# Patient Record
Sex: MALE | Race: WHITE | NOT HISPANIC OR LATINO | Employment: OTHER | URBAN - METROPOLITAN AREA
[De-identification: names, ages, dates, MRNs, and addresses within clinical notes are randomized per-mention and may not be internally consistent; named-entity substitution may affect disease eponyms.]

---

## 2017-01-25 ENCOUNTER — OFFICE VISIT (OUTPATIENT)
Dept: FAMILY MEDICINE CLINIC | Facility: CLINIC | Age: 72
End: 2017-01-25

## 2017-01-25 VITALS
BODY MASS INDEX: 26.92 KG/M2 | HEIGHT: 70 IN | WEIGHT: 188 LBS | OXYGEN SATURATION: 98 % | SYSTOLIC BLOOD PRESSURE: 122 MMHG | TEMPERATURE: 97.5 F | HEART RATE: 102 BPM | DIASTOLIC BLOOD PRESSURE: 66 MMHG

## 2017-01-25 DIAGNOSIS — M51.36 DDD (DEGENERATIVE DISC DISEASE), LUMBAR: Primary | ICD-10-CM

## 2017-01-25 PROCEDURE — 99213 OFFICE O/P EST LOW 20 MIN: CPT | Performed by: FAMILY MEDICINE

## 2017-01-25 RX ORDER — HYDROCODONE BITARTRATE AND ACETAMINOPHEN 10; 325 MG/1; MG/1
1 TABLET ORAL EVERY 6 HOURS PRN
Qty: 120 TABLET | Refills: 0 | Status: SHIPPED | OUTPATIENT
Start: 2017-01-25 | End: 2018-09-10 | Stop reason: DRUGHIGH

## 2017-01-25 NOTE — PROGRESS NOTES
Subjective   Ben Walsh is a 71 y.o. male.     History of Present Illness   Ben Walsh 71 y.o. male who presents today for recheck of low back pain.  Pain has been well controlled with hydrocodone.  he has a history of   Patient Active Problem List   Diagnosis   • Allergic rhinitis   • ADD (attention deficit disorder)   • DDD (degenerative disc disease), lumbar   • BPH (benign prostatic hypertrophy)   • Chronic pain   • ASCVD (arteriosclerotic cardiovascular disease)   • Depression   • Diverticulosis of colon   • GERD (gastroesophageal reflux disease)   • Hiatal hernia   • Hyperlipidemia   • Essential hypertension   • Primary hypothyroidism   • Insomnia   • Osteoarthritis, multiple sites   • Acute pancreatitis   • Vitamin D deficiency   • Glaucoma   • Abnormal nuclear stress test   • Two-vessel coronary artery disease   • SAEED (dyspnea on exertion)   • Obstructive sleep apnea syndrome   • Patent foramen ovale   • Snoring   • Disorder of rotator cuff   • History of COPD   • Type 2 diabetes mellitus with microalbuminuria, without long-term current use of insulin   • Carpal tunnel syndrome of right wrist   .      The following portions of the patient's history were reviewed and updated as appropriate: allergies, current medications, past family history, past medical history, past social history, past surgical history and problem list.    Review of Systems   Constitutional: Negative for fatigue.   Respiratory: Negative for shortness of breath.    Cardiovascular: Negative for chest pain.   Gastrointestinal: Negative for nausea and vomiting.   Musculoskeletal: Positive for back pain.   Skin: Negative.        Objective   Physical Exam   Constitutional: He appears well-developed and well-nourished.   HENT:   Head: Normocephalic.   Eyes: EOM are normal. Pupils are equal, round, and reactive to light.   Cardiovascular: Normal rate, regular rhythm and normal heart sounds.    Pulmonary/Chest: Effort normal and  breath sounds normal.   Musculoskeletal:        Lumbar back: He exhibits decreased range of motion and tenderness.   Skin: Skin is warm and dry.   Psychiatric: He has a normal mood and affect. His behavior is normal. Judgment and thought content normal.   Vitals reviewed.      Assessment/Plan   Ben was seen today for med refill.    Diagnoses and all orders for this visit:    DDD (degenerative disc disease), lumbar  -     HYDROcodone-acetaminophen (NORCO)  MG per tablet; Take 1 tablet by mouth Every 6 (Six) Hours As Needed for moderate pain (4-6).

## 2017-01-25 NOTE — MR AVS SNAPSHOT
Ben Walsh   1/25/2017 3:30 PM   Office Visit    Provider:  Kasey Castillo MD   Department:  [facility]   Dept Phone:  [phone]                Your Full Care Plan              Where to Get Your Medications      You can get these medications from any pharmacy     Bring a paper prescription for each of these medications     HYDROcodone-acetaminophen  MG per tablet            Your Updated Medication List          This list is accurate as of: 1/25/17  4:12 PM.  Always use your most recent med list.                ACCU-CHEK FASTCLIX LANCETS misc   TESTING BS 3 X DAY       amLODIPine 5 MG tablet   Commonly known as:  NORVASC   TAKE 1 TABLET EVERY DAY       aspirin 81 MG EC tablet       atorvastatin 40 MG tablet   Commonly known as:  LIPITOR       citalopram 40 MG tablet   Commonly known as:  CeleXA   TAKE 1 TABLET EVERY DAY       clopidogrel 75 MG tablet   Commonly known as:  PLAVIX   Take 1 tablet by mouth daily. First day take 4 tablets and then one tablet daily after       coenzyme Q10 100 MG capsule       cyclobenzaprine 10 MG tablet   Commonly known as:  FLEXERIL   Take 1 tablet by mouth 3 (three) times a day as needed for muscle spasms.       esomeprazole 20 MG capsule   Commonly known as:  nexIUM       glimepiride 2 MG tablet   Commonly known as:  AMARYL   1 tablet daily       glucose blood test strip   Commonly known as:  ACCU-CHEK SMARTVIEW   TESTING BS 3 X DAY DX CODE E11.29       HYDROcodone-acetaminophen  MG per tablet   Commonly known as:  NORCO   Take 1 tablet by mouth Every 6 (Six) Hours As Needed for moderate pain (4-6).       levothyroxine 50 MCG tablet   Commonly known as:  SYNTHROID, LEVOTHROID   TAKE 1 TABLET EVERY DAY       lisinopril 20 MG tablet   Commonly known as:  PRINIVIL,ZESTRIL       metFORMIN 1000 MG tablet   Commonly known as:  GLUCOPHAGE   1 tablet twice a day       MULTIVITAMIN ADULTS 50+ PO       NITROSTAT 0.4 MG SL tablet   Generic drug:  nitroglycerin       predniSONE 20 MG tablet   Commonly known as:  DELTASONE       PROBIOTIC DAILY capsule       tamsulosin 0.4 MG capsule 24 hr capsule   Commonly known as:  FLOMAX       vitamin D 15199 UNITS capsule capsule   Commonly known as:  ERGOCALCIFEROL   TAKE 1 CAPSULE EVERY WEEK       zolpidem 10 MG tablet   Commonly known as:  AMBIEN   Take 1 tablet by mouth At Night As Needed for sleep.               You Were Diagnosed With        Codes Comments    DDD (degenerative disc disease), lumbar    -  Primary ICD-10-CM: M51.36  ICD-9-CM: 722.52       Instructions     None    Patient Instructions History      [employer] Signup     [facility] allows you to send messages to your doctor, view your test results, renew your prescriptions, schedule appointments, and more. To sign up, go to [employer] and click on the Sign Up Now link in the New User? box. Enter your [employer] Activation Code exactly as it appears below along with the last four digits of your Social Security Number and your Date of Birth () to complete the sign-up process. If you do not sign up before the expiration date, you must request a new code.    [employer] Activation Code: [email or web address]  Expires: 2017  4:12 PM    If you have questions, you can email [email or web address] or call [phone] to talk to our [employer] staff. Remember, [employer] is NOT to be used for urgent needs. For medical emergencies, dial 911.               Other Info from Your Visit           Your Appointments     2017  1:15 PM EST   Office Visit with Matt Flores MD   [facility] (--)    [address]  [facility] [address] [phone]           Arrive 15 minutes prior to appointment.              Allergies     Onglyza [Saxagliptin]  Other (See Comments)    pancreatitis    Other Allergy Other (See Comments)    PLASTIC TAPE  "CAUSES RASH    Vibramycin [Doxycycline] Intolerance Nausea And Vomiting      Reason for Visit     Med Refill           Vital Signs     Blood Pressure Pulse Temperature Height Weight Oxygen Saturation    122/66 (BP Location: Left arm, Patient Position: Sitting, Cuff Size: Adult) 102 97.5 °F (36.4 °C) (Oral) 70\" (177.8 cm) 188 lb (85.3 kg) 98%    Body Mass Index Smoking Status                26.98 kg/m2 Never Smoker          Problems and Diagnoses Noted     Degeneration of lumbar or lumbosacral intervertebral disc      "

## 2017-02-02 RX ORDER — GUAIFENESIN, PSEUDOEPHEDRINE HYDROCHLORIDE 600; 60 MG/1; MG/1
1 TABLET, EXTENDED RELEASE ORAL EVERY 12 HOURS
Qty: 60 TABLET | Refills: 11 | Status: SHIPPED | OUTPATIENT
Start: 2017-02-02 | End: 2019-02-06

## 2017-02-10 DIAGNOSIS — M51.36 DDD (DEGENERATIVE DISC DISEASE), LUMBAR: Primary | ICD-10-CM

## 2017-02-21 ENCOUNTER — OFFICE VISIT (OUTPATIENT)
Dept: CARDIOLOGY | Facility: CLINIC | Age: 72
End: 2017-02-21

## 2017-02-21 VITALS
HEART RATE: 94 BPM | HEIGHT: 70 IN | BODY MASS INDEX: 27.06 KG/M2 | DIASTOLIC BLOOD PRESSURE: 83 MMHG | WEIGHT: 189 LBS | SYSTOLIC BLOOD PRESSURE: 124 MMHG

## 2017-02-21 DIAGNOSIS — I48.91 ATRIAL FIBRILLATION AND FLUTTER (HCC): Primary | ICD-10-CM

## 2017-02-21 DIAGNOSIS — I48.92 ATRIAL FIBRILLATION AND FLUTTER (HCC): Primary | ICD-10-CM

## 2017-02-21 PROBLEM — R94.31 ABNORMAL ELECTROCARDIOGRAM: Status: ACTIVE | Noted: 2017-02-21

## 2017-02-21 PROBLEM — R07.89 ATYPICAL CHEST PAIN: Status: ACTIVE | Noted: 2017-02-21

## 2017-02-21 PROCEDURE — 93000 ELECTROCARDIOGRAM COMPLETE: CPT | Performed by: INTERNAL MEDICINE

## 2017-02-21 PROCEDURE — 99213 OFFICE O/P EST LOW 20 MIN: CPT | Performed by: INTERNAL MEDICINE

## 2017-02-21 RX ORDER — ERGOCALCIFEROL 1.25 MG/1
50000 CAPSULE ORAL
Qty: 13 CAPSULE | Refills: 3 | Status: SHIPPED | OUTPATIENT
Start: 2017-02-21 | End: 2018-03-09 | Stop reason: SDUPTHER

## 2017-02-21 RX ORDER — TAMSULOSIN HYDROCHLORIDE 0.4 MG/1
1 CAPSULE ORAL DAILY
Qty: 90 CAPSULE | Refills: 3 | Status: SHIPPED | OUTPATIENT
Start: 2017-02-21 | End: 2018-09-24 | Stop reason: SDUPTHER

## 2017-02-21 NOTE — PROGRESS NOTES
Procedure   [facility]  Cardiology Progress Note    Patient Identification:  Name:Ben Walsh  Age:71 y.o.  Sex: male  :  1945  MRN: [id number]           2017    Subjective:    Chief Complaint   Patient presents with   • Abnormal ECG       JUNAID Walsh is a 71 y.o. year-old male here for cardiac follow-up on Coronary Artery Disease. Past medical history includes moderate disease of the RCA; treated medically as it is not significant by FFR and had stent to the LAD  with 2.5/12mm promus drug eluting stent, abnormal liver function test,depression, esophageal reflux, hyperlipidemia, hypertension, hypothyroidism, nonalcoholic fatty liver disease, obstructive sleep apnea, rotator cuff tear, diabetes mellitus and patent foramen ovale..    Repeat Cath in 2016 showed patentstent in the LAD and RCA disease is not that bad     . He denies any unusual dyspnea. He had left shoulder surgery done. He has some numbness in the right arm that was felt to be due to either carpal tunnel or cervical radiculopathy.  .    Since last visit the patient has been doing well. The patient denies chest pain, palpitations, dizziness, lightheadedness or syncopal episodes. He says he can cut 3 acres of lawn with no symptoms.    Review of Systems   Constitution: Negative for chills and fever.   Eyes: Negative for blurred vision and double vision.   Cardiovascular: Negative for chest pain, irregular heartbeat, leg swelling, near-syncope, palpitations and syncope.   Respiratory: Negative for cough, shortness of breath, snoring and wheezing.    Gastrointestinal: Negative for diarrhea, nausea and vomiting.   Neurological: Positive for numbness. Negative for dizziness, light-headedness and seizures.        Right arm   Psychiatric/Behavioral: Negative for depression.       Past Medical History   Diagnosis Date   • Acute pancreatitis      thought to be from the Januvia Rx,    • ADD (attention deficit  disorder)    • Allergic rhinitis    • Back pain    • BPH (benign prostatic hypertrophy)    • CAD (coronary artery disease)    • Chronic pain    • DDD (degenerative disc disease), lumbar    • Depression    • Diverticulitis of colon    • Diverticulitis of colon with hemorrhage    • Diverticulosis of colon    • Elevated PSA    • Essential hypertension    • GERD (gastroesophageal reflux disease)    • Glaucoma      Dr. Art   • Herpes zoster      HX   • Hiatal hernia    • Hyperlipidemia    • Hypothyroidism    • Impacted cerumen    • Incisional hernia    • Insomnia    • Joint pain    • Osteoarthritis, multiple sites    • RCT (rotator cuff tear)    • Type 2 diabetes mellitus    • Vitamin D deficiency        Past Surgical History   Procedure Laterality Date   • Shoulder surgery Left    • Back surgery     • Cardiac catheterization     • Coronary angioplasty with stent placement  07/2014   • Cataract extraction Left 03/2014   • Cervical spine surgery     • Colon resection     • Endoscopy     • Lumbar disc surgery     • Lumbar laminectomy     • Lumbar fusion     • Spine surgery       lumbrosacral   • Prostate biopsy     • Eye surgery Right 12/2011     Dr. River Art; had bilat YAG peripheral iridotomy for glaucoma   • Hernia repair  03/2009     also 4/09; Dr. Carson; incisional hernias   • Cardiac catheterization N/A 3/11/2016     Procedure: Coronary angiography;  Surgeon: Anastacio Flores MD;  [facility] INVAS[facility];  Service:    • Shoulder arthroscopy w/ rotator cuff repair Left 8/31/2016     Procedure: LEFT SHOULDER ARTHROSCOPY WITH ROTATOR CUFF REPAIR,  ACROMIOPLASTY, AND DEBRIDMENT OF LABRUM;  Surgeon: Guerrero Kruse MD;  [place] [facility] [place] [facility] [place]  Service:        Social History     Social History   • Marital status:      Spouse name: N/A   • Number of children: N/A   • Years of education: N/A     Occupational History   • Not on file.     Social History Main Topics   • Smoking status:  Never Smoker   • Smokeless tobacco: Never Used   • Alcohol use No   • Drug use: No   • Sexual activity: Not on file     Other Topics Concern   • Not on file     Social History Narrative       Family History   Problem Relation Age of Onset   • Diabetes Mother    • Heart failure Mother    • Hypertension Mother    • Hyperlipidemia Mother    • Alcohol abuse Father    • Cancer Father      prostate   • Cancer Sister    • Other Brother      demyelinating disease of central nervous system   • Cancer Brother    • Heart disease Sister        Scheduled Meds:    Current Outpatient Prescriptions:   •  ACCU-CHEK FASTCLIX LANCETS misc, TESTING BS 3 X DAY, Disp: 300 each, Rfl: 1  •  amLODIPine (NORVASC) 5 MG tablet, TAKE 1 TABLET EVERY DAY, Disp: 90 tablet, Rfl: 1  •  aspirin 81 MG EC tablet, Take 81 mg by mouth. INSTRUCTED NOT TO STOP PER MD, Disp: , Rfl:   •  atorvastatin (LIPITOR) 40 MG tablet, Take 80 mg by mouth daily., Disp: , Rfl:   •  citalopram (CeleXA) 40 MG tablet, TAKE 1 TABLET EVERY DAY, Disp: 90 tablet, Rfl: 1  •  clopidogrel (PLAVIX) 75 MG tablet, Take 1 tablet by mouth daily. First day take 4 tablets and then one tablet daily after, Disp: 30 tablet, Rfl: 4  •  coenzyme Q10 100 MG capsule, Take 100 mg by mouth Daily., Disp: , Rfl:   •  cyclobenzaprine (FLEXERIL) 10 MG tablet, Take 1 tablet by mouth 3 (three) times a day as needed for muscle spasms., Disp: 90 tablet, Rfl: 1  •  esomeprazole (NexIUM) 20 MG capsule, Take 20 mg by mouth every morning before breakfast., Disp: , Rfl:   •  glimepiride (AMARYL) 2 MG tablet, 1 tablet daily, Disp: 90 tablet, Rfl: 2  •  glucose blood (ACCU-CHEK SMARTVIEW) test strip, TESTING BS 3 X DAY DX CODE E11.29, Disp: 300 each, Rfl: 1  •  HYDROcodone-acetaminophen (NORCO)  MG per tablet, Take 1 tablet by mouth Every 6 (Six) Hours As Needed for moderate pain (4-6)., Disp: 120 tablet, Rfl: 0  •  levothyroxine (SYNTHROID, LEVOTHROID) 50 MCG tablet, TAKE 1 TABLET EVERY DAY, Disp: 90  "tablet, Rfl: 1  •  lisinopril (PRINIVIL,ZESTRIL) 20 MG tablet, , Disp: , Rfl:   •  metFORMIN (GLUCOPHAGE) 1000 MG tablet, 1 tablet twice a day, Disp: 180 tablet, Rfl: 2  •  Multiple Vitamins-Minerals (MULTIVITAMIN ADULTS 50+ PO), Take 1 tablet by mouth daily. PT HOLDING FOR SURGERY, Disp: , Rfl:   •  NITROSTAT 0.4 MG SL tablet, Place 0.4 mg under the tongue every 5 (five) minutes as needed., Disp: , Rfl:   •  predniSONE (DELTASONE) 20 MG tablet, Take 20 mg by mouth. 3 DAYS WEEK FOR ALLERGIES , Disp: , Rfl:   •  Probiotic Product (PROBIOTIC DAILY) capsule, Take 1 capsule by mouth daily., Disp: , Rfl:   •  pseudoephedrine-guaifenesin (MUCINEX D)  MG per 12 hr tablet, Take 1 tablet by mouth Every 12 (Twelve) Hours., Disp: 60 tablet, Rfl: 11  •  tamsulosin (FLOMAX) 0.4 MG capsule 24 hr capsule, 1 capsule daily., Disp: , Rfl:   •  vitamin D (ERGOCALCIFEROL) 32878 UNITS capsule capsule, TAKE 1 CAPSULE EVERY WEEK, Disp: 13 capsule, Rfl: 3  •  zolpidem (AMBIEN) 10 MG tablet, Take 1 tablet by mouth At Night As Needed for sleep., Disp: 30 tablet, Rfl: 5    Objective:  Visit Vitals   • /83   • Pulse 94   • Ht 70\" (177.8 cm)   • Wt 189 lb (85.7 kg)   • BMI 27.12 kg/m2        Physical Exam  Physical Exam:    General: No acute distress.    Skin: Warm and dry, no diaphoresis noted   HEENT: No ptosis; external ear and nose normal; oral mucosa moist   Neck: Supple; no carotid bruits; no JVD, Trachea mid line   Heart: S1S2 regular rate and rhythm; no murmurs; no gallop or rub appreciated, apex not displaced   Chest: Respirations regular, unlabored at rest, bilateral breath sounds have good air entry; no  wheezes auscultated.     Abdomen: Soft, non-tender, non-distended, positive bowel sounds  No hepatosplenomegaly   Extremities: Bilateral lower extremities have no pre-tibial pitting edema; Radials are palpable   Neurological: Alert and oriented x 3; no new motor deficits,           ECG 12 Lead  Date/Time: 2/21/2017 1:25 " PM  Performed by: HANNAH FLORES  Authorized by: HANNAH FLORES   Rhythm: sinus rhythm  Rate: normal  QRS axis: normal  Clinical impression: normal ECG           Comparison to previous ECG:  Similar to previous ecg     Cath 2016:       Assessment:  Problem List Items Addressed This Visit     None      Visit Diagnoses     Atrial fibrillation and flutter    -  Primary    Relevant Orders    ECG 12 Lead          Plan:    Overall clinically he has been stable with no angina. No active chest pain. I will continue the current medical regimen except we can stop the Aspirin. Weight loss is reemphasized.      02/21/2017  Anastacio Flores MD, FACC

## 2017-03-06 ENCOUNTER — HOSPITAL ENCOUNTER (OUTPATIENT)
Facility: HOSPITAL | Age: 72
Setting detail: HOSPITAL OUTPATIENT SURGERY
Discharge: HOME | End: 2017-03-06
Attending: SURGERY | Admitting: SURGERY

## 2017-03-06 ENCOUNTER — ANESTHESIA (OUTPATIENT)
Dept: GASTROENTEROLOGY | Facility: HOSPITAL | Age: 72
End: 2017-03-06

## 2017-03-06 ENCOUNTER — ANESTHESIA EVENT (OUTPATIENT)
Dept: GASTROENTEROLOGY | Facility: HOSPITAL | Age: 72
End: 2017-03-06

## 2017-03-06 VITALS
OXYGEN SATURATION: 98 % | RESPIRATION RATE: 16 BRPM | WEIGHT: 182.5 LBS | SYSTOLIC BLOOD PRESSURE: 113 MMHG | DIASTOLIC BLOOD PRESSURE: 66 MMHG | TEMPERATURE: 98.5 F | BODY MASS INDEX: 26.13 KG/M2 | HEART RATE: 76 BPM | HEIGHT: 70 IN

## 2017-03-06 LAB — GLUCOSE BLDC GLUCOMTR-MCNC: 171 MG/DL (ref 70–130)

## 2017-03-06 PROCEDURE — 82962 GLUCOSE BLOOD TEST: CPT

## 2017-03-06 PROCEDURE — 25010000002 MIDAZOLAM PER 1 MG: Performed by: ANESTHESIOLOGY

## 2017-03-06 PROCEDURE — 25010000002 PROPOFOL 10 MG/ML EMULSION: Performed by: ANESTHESIOLOGY

## 2017-03-06 RX ORDER — PROPOFOL 10 MG/ML
VIAL (ML) INTRAVENOUS AS NEEDED
Status: DISCONTINUED | OUTPATIENT
Start: 2017-03-06 | End: 2017-03-06 | Stop reason: SURG

## 2017-03-06 RX ORDER — SODIUM CHLORIDE 0.9 % (FLUSH) 0.9 %
1-10 SYRINGE (ML) INJECTION AS NEEDED
Status: DISCONTINUED | OUTPATIENT
Start: 2017-03-06 | End: 2017-03-06 | Stop reason: HOSPADM

## 2017-03-06 RX ORDER — SODIUM CHLORIDE, SODIUM LACTATE, POTASSIUM CHLORIDE, CALCIUM CHLORIDE 600; 310; 30; 20 MG/100ML; MG/100ML; MG/100ML; MG/100ML
30 INJECTION, SOLUTION INTRAVENOUS CONTINUOUS PRN
Status: DISCONTINUED | OUTPATIENT
Start: 2017-03-06 | End: 2017-03-06 | Stop reason: HOSPADM

## 2017-03-06 RX ORDER — PROPOFOL 10 MG/ML
VIAL (ML) INTRAVENOUS CONTINUOUS PRN
Status: DISCONTINUED | OUTPATIENT
Start: 2017-03-06 | End: 2017-03-06 | Stop reason: SURG

## 2017-03-06 RX ORDER — MIDAZOLAM HYDROCHLORIDE 1 MG/ML
INJECTION INTRAMUSCULAR; INTRAVENOUS AS NEEDED
Status: DISCONTINUED | OUTPATIENT
Start: 2017-03-06 | End: 2017-03-06 | Stop reason: SURG

## 2017-03-06 RX ORDER — LIDOCAINE HYDROCHLORIDE 20 MG/ML
INJECTION, SOLUTION INFILTRATION; PERINEURAL AS NEEDED
Status: DISCONTINUED | OUTPATIENT
Start: 2017-03-06 | End: 2017-03-06 | Stop reason: SURG

## 2017-03-06 RX ADMIN — PROPOFOL 100 MG: 10 INJECTION, EMULSION INTRAVENOUS at 11:50

## 2017-03-06 RX ADMIN — PROPOFOL 100 MCG/KG/MIN: 10 INJECTION, EMULSION INTRAVENOUS at 11:50

## 2017-03-06 RX ADMIN — MIDAZOLAM HYDROCHLORIDE 1 MG: 1 INJECTION, SOLUTION INTRAMUSCULAR; INTRAVENOUS at 11:49

## 2017-03-06 RX ADMIN — LIDOCAINE HYDROCHLORIDE 60 MG: 20 INJECTION, SOLUTION INFILTRATION; PERINEURAL at 11:50

## 2017-03-06 RX ADMIN — SODIUM CHLORIDE, POTASSIUM CHLORIDE, SODIUM LACTATE AND CALCIUM CHLORIDE 30 ML/HR: 600; 310; 30; 20 INJECTION, SOLUTION INTRAVENOUS at 11:29

## 2017-03-06 NOTE — H&P
Ben Walsh is a 71 y.o. male who presents today for a Colonoscopy.  Patient has a personal history of colon polyps and has not been checked in 5 years.    Past Medical History   Diagnosis Date   • Acute pancreatitis      thought to be from the Januvia Rx, 2014   • ADD (attention deficit disorder)    • Allergic rhinitis    • Back pain    • BPH (benign prostatic hypertrophy)    • CAD (coronary artery disease)    • Chronic pain    • DDD (degenerative disc disease), lumbar    • Depression    • Diverticulitis of colon    • Diverticulitis of colon with hemorrhage    • Diverticulosis of colon    • Elevated PSA    • Essential hypertension    • GERD (gastroesophageal reflux disease)    • Glaucoma      Dr. Art   • Herpes zoster      HX   • Hiatal hernia    • History of transfusion    • Hyperlipidemia    • Hypothyroidism    • Impacted cerumen    • Incisional hernia    • Insomnia    • Joint pain    • Osteoarthritis, multiple sites    • RCT (rotator cuff tear)    • Type 2 diabetes mellitus    • Vitamin D deficiency          Objective     Pt is in no distress.  Heart regular.  Chest clear.  Abdomen soft.  Rectal deferred to endoscopy.      Assessment/Plan      Plan a Colonoscopy today.  Risks and benefits were discussed.  Patient is agreeable.  Final recommendations will follow depending on the results.

## 2017-03-06 NOTE — PLAN OF CARE
Problem: Patient Care Overview (Adult)  Goal: Plan of Care Review  Outcome: Ongoing (interventions implemented as appropriate)    03/06/17 1130   Coping/Psychosocial Response Interventions   Plan Of Care Reviewed With patient   Patient Care Overview   Progress progress toward functional goals as expected       Goal: Adult Individualization and Mutuality  Outcome: Ongoing (interventions implemented as appropriate)    03/06/17 1130   Individualization   Patient Specific Preferences Jairon       Goal: Discharge Needs Assessment  Outcome: Ongoing (interventions implemented as appropriate)    03/06/17 1130   Discharge Needs Assessment   Concerns To Be Addressed denies needs/concerns at this time   Discharge Disposition home or self-care   Living Environment   Transportation Available car         Problem: GI Endoscopy (Adult)  Goal: Signs and Symptoms of Listed Potential Problems Will be Absent or Manageable (GI Endoscopy)  Outcome: Ongoing (interventions implemented as appropriate)    03/06/17 1130   GI Endoscopy   Problems Assessed (GI Endoscopy) all   Problems Present (GI Endoscopy) none

## 2017-03-06 NOTE — ANESTHESIA PREPROCEDURE EVALUATION
Anesthesia Evaluation     Patient summary reviewed and Nursing notes reviewed   no history of anesthetic complications:  NPO Status: > 8 hours   Airway   Mallampati: II  Dental      Pulmonary - normal exam   (+) sleep apnea,   Cardiovascular - normal exam    (+) hypertension well controlled, CAD, cardiac stents more than 12 months ago       Neuro/Psych  (+) psychiatric history Depression,    GI/Hepatic/Renal/Endo    (+)  hiatal hernia, GERD, diabetes mellitus type 2,     Musculoskeletal     Abdominal    Substance History      OB/GYN          Other                                    Anesthesia Plan    ASA 4     MAC     intravenous induction   Anesthetic plan and risks discussed with patient.

## 2017-03-06 NOTE — ANESTHESIA POSTPROCEDURE EVALUATION
Patient: Ben Walsh    Procedure Summary     Date Anesthesia Start Anesthesia Stop Room / Location    03/06/17 1147 1208  [facility] /  [facility]       Procedure Diagnosis Surgeon Provider    COLONOSCOPY into cecum (N/A ) No diagnosis on file. MD Lewis Elias MD          Anesthesia Type: MAC  Last vitals  /72 (03/06/17 1110)    Temp 36.9 °C (98.5 °F) (03/06/17 1110)    Pulse 77 (03/06/17 1110)   Resp 16 (03/06/17 1110)    SpO2 95 % (03/06/17 1110)      Post Anesthesia Care and Evaluation    Patient location during evaluation: bedside  Patient participation: complete - patient participated  Level of consciousness: awake and alert  Pain management: adequate  Airway patency: patent  Anesthetic complications: No anesthetic complications    Cardiovascular status: acceptable  Respiratory status: acceptable  Hydration status: acceptable

## 2017-04-12 ENCOUNTER — OFFICE VISIT (OUTPATIENT)
Dept: FAMILY MEDICINE CLINIC | Facility: CLINIC | Age: 72
End: 2017-04-12

## 2017-04-12 VITALS
SYSTOLIC BLOOD PRESSURE: 148 MMHG | DIASTOLIC BLOOD PRESSURE: 90 MMHG | HEIGHT: 70 IN | TEMPERATURE: 97.9 F | WEIGHT: 185 LBS | RESPIRATION RATE: 18 BRPM | HEART RATE: 90 BPM | BODY MASS INDEX: 26.48 KG/M2

## 2017-04-12 DIAGNOSIS — M62.838 MUSCLE SPASM: ICD-10-CM

## 2017-04-12 DIAGNOSIS — E03.9 PRIMARY HYPOTHYROIDISM: ICD-10-CM

## 2017-04-12 DIAGNOSIS — F33.42 RECURRENT MAJOR DEPRESSIVE DISORDER, IN FULL REMISSION (HCC): ICD-10-CM

## 2017-04-12 DIAGNOSIS — I10 ESSENTIAL HYPERTENSION: Primary | ICD-10-CM

## 2017-04-12 DIAGNOSIS — F51.01 PRIMARY INSOMNIA: ICD-10-CM

## 2017-04-12 PROCEDURE — 99214 OFFICE O/P EST MOD 30 MIN: CPT | Performed by: FAMILY MEDICINE

## 2017-04-12 RX ORDER — CITALOPRAM 40 MG/1
40 TABLET ORAL DAILY
Qty: 90 TABLET | Refills: 1 | Status: SHIPPED | OUTPATIENT
Start: 2017-04-12 | End: 2017-10-04 | Stop reason: SDUPTHER

## 2017-04-12 RX ORDER — ZOLPIDEM TARTRATE 10 MG/1
10 TABLET ORAL NIGHTLY PRN
Qty: 30 TABLET | Refills: 2 | Status: SHIPPED | OUTPATIENT
Start: 2017-04-12 | End: 2017-10-04 | Stop reason: SDUPTHER

## 2017-04-12 RX ORDER — LEVOTHYROXINE SODIUM 0.05 MG/1
50 TABLET ORAL DAILY
Qty: 90 TABLET | Refills: 1 | Status: SHIPPED | OUTPATIENT
Start: 2017-04-12 | End: 2017-10-04 | Stop reason: SDUPTHER

## 2017-04-12 RX ORDER — CYCLOBENZAPRINE HCL 10 MG
10 TABLET ORAL 3 TIMES DAILY PRN
Qty: 90 TABLET | Refills: 1 | Status: SHIPPED | OUTPATIENT
Start: 2017-04-12 | End: 2017-10-04 | Stop reason: SDUPTHER

## 2017-04-12 RX ORDER — AMLODIPINE BESYLATE 5 MG/1
5 TABLET ORAL DAILY
Qty: 90 TABLET | Refills: 1 | Status: SHIPPED | OUTPATIENT
Start: 2017-04-12 | End: 2017-10-04 | Stop reason: SDUPTHER

## 2017-04-12 NOTE — PROGRESS NOTES
Subjective   Ben Walsh is a 71 y.o. male.     History of Present Illness     Chief Complaint:   Chief Complaint   Patient presents with   • Hyperlipidemia     med refill / reza - [facility]   • Hypertension     see's specialist in pain mangement    • Hypothyroidism   • Arthritis   • Insomnia       Ben Walsh 71 y.o. male who presents today To see me for the first time (former Dr. Castillo pt)  for Medical Management of the below listed issues and medication refills.  he has a history of   Patient Active Problem List   Diagnosis   • Allergic rhinitis   • ADD (attention deficit disorder)   • DDD (degenerative disc disease), lumbar   • BPH (benign prostatic hypertrophy)   • Chronic pain   • ASCVD (arteriosclerotic cardiovascular disease)   • Depression   • Diverticulosis of colon   • GERD (gastroesophageal reflux disease)   • Hiatal hernia   • Hyperlipidemia   • Hypertension   • Primary hypothyroidism   • Insomnia   • Osteoarthritis, multiple sites   • Acute pancreatitis   • Vitamin D deficiency   • Glaucoma   • Abnormal nuclear stress test   • Two-vessel coronary artery disease   • SAEED (dyspnea on exertion)   • Obstructive sleep apnea syndrome   • Patent foramen ovale   • Snoring   • Disorder of rotator cuff   • History of COPD   • Type 2 diabetes mellitus with microalbuminuria, without long-term current use of insulin   • Carpal tunnel syndrome of right wrist   • Abnormal electrocardiogram   • Atypical chest pain   .  Since the last visit, he has overall felt well.  he has been compliant with   Current Outpatient Prescriptions:   •  amLODIPine (NORVASC) 5 MG tablet, Take 1 tablet by mouth Daily., Disp: 90 tablet, Rfl: 1  •  citalopram (CeleXA) 40 MG tablet, Take 1 tablet by mouth Daily., Disp: 90 tablet, Rfl: 1  •  cyclobenzaprine (FLEXERIL) 10 MG tablet, Take 1 tablet by mouth 3 (Three) Times a Day As Needed for Muscle Spasms., Disp: 90 tablet, Rfl: 1  •  levothyroxine (SYNTHROID, LEVOTHROID) 50 MCG  tablet, Take 1 tablet by mouth Daily., Disp: 90 tablet, Rfl: 1  •  zolpidem (AMBIEN) 10 MG tablet, Take 1 tablet by mouth At Night As Needed for Sleep., Disp: 30 tablet, Rfl: 2  •  ACCU-CHEK FASTCLIX LANCETS misc, TESTING BS 3 X DAY, Disp: 300 each, Rfl: 1  •  atorvastatin (LIPITOR) 40 MG tablet, Take 80 mg by mouth daily., Disp: , Rfl:   •  clopidogrel (PLAVIX) 75 MG tablet, Take 1 tablet by mouth daily. First day take 4 tablets and then one tablet daily after, Disp: 30 tablet, Rfl: 4  •  coenzyme Q10 100 MG capsule, Take 100 mg by mouth Daily., Disp: , Rfl:   •  esomeprazole (NexIUM) 20 MG capsule, Take 20 mg by mouth every morning before breakfast., Disp: , Rfl:   •  glimepiride (AMARYL) 2 MG tablet, 1 tablet daily, Disp: 90 tablet, Rfl: 2  •  glucose blood (ACCU-CHEK SMARTVIEW) test strip, TESTING BS 3 X DAY DX CODE E11.29, Disp: 300 each, Rfl: 1  •  HYDROcodone-acetaminophen (NORCO)  MG per tablet, Take 1 tablet by mouth Every 6 (Six) Hours As Needed for moderate pain (4-6)., Disp: 120 tablet, Rfl: 0  •  lisinopril (PRINIVIL,ZESTRIL) 20 MG tablet, , Disp: , Rfl:   •  metFORMIN (GLUCOPHAGE) 1000 MG tablet, 1 tablet twice a day, Disp: 180 tablet, Rfl: 2  •  Multiple Vitamins-Minerals (MULTIVITAMIN ADULTS 50+ PO), Take 1 tablet by mouth daily. PT HOLDING FOR SURGERY, Disp: , Rfl:   •  NITROSTAT 0.4 MG SL tablet, Place 0.4 mg under the tongue every 5 (five) minutes as needed., Disp: , Rfl:   •  predniSONE (DELTASONE) 20 MG tablet, Take 20 mg by mouth. 3 DAYS WEEK FOR ALLERGIES , Disp: , Rfl:   •  Probiotic Product (PROBIOTIC DAILY) capsule, Take 1 capsule by mouth daily., Disp: , Rfl:   •  pseudoephedrine-guaifenesin (MUCINEX D)  MG per 12 hr tablet, Take 1 tablet by mouth Every 12 (Twelve) Hours., Disp: 60 tablet, Rfl: 11  •  tamsulosin (FLOMAX) 0.4 MG capsule 24 hr capsule, Take 1 capsule by mouth Daily. For prostate, Disp: 90 capsule, Rfl: 3  •  vitamin D (ERGOCALCIFEROL) 23542 UNITS capsule capsule,  "Take 1 capsule by mouth Every 7 (Seven) Days., Disp: 13 capsule, Rfl: 3.  he denies medication side effects.    All of the chronic condition(s) listed above are stable w/o issues.    /90  Pulse 90  Temp 97.9 °F (36.6 °C) (Oral)   Resp 18  Ht 70\" (177.8 cm)  Wt 185 lb (83.9 kg)  BMI 26.54 kg/m2    Results for orders placed or performed during the hospital encounter of 03/06/17   POC Glucose Fingerstick   Result Value Ref Range    Glucose 171 (H) 70 - 130 mg/dL         The following portions of the patient's history were reviewed and updated as appropriate: allergies, current medications, past family history, past medical history, past social history, past surgical history and problem list.    Review of Systems   Constitutional: Negative for activity change, chills, fatigue and fever.   Respiratory: Negative for cough and chest tightness.    Cardiovascular: Negative for chest pain and palpitations.   Gastrointestinal: Negative for abdominal pain and nausea.   Endocrine: Negative for cold intolerance and polydipsia.   Psychiatric/Behavioral: Negative for behavioral problems and dysphoric mood.   All other systems reviewed and are negative.      Objective   Physical Exam   Constitutional: He appears well-developed and well-nourished.   Neck: Neck supple. No thyromegaly present.   Cardiovascular: Normal rate and regular rhythm.    No murmur heard.  Pulmonary/Chest: Effort normal and breath sounds normal.   Abdominal: Bowel sounds are normal.   Psychiatric: He has a normal mood and affect. His behavior is normal.   Nursing note and vitals reviewed.    The patient has read and signed the [facility] Controlled Substance Contract.  I will continue to see patient for regular follow up appointments.  They are well controlled on their medication.  NISHANT has been reviewed by me and is updated every 3 months. The patient is aware of the potential for addiction and dependence.    Assessment/Plan   Ben was seen " today for hyperlipidemia, hypertension, hypothyroidism, arthritis and insomnia.    Diagnoses and all orders for this visit:    Essential hypertension  -     amLODIPine (NORVASC) 5 MG tablet; Take 1 tablet by mouth Daily.    Primary insomnia  -     zolpidem (AMBIEN) 10 MG tablet; Take 1 tablet by mouth At Night As Needed for Sleep.    Primary hypothyroidism  -     levothyroxine (SYNTHROID, LEVOTHROID) 50 MCG tablet; Take 1 tablet by mouth Daily.    Recurrent major depressive disorder, in full remission  -     citalopram (CeleXA) 40 MG tablet; Take 1 tablet by mouth Daily.    Muscle spasm  -     cyclobenzaprine (FLEXERIL) 10 MG tablet; Take 1 tablet by mouth 3 (Three) Times a Day As Needed for Muscle Spasms.

## 2017-05-01 RX ORDER — CLOBETASOL PROPIONATE 0.46 MG/ML
SOLUTION TOPICAL 2 TIMES DAILY
Qty: 1 EACH | Refills: 5 | Status: SHIPPED | OUTPATIENT
Start: 2017-05-01 | End: 2019-05-29

## 2017-05-18 RX ORDER — ATORVASTATIN CALCIUM 40 MG/1
80 TABLET, FILM COATED ORAL DAILY
Qty: 60 TABLET | Refills: 1 | Status: SHIPPED | OUTPATIENT
Start: 2017-05-18 | End: 2017-10-04 | Stop reason: SDUPTHER

## 2017-05-19 RX ORDER — ATORVASTATIN CALCIUM 40 MG/1
TABLET, FILM COATED ORAL
Qty: 30 TABLET | Refills: 5 | Status: SHIPPED | OUTPATIENT
Start: 2017-05-19 | End: 2018-02-27 | Stop reason: SDUPTHER

## 2017-07-25 RX ORDER — CLOPIDOGREL BISULFATE 75 MG/1
75 TABLET ORAL DAILY
Qty: 30 TABLET | Refills: 0 | Status: SHIPPED | OUTPATIENT
Start: 2017-07-25 | End: 2017-12-06 | Stop reason: SDUPTHER

## 2017-08-22 RX ORDER — GLIMEPIRIDE 2 MG/1
TABLET ORAL
Qty: 90 TABLET | Refills: 0 | Status: SHIPPED | OUTPATIENT
Start: 2017-08-22 | End: 2017-10-20

## 2017-09-25 DIAGNOSIS — E03.9 PRIMARY HYPOTHYROIDISM: ICD-10-CM

## 2017-09-26 RX ORDER — LEVOTHYROXINE SODIUM 0.05 MG/1
TABLET ORAL
Qty: 90 TABLET | Refills: 1 | OUTPATIENT
Start: 2017-09-26

## 2017-10-04 ENCOUNTER — OFFICE VISIT (OUTPATIENT)
Dept: FAMILY MEDICINE CLINIC | Facility: CLINIC | Age: 72
End: 2017-10-04

## 2017-10-04 VITALS
TEMPERATURE: 98.2 F | WEIGHT: 188 LBS | HEART RATE: 94 BPM | SYSTOLIC BLOOD PRESSURE: 133 MMHG | HEIGHT: 70 IN | BODY MASS INDEX: 26.92 KG/M2 | RESPIRATION RATE: 18 BRPM | DIASTOLIC BLOOD PRESSURE: 82 MMHG

## 2017-10-04 DIAGNOSIS — F33.42 RECURRENT MAJOR DEPRESSIVE DISORDER, IN FULL REMISSION (HCC): ICD-10-CM

## 2017-10-04 DIAGNOSIS — Z00.00 MEDICARE ANNUAL WELLNESS VISIT, SUBSEQUENT: Primary | ICD-10-CM

## 2017-10-04 DIAGNOSIS — I10 ESSENTIAL HYPERTENSION: ICD-10-CM

## 2017-10-04 DIAGNOSIS — M62.838 MUSCLE SPASM: ICD-10-CM

## 2017-10-04 DIAGNOSIS — F51.01 PRIMARY INSOMNIA: ICD-10-CM

## 2017-10-04 DIAGNOSIS — E03.9 PRIMARY HYPOTHYROIDISM: ICD-10-CM

## 2017-10-04 PROCEDURE — 99214 OFFICE O/P EST MOD 30 MIN: CPT | Performed by: FAMILY MEDICINE

## 2017-10-04 PROCEDURE — G0439 PPPS, SUBSEQ VISIT: HCPCS | Performed by: FAMILY MEDICINE

## 2017-10-04 RX ORDER — CITALOPRAM 40 MG/1
40 TABLET ORAL DAILY
Qty: 90 TABLET | Refills: 1 | Status: SHIPPED | OUTPATIENT
Start: 2017-10-04 | End: 2018-03-27

## 2017-10-04 RX ORDER — AMLODIPINE BESYLATE 5 MG/1
5 TABLET ORAL DAILY
Qty: 90 TABLET | Refills: 1 | Status: SHIPPED | OUTPATIENT
Start: 2017-10-04 | End: 2018-03-27 | Stop reason: SDUPTHER

## 2017-10-04 RX ORDER — CYCLOBENZAPRINE HCL 10 MG
10 TABLET ORAL 3 TIMES DAILY PRN
Qty: 90 TABLET | Refills: 1 | Status: SHIPPED | OUTPATIENT
Start: 2017-10-04 | End: 2017-12-10 | Stop reason: SDUPTHER

## 2017-10-04 RX ORDER — LEVOTHYROXINE SODIUM 0.05 MG/1
50 TABLET ORAL DAILY
Qty: 90 TABLET | Refills: 1 | Status: SHIPPED | OUTPATIENT
Start: 2017-10-04 | End: 2017-10-20 | Stop reason: SDUPTHER

## 2017-10-04 RX ORDER — ZOLPIDEM TARTRATE 10 MG/1
10 TABLET ORAL NIGHTLY PRN
Qty: 30 TABLET | Refills: 2 | Status: SHIPPED | OUTPATIENT
Start: 2017-10-04 | End: 2018-03-27 | Stop reason: SDUPTHER

## 2017-10-04 NOTE — PROGRESS NOTES
QUICK REFERENCE INFORMATION:  The ABCs of the Annual Wellness Visit    Subsequent Medicare Wellness Visit    HEALTH RISK ASSESSMENT    1945    Recent Hospitalizations:  No hospitalization(s) within the last year..        Current Medical Providers:  Patient Care Team:  Jake Marcus MD as PCP - General (Family Medicine)  Kasey Castillo MD (Inactive) as PCP - Family Medicine  Matt Flores MD (Inactive) as Consulting Physician (Cardiology)  Guerrero Kruse MD as Consulting Physician (Orthopedic Surgery)  Kelton Francois MD as Consulting Physician (Ophthalmology)        Smoking Status:  History   Smoking Status   • Never Smoker   Smokeless Tobacco   • Never Used       Alcohol Consumption:  History   Alcohol Use No       Depression Screen:   PHQ-2/PHQ-9 Depression Screening 10/4/2017   Little interest or pleasure in doing things 0   Feeling down, depressed, or hopeless 0   Trouble falling or staying asleep, or sleeping too much 0   Feeling tired or having little energy 0   Poor appetite or overeating 0   Feeling bad about yourself - or that you are a failure or have let yourself or your family down 0   Trouble concentrating on things, such as reading the newspaper or watching television 0   Moving or speaking so slowly that other people could have noticed. Or the opposite - being so fidgety or restless that you have been moving around a lot more than usual 0   Thoughts that you would be better off dead, or of hurting yourself in some way 0   Total Score 0   If you checked off any problems, how difficult have these problems made it for you to do your work, take care of things at home, or get along with other people? Not difficult at all       Health Habits and Functional and Cognitive Screening:  Functional & Cognitive Status 10/4/2017   Do you have difficulty preparing food and eating? No   Do you have difficulty bathing yourself? No   Do you have difficulty getting dressed? No   Do you have  difficulty using the toilet? No   Do you have difficulty moving around from place to place? No   In the past year have you fallen or experienced a near fall? No   Do you need help using the phone?  No   Are you deaf or do you have serious difficulty hearing?  No   Do you need help with transportation? No   Do you need help shopping? No   Do you need help preparing meals?  No   Do you need help with housework?  No   Do you need help with laundry? No   Do you need help taking your medications? No   Do you need help managing money? No   Do you have difficulty concentrating, remembering or making decisions? No       Health Habits  Current Diet: Well Balanced Diet  Dental Exam: Up to date  Eye Exam: Up to date  Exercise (times per week): 3 times per week  Current Exercise Activities Include: Walking      Does the patient have evidence of cognitive impairment? No    Aspirin use counseling: On clopidrogel as an alternative (due to ASA contraindication)      Recent Lab Results:  CMP:  Lab Results   Component Value Date     (H) 11/02/2016    BUN 8 11/02/2016    CREATININE 0.91 11/02/2016    EGFRIFNONA 82 11/02/2016    EGFRIFAFRI 99 11/02/2016    BCR 8.8 11/02/2016     11/02/2016    K 4.7 11/02/2016    CO2 24.8 11/02/2016    CALCIUM 10.0 11/02/2016    PROTENTOTREF 7.2 11/02/2016    ALBUMIN 4.80 11/02/2016    LABGLOBREF 2.4 11/02/2016    LABIL2 2.0 11/02/2016    BILITOT 0.2 11/02/2016    ALKPHOS 82 11/02/2016    AST 17 11/02/2016    ALT 31 11/02/2016     Lipid Panel:  Lab Results   Component Value Date    TRIG 475 (H) 11/02/2016    HDL 36 (L) 11/02/2016    VLDL CANCELED 11/02/2016     HbA1c:  Lab Results   Component Value Date    HGBA1C 7.80 (H) 11/02/2016       Visual Acuity:  No exam data present    Age-appropriate Screening Schedule:  Refer to the list below for future screening recommendations based on patient's age, sex and/or medical conditions. Orders for these recommended tests are listed in the plan  section. The patient has been provided with a written plan.    Health Maintenance   Topic Date Due   • PNEUMOCOCCAL VACCINES (65+ LOW/MEDIUM RISK) (2 of 2 - PPSV23) 10/16/2016   • HEMOGLOBIN A1C  05/02/2017   • DIABETIC EYE EXAM  11/07/2017 (Originally 4/18/2017)   • INFLUENZA VACCINE  11/14/2017 (Originally 8/1/2017)   • DIABETIC FOOT EXAM  11/20/2017 (Originally 12/16/2015)   • TDAP/TD VACCINES (1 - Tdap) 11/20/2017 (Originally 5/31/1964)   • ZOSTER VACCINE  11/20/2017 (Originally 3/16/2016)   • LIPID PANEL  11/02/2017   • URINE MICROALBUMIN  11/02/2017   • COLONOSCOPY  03/06/2027        Subjective   History of Present Illness    Ben Walsh is a 72 y.o. male who presents for an Subsequent Wellness Visit.    The following portions of the patient's history were reviewed and updated as appropriate: allergies, current medications, past family history, past medical history, past social history, past surgical history and problem list.    Outpatient Medications Prior to Visit   Medication Sig Dispense Refill   • amLODIPine (NORVASC) 5 MG tablet Take 1 tablet by mouth Daily. 90 tablet 1   • citalopram (CeleXA) 40 MG tablet Take 1 tablet by mouth Daily. 90 tablet 1   • cyclobenzaprine (FLEXERIL) 10 MG tablet Take 1 tablet by mouth 3 (Three) Times a Day As Needed for Muscle Spasms. 90 tablet 1   • levothyroxine (SYNTHROID, LEVOTHROID) 50 MCG tablet Take 1 tablet by mouth Daily. 90 tablet 1   • ACCU-CHEK FASTCLIX LANCETS [facility] [place] TESTING BS 3 X  each 1   • atorvastatin (LIPITOR) 40 MG tablet TAKE ONE TABLET BY MOUTH DAILY 30 tablet 5   • clobetasol (TEMOVATE) 0.05 % external solution Apply  topically 2 (Two) Times a Day. Apply and rub into scalp lesions BID PRN 1 each 5   • clopidogrel (PLAVIX) 75 MG tablet Take 1 tablet by mouth Daily. First day take 4 tablets and then one tablet daily after 30 tablet 0   • coenzyme Q10 100 MG capsule Take 100 mg by mouth Daily.     • esomeprazole (NexIUM) 20 MG capsule Take 20  mg by mouth every morning before breakfast.     • glimepiride (AMARYL) 2 MG tablet TAKE 1 TABLET EVERY DAY 90 tablet 0   • glucose blood (ACCU-CHEK SMARTVIEW) test strip TESTING BS 3 X DAY DX CODE E11.29 300 each 1   • HYDROcodone-acetaminophen (NORCO)  MG per tablet Take 1 tablet by mouth Every 6 (Six) Hours As Needed for moderate pain (4-6). 120 tablet 0   • lisinopril (PRINIVIL,ZESTRIL) 20 MG tablet      • metFORMIN (GLUCOPHAGE) 1000 MG tablet 1 tablet twice a day 180 tablet 2   • Multiple Vitamins-Minerals (MULTIVITAMIN ADULTS 50+ PO) Take 1 tablet by mouth daily. PT HOLDING FOR SURGERY     • NITROSTAT 0.4 MG SL tablet Place 0.4 mg under the tongue every 5 (five) minutes as needed.     • predniSONE (DELTASONE) 20 MG tablet Take 20 mg by mouth. 3 DAYS WEEK FOR ALLERGIES      • Probiotic Product (PROBIOTIC DAILY) capsule Take 1 capsule by mouth daily.     • pseudoephedrine-guaifenesin (MUCINEX D)  MG per 12 hr tablet Take 1 tablet by mouth Every 12 (Twelve) Hours. 60 tablet 11   • tamsulosin (FLOMAX) 0.4 MG capsule 24 hr capsule Take 1 capsule by mouth Daily. For prostate 90 capsule 3   • vitamin D (ERGOCALCIFEROL) 69840 UNITS capsule capsule Take 1 capsule by mouth Every 7 (Seven) Days. 13 capsule 3   • atorvastatin (LIPITOR) 40 MG tablet Take 2 tablets by mouth Daily. 60 tablet 1   • zolpidem (AMBIEN) 10 MG tablet Take 1 tablet by mouth At Night As Needed for Sleep. 30 tablet 2     No facility-administered medications prior to visit.        Patient Active Problem List   Diagnosis   • Allergic rhinitis   • ADD (attention deficit disorder)   • DDD (degenerative disc disease), lumbar   • BPH (benign prostatic hypertrophy)   • Chronic pain   • ASCVD (arteriosclerotic cardiovascular disease)   • Depression   • Diverticulosis of colon   • GERD (gastroesophageal reflux disease)   • Hiatal hernia   • Hyperlipidemia   • Hypertension   • Primary hypothyroidism   • Insomnia   • Osteoarthritis, multiple sites  "  • Acute pancreatitis   • Vitamin D deficiency   • Glaucoma   • Abnormal nuclear stress test   • Two-vessel coronary artery disease   • SAEED (dyspnea on exertion)   • Obstructive sleep apnea syndrome   • Patent foramen ovale   • Snoring   • Disorder of rotator cuff   • History of COPD   • Type 2 diabetes mellitus with microalbuminuria, without long-term current use of insulin   • Carpal tunnel syndrome of right wrist   • Abnormal electrocardiogram   • Atypical chest pain       Advance Care Planning:  has an advance directive - a copy HAS NOT been provided    Identification of Risk Factors:  Risk factors include: cardiovascular risk.    Review of Systems    Compared to one year ago, the patient feels his physical health is the same.  Compared to one year ago, the patient feels his mental health is the same.    Objective     Physical Exam    Vitals:    10/04/17 1133   BP: 133/82   Pulse: 94   Resp: 18   Temp: 98.2 °F (36.8 °C)   TempSrc: Oral   Weight: 188 lb (85.3 kg)   Height: 70\" (177.8 cm)   PainSc: 0-No pain       Body mass index is 26.98 kg/(m^2).  Discussed the patient's BMI with him. The BMI is in the acceptable range.    Assessment/Plan   Patient Self-Management and Personalized Health Advice  The patient has been provided with information about: diet, exercise and weight management and preventive services including:   · Exercise counseling provided, Fall Risk assessment done, Nutrition counseling provided.    Visit Diagnoses:    ICD-10-CM ICD-9-CM   1. Medicare annual wellness visit, subsequent Z00.00 V70.0   2. Essential hypertension I10 401.9   3. Recurrent major depressive disorder, in full remission F33.42 296.36   4. Muscle spasm M62.838 728.85   5. Primary hypothyroidism E03.9 244.9   6. Primary insomnia F51.01 307.42       No orders of the defined types were placed in this encounter.      Outpatient Encounter Prescriptions as of 10/4/2017   Medication Sig Dispense Refill   • amLODIPine (NORVASC) 5 MG " tablet Take 1 tablet by mouth Daily. 90 tablet 1   • citalopram (CeleXA) 40 MG tablet Take 1 tablet by mouth Daily. 90 tablet 1   • cyclobenzaprine (FLEXERIL) 10 MG tablet Take 1 tablet by mouth 3 (Three) Times a Day As Needed for Muscle Spasms. 90 tablet 1   • levothyroxine (SYNTHROID, LEVOTHROID) 50 MCG tablet Take 1 tablet by mouth Daily. 90 tablet 1   • [DISCONTINUED] amLODIPine (NORVASC) 5 MG tablet Take 1 tablet by mouth Daily. 90 tablet 1   • [DISCONTINUED] citalopram (CeleXA) 40 MG tablet Take 1 tablet by mouth Daily. 90 tablet 1   • [DISCONTINUED] cyclobenzaprine (FLEXERIL) 10 MG tablet Take 1 tablet by mouth 3 (Three) Times a Day As Needed for Muscle Spasms. 90 tablet 1   • [DISCONTINUED] levothyroxine (SYNTHROID, LEVOTHROID) 50 MCG tablet Take 1 tablet by mouth Daily. 90 tablet 1   • ACCU-CHEK FASTCLIX LANCETS misc TESTING BS 3 X  each 1   • atorvastatin (LIPITOR) 40 MG tablet TAKE ONE TABLET BY MOUTH DAILY 30 tablet 5   • clobetasol (TEMOVATE) 0.05 % external solution Apply  topically 2 (Two) Times a Day. Apply and rub into scalp lesions BID PRN 1 each 5   • clopidogrel (PLAVIX) 75 MG tablet Take 1 tablet by mouth Daily. First day take 4 tablets and then one tablet daily after 30 tablet 0   • coenzyme Q10 100 MG capsule Take 100 mg by mouth Daily.     • esomeprazole (NexIUM) 20 MG capsule Take 20 mg by mouth every morning before breakfast.     • glimepiride (AMARYL) 2 MG tablet TAKE 1 TABLET EVERY DAY 90 tablet 0   • glucose blood (ACCU-CHEK SMARTVIEW) test strip TESTING BS 3 X DAY DX CODE E11.29 300 each 1   • HYDROcodone-acetaminophen (NORCO)  MG per tablet Take 1 tablet by mouth Every 6 (Six) Hours As Needed for moderate pain (4-6). 120 tablet 0   • lisinopril (PRINIVIL,ZESTRIL) 20 MG tablet      • metFORMIN (GLUCOPHAGE) 1000 MG tablet 1 tablet twice a day 180 tablet 2   • Multiple Vitamins-Minerals (MULTIVITAMIN ADULTS 50+ PO) Take 1 tablet by mouth daily. PT HOLDING FOR SURGERY     •  NITROSTAT 0.4 MG SL tablet Place 0.4 mg under the tongue every 5 (five) minutes as needed.     • predniSONE (DELTASONE) 20 MG tablet Take 20 mg by mouth. 3 DAYS WEEK FOR ALLERGIES      • Probiotic Product (PROBIOTIC DAILY) capsule Take 1 capsule by mouth daily.     • pseudoephedrine-guaifenesin (MUCINEX D)  MG per 12 hr tablet Take 1 tablet by mouth Every 12 (Twelve) Hours. 60 tablet 11   • tamsulosin (FLOMAX) 0.4 MG capsule 24 hr capsule Take 1 capsule by mouth Daily. For prostate 90 capsule 3   • vitamin D (ERGOCALCIFEROL) 64606 UNITS capsule capsule Take 1 capsule by mouth Every 7 (Seven) Days. 13 capsule 3   • zolpidem (AMBIEN) 10 MG tablet Take 1 tablet by mouth At Night As Needed for Sleep. 30 tablet 2   • [DISCONTINUED] atorvastatin (LIPITOR) 40 MG tablet Take 2 tablets by mouth Daily. 60 tablet 1   • [DISCONTINUED] zolpidem (AMBIEN) 10 MG tablet Take 1 tablet by mouth At Night As Needed for Sleep. 30 tablet 2     No facility-administered encounter medications on file as of 10/4/2017.        Reviewed use of high risk medication in the elderly: not applicable  Reviewed for potential of harmful drug interactions in the elderly: not applicable    Follow Up:  No Follow-up on file.     An After Visit Summary and PPPS with all of these plans were given to the patient.

## 2017-10-04 NOTE — PROGRESS NOTES
Subjective   Ben Walsh is a 72 y.o. male.     History of Present Illness     Chief Complaint:   Chief Complaint   Patient presents with   • Hypertension     MED REFILL    • Hypothyroidism   • MEDICARE WELLNESS EXAM   • Depression       Ben Walsh 72 y.o. male who presents today for Medical Management of the below listed issues and medication refills.  he has a problem list of   Patient Active Problem List   Diagnosis   • Allergic rhinitis   • ADD (attention deficit disorder)   • DDD (degenerative disc disease), lumbar   • BPH (benign prostatic hypertrophy)   • Chronic pain   • ASCVD (arteriosclerotic cardiovascular disease)   • Depression   • Diverticulosis of colon   • GERD (gastroesophageal reflux disease)   • Hiatal hernia   • Hyperlipidemia   • Hypertension   • Primary hypothyroidism   • Insomnia   • Osteoarthritis, multiple sites   • Acute pancreatitis   • Vitamin D deficiency   • Glaucoma   • Abnormal nuclear stress test   • Two-vessel coronary artery disease   • SAEED (dyspnea on exertion)   • Obstructive sleep apnea syndrome   • Patent foramen ovale   • Snoring   • Disorder of rotator cuff   • History of COPD   • Type 2 diabetes mellitus with microalbuminuria, without long-term current use of insulin   • Carpal tunnel syndrome of right wrist   • Abnormal electrocardiogram   • Atypical chest pain   .  Since the last visit, he has overall felt well.  he has been compliant with   Current Outpatient Prescriptions:   •  amLODIPine (NORVASC) 5 MG tablet, Take 1 tablet by mouth Daily., Disp: 90 tablet, Rfl: 1  •  citalopram (CeleXA) 40 MG tablet, Take 1 tablet by mouth Daily., Disp: 90 tablet, Rfl: 1  •  cyclobenzaprine (FLEXERIL) 10 MG tablet, Take 1 tablet by mouth 3 (Three) Times a Day As Needed for Muscle Spasms., Disp: 90 tablet, Rfl: 1  •  levothyroxine (SYNTHROID, LEVOTHROID) 50 MCG tablet, Take 1 tablet by mouth Daily., Disp: 90 tablet, Rfl: 1  •  ACCU-CHEK FASTCLIX LANCETS [facility] [place] 3  X DAY, Disp: 300 each, Rfl: 1  •  atorvastatin (LIPITOR) 40 MG tablet, TAKE ONE TABLET BY MOUTH DAILY, Disp: 30 tablet, Rfl: 5  •  clobetasol (TEMOVATE) 0.05 % external solution, Apply  topically 2 (Two) Times a Day. Apply and rub into scalp lesions BID PRN, Disp: 1 each, Rfl: 5  •  clopidogrel (PLAVIX) 75 MG tablet, Take 1 tablet by mouth Daily. First day take 4 tablets and then one tablet daily after, Disp: 30 tablet, Rfl: 0  •  coenzyme Q10 100 MG capsule, Take 100 mg by mouth Daily., Disp: , Rfl:   •  esomeprazole (NexIUM) 20 MG capsule, Take 20 mg by mouth every morning before breakfast., Disp: , Rfl:   •  glimepiride (AMARYL) 2 MG tablet, TAKE 1 TABLET EVERY DAY, Disp: 90 tablet, Rfl: 0  •  glucose blood (ACCU-CHEK SMARTVIEW) test strip, TESTING BS 3 X DAY DX CODE E11.29, Disp: 300 each, Rfl: 1  •  HYDROcodone-acetaminophen (NORCO)  MG per tablet, Take 1 tablet by mouth Every 6 (Six) Hours As Needed for moderate pain (4-6)., Disp: 120 tablet, Rfl: 0  •  lisinopril (PRINIVIL,ZESTRIL) 20 MG tablet, , Disp: , Rfl:   •  metFORMIN (GLUCOPHAGE) 1000 MG tablet, 1 tablet twice a day, Disp: 180 tablet, Rfl: 2  •  Multiple Vitamins-Minerals (MULTIVITAMIN ADULTS 50+ PO), Take 1 tablet by mouth daily. PT HOLDING FOR SURGERY, Disp: , Rfl:   •  NITROSTAT 0.4 MG SL tablet, Place 0.4 mg under the tongue every 5 (five) minutes as needed., Disp: , Rfl:   •  predniSONE (DELTASONE) 20 MG tablet, Take 20 mg by mouth. 3 DAYS WEEK FOR ALLERGIES , Disp: , Rfl:   •  Probiotic Product (PROBIOTIC DAILY) capsule, Take 1 capsule by mouth daily., Disp: , Rfl:   •  pseudoephedrine-guaifenesin (MUCINEX D)  MG per 12 hr tablet, Take 1 tablet by mouth Every 12 (Twelve) Hours., Disp: 60 tablet, Rfl: 11  •  tamsulosin (FLOMAX) 0.4 MG capsule 24 hr capsule, Take 1 capsule by mouth Daily. For prostate, Disp: 90 capsule, Rfl: 3  •  vitamin D (ERGOCALCIFEROL) 12480 UNITS capsule capsule, Take 1 capsule by mouth Every 7 (Seven) Days.,  "Disp: 13 capsule, Rfl: 3  •  zolpidem (AMBIEN) 10 MG tablet, Take 1 tablet by mouth At Night As Needed for Sleep., Disp: 30 tablet, Rfl: 2.  he denies medication side effects.    All of the chronic condition(s) listed above are stable w/o issues.    /82  Pulse 94  Temp 98.2 °F (36.8 °C) (Oral)   Resp 18  Ht 70\" (177.8 cm)  Wt 188 lb (85.3 kg)  BMI 26.98 kg/m2    Results for orders placed or performed during the hospital encounter of 03/06/17   POC Glucose Fingerstick   Result Value Ref Range    Glucose 171 (H) 70 - 130 mg/dL         The following portions of the patient's history were reviewed and updated as appropriate: allergies, current medications, past family history, past medical history, past social history, past surgical history and problem list.    Review of Systems   Constitutional: Negative for activity change, chills, fatigue and fever.   Respiratory: Negative for cough and shortness of breath.    Cardiovascular: Negative for chest pain and palpitations.   Gastrointestinal: Negative for abdominal pain.   Endocrine: Negative for cold intolerance.   Psychiatric/Behavioral: Negative for behavioral problems and dysphoric mood. The patient is not nervous/anxious.        Objective   Physical Exam   Constitutional: He appears well-developed and well-nourished.   Neck: Neck supple. No thyromegaly present.   Cardiovascular: Normal rate and regular rhythm.    No murmur heard.  Pulmonary/Chest: Effort normal and breath sounds normal.   Abdominal: Bowel sounds are normal.   Psychiatric: He has a normal mood and affect. His behavior is normal.   Nursing note and vitals reviewed.    The patient has read and signed the [facility] Controlled Substance Contract.  I will continue to see patient for regular follow up appointments.  They are well controlled on their medication.  NISHANT has been reviewed by me and is updated every 3 months. The patient is aware of the potential for addiction and " dependence.    Assessment/Plan   Ben was seen today for hypertension, hypothyroidism, medicare wellness exam and depression.    Diagnoses and all orders for this visit:    Medicare annual wellness visit, subsequent    Essential hypertension  -     amLODIPine (NORVASC) 5 MG tablet; Take 1 tablet by mouth Daily.    Recurrent major depressive disorder, in full remission  -     citalopram (CeleXA) 40 MG tablet; Take 1 tablet by mouth Daily.    Muscle spasm  -     cyclobenzaprine (FLEXERIL) 10 MG tablet; Take 1 tablet by mouth 3 (Three) Times a Day As Needed for Muscle Spasms.    Primary hypothyroidism  -     levothyroxine (SYNTHROID, LEVOTHROID) 50 MCG tablet; Take 1 tablet by mouth Daily.    Primary insomnia  -     zolpidem (AMBIEN) 10 MG tablet; Take 1 tablet by mouth At Night As Needed for Sleep.

## 2017-10-04 NOTE — PATIENT INSTRUCTIONS
Medicare Wellness  Personal Prevention Plan of Service     Date of Office Visit:  10/04/2017  Encounter Provider:  Jake Marcus MD  Place of Service:  [facility]  Patient Name: Ben Walsh  :  1945    As part of the Medicare Wellness portion of your visit today, we are providing you with this personalized preventive plan of services (PPPS). This plan is based upon recommendations of the [place] Preventive Services Task Force (USPSTF) and the Advisory Committee on Immunization Practices (ACIP).    This lists the preventive care services that should be considered, and provides dates of when you are due. Items listed as completed are up-to-date and do not require any further intervention.    Health Maintenance   Topic Date Due   • AAA SCREEN (ONE-TIME)  2015   • PNEUMOCOCCAL VACCINES (65+ LOW/MEDIUM RISK) (2 of 2 - PPSV23) 10/16/2016   • HEMOGLOBIN A1C  2017   • DIABETIC EYE EXAM  2017 (Originally 2017)   • INFLUENZA VACCINE  2017 (Originally 2017)   • DIABETIC FOOT EXAM  2017 (Originally 2015)   • TDAP/TD VACCINES (1 - Tdap) 2017 (Originally 1962)   • ZOSTER VACCINE  2017 (Originally 3/16/2016)   • LIPID PANEL  2017   • URINE MICROALBUMIN  2017   • MEDICARE ANNUAL WELLNESS  10/04/2018   • COLONOSCOPY  2027       No orders of the defined types were placed in this encounter.      Return in about 6 months (around 2018) for Recheck.

## 2017-10-11 ENCOUNTER — LAB (OUTPATIENT)
Dept: ENDOCRINOLOGY | Age: 72
End: 2017-10-11

## 2017-10-11 DIAGNOSIS — E11.29 TYPE 2 DIABETES MELLITUS WITH MICROALBUMINURIA, WITHOUT LONG-TERM CURRENT USE OF INSULIN (HCC): ICD-10-CM

## 2017-10-11 DIAGNOSIS — R80.9 TYPE 2 DIABETES MELLITUS WITH MICROALBUMINURIA, WITHOUT LONG-TERM CURRENT USE OF INSULIN (HCC): ICD-10-CM

## 2017-10-11 DIAGNOSIS — E03.9 PRIMARY HYPOTHYROIDISM: ICD-10-CM

## 2017-10-11 DIAGNOSIS — E78.5 HYPERLIPIDEMIA, UNSPECIFIED HYPERLIPIDEMIA TYPE: Primary | ICD-10-CM

## 2017-10-11 DIAGNOSIS — E78.5 HYPERLIPIDEMIA, UNSPECIFIED HYPERLIPIDEMIA TYPE: ICD-10-CM

## 2017-10-11 DIAGNOSIS — E55.9 VITAMIN D DEFICIENCY: ICD-10-CM

## 2017-10-16 LAB
25(OH)D3+25(OH)D2 SERPL-MCNC: 42.1 NG/ML (ref 30–100)
ALBUMIN SERPL-MCNC: 4.6 G/DL (ref 3.5–5.2)
ALBUMIN/GLOB SERPL: 1.7 G/DL
ALP SERPL-CCNC: 88 U/L (ref 39–117)
ALT SERPL-CCNC: 34 U/L (ref 1–41)
AST SERPL-CCNC: 22 U/L (ref 1–40)
BILIRUB SERPL-MCNC: 0.4 MG/DL (ref 0.1–1.2)
BUN SERPL-MCNC: 11 MG/DL (ref 8–23)
BUN/CREAT SERPL: 11.3 (ref 7–25)
C PEPTIDE SERPL-MCNC: 10.4 NG/ML (ref 1.1–4.4)
CALCIUM SERPL-MCNC: 9.9 MG/DL (ref 8.6–10.5)
CHLORIDE SERPL-SCNC: 100 MMOL/L (ref 98–107)
CHOLEST SERPL-MCNC: 144 MG/DL (ref 0–200)
CO2 SERPL-SCNC: 22.6 MMOL/L (ref 22–29)
CREAT SERPL-MCNC: 0.97 MG/DL (ref 0.76–1.27)
FT4I SERPL CALC-MCNC: 1.4 (ref 1.2–4.9)
GLOBULIN SER CALC-MCNC: 2.7 GM/DL
GLUCOSE SERPL-MCNC: 291 MG/DL (ref 65–99)
HBA1C MFR BLD: 8.54 % (ref 4.8–5.6)
HDLC SERPL-MCNC: 35 MG/DL (ref 40–60)
LDLC SERPL CALC-MCNC: ABNORMAL MG/DL
POTASSIUM SERPL-SCNC: 4.8 MMOL/L (ref 3.5–5.2)
PROT SERPL-MCNC: 7.3 G/DL (ref 6–8.5)
SODIUM SERPL-SCNC: 139 MMOL/L (ref 136–145)
T3FREE SERPL-MCNC: 3.1 PG/ML (ref 2–4.4)
T3RU NFR SERPL: 26 % (ref 24–39)
T4 FREE SERPL-MCNC: 0.93 NG/DL (ref 0.93–1.7)
T4 SERPL-MCNC: 5.5 UG/DL (ref 4.5–12)
THYROGLOB AB SERPL-ACNC: <1 IU/ML (ref 0–0.9)
THYROGLOB SERPL-MCNC: <2 NG/ML
TRIGL SERPL-MCNC: 404 MG/DL (ref 0–150)
TSH SERPL DL<=0.005 MIU/L-ACNC: 2.37 UIU/ML (ref 0.45–4.5)
VLDLC SERPL CALC-MCNC: ABNORMAL MG/DL

## 2017-10-20 ENCOUNTER — OFFICE VISIT (OUTPATIENT)
Dept: ENDOCRINOLOGY | Age: 72
End: 2017-10-20

## 2017-10-20 VITALS
HEIGHT: 70 IN | BODY MASS INDEX: 27.29 KG/M2 | WEIGHT: 190.6 LBS | SYSTOLIC BLOOD PRESSURE: 124 MMHG | DIASTOLIC BLOOD PRESSURE: 78 MMHG

## 2017-10-20 DIAGNOSIS — R80.9 TYPE 2 DIABETES MELLITUS WITH MICROALBUMINURIA, WITHOUT LONG-TERM CURRENT USE OF INSULIN (HCC): Primary | ICD-10-CM

## 2017-10-20 DIAGNOSIS — E03.9 PRIMARY HYPOTHYROIDISM: ICD-10-CM

## 2017-10-20 DIAGNOSIS — E11.29 TYPE 2 DIABETES MELLITUS WITH MICROALBUMINURIA, WITHOUT LONG-TERM CURRENT USE OF INSULIN (HCC): Primary | ICD-10-CM

## 2017-10-20 DIAGNOSIS — E78.5 HYPERLIPIDEMIA, UNSPECIFIED HYPERLIPIDEMIA TYPE: ICD-10-CM

## 2017-10-20 DIAGNOSIS — I10 ESSENTIAL HYPERTENSION: ICD-10-CM

## 2017-10-20 DIAGNOSIS — E55.9 VITAMIN D DEFICIENCY: ICD-10-CM

## 2017-10-20 PROCEDURE — 99214 OFFICE O/P EST MOD 30 MIN: CPT | Performed by: NURSE PRACTITIONER

## 2017-10-20 NOTE — PATIENT INSTRUCTIONS
Stop the Amaryl and metformin    Start Synjardy 12.5/1000 1 in the morning one in the evening-with this medication take vitamin C over-the-counter 500 mg once daily and continue your vitamin D increase water at least 3 bottles to 4 bottles daily especially the first 3-4 weeks while starting this medication    New instructions for basal insulIn  Tresiba U200 15 units in AM   Titration instructions - increase AM dose 2 units every 1 days if fasting blood glucose is over 110 mg/Vahid    Start vascepa 1 g 2 capsules twice daily    Start fenofibrate/TriCor 145 mg 1 before bed or dinnertime    home blood tests -  Blood glucose testing: 3-4 times daily, that are:  fasting- 1st thing in morning before eating or drinking  before each meal and 1 or 2 hours after meal  bedtime  anytime you feel symptoms of hyperglycemia or hypoglycemia (high or low blood sugars)

## 2017-10-20 NOTE — PROGRESS NOTES
Ben Walsh  presents to the office  for the follow-up appointment for Type 2 diabetes mellitus.     The diabete's condition location is throughout the system with the  clinical course has fluctuated, the severity is Mild, and the modifying/allievating factors are oral medications.  Medications and  Dosages were  reviewed with Ben Walsh and suggested that compliance most of the time.    Associated symptoms of hyperglycemia have been none.  Associated symptoms of hypoglycemia have been none. Patient reports  hypoglycemia threshold for symptoms is 80 mg/dl .     The patient is currently on oral medications .     Compliance with blood glucose monitoring: poor.     Meal panning: The patient is using avoidance of concentrated sweets.    The patient is currently taking home blood tests - Blood glucose testin times daily, that are:  fasting- 1st thing in morning before eating or drinking    Last instructions given were:   Continue glimepiride and metformin  Continue lisinopril  Continue Lipitor  Continue levothyroxine  Continue amlodipine and lisinopril    Home blood glucose testing daily: 1- FB to 160 mg/dl    Last reported blood glucose readings are:   He stopped checking his blood sugar in 2016.  He denies any severe hypoglycemic episodes.    Patterns reported per patient are that he has not been doing everything he should, but he wants to get back on track.  .         The following portions of the patient's history were reviewed and updated as appropriate: current medications, past family history, past medical history, past social history, past surgical history and problem list.      Current Outpatient Prescriptions:   •  ACCU-CHEK FASTCLIX LANCETS [facility] [place], TESTING BS 3 X DAY, Disp: 300 each, Rfl: 1  •  amLODIPine (NORVASC) 5 MG tablet, Take 1 tablet by mouth Daily., Disp: 90 tablet, Rfl: 1  •  atorvastatin (LIPITOR) 40 MG tablet, TAKE ONE TABLET BY MOUTH DAILY, Disp: 30 tablet, Rfl: 5  •   citalopram (CeleXA) 40 MG tablet, Take 1 tablet by mouth Daily., Disp: 90 tablet, Rfl: 1  •  clobetasol (TEMOVATE) 0.05 % external solution, Apply  topically 2 (Two) Times a Day. Apply and rub into scalp lesions BID PRN, Disp: 1 each, Rfl: 5  •  clopidogrel (PLAVIX) 75 MG tablet, Take 1 tablet by mouth Daily. First day take 4 tablets and then one tablet daily after, Disp: 30 tablet, Rfl: 0  •  coenzyme Q10 100 MG capsule, Take 100 mg by mouth Daily., Disp: , Rfl:   •  cyclobenzaprine (FLEXERIL) 10 MG tablet, Take 1 tablet by mouth 3 (Three) Times a Day As Needed for Muscle Spasms., Disp: 90 tablet, Rfl: 1  •  esomeprazole (NexIUM) 20 MG capsule, Take 20 mg by mouth every morning before breakfast., Disp: , Rfl:   •  glucose blood (ACCU-CHEK SMARTVIEW) test strip, TESTING BS 3 X DAY DX CODE E11.29, Disp: 300 each, Rfl: 1  •  HYDROcodone-acetaminophen (NORCO)  MG per tablet, Take 1 tablet by mouth Every 6 (Six) Hours As Needed for moderate pain (4-6)., Disp: 120 tablet, Rfl: 0  •  levothyroxine (SYNTHROID, LEVOTHROID) 50 MCG tablet, Take 1 tablet by mouth Daily., Disp: 90 tablet, Rfl: 1  •  lisinopril (PRINIVIL,ZESTRIL) 20 MG tablet, , Disp: , Rfl:   •  Multiple Vitamins-Minerals (MULTIVITAMIN ADULTS 50+ PO), Take 1 tablet by mouth daily. PT HOLDING FOR SURGERY, Disp: , Rfl:   •  NITROSTAT 0.4 MG SL tablet, Place 0.4 mg under the tongue every 5 (five) minutes as needed., Disp: , Rfl:   •  predniSONE (DELTASONE) 20 MG tablet, Take 20 mg by mouth. 3 DAYS WEEK FOR ALLERGIES , Disp: , Rfl:   •  Probiotic Product (PROBIOTIC DAILY) capsule, Take 1 capsule by mouth daily., Disp: , Rfl:   •  pseudoephedrine-guaifenesin (MUCINEX D)  MG per 12 hr tablet, Take 1 tablet by mouth Every 12 (Twelve) Hours., Disp: 60 tablet, Rfl: 11  •  tamsulosin (FLOMAX) 0.4 MG capsule 24 hr capsule, Take 1 capsule by mouth Daily. For prostate, Disp: 90 capsule, Rfl: 3  •  vitamin D (ERGOCALCIFEROL) 27591 UNITS capsule capsule, Take 1  capsule by mouth Every 7 (Seven) Days., Disp: 13 capsule, Rfl: 3  •  zolpidem (AMBIEN) 10 MG tablet, Take 1 tablet by mouth At Night As Needed for Sleep., Disp: 30 tablet, Rfl: 2    Patient Active Problem List    Diagnosis   • Abnormal electrocardiogram [R94.31]   • Atypical chest pain [R07.89]     Overview Note:     Description: Suspect this is not cardiac related given that is not exertional. However he is not very functional and this makes the assessment somewhat difficult     • Type 2 diabetes mellitus with microalbuminuria, without long-term current use of insulin [E11.29, R80.9]   • Carpal tunnel syndrome of right wrist [G56.01]   • History of COPD [Z87.09]   • Abnormal nuclear stress test [R94.39]   • Two-vessel coronary artery disease [I25.10]   • SAEED (dyspnea on exertion) [R06.09]     Overview Note:     Description: Clinically he is euvolemic and is not in CHF     • Obstructive sleep apnea syndrome [G47.33]   • Patent foramen ovale [Q21.1]   • Snoring [R06.83]   • Disorder of rotator cuff [M67.919]   • Allergic rhinitis [J30.9]   • ADD (attention deficit disorder) [F98.8]   • DDD (degenerative disc disease), lumbar [M51.36]   • BPH (benign prostatic hypertrophy) [N40.0]   • Chronic pain [G89.29]   • ASCVD (arteriosclerotic cardiovascular disease) [I25.10]   • Depression [F32.9]   • Diverticulosis of colon [K57.30]   • GERD (gastroesophageal reflux disease) [K21.9]   • Hiatal hernia [K44.9]   • Hyperlipidemia [E78.5]   • Hypertension [I10]   • Primary hypothyroidism [E03.9]   • Insomnia [G47.00]   • Osteoarthritis, multiple sites [M15.9]   • Acute pancreatitis [K85.90]     Overview Note:     thought to be from the Januvia Rx     • Vitamin D deficiency [E55.9]   • Glaucoma [H40.9]     Overview Note:     Dr. Art         Review of Systems   A comprehensive review of the 14 systems was negative except of listed below:  Constitutional: fatigue and weight gain 5* lb  in 5 months**  Genitourinary:positive for  "frequency and nocturia  Musculoskeletal:positive for muscle weakness and muscle change with endurance decreased, muscle spasms, , .  Behavioral/Psych: positive for decreased appetite, fatigue and sleep disturbance  Endocrine: diabetic symptoms including increased fatigue, polydipsia, polyphagia and polyuria  hyperglycemia     Objective:     Wt Readings from Last 3 Encounters:   10/20/17 190 lb 9.6 oz (86.5 kg)   10/04/17 188 lb (85.3 kg)   04/12/17 185 lb (83.9 kg)     Temp Readings from Last 3 Encounters:   10/04/17 98.2 °F (36.8 °C) (Oral)   04/12/17 97.9 °F (36.6 °C) (Oral)   03/06/17 98.5 °F (36.9 °C) (Oral)     BP Readings from Last 3 Encounters:   10/20/17 124/78   10/04/17 133/82   04/12/17 148/90     Pulse Readings from Last 3 Encounters:   10/04/17 94   04/12/17 90   03/06/17 76        /78  Ht 70\" (177.8 cm)  Wt 190 lb 9.6 oz (86.5 kg)  BMI 27.35 kg/m2    General appearance:  alert, cooperative, delirious, fatigued, nourished\" \"appears stated age and cooperative\" \"NAD   Neck: no carotid bruit, supple, symmetrical, trachea midline and thyroid not enlarged, symmetric, no tenderness/mass/nodules   Thyroid:  no palpable nodule, no enlargement, no tenderness   Lung:  \"clear to auscultation bilaterally\" \"no abnormal breath sounds  \" effort was normal, no labored breath, no use of accessory muscles.   Heart: regular rate and rhythm, S1, S2 normal, no murmur, click, rub or gallop      Abdomen:  normal bowel sounds- 4 quads, soft non-tender and contour - slt rounded      Extremities: extremities normal, atraumatic, no cyanosis or edema extremities normal, atraumatic, no cyanosis or edema\" WNL - gait and station, strength and stability\"       Skin:   Pulses:  warm and dry, no hyperpigmentation, normal skin coloring, or suspicious lesions   2+ and symmetric   Neuro: Alert and oriented x3. Gait normal. Reflexes and motor strength normal and symmetric. Cranial nerves 2-12 and sensation grossly intact.    "   Psych: behavior - normal, judgement - normal, mood - normal, affect - normal                 Lab Review  Results for orders placed or performed in visit on 10/11/17   Comprehensive Metabolic Panel   Result Value Ref Range    Glucose 291 (H) 65 - 99 mg/dL    BUN 11 8 - 23 mg/dL    Creatinine 0.97 0.76 - 1.27 mg/dL    eGFR Non African Am 76 >60 mL/min/1.73    eGFR African Am 92 >60 mL/min/1.73    BUN/Creatinine Ratio 11.3 7.0 - 25.0    Sodium 139 136 - 145 mmol/L    Potassium 4.8 3.5 - 5.2 mmol/L    Chloride 100 98 - 107 mmol/L    Total CO2 22.6 22.0 - 29.0 mmol/L    Calcium 9.9 8.6 - 10.5 mg/dL    Total Protein 7.3 6.0 - 8.5 g/dL    Albumin 4.60 3.50 - 5.20 g/dL    Globulin 2.7 gm/dL    A/G Ratio 1.7 g/dL    Total Bilirubin 0.4 0.1 - 1.2 mg/dL    Alkaline Phosphatase 88 39 - 117 U/L    AST (SGOT) 22 1 - 40 U/L    ALT (SGPT) 34 1 - 41 U/L   C-Peptide   Result Value Ref Range    C-Peptide 10.4 (H) 1.1 - 4.4 ng/mL   Hemoglobin A1c   Result Value Ref Range    Hemoglobin A1C 8.54 (H) 4.80 - 5.60 %   Vitamin D 25 Hydroxy   Result Value Ref Range    25 Hydroxy, Vitamin D 42.1 30.0 - 100.0 ng/mL   T4, Free   Result Value Ref Range    Free T4 0.93 0.93 - 1.70 ng/dL   T3, Free   Result Value Ref Range    T3, Free 3.1 2.0 - 4.4 pg/mL   Thyroid Panel With TSH   Result Value Ref Range    TSH 2.370 0.450 - 4.500 uIU/mL    T4, Total 5.5 4.5 - 12.0 ug/dL    T3 Uptake 26 24 - 39 %    Free Thyroxine Index 1.4 1.2 - 4.9   Thyroglobulin   Result Value Ref Range    Thyroglobulin (TG-GERMAN) <2.0 ng/mL   Anti-Thyroglobulin Antibody   Result Value Ref Range    Thyroglobulin Ab <1.0 0.0 - 0.9 IU/mL   Lipid Panel   Result Value Ref Range    Total Cholesterol 144 0 - 200 mg/dL    Triglycerides 404 (H) 0 - 150 mg/dL    HDL Cholesterol 35 (L) 40 - 60 mg/dL    VLDL Cholesterol CANCELED mg/dL    LDL Cholesterol  CANCELED mg/dL           Assessment:   Ben was seen today for follow-up.    Diagnoses and all orders for this visit:    Type 2  diabetes mellitus with microalbuminuria, without long-term current use of insulin    Essential hypertension    Primary hypothyroidism    Hyperlipidemia, unspecified hyperlipidemia type    Vitamin D deficiency          Plan:   In Summary: She was seen today.  Patient is metabolically stable and doing well.  Reviewed all lab work and self-monitoring blood glucose, medication changes today were as follows patient verbally stated he understood all instructions.         Stop the Amaryl and metformin    Start Synjardy 12.5/1000 1 in the morning one in the evening-with this medication take vitamin C over-the-counter 500 mg once daily and continue your vitamin D increase water at least 3 bottles to 4 bottles daily especially the first 3-4 weeks while starting this medication    New instructions for basal insulIn  Tresiba U200 15 units in AM   Titration instructions - increase AM dose 2 units every 1 days if fasting blood glucose is over 110 mg/Vahid    Start vascepa 1 g 2 capsules twice daily    Start fenofibrate/TriCor 145 mg 1 before bed or dinnertime    home blood tests -  Blood glucose testing: 3-4 times daily, that are:  fasting- 1st thing in morning before eating or drinking  before each meal and 1 or 2 hours after meal  bedtime  anytime you feel symptoms of hyperglycemia or hypoglycemia (high or low blood sugars)        Education:  interpretation of lab results, blood sugar goals, complications of diabetes mellitus, hypoglycemia prevention and treatment, exercise, illness management, self-monitoring of blood glucose skills, nutrition, carbohydrate counting, site rotation, use of insulin pen, insulin adjustments, self-injection of insulin, use of sliding scale/correction formula and use of insulin: carb ratio        Office visit 45 minutes with additional education given 18 minutes - SMBG with goals, medication changes with purposes and s/e profiles.       Return in about 3 months (around 1/20/2018), or if symptoms  worsen or fail to improve, for Recheck. 3 months with Kay-1 week prior for labs 6 months with Dr. Miller one week prior for labs      Dragon transcription disclaimer     Much of this encounter note is an electronic transcription/translation of spoken language to printed text. The electronic translation of spoken language may permit erroneous, or at times, nonsensical words or phrases to be inadvertently transcribed. Although I have reviewed the note for such errors, some may still exist.

## 2017-10-23 RX ORDER — LEVOTHYROXINE SODIUM 0.05 MG/1
50 TABLET ORAL DAILY
Qty: 90 TABLET | Refills: 1 | Status: SHIPPED | OUTPATIENT
Start: 2017-10-23 | End: 2018-08-04 | Stop reason: SDUPTHER

## 2017-10-23 RX ORDER — FENOFIBRATE 145 MG/1
145 TABLET, COATED ORAL DAILY
Qty: 90 TABLET | Refills: 1 | Status: SHIPPED | OUTPATIENT
Start: 2017-10-23 | End: 2018-07-16 | Stop reason: SDUPTHER

## 2017-10-23 RX ORDER — ICOSAPENT ETHYL 1000 MG/1
2 CAPSULE ORAL 2 TIMES DAILY WITH MEALS
Qty: 240 CAPSULE | Refills: 3 | Status: SHIPPED | OUTPATIENT
Start: 2017-10-23 | End: 2018-06-08 | Stop reason: SDUPTHER

## 2017-10-27 RX ORDER — GLIMEPIRIDE 2 MG/1
TABLET ORAL
Qty: 90 TABLET | Refills: 0 | OUTPATIENT
Start: 2017-10-27

## 2017-11-14 ENCOUNTER — FLU SHOT (OUTPATIENT)
Dept: FAMILY MEDICINE CLINIC | Facility: CLINIC | Age: 72
End: 2017-11-14

## 2017-11-14 PROCEDURE — G0008 ADMIN INFLUENZA VIRUS VAC: HCPCS | Performed by: FAMILY MEDICINE

## 2017-11-14 PROCEDURE — 90686 IIV4 VACC NO PRSV 0.5 ML IM: CPT | Performed by: FAMILY MEDICINE

## 2017-12-06 ENCOUNTER — TELEPHONE (OUTPATIENT)
Dept: FAMILY MEDICINE CLINIC | Facility: CLINIC | Age: 72
End: 2017-12-06

## 2017-12-06 DIAGNOSIS — I25.10 TWO-VESSEL CORONARY ARTERY DISEASE: Primary | ICD-10-CM

## 2017-12-06 DIAGNOSIS — M62.838 MUSCLE SPASM: ICD-10-CM

## 2017-12-06 RX ORDER — CLOPIDOGREL BISULFATE 75 MG/1
75 TABLET ORAL DAILY
Qty: 30 TABLET | Refills: 0 | Status: SHIPPED | OUTPATIENT
Start: 2017-12-06 | End: 2017-12-18 | Stop reason: SDUPTHER

## 2017-12-06 NOTE — TELEPHONE ENCOUNTER
barbi calls and states that his flexaril will no longer be covered and he should change to baclofen or tizanidine. pls advise?

## 2017-12-10 RX ORDER — CYCLOBENZAPRINE HCL 10 MG
10 TABLET ORAL 3 TIMES DAILY PRN
Qty: 90 TABLET | Refills: 5 | Status: SHIPPED | OUTPATIENT
Start: 2017-12-10 | End: 2018-09-24 | Stop reason: SDUPTHER

## 2017-12-18 DIAGNOSIS — I25.10 TWO-VESSEL CORONARY ARTERY DISEASE: ICD-10-CM

## 2017-12-18 RX ORDER — CLOPIDOGREL BISULFATE 75 MG/1
TABLET ORAL
Qty: 30 TABLET | Refills: 0 | Status: SHIPPED | OUTPATIENT
Start: 2017-12-18 | End: 2017-12-25

## 2017-12-20 ENCOUNTER — TELEPHONE (OUTPATIENT)
Dept: FAMILY MEDICINE CLINIC | Facility: CLINIC | Age: 72
End: 2017-12-20

## 2017-12-20 DIAGNOSIS — I25.10 ASCVD (ARTERIOSCLEROTIC CARDIOVASCULAR DISEASE): ICD-10-CM

## 2017-12-20 DIAGNOSIS — I25.10 TWO-VESSEL CORONARY ARTERY DISEASE: Primary | ICD-10-CM

## 2017-12-20 DIAGNOSIS — R94.31 ABNORMAL ELECTROCARDIOGRAM: ICD-10-CM

## 2017-12-20 NOTE — TELEPHONE ENCOUNTER
----- Message from Angel Borges MD sent at 12/18/2017  5:42 PM EST -----  sure  ----- Message -----     From: Cleo Mcintosh LPN     Sent: 12/18/2017   5:37 PM       To: Angel Borges MD    [facility] ! Will you take him on s a patient. This is Janiya solo dad

## 2017-12-23 DIAGNOSIS — I25.10 TWO-VESSEL CORONARY ARTERY DISEASE: ICD-10-CM

## 2017-12-25 RX ORDER — CLOPIDOGREL BISULFATE 75 MG/1
75 TABLET ORAL DAILY
Qty: 30 TABLET | Refills: 11 | Status: SHIPPED | OUTPATIENT
Start: 2017-12-25 | End: 2018-09-24 | Stop reason: SDUPTHER

## 2018-02-15 ENCOUNTER — TELEPHONE (OUTPATIENT)
Dept: CARDIOLOGY | Facility: CLINIC | Age: 73
End: 2018-02-15

## 2018-02-15 NOTE — TELEPHONE ENCOUNTER
----- Message from Emma Tolbert sent at 2/15/2018 10:56 AM EST -----  Regarding: STAT REFILL PT IS OUT OF MEDS  PT SAYS THIS IS 2ND REQUEST FOR atorvastatin (LIPITOR) 40 MG tablet PLEASE SEND REFILL  TO [address]   THANKS          This not Dr Rosenbaum pt. Last physician pt seen was Dr Flores in 2/21/17.  [place] [facility] [place].    Called s/w pt and explained.    Pt verbalized understanding

## 2018-02-20 ENCOUNTER — TELEPHONE (OUTPATIENT)
Dept: CARDIOLOGY | Facility: CLINIC | Age: 73
End: 2018-02-20

## 2018-02-20 NOTE — TELEPHONE ENCOUNTER
----- Message from Emma Tolbert sent at 2/20/2018  1:46 PM EST -----  Regarding: OUT OF MEDS FOR OVER A WEEK   FORMER [facility] PATIENT LAST SEEN FEB 2017 IS REQUESTING REFILL OFatorvastatin (LIPITOR) 40 MG tablet [facility]EDIA - [phone]  - [phone] FX PATIENT STATES HE HAS BEEN OUT OF MEDS FOR OVER A WEEK.  HE IS GOING TO [facility].  PLEASE CALL PATIENT TO ADVISE [phone]

## 2018-02-20 NOTE — TELEPHONE ENCOUNTER
Has an appt scheduled with Dr Borges on 2/22/18. Instructed that he will need to have PCP fill RX until appt. He verbalized understanding-ap

## 2018-02-22 ENCOUNTER — OFFICE VISIT (OUTPATIENT)
Dept: CARDIOLOGY | Facility: CLINIC | Age: 73
End: 2018-02-22

## 2018-02-22 VITALS
WEIGHT: 187 LBS | DIASTOLIC BLOOD PRESSURE: 70 MMHG | HEIGHT: 70 IN | BODY MASS INDEX: 26.77 KG/M2 | SYSTOLIC BLOOD PRESSURE: 115 MMHG | HEART RATE: 84 BPM

## 2018-02-22 DIAGNOSIS — I10 ESSENTIAL HYPERTENSION: ICD-10-CM

## 2018-02-22 DIAGNOSIS — I25.10 TWO-VESSEL CORONARY ARTERY DISEASE: Primary | ICD-10-CM

## 2018-02-22 PROCEDURE — 93000 ELECTROCARDIOGRAM COMPLETE: CPT | Performed by: INTERNAL MEDICINE

## 2018-02-22 PROCEDURE — 99214 OFFICE O/P EST MOD 30 MIN: CPT | Performed by: INTERNAL MEDICINE

## 2018-02-22 NOTE — PROGRESS NOTES
Subjective:     Encounter Date:02/22/2018      Patient ID: Ben Walsh is a 72 y.o. male.    Chief Complaint:  Coronary Artery Disease   Presents for follow-up visit. Symptoms include chest pain. Pertinent negatives include no chest pressure, chest tightness, dizziness, leg swelling, muscle weakness, palpitations or shortness of breath. The symptoms have been stable.   Hypertension   This is a chronic problem. The current episode started more than 1 year ago. The problem is controlled. Associated symptoms include chest pain. Pertinent negatives include no palpitations or shortness of breath.       72-year-old gentleman who presents today for establishment of care.  Patient has seen [place] [facility] specialist in the past and wishes to change physicians.  Patient's been having episodes of chest discomfort.  She describes a right at the costochondral junction and says it usually occurs when he lays down at night on his right side.  He does not get it with exertion he does not get easily during the daytime.  He does carry a history of sleep apnea but does not wear his BiPAP.  Patient had a stent about 4 years ago.  He is facing an upcoming surgery and he was referred to my office for perioperative risk assessment as well as establishment of care.    Review of Systems   Cardiovascular: Positive for chest pain. Negative for leg swelling and palpitations.   Respiratory: Negative for chest tightness and shortness of breath.    Musculoskeletal: Negative for muscle weakness.   Neurological: Negative for dizziness.   All other systems reviewed and are negative.        ECG 12 Lead  Date/Time: 2/22/2018 3:39 PM  Performed by: KAREEN SHAW  Authorized by: KAREEN SHAW   Comparison: compared with previous ECG from 2/21/2017  Similar to previous ECG  Rhythm: sinus rhythm  Clinical impression: normal ECG               Objective:     Physical Exam   Constitutional: He is oriented to person, place, and time. He  appears well-developed.   HENT:   Head: Normocephalic.   Eyes: Conjunctivae are normal.   Neck: Normal range of motion.   Cardiovascular: Normal rate, regular rhythm and normal heart sounds.    Pulmonary/Chest: Breath sounds normal.   Abdominal: Soft. Bowel sounds are normal.   Musculoskeletal: Normal range of motion. He exhibits no edema.   Neurological: He is alert and oriented to person, place, and time.   Skin: Skin is warm and dry.   Psychiatric: He has a normal mood and affect. His behavior is normal.   Vitals reviewed.      Lab Review:       Assessment:          Diagnosis Plan   1. Two-vessel coronary artery disease     2. Essential hypertension            Plan:       1.  History of coronary artery disease.  EKG is unchanged.  Chest pain is consistent with costochondritis.  At this point I encourage exercise no further workup necessary.  2.  Hypertension blood pressures great  3.  Patient faces a surgery for possible circumcision this is a low risk procedure from a vascular standpoint no further workup necessary at this time.  4.  Follow-up one year sooner if issues    Coronary Artery Disease  Assessment  • The patient has no angina    Plan  • Lifestyle modifications discussed include adhering to a heart healthy diet, avoidance of tobacco products, maintenance of a healthy weight, medication compliance, regular exercise and regular monitoring of cholesterol and blood pressure    Subjective - Objective  • There has been a previous stent procedure using CARLIE  • Current antiplatelet therapy includes clopidogrel 75 mg

## 2018-02-26 ENCOUNTER — TELEPHONE (OUTPATIENT)
Dept: CARDIOLOGY | Facility: CLINIC | Age: 73
End: 2018-02-26

## 2018-02-26 NOTE — TELEPHONE ENCOUNTER
Pt had lipid panel on 10-11-17. Please review and let me know if OK to refill.      Thanks,  Leyla

## 2018-02-26 NOTE — TELEPHONE ENCOUNTER
----- Message from Ben Walsh sent at 2/24/2018  9:43 PM EST -----  Regarding: Prescription Question  Contact: [phone]  Forgot to ask for my meds. I have been on  Atorvastatin 1/day at 40 MG.

## 2018-02-27 DIAGNOSIS — F51.01 PRIMARY INSOMNIA: ICD-10-CM

## 2018-02-27 RX ORDER — ZOLPIDEM TARTRATE 10 MG/1
TABLET ORAL
Qty: 30 TABLET | Refills: 1 | OUTPATIENT
Start: 2018-02-27

## 2018-02-27 RX ORDER — ATORVASTATIN CALCIUM 40 MG/1
40 TABLET, FILM COATED ORAL NIGHTLY
Qty: 30 TABLET | Refills: 1 | Status: SHIPPED | OUTPATIENT
Start: 2018-02-27 | End: 2018-07-16 | Stop reason: SDUPTHER

## 2018-03-09 RX ORDER — ERGOCALCIFEROL 1.25 MG/1
CAPSULE ORAL
Qty: 13 CAPSULE | Refills: 3 | Status: SHIPPED | OUTPATIENT
Start: 2018-03-09 | End: 2019-05-08

## 2018-03-27 ENCOUNTER — OFFICE VISIT (OUTPATIENT)
Dept: FAMILY MEDICINE CLINIC | Facility: CLINIC | Age: 73
End: 2018-03-27

## 2018-03-27 VITALS
TEMPERATURE: 98.6 F | BODY MASS INDEX: 26.48 KG/M2 | WEIGHT: 185 LBS | HEART RATE: 82 BPM | DIASTOLIC BLOOD PRESSURE: 83 MMHG | RESPIRATION RATE: 16 BRPM | HEIGHT: 70 IN | SYSTOLIC BLOOD PRESSURE: 137 MMHG

## 2018-03-27 DIAGNOSIS — F51.01 PRIMARY INSOMNIA: ICD-10-CM

## 2018-03-27 DIAGNOSIS — F33.42 RECURRENT MAJOR DEPRESSIVE DISORDER, IN FULL REMISSION (HCC): ICD-10-CM

## 2018-03-27 DIAGNOSIS — I10 ESSENTIAL HYPERTENSION: Primary | ICD-10-CM

## 2018-03-27 PROCEDURE — 99214 OFFICE O/P EST MOD 30 MIN: CPT | Performed by: FAMILY MEDICINE

## 2018-03-27 RX ORDER — ESCITALOPRAM OXALATE 20 MG/1
20 TABLET ORAL DAILY
Qty: 90 TABLET | Refills: 1 | Status: SHIPPED | OUTPATIENT
Start: 2018-03-27 | End: 2018-09-24 | Stop reason: SDUPTHER

## 2018-03-27 RX ORDER — AMLODIPINE BESYLATE 5 MG/1
5 TABLET ORAL DAILY
Qty: 90 TABLET | Refills: 1 | Status: SHIPPED | OUTPATIENT
Start: 2018-03-27 | End: 2018-09-24 | Stop reason: SDUPTHER

## 2018-03-27 RX ORDER — ZOLPIDEM TARTRATE 10 MG/1
10 TABLET ORAL NIGHTLY PRN
Qty: 90 TABLET | Refills: 0 | Status: SHIPPED | OUTPATIENT
Start: 2018-03-27 | End: 2018-06-11 | Stop reason: SDUPTHER

## 2018-03-27 NOTE — PROGRESS NOTES
Subjective   Ben Walsh is a 72 y.o. male.     History of Present Illness     Chief Complaint:   Chief Complaint   Patient presents with   • Insomnia     MED REFILL K ASPER    • Hypertension   • Heartburn   • Anxiety       Ben Walsh 72 y.o. male who presents today for Medical Management of the below listed issues and medication refills.  he has a problem list of   Patient Active Problem List   Diagnosis   • Allergic rhinitis   • ADD (attention deficit disorder)   • DDD (degenerative disc disease), lumbar   • BPH (benign prostatic hypertrophy)   • Chronic pain   • ASCVD (arteriosclerotic cardiovascular disease)   • Depression   • Diverticulosis of colon   • GERD (gastroesophageal reflux disease)   • Hiatal hernia   • Hyperlipidemia   • Hypertension   • Primary hypothyroidism   • Insomnia   • Osteoarthritis, multiple sites   • Acute pancreatitis   • Vitamin D deficiency   • Glaucoma   • Abnormal nuclear stress test   • Two-vessel coronary artery disease   • SAEED (dyspnea on exertion)   • Obstructive sleep apnea syndrome   • Patent foramen ovale   • Snoring   • Disorder of rotator cuff   • History of COPD   • Type 2 diabetes mellitus with microalbuminuria, without long-term current use of insulin   • Carpal tunnel syndrome of right wrist   • Abnormal electrocardiogram   • Atypical chest pain   .  Since the last visit, he has overall felt well, except for some increasing depression/anxiety issues, even on the current medication. he has been compliant with   Current Outpatient Prescriptions:   •  amLODIPine (NORVASC) 5 MG tablet, Take 1 tablet by mouth Daily., Disp: 90 tablet, Rfl: 1  •  clopidogrel (PLAVIX) 75 MG tablet, Take 1 tablet by mouth Daily. For heart disease, Disp: 30 tablet, Rfl: 11  •  cyclobenzaprine (FLEXERIL) 10 MG tablet, Take 1 tablet by mouth 3 (Three) Times a Day As Needed for Muscle Spasms., Disp: 90 tablet, Rfl: 5  •  tamsulosin (FLOMAX) 0.4 MG capsule 24 hr capsule, Take 1 capsule  by mouth Daily. For prostate, Disp: 90 capsule, Rfl: 3  •  vitamin D (ERGOCALCIFEROL) 26833 units capsule capsule, TAKE 1 CAPSULE EVERY 7 DAYS, Disp: 13 capsule, Rfl: 3  •  zolpidem (AMBIEN) 10 MG tablet, Take 1 tablet by mouth At Night As Needed for Sleep., Disp: 90 tablet, Rfl: 0  •  ACCU-CHEK FASTCLIX LANCETS misc, TESTING BS 3 X DAY, Disp: 300 each, Rfl: 1  •  atorvastatin (LIPITOR) 40 MG tablet, Take 1 tablet by mouth Every Night. PT MUST KEEP April APPT, Disp: 30 tablet, Rfl: 1  •  clobetasol (TEMOVATE) 0.05 % external solution, Apply  topically 2 (Two) Times a Day. Apply and rub into scalp lesions BID PRN, Disp: 1 each, Rfl: 5  •  coenzyme Q10 100 MG capsule, Take 100 mg by mouth Daily., Disp: , Rfl:   •  Empagliflozin-Metformin HCl 12.5-1000 MG tablet, Take 12.5 mg by mouth 2 (Two) Times a Day., Disp: 180 tablet, Rfl: 1  •  escitalopram (LEXAPRO) 20 MG tablet, Take 1 tablet by mouth Daily., Disp: 90 tablet, Rfl: 1  •  esomeprazole (NexIUM) 20 MG capsule, Take 20 mg by mouth every morning before breakfast., Disp: , Rfl:   •  fenofibrate (TRICOR) 145 MG tablet, Take 1 tablet by mouth Daily., Disp: 90 tablet, Rfl: 1  •  glucose blood (ACCU-CHEK SMARTVIEW) test strip, TESTING BS 5x DAY DX CODE E11.29, Disp: 300 each, Rfl: 1  •  HYDROcodone-acetaminophen (NORCO)  MG per tablet, Take 1 tablet by mouth Every 6 (Six) Hours As Needed for moderate pain (4-6)., Disp: 120 tablet, Rfl: 0  •  icosapent ethyl (VASCEPA) 1 g capsule capsule, Take 2 g by mouth 2 (Two) Times a Day With Meals., Disp: 240 capsule, Rfl: 3  •  Insulin Degludec 200 UNIT/ML solution pen-injector, Inject 20 Units under the skin Daily. Titration max of 100 units daily, Disp: 6 pen, Rfl: 4  •  Insulin Pen Needle 31G X 8 MM misc, Inject once daily, Disp: 90 each, Rfl: 1  •  levothyroxine (SYNTHROID, LEVOTHROID) 50 MCG tablet, Take 1 tablet by mouth Daily., Disp: 90 tablet, Rfl: 1  •  lisinopril (PRINIVIL,ZESTRIL) 20 MG tablet, , Disp: , Rfl:   •   "Multiple Vitamins-Minerals (MULTIVITAMIN ADULTS 50+ PO), Take 1 tablet by mouth daily. PT HOLDING FOR SURGERY, Disp: , Rfl:   •  NITROSTAT 0.4 MG SL tablet, Place 0.4 mg under the tongue every 5 (five) minutes as needed., Disp: , Rfl:   •  Probiotic Product (PROBIOTIC DAILY) capsule, Take 1 capsule by mouth daily., Disp: , Rfl:   •  pseudoephedrine-guaifenesin (MUCINEX D)  MG per 12 hr tablet, Take 1 tablet by mouth Every 12 (Twelve) Hours., Disp: 60 tablet, Rfl: 11.  he denies medication side effects.    All of the chronic condition(s) listed above are stable w/o issues.    /83   Pulse 82   Temp 98.6 °F (37 °C) (Oral)   Resp 16   Ht 177.8 cm (70\")   Wt 83.9 kg (185 lb)   BMI 26.54 kg/m²     Results for orders placed or performed in visit on 10/11/17   Comprehensive Metabolic Panel   Result Value Ref Range    Glucose 291 (H) 65 - 99 mg/dL    BUN 11 8 - 23 mg/dL    Creatinine 0.97 0.76 - 1.27 mg/dL    eGFR Non African Am 76 >60 mL/min/1.73    eGFR African Am 92 >60 mL/min/1.73    BUN/Creatinine Ratio 11.3 7.0 - 25.0    Sodium 139 136 - 145 mmol/L    Potassium 4.8 3.5 - 5.2 mmol/L    Chloride 100 98 - 107 mmol/L    Total CO2 22.6 22.0 - 29.0 mmol/L    Calcium 9.9 8.6 - 10.5 mg/dL    Total Protein 7.3 6.0 - 8.5 g/dL    Albumin 4.60 3.50 - 5.20 g/dL    Globulin 2.7 gm/dL    A/G Ratio 1.7 g/dL    Total Bilirubin 0.4 0.1 - 1.2 mg/dL    Alkaline Phosphatase 88 39 - 117 U/L    AST (SGOT) 22 1 - 40 U/L    ALT (SGPT) 34 1 - 41 U/L   C-Peptide   Result Value Ref Range    C-Peptide 10.4 (H) 1.1 - 4.4 ng/mL   Hemoglobin A1c   Result Value Ref Range    Hemoglobin A1C 8.54 (H) 4.80 - 5.60 %   Vitamin D 25 Hydroxy   Result Value Ref Range    25 Hydroxy, Vitamin D 42.1 30.0 - 100.0 ng/mL   T4, Free   Result Value Ref Range    Free T4 0.93 0.93 - 1.70 ng/dL   T3, Free   Result Value Ref Range    T3, Free 3.1 2.0 - 4.4 pg/mL   Thyroid Panel With TSH   Result Value Ref Range    TSH 2.370 0.450 - 4.500 uIU/mL    T4, " Total 5.5 4.5 - 12.0 ug/dL    T3 Uptake 26 24 - 39 %    Free Thyroxine Index 1.4 1.2 - 4.9   Thyroglobulin   Result Value Ref Range    Thyroglobulin (TG-GERMAN) <2.0 ng/mL   Anti-Thyroglobulin Antibody   Result Value Ref Range    Thyroglobulin Ab <1.0 0.0 - 0.9 IU/mL   Lipid Panel   Result Value Ref Range    Total Cholesterol 144 0 - 200 mg/dL    Triglycerides 404 (H) 0 - 150 mg/dL    HDL Cholesterol 35 (L) 40 - 60 mg/dL    VLDL Cholesterol CANCELED mg/dL    LDL Cholesterol  CANCELED mg/dL           The following portions of the patient's history were reviewed and updated as appropriate: allergies, current medications, past family history, past medical history, past social history, past surgical history and problem list.    Review of Systems   Constitutional: Negative for activity change, chills, fatigue and fever.   Respiratory: Negative for cough and shortness of breath.    Cardiovascular: Negative for chest pain and palpitations.   Gastrointestinal: Negative for abdominal pain.   Endocrine: Negative for cold intolerance.   Psychiatric/Behavioral: Positive for dysphoric mood. Negative for behavioral problems. The patient is nervous/anxious.        Objective   Physical Exam   Constitutional: He appears well-developed and well-nourished.   Neck: Neck supple. No thyromegaly present.   Cardiovascular: Normal rate and regular rhythm.    No murmur heard.  Pulmonary/Chest: Effort normal and breath sounds normal.   Abdominal: Bowel sounds are normal.   Psychiatric: He has a normal mood and affect. His behavior is normal.   Nursing note and vitals reviewed.      Assessment/Plan   Ben was seen today for insomnia, hypertension, heartburn and anxiety.    Diagnoses and all orders for this visit:    Essential hypertension  -     amLODIPine (NORVASC) 5 MG tablet; Take 1 tablet by mouth Daily.    Primary insomnia  -     zolpidem (AMBIEN) 10 MG tablet; Take 1 tablet by mouth At Night As Needed for Sleep.    Recurrent major  depressive disorder, in full remission  -     escitalopram (LEXAPRO) 20 MG tablet; Take 1 tablet by mouth Daily.

## 2018-04-23 ENCOUNTER — OFFICE VISIT (OUTPATIENT)
Dept: ENDOCRINOLOGY | Age: 73
End: 2018-04-23

## 2018-04-23 VITALS
OXYGEN SATURATION: 98 % | WEIGHT: 186.8 LBS | BODY MASS INDEX: 26.74 KG/M2 | HEART RATE: 91 BPM | DIASTOLIC BLOOD PRESSURE: 72 MMHG | HEIGHT: 70 IN | SYSTOLIC BLOOD PRESSURE: 116 MMHG

## 2018-04-23 DIAGNOSIS — E03.9 PRIMARY HYPOTHYROIDISM: ICD-10-CM

## 2018-04-23 DIAGNOSIS — I10 ESSENTIAL HYPERTENSION: ICD-10-CM

## 2018-04-23 DIAGNOSIS — E11.42 TYPE 2 DIABETES MELLITUS WITH PERIPHERAL NEUROPATHY (HCC): ICD-10-CM

## 2018-04-23 DIAGNOSIS — E11.29 TYPE 2 DIABETES MELLITUS WITH MICROALBUMINURIA, WITHOUT LONG-TERM CURRENT USE OF INSULIN (HCC): Primary | ICD-10-CM

## 2018-04-23 DIAGNOSIS — E78.5 HYPERLIPIDEMIA, UNSPECIFIED HYPERLIPIDEMIA TYPE: ICD-10-CM

## 2018-04-23 DIAGNOSIS — R80.9 TYPE 2 DIABETES MELLITUS WITH MICROALBUMINURIA, WITHOUT LONG-TERM CURRENT USE OF INSULIN (HCC): Primary | ICD-10-CM

## 2018-04-23 PROCEDURE — 99214 OFFICE O/P EST MOD 30 MIN: CPT | Performed by: INTERNAL MEDICINE

## 2018-04-23 RX ORDER — GLIPIZIDE 10 MG/1
TABLET ORAL
Qty: 90 TABLET | Refills: 2 | Status: SHIPPED | OUTPATIENT
Start: 2018-04-23 | End: 2019-01-12 | Stop reason: SDUPTHER

## 2018-04-23 RX ORDER — GABAPENTIN 100 MG/1
100 CAPSULE ORAL 3 TIMES DAILY
Qty: 90 CAPSULE | Refills: 2 | Status: SHIPPED | OUTPATIENT
Start: 2018-04-23 | End: 2018-09-10 | Stop reason: DRUGHIGH

## 2018-04-23 NOTE — PROGRESS NOTES
Subjective   Ben Walsh is a 72 y.o. male.     F/u for dm  2, hypertension, hyperlipidemia, vitamin d def, BPH, COPD / testing bs 1 x day / last dm eye exam 10/17/17 with dr Francois / last dm foot exam today with dr Weinberg       Diabetes   Hypoglycemia symptoms include nervousness/anxiousness. Associated symptoms include fatigue.   Hyperlipidemia     Hypertension   Associated symptoms include neck pain.   Benign Prostatic Hypertrophy     COPD   Associated symptoms include fatigue, neck pain and numbness (hands and feet ).      Patient is a 72-year-old male who came in for follow-up.  He has known diabetes since 2013.  He has been on Synjardy 12.5/1000 mg twice a day and Tresiba 20 every AM. FBS <116 .  He denies any severe hypoglycemic episodes.  He has lost 4 pounds since 11/17.  He has penile skin infection every few weeks.  His last meal was at 8 AM     He has microalbuminuria on urine sample taken in 2014.  He has been on lisinopril.  His last eye examination was in 10/17.  He has cataract on the right eye but no retinopathy.  He has burning in feet and pain in his calves, not related to activity     He has hyperlipidemia and has been on Lipitor 40 mg once a day, TriCor 145 mg once a day, and Vascepa 2 g twice a day..  He denies any myalgia.  CT scan of the abdomen and ultrasound of the abdomen in 2012 showed fatty infiltration of the liver and a right kidney stone.     He has hypothyroidism and has been on levothyroxine 50 µg per day.  He denies heat or cold intolerance.     He has hypertension and has been on amlodipine and lisinopril.  He had a false positive stress test in February 2016.  He had a cardiac catheterization in March 2016.  The stent to the mid LAD is patent.  There is mild luminal irregularities of the proximal LAD.  The left circumflex complex has mild luminal plaque.  The proximal RCA has a stable 50% stenosis.  He is on Plavix 75 mg once a day.  He follows with Dr. Borges     The  "following portions of the patient's history were reviewed and updated as appropriate: allergies, current medications, past family history, past medical history, past social history, past surgical history and problem list.    Review of Systems   Constitutional: Positive for fatigue.   HENT: Negative.    Eyes: Negative.    Respiratory: Negative.    Cardiovascular: Negative.    Gastrointestinal: Positive for abdominal distention (bloating ).   Endocrine: Negative.    Genitourinary: Negative.    Musculoskeletal: Positive for back pain and neck pain.   Skin: Negative.    Allergic/Immunologic: Negative.    Neurological: Positive for numbness (hands and feet ).   Psychiatric/Behavioral: Positive for sleep disturbance ( sleep apnea not using mask ). The patient is nervous/anxious.        Objective      Vitals:    04/23/18 1352   BP: 116/72   BP Location: Left arm   Patient Position: Sitting   Cuff Size: Large Adult   Pulse: 91   SpO2: 98%   Weight: 84.7 kg (186 lb 12.8 oz)   Height: 177.8 cm (70\")     Physical Exam   Constitutional: He is oriented to person, place, and time. He appears well-developed and well-nourished. No distress.   HENT:   Head: Normocephalic.   Nose: Nose normal.   Mouth/Throat: No oropharyngeal exudate.   Eyes: Conjunctivae and EOM are normal. Right eye exhibits no discharge. Left eye exhibits no discharge. No scleral icterus.   Neck: Normal range of motion. Neck supple. No JVD present. No tracheal deviation present. No thyromegaly present.   Cardiovascular: Normal rate, regular rhythm, normal heart sounds and intact distal pulses.  Exam reveals no friction rub.    No murmur heard.  Pulmonary/Chest: Effort normal and breath sounds normal. No respiratory distress. He has no wheezes. He has no rales.   Abdominal: Soft. Bowel sounds are normal. He exhibits no distension and no mass. There is no tenderness. There is no guarding.   Musculoskeletal: Normal range of motion. He exhibits no edema or deformity. "   Lymphadenopathy:     He has no cervical adenopathy.   Neurological: He is alert and oriented to person, place, and time.   Skin: Skin is warm and dry. No rash noted. No erythema.   Psychiatric: He has a normal mood and affect. His behavior is normal.     Lab on 10/11/2017   Component Date Value Ref Range Status   • Glucose 10/16/2017 291* 65 - 99 mg/dL Final   • BUN 10/16/2017 11  8 - 23 mg/dL Final   • Creatinine 10/16/2017 0.97  0.76 - 1.27 mg/dL Final   • eGFR Non  Am 10/16/2017 76  >60 mL/min/1.73 Final    Comment: The MDRD GFR formula is only valid for adults with stable  renal function between ages 18 and 70.     • eGFR  Am 10/16/2017 92  >60 mL/min/1.73 Final   • BUN/Creatinine Ratio 10/16/2017 11.3  7.0 - 25.0 Final   • Sodium 10/16/2017 139  136 - 145 mmol/L Final   • Potassium 10/16/2017 4.8  3.5 - 5.2 mmol/L Final   • Chloride 10/16/2017 100  98 - 107 mmol/L Final   • Total CO2 10/16/2017 22.6  22.0 - 29.0 mmol/L Final   • Calcium 10/16/2017 9.9  8.6 - 10.5 mg/dL Final   • Total Protein 10/16/2017 7.3  6.0 - 8.5 g/dL Final   • Albumin 10/16/2017 4.60  3.50 - 5.20 g/dL Final   • Globulin 10/16/2017 2.7  gm/dL Final   • A/G Ratio 10/16/2017 1.7  g/dL Final   • Total Bilirubin 10/16/2017 0.4  0.1 - 1.2 mg/dL Final   • Alkaline Phosphatase 10/16/2017 88  39 - 117 U/L Final   • AST (SGOT) 10/16/2017 22  1 - 40 U/L Final   • ALT (SGPT) 10/16/2017 34  1 - 41 U/L Final   • C-Peptide 10/16/2017 10.4* 1.1 - 4.4 ng/mL Final   • Hemoglobin A1C 10/16/2017 8.54* 4.80 - 5.60 % Final    Comment: Hemoglobin A1C Ranges:  Increased Risk for Diabetes  5.7% to 6.4%  Diabetes                     >= 6.5%  Diabetic Goal                < 7.0%     • 25 Hydroxy, Vitamin D 10/16/2017 42.1  30.0 - 100.0 ng/mL Final    Comment: Reference Range for Total Vitamin D 25(OH)  Deficiency    <20.0 ng/mL  Insufficiency 21-29 ng/mL  Sufficiency    ng/mL  Toxicity      >100 ng/ml        • Free T4 10/16/2017 0.93  0.93 -  1.70 ng/dL Final   • T3, Free 10/16/2017 3.1  2.0 - 4.4 pg/mL Final   • TSH 10/16/2017 2.370  0.450 - 4.500 uIU/mL Final   • T4, Total 10/16/2017 5.5  4.5 - 12.0 ug/dL Final   • T3 Uptake 10/16/2017 26  24 - 39 % Final   • Free Thyroxine Index 10/16/2017 1.4  1.2 - 4.9 Final   • Thyroglobulin (TG-GERMAN) 10/16/2017 <2.0  ng/mL Final    Comment: Reference Range:  Pubertal Children  and Adults: <40  According to the National Academy of Clinical Biochemistry,  the reference interval for Thyroglobulin (TG) should be  related to euthyroid patients and not for patients who  underwent thyroidectomy.  TG reference intervals for these  patients depend on the residual mass of the thyroid tissue  left after surgery.  Establishing a post-operative baseline  is recommended.  The assay quantitation limit is 2.0 ng/mL.     • Thyroglobulin Ab 10/16/2017 <1.0  0.0 - 0.9 IU/mL Final   • Total Cholesterol 10/16/2017 144  0 - 200 mg/dL Final   • Triglycerides 10/16/2017 404* 0 - 150 mg/dL Final   • HDL Cholesterol 10/16/2017 35* 40 - 60 mg/dL Final   • VLDL Cholesterol 10/16/2017 CANCELED  mg/dL Final-Edited    Comment: Test not performed  Unable to calculate    Result canceled by the ancillary     • LDL Cholesterol  10/16/2017 CANCELED  mg/dL Final-Edited    Comment: Test not performed  Unable to calculate    Result canceled by the ancillary       Assessment/Plan   Ben was seen today for diabetes, hyperlipidemia, hypertension, vitamin d deficiency, benign prostatic hypertrophy and copd.    Diagnoses and all orders for this visit:    Type 2 diabetes mellitus with microalbuminuria, without long-term current use of insulin  -     Comprehensive Metabolic Panel  -     Hemoglobin A1c  -     TSH  -     T4, Free  -     Vitamin B12  -     glipiZIDE (GLUCOTROL) 10 MG tablet; 1 tablet every morning  -     metFORMIN (GLUCOPHAGE) 1000 MG tablet; 1 tablet twice a day with meals  -     Microalbumin / Creatinine Urine Ratio - Urine, Clean  Catch    Hyperlipidemia, unspecified hyperlipidemia type  -     Comprehensive Metabolic Panel  -     Lipid Panel  -     Aldolase  -     CK    Primary hypothyroidism  -     TSH  -     T4, Free    Essential hypertension  -     Comprehensive Metabolic Panel    Type 2 diabetes mellitus with peripheral neuropathy  -     gabapentin (NEURONTIN) 100 MG capsule; Take 1 capsule by mouth 3 (Three) Times a Day.      Discontinue Synjardy because of recurrent penile skin infection.  Start glipizide 10 mg every morning and metformin 1000 mg twice a day.  Continue Tresiba 20 units every morning.  Start gabapentin 100 mg every evening  Topic TriCor and Lipitor temporarily and see if calf pain goes away.  Continue Vascepa.   Call with results  Continue amlodipine and lisinopril    Send copy of my note to Dr. Jake Marcus and Janiya Black PA    RTC 4 mos

## 2018-04-23 NOTE — PATIENT INSTRUCTIONS
Discontinue Synjardy  Start glipizide 10 mg before breakfast and metformin 1000 mg with meals  Continue Tresiba 20 units every morning.  Hold Lipitor and TriCor temporarily and see if calf pain goes away.  Call with results.  Continue Vascepa.  Start gabapentin 100 mg every evening.

## 2018-04-24 LAB
ALBUMIN SERPL-MCNC: 5 G/DL (ref 3.5–5.2)
ALBUMIN/CREAT UR: 7.4 MG/G CREAT (ref 0–30)
ALBUMIN/GLOB SERPL: 1.9 G/DL
ALDOLASE SERPL-CCNC: 2.3 U/L (ref 3.3–10.3)
ALP SERPL-CCNC: 79 U/L (ref 39–117)
ALT SERPL-CCNC: 32 U/L (ref 1–41)
AST SERPL-CCNC: 20 U/L (ref 1–40)
BILIRUB SERPL-MCNC: 0.4 MG/DL (ref 0.1–1.2)
BUN SERPL-MCNC: 12 MG/DL (ref 8–23)
BUN/CREAT SERPL: 10.7 (ref 7–25)
CALCIUM SERPL-MCNC: 11 MG/DL (ref 8.6–10.5)
CHLORIDE SERPL-SCNC: 101 MMOL/L (ref 98–107)
CHOLEST SERPL-MCNC: 145 MG/DL (ref 0–200)
CK SERPL-CCNC: 87 U/L (ref 20–200)
CO2 SERPL-SCNC: 26.1 MMOL/L (ref 22–29)
CREAT SERPL-MCNC: 1.12 MG/DL (ref 0.76–1.27)
CREAT UR-MCNC: 45.9 MG/DL
GFR SERPLBLD CREATININE-BSD FMLA CKD-EPI: 64 ML/MIN/1.73
GFR SERPLBLD CREATININE-BSD FMLA CKD-EPI: 78 ML/MIN/1.73
GLOBULIN SER CALC-MCNC: 2.7 GM/DL
GLUCOSE SERPL-MCNC: 94 MG/DL (ref 65–99)
HBA1C MFR BLD: 7.4 % (ref 4.8–5.6)
HDLC SERPL-MCNC: 41 MG/DL (ref 40–60)
INTERPRETATION: NORMAL
LDLC SERPL CALC-MCNC: 82 MG/DL (ref 0–100)
Lab: NORMAL
MICROALBUMIN UR-MCNC: 3.4 UG/ML
POTASSIUM SERPL-SCNC: 4.8 MMOL/L (ref 3.5–5.2)
PROT SERPL-MCNC: 7.7 G/DL (ref 6–8.5)
SODIUM SERPL-SCNC: 144 MMOL/L (ref 136–145)
T4 FREE SERPL-MCNC: 1.26 NG/DL (ref 0.93–1.7)
TRIGL SERPL-MCNC: 111 MG/DL (ref 0–150)
TSH SERPL DL<=0.005 MIU/L-ACNC: 4.03 MIU/ML (ref 0.27–4.2)
VIT B12 SERPL-MCNC: 368 PG/ML (ref 211–946)
VLDLC SERPL CALC-MCNC: 22.2 MG/DL (ref 5–40)

## 2018-04-26 ENCOUNTER — TELEPHONE (OUTPATIENT)
Dept: ENDOCRINOLOGY | Age: 73
End: 2018-04-26

## 2018-04-26 DIAGNOSIS — E83.52 SERUM CALCIUM ELEVATED: Primary | ICD-10-CM

## 2018-04-26 NOTE — TELEPHONE ENCOUNTER
----- Message from Kay Hall sent at 4/26/2018 12:29 PM EDT -----  Contact: PATIENT     PATIENT CALLED BACK FOR LAB RESULTS:  DATE LABS WAS TAKEN:  04/23/2018  CALL BACK NUMBER: [phone]  MESSAGE: PATIENT IS RETURNING YOUR PHONE CALL.   AND CAN LEAVE A MESSAGE ON HIS VOICE MAIL ABOUT HIS LAB RESULTS

## 2018-05-02 ENCOUNTER — RESULTS ENCOUNTER (OUTPATIENT)
Dept: ENDOCRINOLOGY | Age: 73
End: 2018-05-02

## 2018-05-02 ENCOUNTER — TELEPHONE (OUTPATIENT)
Dept: ENDOCRINOLOGY | Age: 73
End: 2018-05-02

## 2018-05-02 DIAGNOSIS — E83.52 SERUM CALCIUM ELEVATED: ICD-10-CM

## 2018-05-02 NOTE — TELEPHONE ENCOUNTER
----- Message from Gretchen Robertson sent at 5/2/2018 12:00 PM EDT -----  Contact: PATIENT AT CHECK OUT  PATIENT ASKED IF MERARY BROWER WILL HAVE HIS IRON CHECKED WITH THE LABS HE HAD DRAWN TODAY. HE SAID HE SPOKE TO THE LAB AND THEY SINDY ENOUGH BLOOD.  HE SAID HE HAS BEEN FEELING TIRED FOR A WEEK.  THANK YOU.

## 2018-05-03 LAB
BUN SERPL-MCNC: 11 MG/DL (ref 8–23)
BUN/CREAT SERPL: 11.2 (ref 7–25)
CALCIUM SERPL-MCNC: 9.4 MG/DL (ref 8.6–10.5)
CHLORIDE SERPL-SCNC: 102 MMOL/L (ref 98–107)
CO2 SERPL-SCNC: 23.9 MMOL/L (ref 22–29)
CREAT SERPL-MCNC: 0.98 MG/DL (ref 0.76–1.27)
GFR SERPLBLD CREATININE-BSD FMLA CKD-EPI: 75 ML/MIN/1.73
GFR SERPLBLD CREATININE-BSD FMLA CKD-EPI: 91 ML/MIN/1.73
GLUCOSE SERPL-MCNC: 269 MG/DL (ref 65–99)
Lab: NORMAL
Lab: NORMAL
POTASSIUM SERPL-SCNC: 4.6 MMOL/L (ref 3.5–5.2)
PTH-INTACT SERPL-MCNC: 14 PG/ML (ref 15–65)
SODIUM SERPL-SCNC: 140 MMOL/L (ref 136–145)

## 2018-05-08 LAB
FERRITIN SERPL-MCNC: 10.07 NG/ML (ref 30–400)
IRON SATN MFR SERPL: 5 % (ref 20–50)
IRON SERPL-MCNC: 33 MCG/DL (ref 59–158)
TIBC SERPL-MCNC: 651 MCG/DL
UIBC SERPL-MCNC: 618 MCG/DL
WRITTEN AUTHORIZATION: NORMAL

## 2018-05-18 RX ORDER — PEN NEEDLE, DIABETIC 31 GX5/16"
NEEDLE, DISPOSABLE MISCELLANEOUS
Qty: 100 EACH | Refills: 11 | Status: SHIPPED | OUTPATIENT
Start: 2018-05-18 | End: 2019-07-23 | Stop reason: SDUPTHER

## 2018-06-08 DIAGNOSIS — E78.5 HYPERLIPIDEMIA, UNSPECIFIED HYPERLIPIDEMIA TYPE: ICD-10-CM

## 2018-06-11 ENCOUNTER — OFFICE VISIT (OUTPATIENT)
Dept: FAMILY MEDICINE CLINIC | Facility: CLINIC | Age: 73
End: 2018-06-11

## 2018-06-11 VITALS
RESPIRATION RATE: 16 BRPM | TEMPERATURE: 97.9 F | SYSTOLIC BLOOD PRESSURE: 140 MMHG | BODY MASS INDEX: 26.48 KG/M2 | OXYGEN SATURATION: 98 % | HEART RATE: 82 BPM | HEIGHT: 70 IN | WEIGHT: 185 LBS | DIASTOLIC BLOOD PRESSURE: 78 MMHG

## 2018-06-11 DIAGNOSIS — F51.01 PRIMARY INSOMNIA: ICD-10-CM

## 2018-06-11 DIAGNOSIS — M54.12 CERVICAL RADICULOPATHY: ICD-10-CM

## 2018-06-11 DIAGNOSIS — R07.89 CHEST WALL PAIN: Primary | ICD-10-CM

## 2018-06-11 PROCEDURE — 93000 ELECTROCARDIOGRAM COMPLETE: CPT | Performed by: FAMILY MEDICINE

## 2018-06-11 PROCEDURE — 99214 OFFICE O/P EST MOD 30 MIN: CPT | Performed by: FAMILY MEDICINE

## 2018-06-11 RX ORDER — ZOLPIDEM TARTRATE 10 MG/1
10 TABLET ORAL NIGHTLY PRN
Qty: 90 TABLET | Refills: 0 | Status: SHIPPED | OUTPATIENT
Start: 2018-06-11 | End: 2018-09-24 | Stop reason: SDUPTHER

## 2018-06-11 RX ORDER — METHYLPREDNISOLONE 4 MG/1
TABLET ORAL
Qty: 21 TABLET | Refills: 0 | Status: SHIPPED | OUTPATIENT
Start: 2018-06-11 | End: 2018-09-10

## 2018-06-11 NOTE — PROGRESS NOTES
"Subjective   Ben Walsh is a 73 y.o. male.     CC: Chest Pain/Numbness Issues          Management of Insomnia    History of Present Illness     Pt comes in today c/o 8-9 month h/o chest pain along with some numbness and tingling of the arms.  Reports has seen his cardiologist and endocrine and told his heart ok. No increase pain with exertion but does have increased pain with actual movement (deep breaths, rotation, movement). No trauma. Uses [employer] for his back and reports this doesn't help. Denies neck pain per se but does report the arms resolve with laying supine with the arms both in full flexion at the shoulders. Had neck fusion many years ago.     Pt also doing well with his Ambien, w/o SEs, and desires a refill.    The following portions of the patient's history were reviewed and updated as appropriate: allergies, current medications, past family history, past medical history, past social history, past surgical history and problem list.    Review of Systems   Constitutional: Negative for activity change, chills, fatigue and fever.   Respiratory: Negative for cough and shortness of breath.    Cardiovascular: Positive for chest pain. Negative for palpitations.   Gastrointestinal: Negative for abdominal pain.   Endocrine: Negative for cold intolerance.   Musculoskeletal: Positive for neck pain.   Neurological: Positive for numbness.   Psychiatric/Behavioral: Negative for behavioral problems and dysphoric mood. The patient is not nervous/anxious.      /78   Pulse 82   Temp 97.9 °F (36.6 °C) (Oral)   Resp 16   Ht 177.8 cm (70\")   Wt 83.9 kg (185 lb)   SpO2 98%   BMI 26.54 kg/m²     Objective   Physical Exam   Constitutional: He appears well-developed and well-nourished.   Neck: Neck supple. No thyromegaly present.   Cardiovascular: Normal rate and regular rhythm.    No murmur heard.  Pulmonary/Chest: Effort normal and breath sounds normal.   Abdominal: Bowel sounds are normal. There is no " tenderness.   Musculoskeletal: He exhibits tenderness (left costchondral attachments at the Sternal interface, along with lateral CW, also lower neck spinous processes).   Psychiatric: He has a normal mood and affect. His behavior is normal.   Nursing note and vitals reviewed.      Assessment/Plan   Ben was seen today for chest pain and numbness and tingling bilateral arms.    Diagnoses and all orders for this visit:    Chest wall pain  -     MethylPREDNISolone (MEDROL) 4 MG tablet; follow package directions  -     CT chest wo contrast; Future  -     ECG 12 Lead    Cervical radiculopathy  -     MethylPREDNISolone (MEDROL) 4 MG tablet; follow package directions  -     MRI cervical spine wo contrast; Future    Primary insomnia  -     zolpidem (AMBIEN) 10 MG tablet; Take 1 tablet by mouth At Night As Needed for Sleep.

## 2018-06-11 NOTE — PROGRESS NOTES
Procedure     ECG 12 Lead  Date/Time: 6/11/2018 6:02 PM  Performed by: CHRISTIANE BARBA  Authorized by: CHRISTIANE BARBA   Interpreted by ED physician  Comparison: compared with previous ECG from 2/22/2018  Similar to previous ECG  Rhythm: sinus rhythm  Rate: normal  Conduction: conduction normal  ST Segments: ST segments normal  T Waves: T waves normal  QRS axis: normal  Other: no other findings  Clinical impression: normal ECG

## 2018-06-12 RX ORDER — ICOSAPENT ETHYL 1000 MG/1
CAPSULE ORAL
Qty: 360 CAPSULE | Refills: 3 | Status: SHIPPED | OUTPATIENT
Start: 2018-06-12 | End: 2018-11-07 | Stop reason: ALTCHOICE

## 2018-06-14 DIAGNOSIS — F41.8 ANXIETY ABOUT HEALTH: Primary | ICD-10-CM

## 2018-06-14 RX ORDER — ALPRAZOLAM 0.5 MG/1
TABLET ORAL
Qty: 2 TABLET | Refills: 0 | Status: SHIPPED | OUTPATIENT
Start: 2018-06-14 | End: 2018-09-10

## 2018-06-18 ENCOUNTER — TELEPHONE (OUTPATIENT)
Dept: FAMILY MEDICINE CLINIC | Facility: CLINIC | Age: 73
End: 2018-06-18

## 2018-06-18 NOTE — TELEPHONE ENCOUNTER
Confusion on message - Pt has not had CT scan done yet. This is in Medical review. Pt was scheduled for two different tests. Mary in Referrals to check on this germán

## 2018-06-18 NOTE — TELEPHONE ENCOUNTER
Pt advised that Ct scan is still in Medical review per Mary Lynn . Pt told if pain gets worse go to the ER. germán

## 2018-06-18 NOTE — TELEPHONE ENCOUNTER
Patient has 2 different orders in for CT SCAN- Mary SAWANT Reschedule appointment - pt missed appointment . germán

## 2018-06-21 DIAGNOSIS — R06.00 DYSPNEA, UNSPECIFIED TYPE: Primary | ICD-10-CM

## 2018-06-21 DIAGNOSIS — R07.89 CHEST WALL PAIN: ICD-10-CM

## 2018-06-21 PROCEDURE — 71046 X-RAY EXAM CHEST 2 VIEWS: CPT | Performed by: PHYSICIAN ASSISTANT

## 2018-06-25 DIAGNOSIS — M54.2 ACUTE NECK PAIN: Primary | ICD-10-CM

## 2018-07-16 ENCOUNTER — TELEPHONE (OUTPATIENT)
Dept: ENDOCRINOLOGY | Age: 73
End: 2018-07-16

## 2018-07-16 DIAGNOSIS — E78.5 HYPERLIPIDEMIA, UNSPECIFIED HYPERLIPIDEMIA TYPE: ICD-10-CM

## 2018-07-16 RX ORDER — FENOFIBRATE 145 MG/1
145 TABLET, COATED ORAL DAILY
Qty: 90 TABLET | Refills: 1 | Status: SHIPPED | OUTPATIENT
Start: 2018-07-16 | End: 2019-02-09 | Stop reason: SDUPTHER

## 2018-07-16 RX ORDER — ATORVASTATIN CALCIUM 40 MG/1
40 TABLET, FILM COATED ORAL NIGHTLY
Qty: 90 TABLET | Refills: 1 | Status: SHIPPED | OUTPATIENT
Start: 2018-07-16 | End: 2018-10-11 | Stop reason: SDUPTHER

## 2018-07-16 NOTE — TELEPHONE ENCOUNTER
----- Message from Cami Escalona sent at 7/16/2018  4:14 PM EDT -----  Contact: PATIENT   PATIENT REQUEST A 90 DAY SUPPLY OF THE FOLLOWING.     fenofibrate (TRICOR) 145 MG    atorvastatin (LIPITOR) 40 MG tablet    [facility] - [place], [place] - [address] - [phone]  - [phone] (Phone)  [phone] (Fax)

## 2018-07-31 ENCOUNTER — OFFICE VISIT (OUTPATIENT)
Dept: FAMILY MEDICINE CLINIC | Facility: CLINIC | Age: 73
End: 2018-07-31

## 2018-07-31 VITALS
BODY MASS INDEX: 27.35 KG/M2 | SYSTOLIC BLOOD PRESSURE: 142 MMHG | WEIGHT: 191 LBS | HEIGHT: 70 IN | TEMPERATURE: 97.9 F | HEART RATE: 84 BPM | DIASTOLIC BLOOD PRESSURE: 82 MMHG | RESPIRATION RATE: 16 BRPM | OXYGEN SATURATION: 95 %

## 2018-07-31 DIAGNOSIS — R09.81 SINUS CONGESTION: ICD-10-CM

## 2018-07-31 DIAGNOSIS — D50.9 IRON DEFICIENCY ANEMIA, UNSPECIFIED IRON DEFICIENCY ANEMIA TYPE: Primary | ICD-10-CM

## 2018-07-31 PROCEDURE — 99214 OFFICE O/P EST MOD 30 MIN: CPT | Performed by: NURSE PRACTITIONER

## 2018-07-31 RX ORDER — PREDNISONE 20 MG/1
TABLET ORAL
Qty: 20 TABLET | Refills: 0 | Status: SHIPPED | OUTPATIENT
Start: 2018-07-31 | End: 2018-09-10

## 2018-07-31 NOTE — PROGRESS NOTES
Subjective   Ben Walsh is a 73 y.o. male.     History of Present Illness   Ben Walsh 73 y.o. male who presents for evaluation of sinus pressure and congestion. Symptoms include ear pressure, congestion, nasal blockage and sinus pain.  Onset of symptoms was 3 weeks ago, unchanged since that time. Patient denies fever.   Evaluation to date: none Treatment to date:  Mucinex.       Has had fatigue for months. Had low iron and ferritin with last lab set in May.  He has not been treated for this. Colonoscopy was last year with Dr. Wynne and was normal.  Repeat in 4 more years.  Patient states he has some intermittent diarrhea at times which is new since colonoscopy.   The following portions of the patient's history were reviewed and updated as appropriate: allergies, current medications, past family history, past medical history, past social history, past surgical history and problem list.    Review of Systems   Constitutional: Positive for fatigue. Negative for chills and fever.   HENT: Positive for congestion, postnasal drip, rhinorrhea and sinus pressure.    Respiratory: Negative for cough.    Neurological: Positive for weakness.       Objective   Physical Exam   Constitutional: He is oriented to person, place, and time. He appears well-developed and well-nourished.   HENT:   Right Ear: Tympanic membrane, external ear and ear canal normal.   Left Ear: Tympanic membrane, external ear and ear canal normal.   Nose: Right sinus exhibits no maxillary sinus tenderness and no frontal sinus tenderness. Left sinus exhibits no maxillary sinus tenderness and no frontal sinus tenderness.   Mouth/Throat: Uvula is midline and oropharynx is clear and moist.   Cardiovascular: Normal rate and regular rhythm.    Pulmonary/Chest: Effort normal and breath sounds normal.   Neurological: He is oriented to person, place, and time.   Skin: Skin is warm and dry.   Psychiatric: He has a normal mood and affect. His behavior is  normal. Judgment and thought content normal.   Nursing note and vitals reviewed.      Assessment/Plan   Problems Addressed this Visit     None      Visit Diagnoses     Iron deficiency anemia, unspecified iron deficiency anemia type    -  Primary    Relevant Orders    CBC No Differential    Iron level    Ferritin    Sinus congestion        Relevant Medications    predniSONE (DELTASONE) 20 MG tablet

## 2018-08-01 LAB
ERYTHROCYTE [DISTWIDTH] IN BLOOD BY AUTOMATED COUNT: 15.1 % (ref 11.5–14.5)
FERRITIN SERPL-MCNC: 13.06 NG/ML (ref 30–400)
HCT VFR BLD AUTO: 39.5 % (ref 40.4–52.2)
HGB BLD-MCNC: 12.4 G/DL (ref 13.7–17.6)
IRON SERPL-MCNC: 48 MCG/DL (ref 59–158)
MCH RBC QN AUTO: 27.9 PG (ref 27–32.7)
MCHC RBC AUTO-ENTMCNC: 31.4 G/DL (ref 32.6–36.4)
MCV RBC AUTO: 89 FL (ref 79.8–96.2)
PLATELET # BLD AUTO: 312 10*3/MM3 (ref 140–500)
RBC # BLD AUTO: 4.44 10*6/MM3 (ref 4.6–6)
WBC # BLD AUTO: 5.45 10*3/MM3 (ref 4.5–10.7)

## 2018-08-04 DIAGNOSIS — E03.9 PRIMARY HYPOTHYROIDISM: ICD-10-CM

## 2018-08-06 RX ORDER — LEVOTHYROXINE SODIUM 0.05 MG/1
TABLET ORAL
Qty: 90 TABLET | Refills: 1 | Status: SHIPPED | OUTPATIENT
Start: 2018-08-06 | End: 2018-09-02 | Stop reason: SDUPTHER

## 2018-09-02 DIAGNOSIS — E03.9 PRIMARY HYPOTHYROIDISM: ICD-10-CM

## 2018-09-02 RX ORDER — LEVOTHYROXINE SODIUM 0.05 MG/1
50 TABLET ORAL DAILY
Qty: 90 TABLET | Refills: 1 | Status: SHIPPED | OUTPATIENT
Start: 2018-09-02 | End: 2018-09-24 | Stop reason: SDUPTHER

## 2018-09-10 ENCOUNTER — OFFICE VISIT (OUTPATIENT)
Dept: ENDOCRINOLOGY | Age: 73
End: 2018-09-10

## 2018-09-10 VITALS
SYSTOLIC BLOOD PRESSURE: 132 MMHG | BODY MASS INDEX: 27.46 KG/M2 | WEIGHT: 191.8 LBS | OXYGEN SATURATION: 96 % | DIASTOLIC BLOOD PRESSURE: 76 MMHG | HEART RATE: 84 BPM | HEIGHT: 70 IN

## 2018-09-10 DIAGNOSIS — R80.9 TYPE 2 DIABETES MELLITUS WITH MICROALBUMINURIA, WITHOUT LONG-TERM CURRENT USE OF INSULIN (HCC): Primary | ICD-10-CM

## 2018-09-10 DIAGNOSIS — E78.5 HYPERLIPIDEMIA, UNSPECIFIED HYPERLIPIDEMIA TYPE: ICD-10-CM

## 2018-09-10 DIAGNOSIS — I10 ESSENTIAL HYPERTENSION: ICD-10-CM

## 2018-09-10 DIAGNOSIS — E11.42 TYPE 2 DIABETES MELLITUS WITH PERIPHERAL NEUROPATHY (HCC): ICD-10-CM

## 2018-09-10 DIAGNOSIS — D64.9 CHRONIC ANEMIA: ICD-10-CM

## 2018-09-10 DIAGNOSIS — M51.36 DDD (DEGENERATIVE DISC DISEASE), LUMBAR: ICD-10-CM

## 2018-09-10 DIAGNOSIS — I25.10 ASCVD (ARTERIOSCLEROTIC CARDIOVASCULAR DISEASE): ICD-10-CM

## 2018-09-10 DIAGNOSIS — E55.9 VITAMIN D DEFICIENCY: ICD-10-CM

## 2018-09-10 DIAGNOSIS — E11.29 TYPE 2 DIABETES MELLITUS WITH MICROALBUMINURIA, WITHOUT LONG-TERM CURRENT USE OF INSULIN (HCC): Primary | ICD-10-CM

## 2018-09-10 DIAGNOSIS — E03.9 PRIMARY HYPOTHYROIDISM: ICD-10-CM

## 2018-09-10 PROCEDURE — 99214 OFFICE O/P EST MOD 30 MIN: CPT | Performed by: INTERNAL MEDICINE

## 2018-09-10 RX ORDER — HYDROCODONE BITARTRATE AND ACETAMINOPHEN 7.5; 325 MG/1; MG/1
1 TABLET ORAL 2 TIMES DAILY
Start: 2018-09-10

## 2018-09-10 RX ORDER — GABAPENTIN 100 MG/1
CAPSULE ORAL
Start: 2018-09-10 | End: 2020-03-03 | Stop reason: DRUGHIGH

## 2018-09-10 NOTE — PROGRESS NOTES
Subjective   Ben Walsh is a 73 y.o. male.     F/u for dm 2,hypertension, hyperlipidemia, vitamin d def,BPH, COPD/ testing bs 1 x day / last dm eye exam 10/17/17 with dr Francois / last dm foot exam 4/23/18 with dr Miller       Diabetes   Hypoglycemia symptoms include nervousness/anxiousness. Associated symptoms include fatigue.   Hyperlipidemia     Hypertension   Associated symptoms include neck pain.   Benign Prostatic Hypertrophy     COPD   Associated symptoms include fatigue and neck pain.      Patient is a 73-year-old male who came in for follow-up.  He has known diabetes since 2013.  He has been on metformin 1000 mg twice a day, glipizide 10 mg every AM  and Tresiba 20 every AM.  Fasting glucose 100-196.  Random glucose 123-273.   .  He denies any severe hypoglycemic episodes.  He has gained 5 lbs since 4/18.  He admits to dietary noncompliance.  He is no longer hair having penile skin infection since he stopped Synjardy.  His last meal was at 10 AM     He has microalbuminuria on urine sample taken in 2014.  Repeat urine microalbumin done in April 2018 was normal.  He has been on lisinopril.  His last eye examination was in 10/17.  He has cataract on the right eye but no retinopathy.  He has burning in feet and pain in his calves, not related to activity.  He is on gabapentin 200 mg every morning, 200 mg every afternoon and 300 mg every evening prescribed by Dr. Jacobo.     He has hyperlipidemia and has been on Lipitor 40 mg once a day, TriCor 145 mg once a day, and Vascepa 2 g twice a day..  He denies any myalgia.  CT scan of the abdomen and ultrasound of the abdomen in 2012 showed fatty infiltration of the liver and a right kidney stone.     He has hypothyroidism and has been on levothyroxine 50 µg per day.  He denies heat or cold intolerance.     He has hypertension and has been on amlodipine and lisinopril.  He ran out of lisinopril for unknown period of time.  He had a false positive stress test in  "February 2016.  He had a cardiac catheterization in March 2016.  The stent to the mid LAD is patent.  There is mild luminal irregularities of the proximal LAD.  The left circumflex complex has mild luminal plaque.  The proximal RCA has a stable 50% stenosis.  He is on Plavix 75 mg once a day.  He follows with Dr. Borges.    He has chronic mild iron deficiency anemia since at least June 2014.  He is not on iron supplements.  He had a colonoscopy in March 2014 and a mixed adenomatous/hyper lasting polyp with low-grade dysplasia was removed by Dr. Wynne. He had a normal colonoscopy in March 2017 which was done by Dr. Wynne.  He denies melena, hematochezia, or hematuria.    The following portions of the patient's history were reviewed and updated as appropriate: allergies, current medications, past family history, past medical history, past social history, past surgical history and problem list.    Review of Systems   Constitutional: Positive for fatigue.   HENT: Negative.    Eyes: Negative.    Respiratory: Negative.    Cardiovascular: Negative.    Gastrointestinal: Positive for abdominal distention (bloating ).   Endocrine: Negative.    Genitourinary: Negative.    Musculoskeletal: Positive for back pain and neck pain.   Skin: Negative.    Allergic/Immunologic: Negative.    Neurological: Negative.    Hematological: Bruises/bleeds easily (on plavix ).   Psychiatric/Behavioral: The patient is nervous/anxious.        Objective      Vitals:    09/10/18 1359   BP: 132/76   BP Location: Right arm   Patient Position: Sitting   Cuff Size: Large Adult   Pulse: 84   SpO2: 96%   Weight: 87 kg (191 lb 12.8 oz)   Height: 177.8 cm (70\")     Physical Exam   Constitutional: He is oriented to person, place, and time. He appears well-developed and well-nourished. No distress.   HENT:   Head: Normocephalic.   Nose: Nose normal.   Mouth/Throat: No oropharyngeal exudate.   Eyes: Conjunctivae and EOM are normal. Right eye exhibits no " discharge. Left eye exhibits no discharge. No scleral icterus.   Neck: Neck supple. No JVD present. No tracheal deviation present. No thyromegaly present.   Cardiovascular: Normal rate, regular rhythm, normal heart sounds and intact distal pulses.  Exam reveals no gallop and no friction rub.    No murmur heard.  Pulmonary/Chest: Effort normal and breath sounds normal. No respiratory distress. He has no wheezes. He has no rales. He exhibits no tenderness.   Abdominal: Soft. Bowel sounds are normal. He exhibits no distension and no mass. There is no tenderness. There is no rebound and no guarding. No hernia.   Musculoskeletal: Normal range of motion. He exhibits no edema, tenderness or deformity.   Lymphadenopathy:     He has no cervical adenopathy.   Neurological: He is alert and oriented to person, place, and time. He displays normal reflexes. Coordination normal.   Intact light touch in distal lower ext   Skin: Skin is warm and dry. No rash noted. No erythema. No pallor.   Psychiatric: He has a normal mood and affect. His behavior is normal.     Office Visit on 07/31/2018   Component Date Value Ref Range Status   • WBC 07/31/2018 5.45  4.50 - 10.70 10*3/mm3 Final   • RBC 07/31/2018 4.44* 4.60 - 6.00 10*6/mm3 Final   • Hemoglobin 07/31/2018 12.4* 13.7 - 17.6 g/dL Final   • Hematocrit 07/31/2018 39.5* 40.4 - 52.2 % Final   • MCV 07/31/2018 89.0  79.8 - 96.2 fL Final   • MCH 07/31/2018 27.9  27.0 - 32.7 pg Final   • MCHC 07/31/2018 31.4* 32.6 - 36.4 g/dL Final   • RDW 07/31/2018 15.1* 11.5 - 14.5 % Final   • Platelets 07/31/2018 312  140 - 500 10*3/mm3 Final   • Iron 07/31/2018 48* 59 - 158 mcg/dL Final   • Ferritin 07/31/2018 13.06* 30.00 - 400.00 ng/mL Final     Assessment/Plan   Ben was seen today for diabetes, hyperlipidemia, hypertension, vitamin d deficiency, benign prostatic hypertrophy and copd.    Diagnoses and all orders for this visit:    Type 2 diabetes mellitus with microalbuminuria, without long-term  current use of insulin (CMS/[facility])  -     Comprehensive Metabolic Panel  -     Lipid Panel  -     Hemoglobin A1c    Hyperlipidemia, unspecified hyperlipidemia type  -     Comprehensive Metabolic Panel  -     Lipid Panel    Type 2 diabetes mellitus with peripheral neuropathy (CMS/[facility])  -     gabapentin (NEURONTIN) 100 MG capsule; 2 capsules every morning, 2 capsules every afternoon, and 3 capsules every evening.  Prescribed by Dr. Jacobo  -     Comprehensive Metabolic Panel  -     Lipid Panel  -     Hemoglobin A1c    DDD (degenerative disc disease), lumbar  -     HYDROcodone-acetaminophen (NORCO) 7.5-325 MG per tablet; Take 1 tablet by mouth 2 (Two) Times a Day. Prescribed by Dr. Jacobo    Primary hypothyroidism  -     Comprehensive Metabolic Panel  -     TSH  -     T4, Free    ASCVD (arteriosclerotic cardiovascular disease)    Essential hypertension  -     Comprehensive Metabolic Panel    Vitamin D deficiency  -     Comprehensive Metabolic Panel    Chronic anemia  -     CBC & Differential  -     DEJAH + PE  -     Reticulocytes  -     Haptoglobin  -     Hgb. Frac. Profile  -     Iron Profile  -     Ferritin  -     Folate  -     Vitamin B12      Continue glipizide, metformin, and Tresiba  Continue Lipitor, TriCor, Vascepa, and low-fat diet.  Continue levothyroxine 50 µg per day.  Continue amlodipine.  Restart lisinopril 10 mg per day.  Continue Plavix per Dr. Borges  Will workup anemia.  May need a referral to hematologist.  Flu vaccine next month    Send copy of my notes to Dr. Jake Marcus, and Dr. Borges    RTC 4 mos.

## 2018-09-11 LAB
25(OH)D3+25(OH)D2 SERPL-MCNC: 73.8 NG/ML (ref 30–100)
ALBUMIN SERPL ELPH-MCNC: 4 G/DL (ref 2.9–4.4)
ALBUMIN SERPL-MCNC: 4.8 G/DL (ref 3.5–5.2)
ALBUMIN/GLOB SERPL: 1.3 {RATIO} (ref 0.7–1.7)
ALBUMIN/GLOB SERPL: 2 G/DL
ALP SERPL-CCNC: 75 U/L (ref 39–117)
ALPHA1 GLOB SERPL ELPH-MCNC: 0.2 G/DL (ref 0–0.4)
ALPHA2 GLOB SERPL ELPH-MCNC: 0.9 G/DL (ref 0.4–1)
ALT SERPL-CCNC: 41 U/L (ref 1–41)
AST SERPL-CCNC: 27 U/L (ref 1–40)
B-GLOBULIN SERPL ELPH-MCNC: 1.3 G/DL (ref 0.7–1.3)
BASOPHILS # BLD AUTO: 0.03 10*3/MM3 (ref 0–0.2)
BASOPHILS NFR BLD AUTO: 0.5 % (ref 0–1.5)
BILIRUB SERPL-MCNC: 0.5 MG/DL (ref 0.1–1.2)
BUN SERPL-MCNC: 9 MG/DL (ref 8–23)
BUN/CREAT SERPL: 10 (ref 7–25)
CALCIUM SERPL-MCNC: 10.5 MG/DL (ref 8.6–10.5)
CHLORIDE SERPL-SCNC: 102 MMOL/L (ref 98–107)
CHOLEST SERPL-MCNC: 122 MG/DL (ref 0–200)
CO2 SERPL-SCNC: 26.8 MMOL/L (ref 22–29)
CREAT SERPL-MCNC: 0.9 MG/DL (ref 0.76–1.27)
EOSINOPHIL # BLD AUTO: 0.1 10*3/MM3 (ref 0–0.7)
EOSINOPHIL NFR BLD AUTO: 1.8 % (ref 0.3–6.2)
ERYTHROCYTE [DISTWIDTH] IN BLOOD BY AUTOMATED COUNT: 14.6 % (ref 11.5–14.5)
FERRITIN SERPL-MCNC: 25.2 NG/ML (ref 30–400)
FOLATE SERPL-MCNC: >20 NG/ML (ref 4.78–24.2)
GAMMA GLOB SERPL ELPH-MCNC: 0.7 G/DL (ref 0.4–1.8)
GLOBULIN SER CALC-MCNC: 2.4 GM/DL
GLOBULIN SER-MCNC: 3.2 G/DL (ref 2.2–3.9)
GLUCOSE SERPL-MCNC: 94 MG/DL (ref 65–99)
HAPTOGLOB SERPL-MCNC: 144 MG/DL (ref 34–200)
HBA1C MFR BLD: 7.7 % (ref 4.8–5.6)
HCT VFR BLD AUTO: 41.6 % (ref 40.4–52.2)
HDLC SERPL-MCNC: 32 MG/DL (ref 40–60)
HGB A MFR BLD: 97.8 % (ref 96.4–98.8)
HGB A2 MFR BLD COLUMN CHROM: 2.2 % (ref 1.8–3.2)
HGB BLD-MCNC: 12.8 G/DL (ref 13.7–17.6)
HGB C MFR BLD: 0 %
HGB F MFR BLD: 0 % (ref 0–2)
HGB FRACT BLD-IMP: NORMAL
HGB S BLD QL SOLY: NEGATIVE
HGB S MFR BLD: 0 %
IGA SERPL-MCNC: 205 MG/DL (ref 61–437)
IGG SERPL-MCNC: 739 MG/DL (ref 700–1600)
IGM SERPL-MCNC: 27 MG/DL (ref 15–143)
IMM GRANULOCYTES # BLD: 0 10*3/MM3 (ref 0–0.03)
IMM GRANULOCYTES NFR BLD: 0 % (ref 0–0.5)
INTERPRETATION SERPL IEP-IMP: NORMAL
INTERPRETATION: NORMAL
IRON SATN MFR SERPL: 28 % (ref 20–50)
IRON SERPL-MCNC: 171 MCG/DL (ref 59–158)
LABORATORY COMMENT REPORT: NORMAL
LDLC SERPL CALC-MCNC: 54 MG/DL (ref 0–100)
LYMPHOCYTES # BLD AUTO: 1.82 10*3/MM3 (ref 0.9–4.8)
LYMPHOCYTES NFR BLD AUTO: 32.8 % (ref 19.6–45.3)
Lab: NORMAL
M PROTEIN SERPL ELPH-MCNC: NORMAL G/DL
MCH RBC QN AUTO: 27.9 PG (ref 27–32.7)
MCHC RBC AUTO-ENTMCNC: 30.8 G/DL (ref 32.6–36.4)
MCV RBC AUTO: 90.8 FL (ref 79.8–96.2)
MONOCYTES # BLD AUTO: 0.56 10*3/MM3 (ref 0.2–1.2)
MONOCYTES NFR BLD AUTO: 10.1 % (ref 5–12)
NEUTROPHILS # BLD AUTO: 3.04 10*3/MM3 (ref 1.9–8.1)
NEUTROPHILS NFR BLD AUTO: 54.8 % (ref 42.7–76)
PLATELET # BLD AUTO: 330 10*3/MM3 (ref 140–500)
POTASSIUM SERPL-SCNC: 4.7 MMOL/L (ref 3.5–5.2)
PROT SERPL-MCNC: 7.2 G/DL (ref 6–8.5)
RBC # BLD AUTO: 4.58 10*6/MM3 (ref 4.6–6)
RETICS/RBC NFR AUTO: 1.05 % (ref 0.5–1.5)
SODIUM SERPL-SCNC: 140 MMOL/L (ref 136–145)
T4 FREE SERPL-MCNC: 1.3 NG/DL (ref 0.93–1.7)
TIBC SERPL-MCNC: 606 MCG/DL
TRIGL SERPL-MCNC: 181 MG/DL (ref 0–150)
TSH SERPL DL<=0.005 MIU/L-ACNC: 2.12 MIU/ML (ref 0.27–4.2)
UIBC SERPL-MCNC: 435 MCG/DL
VIT B12 SERPL-MCNC: 321 PG/ML (ref 211–946)
VLDLC SERPL CALC-MCNC: 36.2 MG/DL (ref 5–40)
WBC # BLD AUTO: 5.55 10*3/MM3 (ref 4.5–10.7)

## 2018-09-14 DIAGNOSIS — R80.9 TYPE 2 DIABETES MELLITUS WITH MICROALBUMINURIA, WITHOUT LONG-TERM CURRENT USE OF INSULIN (HCC): ICD-10-CM

## 2018-09-14 DIAGNOSIS — E78.49 OTHER HYPERLIPIDEMIA: Primary | ICD-10-CM

## 2018-09-14 DIAGNOSIS — R79.9 ABNORMAL BLOOD CHEMISTRY: ICD-10-CM

## 2018-09-14 DIAGNOSIS — I10 ESSENTIAL HYPERTENSION: ICD-10-CM

## 2018-09-14 DIAGNOSIS — E11.29 TYPE 2 DIABETES MELLITUS WITH MICROALBUMINURIA, WITHOUT LONG-TERM CURRENT USE OF INSULIN (HCC): ICD-10-CM

## 2018-09-24 ENCOUNTER — OFFICE VISIT (OUTPATIENT)
Dept: FAMILY MEDICINE CLINIC | Facility: CLINIC | Age: 73
End: 2018-09-24

## 2018-09-24 VITALS
HEIGHT: 70 IN | DIASTOLIC BLOOD PRESSURE: 80 MMHG | HEART RATE: 86 BPM | WEIGHT: 192 LBS | SYSTOLIC BLOOD PRESSURE: 140 MMHG | BODY MASS INDEX: 27.49 KG/M2 | RESPIRATION RATE: 16 BRPM

## 2018-09-24 DIAGNOSIS — F51.01 PRIMARY INSOMNIA: ICD-10-CM

## 2018-09-24 DIAGNOSIS — N40.0 BENIGN PROSTATIC HYPERPLASIA WITHOUT LOWER URINARY TRACT SYMPTOMS: ICD-10-CM

## 2018-09-24 DIAGNOSIS — E03.9 PRIMARY HYPOTHYROIDISM: ICD-10-CM

## 2018-09-24 DIAGNOSIS — I25.10 TWO-VESSEL CORONARY ARTERY DISEASE: ICD-10-CM

## 2018-09-24 DIAGNOSIS — I10 ESSENTIAL HYPERTENSION: Primary | ICD-10-CM

## 2018-09-24 DIAGNOSIS — M62.838 MUSCLE SPASM: ICD-10-CM

## 2018-09-24 DIAGNOSIS — F33.42 RECURRENT MAJOR DEPRESSIVE DISORDER, IN FULL REMISSION (HCC): ICD-10-CM

## 2018-09-24 PROCEDURE — 99214 OFFICE O/P EST MOD 30 MIN: CPT | Performed by: FAMILY MEDICINE

## 2018-09-24 RX ORDER — ZOLPIDEM TARTRATE 10 MG/1
10 TABLET ORAL NIGHTLY PRN
Qty: 90 TABLET | Refills: 0 | Status: SHIPPED | OUTPATIENT
Start: 2018-09-24 | End: 2019-01-31 | Stop reason: SDUPTHER

## 2018-09-24 RX ORDER — CYCLOBENZAPRINE HCL 10 MG
10 TABLET ORAL 3 TIMES DAILY PRN
Qty: 90 TABLET | Refills: 5 | Status: SHIPPED | OUTPATIENT
Start: 2018-09-24 | End: 2019-01-31 | Stop reason: SDUPTHER

## 2018-09-24 RX ORDER — ESCITALOPRAM OXALATE 20 MG/1
20 TABLET ORAL DAILY
Qty: 90 TABLET | Refills: 1 | Status: SHIPPED | OUTPATIENT
Start: 2018-09-24 | End: 2019-01-31 | Stop reason: SDUPTHER

## 2018-09-24 RX ORDER — TAMSULOSIN HYDROCHLORIDE 0.4 MG/1
1 CAPSULE ORAL DAILY
Qty: 90 CAPSULE | Refills: 1 | Status: SHIPPED | OUTPATIENT
Start: 2018-09-24 | End: 2019-01-31 | Stop reason: SDUPTHER

## 2018-09-24 RX ORDER — LEVOTHYROXINE SODIUM 0.05 MG/1
50 TABLET ORAL DAILY
Qty: 90 TABLET | Refills: 1 | Status: SHIPPED | OUTPATIENT
Start: 2018-09-24 | End: 2019-01-31 | Stop reason: SDUPTHER

## 2018-09-24 RX ORDER — LISINOPRIL 20 MG/1
20 TABLET ORAL DAILY
Qty: 90 TABLET | Refills: 1 | Status: SHIPPED | OUTPATIENT
Start: 2018-09-24 | End: 2019-01-31 | Stop reason: SDUPTHER

## 2018-09-24 RX ORDER — AMLODIPINE BESYLATE 5 MG/1
5 TABLET ORAL DAILY
Qty: 90 TABLET | Refills: 1 | Status: SHIPPED | OUTPATIENT
Start: 2018-09-24 | End: 2019-01-31 | Stop reason: SDUPTHER

## 2018-09-24 RX ORDER — CLOPIDOGREL BISULFATE 75 MG/1
75 TABLET ORAL DAILY
Qty: 90 TABLET | Refills: 1 | Status: SHIPPED | OUTPATIENT
Start: 2018-09-24 | End: 2019-01-31 | Stop reason: SDUPTHER

## 2018-09-24 NOTE — PROGRESS NOTES
Subjective   Ben Walsh is a 73 y.o. male.     History of Present Illness     Chief Complaint:   Chief Complaint   Patient presents with   • Insomnia     med refill - reza    • Hypertension     room 14   • Hyperlipidemia   • Hypothyroidism   • Arthritis   • Depression       Ben Walsh 73 y.o. male who presents today for Medical Management of the below listed issues and medication refills.  he has a problem list of   Patient Active Problem List   Diagnosis   • Allergic rhinitis   • ADD (attention deficit disorder)   • DDD (degenerative disc disease), lumbar   • BPH (benign prostatic hypertrophy)   • Chronic pain   • ASCVD (arteriosclerotic cardiovascular disease)   • Depression   • Diverticulosis of colon   • GERD (gastroesophageal reflux disease)   • Hiatal hernia   • Hyperlipidemia   • Hypertension   • Primary hypothyroidism   • Insomnia   • Osteoarthritis, multiple sites   • Acute pancreatitis   • Vitamin D deficiency   • Glaucoma   • Abnormal nuclear stress test   • Two-vessel coronary artery disease   • SAEED (dyspnea on exertion)   • Obstructive sleep apnea syndrome   • Patent foramen ovale   • Snoring   • Disorder of rotator cuff   • History of COPD   • Type 2 diabetes mellitus with microalbuminuria, without long-term current use of insulin (CMS/[facility])   • Carpal tunnel syndrome of right wrist   • Abnormal electrocardiogram   • Atypical chest pain   • Type 2 diabetes mellitus with peripheral neuropathy (CMS/HCC)   .  Since the last visit, he has overall felt well.  he has been compliant with   Current Outpatient Prescriptions:   •  amLODIPine (NORVASC) 5 MG tablet, Take 1 tablet by mouth Daily., Disp: 90 tablet, Rfl: 1  •  clopidogrel (PLAVIX) 75 MG tablet, Take 1 tablet by mouth Daily. For heart disease, Disp: 90 tablet, Rfl: 1  •  cyclobenzaprine (FLEXERIL) 10 MG tablet, Take 1 tablet by mouth 3 (Three) Times a Day As Needed for Muscle Spasms., Disp: 90 tablet, Rfl: 5  •  escitalopram (LEXAPRO)  "20 MG tablet, Take 1 tablet by mouth Daily., Disp: 90 tablet, Rfl: 1  •  levothyroxine (SYNTHROID, LEVOTHROID) 50 MCG tablet, Take 1 tablet by mouth Daily. For thyroid, Disp: 90 tablet, Rfl: 1  •  lisinopril (PRINIVIL,ZESTRIL) 20 MG tablet, Take 1 tablet by mouth Daily., Disp: 90 tablet, Rfl: 1  •  tamsulosin (FLOMAX) 0.4 MG capsule 24 hr capsule, Take 1 capsule by mouth Daily. For prostate, Disp: 90 capsule, Rfl: 1  •  zolpidem (AMBIEN) 10 MG tablet, Take 1 tablet by mouth At Night As Needed for Sleep., Disp: 90 tablet, Rfl: 0  •  ACCU-CHEK FASTCLIX LANCETS misc, TESTING BS 3 X DAY, Disp: 300 each, Rfl: 1  •  atorvastatin (LIPITOR) 40 MG tablet, Take 1 tablet by mouth Every Night. PT MUST KEEP April APPT, Disp: 90 tablet, Rfl: 1  •  clobetasol (TEMOVATE) 0.05 % external solution, Apply  topically 2 (Two) Times a Day. Apply and rub into scalp lesions BID PRN, Disp: 1 each, Rfl: 5  •  coenzyme Q10 100 MG capsule, Take 100 mg by mouth Daily., Disp: , Rfl:   •  esomeprazole (NexIUM) 20 MG capsule, Take 20 mg by mouth every morning before breakfast., Disp: , Rfl:   •  fenofibrate (TRICOR) 145 MG tablet, Take 1 tablet by mouth Daily., Disp: 90 tablet, Rfl: 1  •  gabapentin (NEURONTIN) 100 MG capsule, 2 capsules every morning, 2 capsules every afternoon, and 3 capsules every evening.  Prescribed by Dr. Jacobo, Disp: , Rfl:   •  glipiZIDE (GLUCOTROL) 10 MG tablet, 1 tablet every morning, Disp: 90 tablet, Rfl: 2  •  glucose blood (ACCU-CHEK SMARTVIEW) test strip, TESTING BS 5x DAY DX CODE E11.29, Disp: 300 each, Rfl: 1  •  HYDROcodone-acetaminophen (NORCO) 7.5-325 MG per tablet, Take 1 tablet by mouth 2 (Two) Times a Day. Prescribed by Dr. Jacobo, Disp: , Rfl:   •  Insulin Degludec 200 UNIT/ML solution pen-injector, Inject 20 Units under the skin Daily. Titration max of 100 units daily, Disp: 6 pen, Rfl: 4  •  Insulin Pen Needle (PEN NEEDLES 5/16\") 31G X 8 MM misc, USE FOR INJECTIONS DAILY, Disp: 100 each, Rfl: 11  •  " "metFORMIN (GLUCOPHAGE) 1000 MG tablet, 1 tablet twice a day with meals, Disp: 180 tablet, Rfl: 2  •  Multiple Vitamins-Minerals (MULTIVITAMIN ADULTS 50+ PO), Take 1 tablet by mouth daily. PT HOLDING FOR SURGERY, Disp: , Rfl:   •  NITROSTAT 0.4 MG SL tablet, Place 0.4 mg under the tongue every 5 (five) minutes as needed., Disp: , Rfl:   •  Probiotic Product (PROBIOTIC DAILY) capsule, Take 1 capsule by mouth daily., Disp: , Rfl:   •  pseudoephedrine-guaifenesin (MUCINEX D)  MG per 12 hr tablet, Take 1 tablet by mouth Every 12 (Twelve) Hours., Disp: 60 tablet, Rfl: 11  •  VASCEPA 1 g capsule capsule, TAKE 2 CAPSULES TWICE DAILY WITH MEALS, Disp: 360 capsule, Rfl: 3  •  vitamin D (ERGOCALCIFEROL) 37409 units capsule capsule, TAKE 1 CAPSULE EVERY 7 DAYS, Disp: 13 capsule, Rfl: 3.  he denies medication side effects.    All of the chronic condition(s) listed above are stable w/o issues.    /80   Pulse 86   Resp 16   Ht 177.8 cm (70\")   Wt 87.1 kg (192 lb)   BMI 27.55 kg/m²     Results for orders placed or performed in visit on 09/10/18   Comprehensive Metabolic Panel   Result Value Ref Range    Glucose 94 65 - 99 mg/dL    BUN 9 8 - 23 mg/dL    Creatinine 0.90 0.76 - 1.27 mg/dL    eGFR Non African Am 82 >60 mL/min/1.73    eGFR African Am 100 >60 mL/min/1.73    BUN/Creatinine Ratio 10.0 7.0 - 25.0    Sodium 140 136 - 145 mmol/L    Potassium 4.7 3.5 - 5.2 mmol/L    Chloride 102 98 - 107 mmol/L    Total CO2 26.8 22.0 - 29.0 mmol/L    Calcium 10.5 8.6 - 10.5 mg/dL    Total Protein 7.2 6.0 - 8.5 g/dL    Albumin 4.80 3.50 - 5.20 g/dL    Globulin 2.4 gm/dL    A/G Ratio 2.0 g/dL    Total Bilirubin 0.5 0.1 - 1.2 mg/dL    Alkaline Phosphatase 75 39 - 117 U/L    AST (SGOT) 27 1 - 40 U/L    ALT (SGPT) 41 1 - 41 U/L   Lipid Panel   Result Value Ref Range    Total Cholesterol 122 0 - 200 mg/dL    Triglycerides 181 (H) 0 - 150 mg/dL    HDL Cholesterol 32 (L) 40 - 60 mg/dL    VLDL Cholesterol 36.2 5 - 40 mg/dL    LDL " Cholesterol  54 0 - 100 mg/dL   Hemoglobin A1c   Result Value Ref Range    Hemoglobin A1C 7.70 (H) 4.80 - 5.60 %   TSH   Result Value Ref Range    TSH 2.120 0.270 - 4.200 mIU/mL   DEJAH + PE   Result Value Ref Range    IgG 739 700 - 1,600 mg/dL    IgA 205 61 - 437 mg/dL    IgM 27 15 - 143 mg/dL    Albumin 4.0 2.9 - 4.4 g/dL    Alpha-1-Globulin 0.2 0.0 - 0.4 g/dL    Alpha-2-Globulin 0.9 0.4 - 1.0 g/dL    Beta Globulin 1.3 0.7 - 1.3 g/dL    Gamma Globulin 0.7 0.4 - 1.8 g/dL    M-Benedict Not Observed Not Observed g/dL    Globulin 3.2 2.2 - 3.9 g/dL    A/G Ratio 1.3 0.7 - 1.7    Immunofixation Reflex, Serum Comment     Please note Comment    T4, Free   Result Value Ref Range    Free T4 1.30 0.93 - 1.70 ng/dL   Reticulocytes   Result Value Ref Range    Reticulocyte Absolute 1.05 0.50 - 1.50 %   Haptoglobin   Result Value Ref Range    Haptoglobin 144 34 - 200 mg/dL   Hgb. Frac. Profile   Result Value Ref Range    Hgb Solubility Negative Negative    Hgb F Quant 0.0 0.0 - 2.0 %    Hgb A 97.8 96.4 - 98.8 %    Hgb S 0.0 0.0 %    Hgb C 0.0 0.0 %    Hgb A2 Quant 2.2 1.8 - 3.2 %    Hgb Interp. Comment    Iron Profile   Result Value Ref Range    TIBC 606 mcg/dL    UIBC 435 mcg/dL    Iron 171 (H) 59 - 158 mcg/dL    Iron Saturation 28 20 - 50 %   Ferritin   Result Value Ref Range    Ferritin 25.20 (L) 30.00 - 400.00 ng/mL   Folate   Result Value Ref Range    Folate >20.00 4.78 - 24.20 ng/mL   Vitamin B12   Result Value Ref Range    Vitamin B-12 321 211 - 946 pg/mL   Vitamin D 25 Hydroxy   Result Value Ref Range    25 Hydroxy, Vitamin D 73.8 30.0 - 100.0 ng/ml   Cardiovascular Risk Assessment   Result Value Ref Range    Interpretation Note    Diabetes Patient Education   Result Value Ref Range    PDF Image Not applicable    CBC & Differential   Result Value Ref Range    WBC 5.55 4.50 - 10.70 10*3/mm3    RBC 4.58 (L) 4.60 - 6.00 10*6/mm3    Hemoglobin 12.8 (L) 13.7 - 17.6 g/dL    Hematocrit 41.6 40.4 - 52.2 %    MCV 90.8 79.8 - 96.2 fL     MCH 27.9 27.0 - 32.7 pg    MCHC 30.8 (L) 32.6 - 36.4 g/dL    RDW 14.6 (H) 11.5 - 14.5 %    Platelets 330 140 - 500 10*3/mm3    Neutrophil Rel % 54.8 42.7 - 76.0 %    Lymphocyte Rel % 32.8 19.6 - 45.3 %    Monocyte Rel % 10.1 5.0 - 12.0 %    Eosinophil Rel % 1.8 0.3 - 6.2 %    Basophil Rel % 0.5 0.0 - 1.5 %    Neutrophils Absolute 3.04 1.90 - 8.10 10*3/mm3    Lymphocytes Absolute 1.82 0.90 - 4.80 10*3/mm3    Monocytes Absolute 0.56 0.20 - 1.20 10*3/mm3    Eosinophils Absolute 0.10 0.00 - 0.70 10*3/mm3    Basophils Absolute 0.03 0.00 - 0.20 10*3/mm3    Immature Granulocyte Rel % 0.0 0.0 - 0.5 %    Immature Grans Absolute 0.00 0.00 - 0.03 10*3/mm3           The following portions of the patient's history were reviewed and updated as appropriate: allergies, current medications, past family history, past medical history, past social history, past surgical history and problem list.    Review of Systems   Constitutional: Negative for activity change, chills, fatigue and fever.   Respiratory: Negative for cough and shortness of breath.    Cardiovascular: Negative for chest pain and palpitations.   Gastrointestinal: Negative for abdominal pain.   Endocrine: Negative for cold intolerance.   Psychiatric/Behavioral: Negative for behavioral problems and dysphoric mood. The patient is not nervous/anxious.        Objective   Physical Exam   Constitutional: He appears well-developed and well-nourished.   Neck: Neck supple. No thyromegaly present.   Cardiovascular: Normal rate and regular rhythm.    No murmur heard.  Pulmonary/Chest: Effort normal and breath sounds normal.   Abdominal: Bowel sounds are normal. There is no tenderness.   Psychiatric: He has a normal mood and affect. His behavior is normal.   Nursing note and vitals reviewed.    The patient has read and signed the [facility] Controlled Substance Contract.  I will continue to see patient for regular follow up appointments.  They are well controlled on their  medication.  NISHANT has been reviewed by me and is updated every 3 months. The patient is aware of the potential for addiction and dependence.    Assessment/Plan   Ben was seen today for insomnia, hypertension, hyperlipidemia, hypothyroidism, arthritis and depression.    Diagnoses and all orders for this visit:    Essential hypertension  -     amLODIPine (NORVASC) 5 MG tablet; Take 1 tablet by mouth Daily.  -     lisinopril (PRINIVIL,ZESTRIL) 20 MG tablet; Take 1 tablet by mouth Daily.    Two-vessel coronary artery disease  -     clopidogrel (PLAVIX) 75 MG tablet; Take 1 tablet by mouth Daily. For heart disease    Muscle spasm  -     cyclobenzaprine (FLEXERIL) 10 MG tablet; Take 1 tablet by mouth 3 (Three) Times a Day As Needed for Muscle Spasms.    Recurrent major depressive disorder, in full remission (CMS/HCC)  -     escitalopram (LEXAPRO) 20 MG tablet; Take 1 tablet by mouth Daily.    Primary hypothyroidism  -     levothyroxine (SYNTHROID, LEVOTHROID) 50 MCG tablet; Take 1 tablet by mouth Daily. For thyroid    Primary insomnia  -     zolpidem (AMBIEN) 10 MG tablet; Take 1 tablet by mouth At Night As Needed for Sleep.    Benign prostatic hyperplasia without lower urinary tract symptoms  -     tamsulosin (FLOMAX) 0.4 MG capsule 24 hr capsule; Take 1 capsule by mouth Daily. For prostate

## 2018-10-11 RX ORDER — ATORVASTATIN CALCIUM 40 MG/1
TABLET, FILM COATED ORAL
Qty: 30 TABLET | Refills: 5 | Status: SHIPPED | OUTPATIENT
Start: 2018-10-11 | End: 2019-06-13 | Stop reason: SDUPTHER

## 2018-10-16 ENCOUNTER — CLINICAL SUPPORT (OUTPATIENT)
Dept: FAMILY MEDICINE CLINIC | Facility: CLINIC | Age: 73
End: 2018-10-16

## 2018-10-16 DIAGNOSIS — Z23 IMMUNIZATION DUE: Primary | ICD-10-CM

## 2018-10-16 PROCEDURE — G0008 ADMIN INFLUENZA VIRUS VAC: HCPCS | Performed by: FAMILY MEDICINE

## 2018-10-16 PROCEDURE — 90732 PPSV23 VACC 2 YRS+ SUBQ/IM: CPT | Performed by: FAMILY MEDICINE

## 2018-10-16 PROCEDURE — 90662 IIV NO PRSV INCREASED AG IM: CPT | Performed by: FAMILY MEDICINE

## 2018-10-16 PROCEDURE — G0009 ADMIN PNEUMOCOCCAL VACCINE: HCPCS | Performed by: FAMILY MEDICINE

## 2018-11-07 RX ORDER — OMEGA-3-ACID ETHYL ESTERS 1 G/1
2 CAPSULE, LIQUID FILLED ORAL 2 TIMES DAILY
Qty: 120 CAPSULE | Refills: 5 | Status: SHIPPED | OUTPATIENT
Start: 2018-11-07 | End: 2022-02-25

## 2019-01-02 ENCOUNTER — RESULTS ENCOUNTER (OUTPATIENT)
Dept: ENDOCRINOLOGY | Age: 74
End: 2019-01-02

## 2019-01-02 DIAGNOSIS — R80.9 TYPE 2 DIABETES MELLITUS WITH MICROALBUMINURIA, WITHOUT LONG-TERM CURRENT USE OF INSULIN (HCC): ICD-10-CM

## 2019-01-02 DIAGNOSIS — I10 ESSENTIAL HYPERTENSION: ICD-10-CM

## 2019-01-02 DIAGNOSIS — E11.29 TYPE 2 DIABETES MELLITUS WITH MICROALBUMINURIA, WITHOUT LONG-TERM CURRENT USE OF INSULIN (HCC): ICD-10-CM

## 2019-01-02 DIAGNOSIS — E78.49 OTHER HYPERLIPIDEMIA: ICD-10-CM

## 2019-01-02 DIAGNOSIS — R79.9 ABNORMAL BLOOD CHEMISTRY: ICD-10-CM

## 2019-01-12 DIAGNOSIS — E11.29 TYPE 2 DIABETES MELLITUS WITH MICROALBUMINURIA, WITHOUT LONG-TERM CURRENT USE OF INSULIN (HCC): ICD-10-CM

## 2019-01-12 DIAGNOSIS — R80.9 TYPE 2 DIABETES MELLITUS WITH MICROALBUMINURIA, WITHOUT LONG-TERM CURRENT USE OF INSULIN (HCC): ICD-10-CM

## 2019-01-14 RX ORDER — GLIPIZIDE 10 MG/1
TABLET ORAL
Qty: 90 TABLET | Refills: 0 | Status: SHIPPED | OUTPATIENT
Start: 2019-01-14 | End: 2019-05-08 | Stop reason: SDUPTHER

## 2019-01-15 RX ORDER — BLOOD-GLUCOSE METER
1 EACH MISCELLANEOUS ONCE
Qty: 1 KIT | Refills: 0 | Status: SHIPPED | OUTPATIENT
Start: 2019-01-15 | End: 2019-01-15

## 2019-01-15 RX ORDER — BLOOD SUGAR DIAGNOSTIC
STRIP MISCELLANEOUS
Qty: 300 EACH | Refills: 1 | Status: SHIPPED | OUTPATIENT
Start: 2019-01-15

## 2019-01-21 DIAGNOSIS — I10 ESSENTIAL HYPERTENSION: ICD-10-CM

## 2019-01-21 DIAGNOSIS — E61.1 IRON DEFICIENCY: ICD-10-CM

## 2019-01-21 DIAGNOSIS — E11.29 TYPE 2 DIABETES MELLITUS WITH MICROALBUMINURIA, WITHOUT LONG-TERM CURRENT USE OF INSULIN (HCC): ICD-10-CM

## 2019-01-21 DIAGNOSIS — E78.49 OTHER HYPERLIPIDEMIA: Primary | ICD-10-CM

## 2019-01-21 DIAGNOSIS — R80.9 TYPE 2 DIABETES MELLITUS WITH MICROALBUMINURIA, WITHOUT LONG-TERM CURRENT USE OF INSULIN (HCC): ICD-10-CM

## 2019-01-28 ENCOUNTER — RESULTS ENCOUNTER (OUTPATIENT)
Dept: ENDOCRINOLOGY | Age: 74
End: 2019-01-28

## 2019-01-28 DIAGNOSIS — E11.29 TYPE 2 DIABETES MELLITUS WITH MICROALBUMINURIA, WITHOUT LONG-TERM CURRENT USE OF INSULIN (HCC): ICD-10-CM

## 2019-01-28 DIAGNOSIS — E61.1 IRON DEFICIENCY: ICD-10-CM

## 2019-01-28 DIAGNOSIS — I10 ESSENTIAL HYPERTENSION: ICD-10-CM

## 2019-01-28 DIAGNOSIS — E78.49 OTHER HYPERLIPIDEMIA: ICD-10-CM

## 2019-01-28 DIAGNOSIS — R80.9 TYPE 2 DIABETES MELLITUS WITH MICROALBUMINURIA, WITHOUT LONG-TERM CURRENT USE OF INSULIN (HCC): ICD-10-CM

## 2019-01-28 LAB
ALBUMIN SERPL-MCNC: 4.7 G/DL (ref 3.5–5.2)
ALBUMIN/GLOB SERPL: 2 G/DL
ALP SERPL-CCNC: 88 U/L (ref 39–117)
ALT SERPL-CCNC: 63 U/L (ref 1–41)
AST SERPL-CCNC: 40 U/L (ref 1–40)
BASOPHILS # BLD AUTO: 0.03 10*3/MM3 (ref 0–0.2)
BASOPHILS NFR BLD AUTO: 0.7 % (ref 0–1.5)
BILIRUB SERPL-MCNC: 0.4 MG/DL (ref 0.1–1.2)
BUN SERPL-MCNC: 14 MG/DL (ref 8–23)
BUN/CREAT SERPL: 14.6 (ref 7–25)
CALCIUM SERPL-MCNC: 10.5 MG/DL (ref 8.6–10.5)
CHLORIDE SERPL-SCNC: 104 MMOL/L (ref 98–107)
CHOLEST SERPL-MCNC: 131 MG/DL (ref 0–200)
CO2 SERPL-SCNC: 24.4 MMOL/L (ref 22–29)
CREAT SERPL-MCNC: 0.96 MG/DL (ref 0.76–1.27)
EOSINOPHIL # BLD AUTO: 0.12 10*3/MM3 (ref 0–0.7)
EOSINOPHIL NFR BLD AUTO: 2.7 % (ref 0.3–6.2)
ERYTHROCYTE [DISTWIDTH] IN BLOOD BY AUTOMATED COUNT: 13 % (ref 11.5–14.5)
GLOBULIN SER CALC-MCNC: 2.4 GM/DL
GLUCOSE SERPL-MCNC: 149 MG/DL (ref 65–99)
HBA1C MFR BLD: 7.2 % (ref 4.8–5.6)
HCT VFR BLD AUTO: 40.8 % (ref 40.4–52.2)
HDLC SERPL-MCNC: 31 MG/DL (ref 40–60)
HGB BLD-MCNC: 13.2 G/DL (ref 13.7–17.6)
IMM GRANULOCYTES # BLD AUTO: 0 10*3/MM3 (ref 0–0.03)
IMM GRANULOCYTES NFR BLD AUTO: 0 % (ref 0–0.5)
INTERPRETATION: NORMAL
IRON SATN MFR SERPL: 18 % (ref 20–50)
IRON SERPL-MCNC: 96 MCG/DL (ref 59–158)
LDLC SERPL CALC-MCNC: 65 MG/DL (ref 0–100)
LYMPHOCYTES # BLD AUTO: 1.65 10*3/MM3 (ref 0.9–4.8)
LYMPHOCYTES NFR BLD AUTO: 37.6 % (ref 19.6–45.3)
Lab: NORMAL
MCH RBC QN AUTO: 30.6 PG (ref 27–32.7)
MCHC RBC AUTO-ENTMCNC: 32.4 G/DL (ref 32.6–36.4)
MCV RBC AUTO: 94.7 FL (ref 79.8–96.2)
MONOCYTES # BLD AUTO: 0.45 10*3/MM3 (ref 0.2–1.2)
MONOCYTES NFR BLD AUTO: 10.3 % (ref 5–12)
NEUTROPHILS # BLD AUTO: 2.14 10*3/MM3 (ref 1.9–8.1)
NEUTROPHILS NFR BLD AUTO: 48.7 % (ref 42.7–76)
PLATELET # BLD AUTO: 287 10*3/MM3 (ref 140–500)
POTASSIUM SERPL-SCNC: 4.7 MMOL/L (ref 3.5–5.2)
PROT SERPL-MCNC: 7.1 G/DL (ref 6–8.5)
RBC # BLD AUTO: 4.31 10*6/MM3 (ref 4.6–6)
SODIUM SERPL-SCNC: 142 MMOL/L (ref 136–145)
TIBC SERPL-MCNC: 526 MCG/DL
TRIGL SERPL-MCNC: 174 MG/DL (ref 0–150)
UIBC SERPL-MCNC: 430 MCG/DL
VLDLC SERPL CALC-MCNC: 34.8 MG/DL (ref 5–40)
WBC # BLD AUTO: 4.39 10*3/MM3 (ref 4.5–10.7)

## 2019-01-31 ENCOUNTER — OFFICE VISIT (OUTPATIENT)
Dept: FAMILY MEDICINE CLINIC | Facility: CLINIC | Age: 74
End: 2019-01-31

## 2019-01-31 VITALS
TEMPERATURE: 98.8 F | HEART RATE: 92 BPM | RESPIRATION RATE: 18 BRPM | SYSTOLIC BLOOD PRESSURE: 137 MMHG | DIASTOLIC BLOOD PRESSURE: 72 MMHG | HEIGHT: 70 IN | WEIGHT: 198 LBS | BODY MASS INDEX: 28.35 KG/M2

## 2019-01-31 DIAGNOSIS — I25.10 TWO-VESSEL CORONARY ARTERY DISEASE: ICD-10-CM

## 2019-01-31 DIAGNOSIS — E11.42 TYPE 2 DIABETES MELLITUS WITH PERIPHERAL NEUROPATHY (HCC): ICD-10-CM

## 2019-01-31 DIAGNOSIS — M62.838 MUSCLE SPASM: ICD-10-CM

## 2019-01-31 DIAGNOSIS — I48.92 ATRIAL FIBRILLATION AND FLUTTER (HCC): ICD-10-CM

## 2019-01-31 DIAGNOSIS — N40.0 BENIGN PROSTATIC HYPERPLASIA WITHOUT LOWER URINARY TRACT SYMPTOMS: ICD-10-CM

## 2019-01-31 DIAGNOSIS — Z00.00 MEDICARE ANNUAL WELLNESS VISIT, SUBSEQUENT: Primary | ICD-10-CM

## 2019-01-31 DIAGNOSIS — F51.01 PRIMARY INSOMNIA: ICD-10-CM

## 2019-01-31 DIAGNOSIS — I48.91 ATRIAL FIBRILLATION AND FLUTTER (HCC): ICD-10-CM

## 2019-01-31 DIAGNOSIS — E03.9 PRIMARY HYPOTHYROIDISM: ICD-10-CM

## 2019-01-31 DIAGNOSIS — F33.42 RECURRENT MAJOR DEPRESSIVE DISORDER, IN FULL REMISSION (HCC): ICD-10-CM

## 2019-01-31 DIAGNOSIS — I10 ESSENTIAL HYPERTENSION: ICD-10-CM

## 2019-01-31 PROCEDURE — 99214 OFFICE O/P EST MOD 30 MIN: CPT | Performed by: FAMILY MEDICINE

## 2019-01-31 PROCEDURE — 96160 PT-FOCUSED HLTH RISK ASSMT: CPT | Performed by: FAMILY MEDICINE

## 2019-01-31 PROCEDURE — G0439 PPPS, SUBSEQ VISIT: HCPCS | Performed by: FAMILY MEDICINE

## 2019-01-31 RX ORDER — LISINOPRIL 20 MG/1
20 TABLET ORAL DAILY
Qty: 90 TABLET | Refills: 1 | Status: SHIPPED | OUTPATIENT
Start: 2019-01-31 | End: 2019-09-21 | Stop reason: SDUPTHER

## 2019-01-31 RX ORDER — CLOPIDOGREL BISULFATE 75 MG/1
75 TABLET ORAL DAILY
Qty: 90 TABLET | Refills: 1 | Status: SHIPPED | OUTPATIENT
Start: 2019-01-31 | End: 2019-08-29 | Stop reason: SDUPTHER

## 2019-01-31 RX ORDER — ZOLPIDEM TARTRATE 10 MG/1
10 TABLET ORAL NIGHTLY PRN
Qty: 90 TABLET | Refills: 0 | Status: SHIPPED | OUTPATIENT
Start: 2019-01-31 | End: 2019-05-29 | Stop reason: SDUPTHER

## 2019-01-31 RX ORDER — TAMSULOSIN HYDROCHLORIDE 0.4 MG/1
1 CAPSULE ORAL DAILY
Qty: 90 CAPSULE | Refills: 1 | Status: SHIPPED | OUTPATIENT
Start: 2019-01-31 | End: 2019-08-29 | Stop reason: SDUPTHER

## 2019-01-31 RX ORDER — LEVOTHYROXINE SODIUM 0.05 MG/1
50 TABLET ORAL DAILY
Qty: 90 TABLET | Refills: 1 | Status: SHIPPED | OUTPATIENT
Start: 2019-01-31 | End: 2019-08-29 | Stop reason: SDUPTHER

## 2019-01-31 RX ORDER — AMLODIPINE BESYLATE 5 MG/1
5 TABLET ORAL DAILY
Qty: 90 TABLET | Refills: 1 | Status: SHIPPED | OUTPATIENT
Start: 2019-01-31 | End: 2019-08-29 | Stop reason: SDUPTHER

## 2019-01-31 RX ORDER — ESCITALOPRAM OXALATE 20 MG/1
20 TABLET ORAL DAILY
Qty: 90 TABLET | Refills: 1 | Status: SHIPPED | OUTPATIENT
Start: 2019-01-31 | End: 2019-08-29 | Stop reason: SDUPTHER

## 2019-01-31 RX ORDER — CYCLOBENZAPRINE HCL 10 MG
10 TABLET ORAL 3 TIMES DAILY PRN
Qty: 90 TABLET | Refills: 5 | Status: SHIPPED | OUTPATIENT
Start: 2019-01-31 | End: 2021-01-02 | Stop reason: SDUPTHER

## 2019-01-31 NOTE — PROGRESS NOTES
Subjective   Ben Walsh is a 73 y.o. male.     History of Present Illness     Chief Complaint:   Chief Complaint   Patient presents with   • medicare wellness   • Hypertension     med refill - reza    • Hyperlipidemia   • Hypothyroidism   • Coronary Artery Disease   • Insomnia       Ben Walsh 73 y.o. male who presents today for Medical Management of the below listed issues and medication refills.  he has a problem list of   Patient Active Problem List   Diagnosis   • Allergic rhinitis   • ADD (attention deficit disorder)   • DDD (degenerative disc disease), lumbar   • BPH (benign prostatic hypertrophy)   • Chronic pain   • ASCVD (arteriosclerotic cardiovascular disease)   • Depression   • Diverticulosis of colon   • GERD (gastroesophageal reflux disease)   • Hiatal hernia   • Hyperlipidemia   • Hypertension   • Primary hypothyroidism   • Insomnia   • Osteoarthritis, multiple sites   • Acute pancreatitis   • Vitamin D deficiency   • Glaucoma   • Abnormal nuclear stress test   • Two-vessel coronary artery disease   • SAEED (dyspnea on exertion)   • Obstructive sleep apnea syndrome   • Patent foramen ovale   • Snoring   • Disorder of rotator cuff   • History of COPD   • Type 2 diabetes mellitus with microalbuminuria, without long-term current use of insulin (CMS/[facility])   • Carpal tunnel syndrome of right wrist   • Abnormal electrocardiogram   • Atypical chest pain   • Type 2 diabetes mellitus with peripheral neuropathy (CMS/HCC)   • Atrial fibrillation and flutter (CMS/HCC)   .  Since the last visit, he has overall felt well.  he has been compliant with   Current Outpatient Medications:   •  amLODIPine (NORVASC) 5 MG tablet, Take 1 tablet by mouth Daily., Disp: 90 tablet, Rfl: 1  •  clopidogrel (PLAVIX) 75 MG tablet, Take 1 tablet by mouth Daily. For heart disease, Disp: 90 tablet, Rfl: 1  •  cyclobenzaprine (FLEXERIL) 10 MG tablet, Take 1 tablet by mouth 3 (Three) Times a Day As Needed for Muscle  "Spasms., Disp: 90 tablet, Rfl: 5  •  levothyroxine (SYNTHROID, LEVOTHROID) 50 MCG tablet, Take 1 tablet by mouth Daily. For thyroid, Disp: 90 tablet, Rfl: 1  •  lisinopril (PRINIVIL,ZESTRIL) 20 MG tablet, Take 1 tablet by mouth Daily., Disp: 90 tablet, Rfl: 1  •  tamsulosin (FLOMAX) 0.4 MG capsule 24 hr capsule, Take 1 capsule by mouth Daily. For prostate, Disp: 90 capsule, Rfl: 1  •  zolpidem (AMBIEN) 10 MG tablet, Take 1 tablet by mouth At Night As Needed for Sleep., Disp: 90 tablet, Rfl: 0  •  ACCU-CHEK GUIDE test strip, Dx code E11.29 testing bs 3 x day, Disp: 300 each, Rfl: 1  •  atorvastatin (LIPITOR) 40 MG tablet, TAKE ONE TABLET BY MOUTH EVERY NIGHT AT BEDTIME, Disp: 30 tablet, Rfl: 5  •  clobetasol (TEMOVATE) 0.05 % external solution, Apply  topically 2 (Two) Times a Day. Apply and rub into scalp lesions BID PRN, Disp: 1 each, Rfl: 5  •  coenzyme Q10 100 MG capsule, Take 100 mg by mouth Daily., Disp: , Rfl:   •  escitalopram (LEXAPRO) 20 MG tablet, Take 1 tablet by mouth Daily., Disp: 90 tablet, Rfl: 1  •  esomeprazole (NexIUM) 20 MG capsule, Take 20 mg by mouth every morning before breakfast., Disp: , Rfl:   •  fenofibrate (TRICOR) 145 MG tablet, Take 1 tablet by mouth Daily., Disp: 90 tablet, Rfl: 1  •  gabapentin (NEURONTIN) 100 MG capsule, 2 capsules every morning, 2 capsules every afternoon, and 3 capsules every evening.  Prescribed by Dr. Jacobo, Disp: , Rfl:   •  glipiZIDE (GLUCOTROL) 10 MG tablet, TAKE 1 TABLET EVERY MORNING, Disp: 90 tablet, Rfl: 0  •  HYDROcodone-acetaminophen (NORCO) 7.5-325 MG per tablet, Take 1 tablet by mouth 2 (Two) Times a Day. Prescribed by Dr. Jacobo, Disp: , Rfl:   •  Insulin Degludec 200 UNIT/ML solution pen-injector, Inject 20 Units under the skin Daily. Titration max of 100 units daily, Disp: 6 pen, Rfl: 4  •  Insulin Pen Needle (PEN NEEDLES 5/16\") 31G X 8 MM misc, USE FOR INJECTIONS DAILY, Disp: 100 each, Rfl: 11  •  Lancets (ACCU-CHEK SOFT TOUCH) lancets, Dx code " "E11.29 testing bs 3 x day, Disp: 300 each, Rfl: 1  •  metFORMIN (GLUCOPHAGE) 1000 MG tablet, 1 tablet twice a day with meals, Disp: 180 tablet, Rfl: 2  •  Multiple Vitamins-Minerals (MULTIVITAMIN ADULTS 50+ PO), Take 1 tablet by mouth daily. PT HOLDING FOR SURGERY, Disp: , Rfl:   •  NITROSTAT 0.4 MG SL tablet, Place 0.4 mg under the tongue every 5 (five) minutes as needed., Disp: , Rfl:   •  omega-3 acid ethyl esters (LOVAZA) 1 g capsule, Take 2 capsules by mouth 2 (Two) Times a Day., Disp: 120 capsule, Rfl: 5  •  Probiotic Product (PROBIOTIC DAILY) capsule, Take 1 capsule by mouth daily., Disp: , Rfl:   •  pseudoephedrine-guaifenesin (MUCINEX D)  MG per 12 hr tablet, Take 1 tablet by mouth Every 12 (Twelve) Hours., Disp: 60 tablet, Rfl: 11  •  vitamin D (ERGOCALCIFEROL) 78045 units capsule capsule, TAKE 1 CAPSULE EVERY 7 DAYS, Disp: 13 capsule, Rfl: 3.  he denies medication side effects.    All of the chronic condition(s) listed above are stable w/o issues.    /72   Pulse 92   Temp 98.8 °F (37.1 °C) (Oral)   Resp 18   Ht 177.8 cm (70\")   Wt 89.8 kg (198 lb)   BMI 28.41 kg/m²     Results for orders placed or performed in visit on 01/28/19   Comprehensive Metabolic Panel   Result Value Ref Range    Glucose 149 (H) 65 - 99 mg/dL    BUN 14 8 - 23 mg/dL    Creatinine 0.96 0.76 - 1.27 mg/dL    eGFR Non African Am 76 >60 mL/min/1.73    eGFR African Am 93 >60 mL/min/1.73    BUN/Creatinine Ratio 14.6 7.0 - 25.0    Sodium 142 136 - 145 mmol/L    Potassium 4.7 3.5 - 5.2 mmol/L    Chloride 104 98 - 107 mmol/L    Total CO2 24.4 22.0 - 29.0 mmol/L    Calcium 10.5 8.6 - 10.5 mg/dL    Total Protein 7.1 6.0 - 8.5 g/dL    Albumin 4.70 3.50 - 5.20 g/dL    Globulin 2.4 gm/dL    A/G Ratio 2.0 g/dL    Total Bilirubin 0.4 0.1 - 1.2 mg/dL    Alkaline Phosphatase 88 39 - 117 U/L    AST (SGOT) 40 1 - 40 U/L    ALT (SGPT) 63 (H) 1 - 41 U/L   Lipid Panel   Result Value Ref Range    Total Cholesterol 131 0 - 200 mg/dL    " Triglycerides 174 (H) 0 - 150 mg/dL    HDL Cholesterol 31 (L) 40 - 60 mg/dL    VLDL Cholesterol 34.8 5 - 40 mg/dL    LDL Cholesterol  65 0 - 100 mg/dL   Hemoglobin A1c   Result Value Ref Range    Hemoglobin A1C 7.20 (H) 4.80 - 5.60 %   Iron Profile   Result Value Ref Range    TIBC 526 mcg/dL    UIBC 430 mcg/dL    Iron 96 59 - 158 mcg/dL    Iron Saturation 18 (L) 20 - 50 %   Cardiovascular Risk Assessment   Result Value Ref Range    Interpretation Note    Diabetes Patient Education   Result Value Ref Range    PDF Image Not applicable    CBC & Differential   Result Value Ref Range    WBC 4.39 (L) 4.50 - 10.70 10*3/mm3    RBC 4.31 (L) 4.60 - 6.00 10*6/mm3    Hemoglobin 13.2 (L) 13.7 - 17.6 g/dL    Hematocrit 40.8 40.4 - 52.2 %    MCV 94.7 79.8 - 96.2 fL    MCH 30.6 27.0 - 32.7 pg    MCHC 32.4 (L) 32.6 - 36.4 g/dL    RDW 13.0 11.5 - 14.5 %    Platelets 287 140 - 500 10*3/mm3    Neutrophil Rel % 48.7 42.7 - 76.0 %    Lymphocyte Rel % 37.6 19.6 - 45.3 %    Monocyte Rel % 10.3 5.0 - 12.0 %    Eosinophil Rel % 2.7 0.3 - 6.2 %    Basophil Rel % 0.7 0.0 - 1.5 %    Neutrophils Absolute 2.14 1.90 - 8.10 10*3/mm3    Lymphocytes Absolute 1.65 0.90 - 4.80 10*3/mm3    Monocytes Absolute 0.45 0.20 - 1.20 10*3/mm3    Eosinophils Absolute 0.12 0.00 - 0.70 10*3/mm3    Basophils Absolute 0.03 0.00 - 0.20 10*3/mm3    Immature Granulocyte Rel % 0.0 0.0 - 0.5 %    Immature Grans Absolute 0.00 0.00 - 0.03 10*3/mm3           The following portions of the patient's history were reviewed and updated as appropriate: allergies, current medications, past family history, past medical history, past social history, past surgical history and problem list.    Review of Systems   Constitutional: Negative for activity change, chills, fatigue and fever.   Respiratory: Negative for cough and shortness of breath.    Cardiovascular: Negative for chest pain and palpitations.   Gastrointestinal: Negative for abdominal pain.   Endocrine: Negative for cold  intolerance.   Psychiatric/Behavioral: Negative for behavioral problems and dysphoric mood. The patient is not nervous/anxious.        Objective   Physical Exam   Constitutional: He appears well-developed and well-nourished.   Neck: Neck supple. No thyromegaly present.   Cardiovascular: Normal rate and regular rhythm.   No murmur heard.  Pulmonary/Chest: Effort normal and breath sounds normal.   Abdominal: Bowel sounds are normal. There is no tenderness.   Psychiatric: He has a normal mood and affect. His behavior is normal.   Nursing note and vitals reviewed.  Labs reviewed with pt today during visit. All questions answered.    The patient has read and signed the [facility] Controlled Substance Contract.  I will continue to see patient for regular follow up appointments.  They are well controlled on their medication.  NISHANT has been reviewed by me and is updated every 3 months. The patient is aware of the potential for addiction and dependence.    Assessment/Plan   Ben was seen today for medicare wellness, hypertension, hyperlipidemia, hypothyroidism, coronary artery disease and insomnia.    Diagnoses and all orders for this visit:    Medicare annual wellness visit, subsequent    Essential hypertension  -     amLODIPine (NORVASC) 5 MG tablet; Take 1 tablet by mouth Daily.  -     lisinopril (PRINIVIL,ZESTRIL) 20 MG tablet; Take 1 tablet by mouth Daily.    Two-vessel coronary artery disease  -     clopidogrel (PLAVIX) 75 MG tablet; Take 1 tablet by mouth Daily. For heart disease    Muscle spasm  -     cyclobenzaprine (FLEXERIL) 10 MG tablet; Take 1 tablet by mouth 3 (Three) Times a Day As Needed for Muscle Spasms.    Recurrent major depressive disorder, in full remission (CMS/HCC)  -     escitalopram (LEXAPRO) 20 MG tablet; Take 1 tablet by mouth Daily.    Primary hypothyroidism  -     levothyroxine (SYNTHROID, LEVOTHROID) 50 MCG tablet; Take 1 tablet by mouth Daily. For thyroid    Benign prostatic  hyperplasia without lower urinary tract symptoms  -     tamsulosin (FLOMAX) 0.4 MG capsule 24 hr capsule; Take 1 capsule by mouth Daily. For prostate    Primary insomnia  -     zolpidem (AMBIEN) 10 MG tablet; Take 1 tablet by mouth At Night As Needed for Sleep.    Atrial fibrillation and flutter (CMS/HCC)    Type 2 diabetes mellitus with peripheral neuropathy (CMS/HCC)

## 2019-01-31 NOTE — PATIENT INSTRUCTIONS
Medicare Wellness  Personal Prevention Plan of Service     Date of Office Visit:  2019  Encounter Provider:  Jake Marcus MD  Place of Service:  [facility]  Patient Name: Ben Walsh  :  1945    As part of the Medicare Wellness portion of your visit today, we are providing you with this personalized preventive plan of services (PPPS). This plan is based upon recommendations of the [place] Preventive Services Task Force (USPSTF) and the Advisory Committee on Immunization Practices (ACIP).    This lists the preventive care services that should be considered, and provides dates of when you are due. Items listed as completed are up-to-date and do not require any further intervention.    Health Maintenance   Topic Date Due   • TDAP/TD VACCINES (1 - Tdap) 2019 (Originally 1964)   • DIABETIC EYE EXAM  2019 (Originally 10/17/2018)   • AAA SCREEN (ONE-TIME)  2019 (Originally 2015)   • DIABETIC FOOT EXAM  2019   • URINE MICROALBUMIN  2019   • HEMOGLOBIN A1C  2019   • LIPID PANEL  2020   • MEDICARE ANNUAL WELLNESS  2020   • COLONOSCOPY  2027   • INFLUENZA VACCINE  Completed   • PNEUMOCOCCAL VACCINES (65+ LOW/MEDIUM RISK)  Completed   • ZOSTER VACCINE  Discontinued       No orders of the defined types were placed in this encounter.      Return in about 3 months (around 2019) for Recheck.

## 2019-01-31 NOTE — PROGRESS NOTES
QUICK REFERENCE INFORMATION:  The ABCs of the Annual Wellness Visit    Subsequent Medicare Wellness Visit    HEALTH RISK ASSESSMENT    1945    Recent Hospitalizations:  No hospitalization(s) within the last year..        Current Medical Providers:  Patient Care Team:  Jake Marcus MD as PCP - General (Family Medicine)  Kasey Castillo MD (Inactive) as PCP - Family Medicine  Guerrero Kruse MD as Consulting Physician (Orthopedic Surgery)  Kelton Francois MD as Consulting Physician (Ophthalmology)  Justin Miller MD as Consulting Physician (Endocrinology)  Angel Borges MD as Consulting Physician (Cardiology)        Smoking Status:  Social History     Tobacco Use   Smoking Status Never Smoker   Smokeless Tobacco Never Used   Tobacco Comment    no caffeine       Alcohol Consumption:  Social History     Substance and Sexual Activity   Alcohol Use No       Depression Screen:   PHQ-2/PHQ-9 Depression Screening 1/31/2019   Little interest or pleasure in doing things 0   Feeling down, depressed, or hopeless 0   Trouble falling or staying asleep, or sleeping too much -   Feeling tired or having little energy -   Poor appetite or overeating -   Feeling bad about yourself - or that you are a failure or have let yourself or your family down -   Trouble concentrating on things, such as reading the newspaper or watching television -   Moving or speaking so slowly that other people could have noticed. Or the opposite - being so fidgety or restless that you have been moving around a lot more than usual -   Thoughts that you would be better off dead, or of hurting yourself in some way -   Total Score 0   If you checked off any problems, how difficult have these problems made it for you to do your work, take care of things at home, or get along with other people? -       Health Habits and Functional and Cognitive Screening:  Functional & Cognitive Status 1/31/2019   Do you have difficulty preparing food and  eating? No   Do you have difficulty bathing yourself, getting dressed or grooming yourself? No   Do you have difficulty using the toilet? No   Do you have difficulty moving around from place to place? No   Do you have trouble with steps or getting out of a bed or a chair? No   In the past year have you fallen or experienced a near fall? Yes   Current Diet Well Balanced Diet   Dental Exam Up to date   Eye Exam Up to date   Exercise (times per week) 0 times per week   Current Exercise Activities Include None   Do you need help using the phone?  No   Are you deaf or do you have serious difficulty hearing?  No   Do you need help with transportation? No   Do you need help shopping? No   Do you need help preparing meals?  No   Do you need help with housework?  No   Do you need help with laundry? No   Do you need help taking your medications? No   Do you need help managing money? No   Do you ever drive or ride in a car without wearing a seat belt? No   Have you felt unusual stress, anger or loneliness in the last month? No   Who do you live with? Spouse   If you need help, do you have trouble finding someone available to you? Yes   Have you been bothered in the last four weeks by sexual problems? No   Do you have difficulty concentrating, remembering or making decisions? No           Does the patient have evidence of cognitive impairment? No    Aspirin use counseling: On clopidrogel as an alternative (due to ASA contraindication)      Recent Lab Results:  CMP:  Lab Results   Component Value Date     (H) 01/28/2019    BUN 14 01/28/2019    CREATININE 0.96 01/28/2019    EGFRIFNONA 76 01/28/2019    EGFRIFAFRI 93 01/28/2019    BCR 14.6 01/28/2019     01/28/2019    K 4.7 01/28/2019    CO2 24.4 01/28/2019    CALCIUM 10.5 01/28/2019    PROTENTOTREF 7.1 01/28/2019    ALBUMIN 4.70 01/28/2019    LABGLOBREF 2.4 01/28/2019    LABIL2 2.0 01/28/2019    BILITOT 0.4 01/28/2019    ALKPHOS 88 01/28/2019    AST 40 01/28/2019     ALT 63 (H) 01/28/2019     Lipid Panel:  Lab Results   Component Value Date    TRIG 174 (H) 01/28/2019    HDL 31 (L) 01/28/2019    VLDL 34.8 01/28/2019     HbA1c:  Lab Results   Component Value Date    HGBA1C 7.20 (H) 01/28/2019       Visual Acuity:  No exam data present    Age-appropriate Screening Schedule:  Refer to the list below for future screening recommendations based on patient's age, sex and/or medical conditions. Orders for these recommended tests are listed in the plan section. The patient has been provided with a written plan.    Health Maintenance   Topic Date Due   • TDAP/TD VACCINES (1 - Tdap) 04/24/2019 (Originally 5/31/1964)   • DIABETIC EYE EXAM  07/17/2019 (Originally 10/17/2018)   • DIABETIC FOOT EXAM  04/23/2019   • URINE MICROALBUMIN  04/23/2019   • HEMOGLOBIN A1C  07/28/2019   • LIPID PANEL  01/28/2020   • COLONOSCOPY  03/06/2027   • INFLUENZA VACCINE  Completed   • PNEUMOCOCCAL VACCINES (65+ LOW/MEDIUM RISK)  Completed   • ZOSTER VACCINE  Discontinued        Subjective   History of Present Illness    Ben Walsh is a 73 y.o. male who presents for an Subsequent Wellness Visit.    The following portions of the patient's history were reviewed and updated as appropriate: allergies, current medications, past family history, past medical history, past social history, past surgical history and problem list.    Outpatient Medications Prior to Visit   Medication Sig Dispense Refill   • amLODIPine (NORVASC) 5 MG tablet Take 1 tablet by mouth Daily. 90 tablet 1   • clopidogrel (PLAVIX) 75 MG tablet Take 1 tablet by mouth Daily. For heart disease 90 tablet 1   • cyclobenzaprine (FLEXERIL) 10 MG tablet Take 1 tablet by mouth 3 (Three) Times a Day As Needed for Muscle Spasms. 90 tablet 5   • levothyroxine (SYNTHROID, LEVOTHROID) 50 MCG tablet Take 1 tablet by mouth Daily. For thyroid 90 tablet 1   • lisinopril (PRINIVIL,ZESTRIL) 20 MG tablet Take 1 tablet by mouth Daily. 90 tablet 1   • tamsulosin  "(FLOMAX) 0.4 MG capsule 24 hr capsule Take 1 capsule by mouth Daily. For prostate 90 capsule 1   • zolpidem (AMBIEN) 10 MG tablet Take 1 tablet by mouth At Night As Needed for Sleep. 90 tablet 0   • ACCU-CHEK GUIDE test strip Dx code E11.29 testing bs 3 x day 300 each 1   • atorvastatin (LIPITOR) 40 MG tablet TAKE ONE TABLET BY MOUTH EVERY NIGHT AT BEDTIME 30 tablet 5   • clobetasol (TEMOVATE) 0.05 % external solution Apply  topically 2 (Two) Times a Day. Apply and rub into scalp lesions BID PRN 1 each 5   • coenzyme Q10 100 MG capsule Take 100 mg by mouth Daily.     • esomeprazole (NexIUM) 20 MG capsule Take 20 mg by mouth every morning before breakfast.     • fenofibrate (TRICOR) 145 MG tablet Take 1 tablet by mouth Daily. 90 tablet 1   • gabapentin (NEURONTIN) 100 MG capsule 2 capsules every morning, 2 capsules every afternoon, and 3 capsules every evening.  Prescribed by Dr. Jacobo     • glipiZIDE (GLUCOTROL) 10 MG tablet TAKE 1 TABLET EVERY MORNING 90 tablet 0   • HYDROcodone-acetaminophen (NORCO) 7.5-325 MG per tablet Take 1 tablet by mouth 2 (Two) Times a Day. Prescribed by Dr. Jacobo     • Insulin Degludec 200 UNIT/ML solution pen-injector Inject 20 Units under the skin Daily. Titration max of 100 units daily 6 pen 4   • Insulin Pen Needle (PEN NEEDLES 5/16\") 31G X 8 MM misc USE FOR INJECTIONS DAILY 100 each 11   • Lancets (ACCU-CHEK SOFT TOUCH) lancets Dx code E11.29 testing bs 3 x day 300 each 1   • metFORMIN (GLUCOPHAGE) 1000 MG tablet 1 tablet twice a day with meals 180 tablet 2   • Multiple Vitamins-Minerals (MULTIVITAMIN ADULTS 50+ PO) Take 1 tablet by mouth daily. PT HOLDING FOR SURGERY     • NITROSTAT 0.4 MG SL tablet Place 0.4 mg under the tongue every 5 (five) minutes as needed.     • omega-3 acid ethyl esters (LOVAZA) 1 g capsule Take 2 capsules by mouth 2 (Two) Times a Day. 120 capsule 5   • Probiotic Product (PROBIOTIC DAILY) capsule Take 1 capsule by mouth daily.     • " pseudoephedrine-guaifenesin (MUCINEX D)  MG per 12 hr tablet Take 1 tablet by mouth Every 12 (Twelve) Hours. 60 tablet 11   • vitamin D (ERGOCALCIFEROL) 23375 units capsule capsule TAKE 1 CAPSULE EVERY 7 DAYS 13 capsule 3   • escitalopram (LEXAPRO) 20 MG tablet Take 1 tablet by mouth Daily. 90 tablet 1     No facility-administered medications prior to visit.        Patient Active Problem List   Diagnosis   • Allergic rhinitis   • ADD (attention deficit disorder)   • DDD (degenerative disc disease), lumbar   • BPH (benign prostatic hypertrophy)   • Chronic pain   • ASCVD (arteriosclerotic cardiovascular disease)   • Depression   • Diverticulosis of colon   • GERD (gastroesophageal reflux disease)   • Hiatal hernia   • Hyperlipidemia   • Hypertension   • Primary hypothyroidism   • Insomnia   • Osteoarthritis, multiple sites   • Acute pancreatitis   • Vitamin D deficiency   • Glaucoma   • Abnormal nuclear stress test   • Two-vessel coronary artery disease   • SAEED (dyspnea on exertion)   • Obstructive sleep apnea syndrome   • Patent foramen ovale   • Snoring   • Disorder of rotator cuff   • History of COPD   • Type 2 diabetes mellitus with microalbuminuria, without long-term current use of insulin (CMS/HCC)   • Carpal tunnel syndrome of right wrist   • Abnormal electrocardiogram   • Atypical chest pain   • Type 2 diabetes mellitus with peripheral neuropathy (CMS/HCC)   • Atrial fibrillation and flutter (CMS/HCC)       Advance Care Planning:  has an advance directive - a copy has been provided and is in file    Identification of Risk Factors:  Risk factors include: weight  and cardiovascular risk.    Review of Systems    Compared to one year ago, the patient feels his physical health is the same.  Compared to one year ago, the patient feels his mental health is the same.    Objective     Physical Exam    Vitals:    01/31/19 1505   BP: 137/72   Pulse: 92   Resp: 18   Temp: 98.8 °F (37.1 °C)   TempSrc: Oral   Weight:  "89.8 kg (198 lb)   Height: 177.8 cm (70\")   PainSc:   6   PainLoc: Hip  Comment: rt leg / left hip pain       Patient's Body mass index is 28.41 kg/m². BMI is above normal parameters. Recommendations include: exercise counseling and nutrition counseling.      Assessment/Plan   Patient Self-Management and Personalized Health Advice  The patient has been provided with information about: diet, exercise and weight management and preventive services including:   · Exercise counseling provided, Fall Risk assessment done, Nutrition counseling provided.    Visit Diagnoses:    ICD-10-CM ICD-9-CM   1. Medicare annual wellness visit, subsequent Z00.00 V70.0   2. Essential hypertension I10 401.9   3. Two-vessel coronary artery disease I25.10 414.00   4. Muscle spasm M62.838 728.85   5. Recurrent major depressive disorder, in full remission (CMS/[facility]) F33.42 296.36   6. Primary hypothyroidism E03.9 244.9   7. Benign prostatic hyperplasia without lower urinary tract symptoms N40.0 600.00   8. Primary insomnia F51.01 307.42   9. Atrial fibrillation and flutter (CMS/[facility]) I48.91 427.31    I48.92 427.32   10. Type 2 diabetes mellitus with peripheral neuropathy (CMS/[facility]) E11.42 250.60     357.2       No orders of the defined types were placed in this encounter.      Outpatient Encounter Medications as of 1/31/2019   Medication Sig Dispense Refill   • amLODIPine (NORVASC) 5 MG tablet Take 1 tablet by mouth Daily. 90 tablet 1   • clopidogrel (PLAVIX) 75 MG tablet Take 1 tablet by mouth Daily. For heart disease 90 tablet 1   • cyclobenzaprine (FLEXERIL) 10 MG tablet Take 1 tablet by mouth 3 (Three) Times a Day As Needed for Muscle Spasms. 90 tablet 5   • levothyroxine (SYNTHROID, LEVOTHROID) 50 MCG tablet Take 1 tablet by mouth Daily. For thyroid 90 tablet 1   • lisinopril (PRINIVIL,ZESTRIL) 20 MG tablet Take 1 tablet by mouth Daily. 90 tablet 1   • tamsulosin (FLOMAX) 0.4 MG capsule 24 hr capsule Take 1 capsule by mouth Daily. For " prostate 90 capsule 1   • zolpidem (AMBIEN) 10 MG tablet Take 1 tablet by mouth At Night As Needed for Sleep. 90 tablet 0   • [DISCONTINUED] amLODIPine (NORVASC) 5 MG tablet Take 1 tablet by mouth Daily. 90 tablet 1   • [DISCONTINUED] clopidogrel (PLAVIX) 75 MG tablet Take 1 tablet by mouth Daily. For heart disease 90 tablet 1   • [DISCONTINUED] cyclobenzaprine (FLEXERIL) 10 MG tablet Take 1 tablet by mouth 3 (Three) Times a Day As Needed for Muscle Spasms. 90 tablet 5   • [DISCONTINUED] levothyroxine (SYNTHROID, LEVOTHROID) 50 MCG tablet Take 1 tablet by mouth Daily. For thyroid 90 tablet 1   • [DISCONTINUED] lisinopril (PRINIVIL,ZESTRIL) 20 MG tablet Take 1 tablet by mouth Daily. 90 tablet 1   • [DISCONTINUED] tamsulosin (FLOMAX) 0.4 MG capsule 24 hr capsule Take 1 capsule by mouth Daily. For prostate 90 capsule 1   • [DISCONTINUED] zolpidem (AMBIEN) 10 MG tablet Take 1 tablet by mouth At Night As Needed for Sleep. 90 tablet 0   • ACCU-CHEK GUIDE test strip Dx code E11.29 testing bs 3 x day 300 each 1   • atorvastatin (LIPITOR) 40 MG tablet TAKE ONE TABLET BY MOUTH EVERY NIGHT AT BEDTIME 30 tablet 5   • clobetasol (TEMOVATE) 0.05 % external solution Apply  topically 2 (Two) Times a Day. Apply and rub into scalp lesions BID PRN 1 each 5   • coenzyme Q10 100 MG capsule Take 100 mg by mouth Daily.     • escitalopram (LEXAPRO) 20 MG tablet Take 1 tablet by mouth Daily. 90 tablet 1   • esomeprazole (NexIUM) 20 MG capsule Take 20 mg by mouth every morning before breakfast.     • fenofibrate (TRICOR) 145 MG tablet Take 1 tablet by mouth Daily. 90 tablet 1   • gabapentin (NEURONTIN) 100 MG capsule 2 capsules every morning, 2 capsules every afternoon, and 3 capsules every evening.  Prescribed by Dr. Jacobo     • glipiZIDE (GLUCOTROL) 10 MG tablet TAKE 1 TABLET EVERY MORNING 90 tablet 0   • HYDROcodone-acetaminophen (NORCO) 7.5-325 MG per tablet Take 1 tablet by mouth 2 (Two) Times a Day. Prescribed by Dr. Jacobo     •  "Insulin Degludec 200 UNIT/ML solution pen-injector Inject 20 Units under the skin Daily. Titration max of 100 units daily 6 pen 4   • Insulin Pen Needle (PEN NEEDLES 5/16\") 31G X 8 MM misc USE FOR INJECTIONS DAILY 100 each 11   • Lancets (ACCU-CHEK SOFT TOUCH) lancets Dx code E11.29 testing bs 3 x day 300 each 1   • metFORMIN (GLUCOPHAGE) 1000 MG tablet 1 tablet twice a day with meals 180 tablet 2   • Multiple Vitamins-Minerals (MULTIVITAMIN ADULTS 50+ PO) Take 1 tablet by mouth daily. PT HOLDING FOR SURGERY     • NITROSTAT 0.4 MG SL tablet Place 0.4 mg under the tongue every 5 (five) minutes as needed.     • omega-3 acid ethyl esters (LOVAZA) 1 g capsule Take 2 capsules by mouth 2 (Two) Times a Day. 120 capsule 5   • Probiotic Product (PROBIOTIC DAILY) capsule Take 1 capsule by mouth daily.     • pseudoephedrine-guaifenesin (MUCINEX D)  MG per 12 hr tablet Take 1 tablet by mouth Every 12 (Twelve) Hours. 60 tablet 11   • vitamin D (ERGOCALCIFEROL) 27919 units capsule capsule TAKE 1 CAPSULE EVERY 7 DAYS 13 capsule 3   • [DISCONTINUED] escitalopram (LEXAPRO) 20 MG tablet Take 1 tablet by mouth Daily. 90 tablet 1     No facility-administered encounter medications on file as of 1/31/2019.        Reviewed use of high risk medication in the elderly: not applicable  Reviewed for potential of harmful drug interactions in the elderly: not applicable    Follow Up:  No Follow-up on file.     An After Visit Summary and PPPS with all of these plans were given to the patient.         "

## 2019-02-06 ENCOUNTER — OFFICE VISIT (OUTPATIENT)
Dept: ENDOCRINOLOGY | Age: 74
End: 2019-02-06

## 2019-02-06 VITALS
OXYGEN SATURATION: 92 % | HEART RATE: 72 BPM | BODY MASS INDEX: 27.77 KG/M2 | SYSTOLIC BLOOD PRESSURE: 138 MMHG | DIASTOLIC BLOOD PRESSURE: 74 MMHG | HEIGHT: 70 IN | WEIGHT: 194 LBS

## 2019-02-06 DIAGNOSIS — E03.9 PRIMARY HYPOTHYROIDISM: ICD-10-CM

## 2019-02-06 DIAGNOSIS — E78.5 HYPERLIPIDEMIA, UNSPECIFIED HYPERLIPIDEMIA TYPE: ICD-10-CM

## 2019-02-06 DIAGNOSIS — I10 ESSENTIAL HYPERTENSION: ICD-10-CM

## 2019-02-06 DIAGNOSIS — E11.42 TYPE 2 DIABETES MELLITUS WITH PERIPHERAL NEUROPATHY (HCC): ICD-10-CM

## 2019-02-06 DIAGNOSIS — R80.9 TYPE 2 DIABETES MELLITUS WITH MICROALBUMINURIA, WITHOUT LONG-TERM CURRENT USE OF INSULIN (HCC): Primary | ICD-10-CM

## 2019-02-06 DIAGNOSIS — E11.29 TYPE 2 DIABETES MELLITUS WITH MICROALBUMINURIA, WITHOUT LONG-TERM CURRENT USE OF INSULIN (HCC): Primary | ICD-10-CM

## 2019-02-06 PROCEDURE — 99214 OFFICE O/P EST MOD 30 MIN: CPT | Performed by: INTERNAL MEDICINE

## 2019-02-06 NOTE — PROGRESS NOTES
Subjective   Ben Walsh is a 73 y.o. male.     F/u for dm 2, hypertension, hyperlipidemia,vitamin d def, BPH, COPD/ testing bs 1 x day / last dm eye exam 10/17/17 appt today @ 2:30  with dr Francois/ last dm foot exam today with dr Weinberg / flu vaccine @[facility]       Diabetes   He presents for his follow-up diabetic visit. He has type 2 diabetes mellitus.   Hyperlipidemia     Hypertension     Benign Prostatic Hypertrophy     COPD   Associated symptoms include numbness (right hand , legs from knee down ).      Patient is a 73-year-old male who came in for follow-up.  He has known diabetes since 2013.  He has been on metformin 1000 mg twice a day, glipizide 10 mg every AM  and Tresiba 20 every AM.  Fasting glucose .  Random glucose .   .  He denies any severe hypoglycemic episodes.  He has gained 3 lbs since 9/18.  He admits to dietary noncompliance.      He has microalbuminuria on urine sample taken in 2014.  Repeat urine microalbumin done in April 2018 was normal.  He has been on lisinopril.  His last eye examination was in 10/17.  He has cataract on the right eye but no retinopathy.  He has a scheduled eye examination today.      He has chronic neck and lower back pain due to degenerative disc disease.  He has burning in feet and pain in his calves, not related to activity.   he recently completed physical therapy.   He is on gabapentin 200 mg every morning, 200 mg every afternoon and 300 mg every evening prescribed by Dr. Jacobo.     He has hyperlipidemia and has been on Lipitor 40 mg once a day, TriCor 145 mg once a day, and Vascepa 2 g twice a day..  He denies any myalgia.  CT scan of the abdomen and ultrasound of the abdomen in 2012 showed fatty infiltration of the liver and a right kidney stone.     He has hypothyroidism and has been on levothyroxine 50 µg per day.  He denies heat or cold intolerance.  TSH done in September 2018 was normal at 2.12.     He has hypertension and has been on amlodipine  "and lisinopril.  He had a false positive stress test in February 2016.  He had a cardiac catheterization in March 2016.  The stent to the mid LAD is patent.  There is mild luminal irregularities of the proximal LAD.  The left circumflex complex has mild luminal plaque.  The proximal RCA has a stable 50% stenosis.  He is on Plavix 75 mg once a day.  He follows with Dr. Borges.     He has chronic mild iron deficiency anemia since at least June 2014.  He is not on iron supplements.  He had a colonoscopy in March 2014 and a mixed adenomatous/hyperplastic polyp with low-grade dysplasia was removed by Dr. Wynne. He had a normal colonoscopy in March 2017 which was done by Dr. Wynne.  He denies melena, hematochezia, or hematuria.    The following portions of the patient's history were reviewed and updated as appropriate: allergies, current medications, past family history, past medical history, past social history, past surgical history and problem list.    Review of Systems   Constitutional: Negative.    HENT: Negative.    Eyes: Negative.    Respiratory: Negative.    Cardiovascular: Negative.    Gastrointestinal: Positive for abdominal distention (tenderness ).   Endocrine: Negative.    Genitourinary: Positive for decreased urine volume and difficulty urinating (leaking / hard to get started ).   Musculoskeletal: Negative.    Skin: Negative.    Allergic/Immunologic: Negative.    Neurological: Positive for numbness (right hand , legs from knee down ).   Hematological: Negative.    Psychiatric/Behavioral: Negative.        Objective      Vitals:    02/06/19 1139   BP: 138/74   BP Location: Right arm   Patient Position: Sitting   Cuff Size: Large Adult   Pulse: 72   SpO2: 92%   Weight: 88 kg (194 lb)   Height: 177.8 cm (70\")     Physical Exam   Constitutional: He is oriented to person, place, and time. He appears well-developed and well-nourished. No distress.   HENT:   Head: Normocephalic.   Nose: Nose normal. "   Mouth/Throat: No oropharyngeal exudate.   Eyes: Conjunctivae and EOM are normal. Right eye exhibits no discharge. Left eye exhibits no discharge. No scleral icterus.   Neck: Neck supple. No JVD present. No tracheal deviation present. No thyromegaly present.   Cardiovascular: Normal rate, regular rhythm, normal heart sounds and intact distal pulses. Exam reveals no gallop and no friction rub.   No murmur heard.  Pulmonary/Chest: Effort normal and breath sounds normal. No stridor. No respiratory distress. He has no wheezes. He has no rales. He exhibits no tenderness.   Abdominal: Soft. Bowel sounds are normal. He exhibits no distension and no mass. There is no tenderness. There is no rebound and no guarding.   Musculoskeletal: Normal range of motion. He exhibits no edema, tenderness or deformity.   Lymphadenopathy:     He has no cervical adenopathy.   Neurological: He is alert and oriented to person, place, and time. He displays normal reflexes. Coordination normal.   Intact light touch   Skin: Skin is warm and dry. No rash noted. No erythema.   Psychiatric: He has a normal mood and affect. His behavior is normal.     Results Encounter on 01/28/2019   Component Date Value Ref Range Status   • WBC 01/28/2019 4.39* 4.50 - 10.70 10*3/mm3 Final   • RBC 01/28/2019 4.31* 4.60 - 6.00 10*6/mm3 Final   • Hemoglobin 01/28/2019 13.2* 13.7 - 17.6 g/dL Final   • Hematocrit 01/28/2019 40.8  40.4 - 52.2 % Final   • MCV 01/28/2019 94.7  79.8 - 96.2 fL Final   • MCH 01/28/2019 30.6  27.0 - 32.7 pg Final   • MCHC 01/28/2019 32.4* 32.6 - 36.4 g/dL Final   • RDW 01/28/2019 13.0  11.5 - 14.5 % Final   • Platelets 01/28/2019 287  140 - 500 10*3/mm3 Final   • Neutrophil Rel % 01/28/2019 48.7  42.7 - 76.0 % Final   • Lymphocyte Rel % 01/28/2019 37.6  19.6 - 45.3 % Final   • Monocyte Rel % 01/28/2019 10.3  5.0 - 12.0 % Final   • Eosinophil Rel % 01/28/2019 2.7  0.3 - 6.2 % Final   • Basophil Rel % 01/28/2019 0.7  0.0 - 1.5 % Final   •  Neutrophils Absolute 01/28/2019 2.14  1.90 - 8.10 10*3/mm3 Final   • Lymphocytes Absolute 01/28/2019 1.65  0.90 - 4.80 10*3/mm3 Final   • Monocytes Absolute 01/28/2019 0.45  0.20 - 1.20 10*3/mm3 Final   • Eosinophils Absolute 01/28/2019 0.12  0.00 - 0.70 10*3/mm3 Final   • Basophils Absolute 01/28/2019 0.03  0.00 - 0.20 10*3/mm3 Final   • Immature Granulocyte Rel % 01/28/2019 0.0  0.0 - 0.5 % Final   • Immature Grans Absolute 01/28/2019 0.00  0.00 - 0.03 10*3/mm3 Final   • Glucose 01/28/2019 149* 65 - 99 mg/dL Final   • BUN 01/28/2019 14  8 - 23 mg/dL Final   • Creatinine 01/28/2019 0.96  0.76 - 1.27 mg/dL Final   • eGFR Non African Am 01/28/2019 76  >60 mL/min/1.73 Final    Comment: The MDRD GFR formula is only valid for adults with stable  renal function between ages 18 and 70.     • eGFR  Am 01/28/2019 93  >60 mL/min/1.73 Final   • BUN/Creatinine Ratio 01/28/2019 14.6  7.0 - 25.0 Final   • Sodium 01/28/2019 142  136 - 145 mmol/L Final   • Potassium 01/28/2019 4.7  3.5 - 5.2 mmol/L Final   • Chloride 01/28/2019 104  98 - 107 mmol/L Final   • Total CO2 01/28/2019 24.4  22.0 - 29.0 mmol/L Final   • Calcium 01/28/2019 10.5  8.6 - 10.5 mg/dL Final   • Total Protein 01/28/2019 7.1  6.0 - 8.5 g/dL Final   • Albumin 01/28/2019 4.70  3.50 - 5.20 g/dL Final   • Globulin 01/28/2019 2.4  gm/dL Final   • A/G Ratio 01/28/2019 2.0  g/dL Final   • Total Bilirubin 01/28/2019 0.4  0.1 - 1.2 mg/dL Final   • Alkaline Phosphatase 01/28/2019 88  39 - 117 U/L Final   • AST (SGOT) 01/28/2019 40  1 - 40 U/L Final   • ALT (SGPT) 01/28/2019 63* 1 - 41 U/L Final   • Total Cholesterol 01/28/2019 131  0 - 200 mg/dL Final   • Triglycerides 01/28/2019 174* 0 - 150 mg/dL Final   • HDL Cholesterol 01/28/2019 31* 40 - 60 mg/dL Final   • VLDL Cholesterol 01/28/2019 34.8  5 - 40 mg/dL Final   • LDL Cholesterol  01/28/2019 65  0 - 100 mg/dL Final   • Hemoglobin A1C 01/28/2019 7.20* 4.80 - 5.60 % Final    Comment: Hemoglobin A1C  Ranges:  Increased Risk for Diabetes  5.7% to 6.4%  Diabetes                     >= 6.5%  Diabetic Goal                < 7.0%     • TIBC 01/28/2019 526  mcg/dL Final   • UIBC 01/28/2019 430  mcg/dL Final   • Iron 01/28/2019 96  59 - 158 mcg/dL Final   • Iron Saturation 01/28/2019 18* 20 - 50 % Final   • Interpretation 01/28/2019 Note   Final    Supplemental report is available.   • PDF Image 01/28/2019 Not applicable   Final     Assessment/Plan   Ben was seen today for diabetes, hyperlipidemia, hypertension, vitamin d deficiency, benign prostatic hypertrophy and copd.    Diagnoses and all orders for this visit:    Type 2 diabetes mellitus with microalbuminuria, without long-term current use of insulin (CMS/[facility])  -     Microalbumin / Creatinine Urine Ratio - Urine, Clean Catch    Type 2 diabetes mellitus with peripheral neuropathy (CMS/[facility])    Hyperlipidemia, unspecified hyperlipidemia type    Essential hypertension    Primary hypothyroidism      Continue Tresiba 20 units every morning, metformin 1000 mg twice a day, and glipizide 10 mg every morning.  Continue Lipitor 40 mg per day, TriCor 145 mg per day, and Vascepa 2 g twice a day.  Continue levothyroxine 50 µg per day.    Continue amlodipine and lisinopril.    Send copy of my note to Dr. Marcus and Dr. Jacobo    RTC 4 mos

## 2019-02-07 LAB
ALBUMIN/CREAT UR: 3.5 MG/G CREAT (ref 0–30)
CREAT UR-MCNC: 126.1 MG/DL
MICROALBUMIN UR-MCNC: 4.4 UG/ML

## 2019-02-09 DIAGNOSIS — E78.5 HYPERLIPIDEMIA, UNSPECIFIED HYPERLIPIDEMIA TYPE: ICD-10-CM

## 2019-02-11 RX ORDER — FENOFIBRATE 145 MG/1
TABLET, COATED ORAL
Qty: 90 TABLET | Refills: 1 | Status: SHIPPED | OUTPATIENT
Start: 2019-02-11 | End: 2019-08-17 | Stop reason: SDUPTHER

## 2019-03-06 ENCOUNTER — OFFICE VISIT (OUTPATIENT)
Dept: FAMILY MEDICINE CLINIC | Facility: CLINIC | Age: 74
End: 2019-03-06

## 2019-03-06 VITALS
RESPIRATION RATE: 18 BRPM | HEART RATE: 94 BPM | DIASTOLIC BLOOD PRESSURE: 72 MMHG | WEIGHT: 193 LBS | HEIGHT: 70 IN | OXYGEN SATURATION: 98 % | BODY MASS INDEX: 27.63 KG/M2 | TEMPERATURE: 97.8 F | SYSTOLIC BLOOD PRESSURE: 126 MMHG

## 2019-03-06 DIAGNOSIS — M54.50 LUMBAR BACK PAIN: ICD-10-CM

## 2019-03-06 DIAGNOSIS — M25.551 RIGHT HIP PAIN: Primary | ICD-10-CM

## 2019-03-06 PROCEDURE — 99214 OFFICE O/P EST MOD 30 MIN: CPT | Performed by: FAMILY MEDICINE

## 2019-03-06 PROCEDURE — 72100 X-RAY EXAM L-S SPINE 2/3 VWS: CPT | Performed by: FAMILY MEDICINE

## 2019-03-06 PROCEDURE — 73502 X-RAY EXAM HIP UNI 2-3 VIEWS: CPT | Performed by: FAMILY MEDICINE

## 2019-03-06 RX ORDER — METHYLPREDNISOLONE 4 MG/1
TABLET ORAL
Qty: 21 TABLET | Refills: 0 | Status: SHIPPED | OUTPATIENT
Start: 2019-03-06 | End: 2019-05-29

## 2019-03-06 NOTE — PROGRESS NOTES
"Subjective   Ben Walsh is a 73 y.o. male.     CC: Numbness/Pain    History of Present Illness     Pt comes in today c/o a roughly 8 week h/o right hip pain that radiates to the foot as well as bilateral feet tingling (R>L). Has a h/o cervical DDD and sees pain management for that. Had been going the PT for this but reports the PT made it feel worse. Also reports some ongoing LBP for many years. Had lumbar surgery 30-35 years ago and was successful per pt.      The following portions of the patient's history were reviewed and updated as appropriate: allergies, current medications, past family history, past medical history, past social history, past surgical history and problem list.    Review of Systems   Constitutional: Negative for activity change, chills, fatigue and fever.   Respiratory: Negative for cough and shortness of breath.    Cardiovascular: Negative for chest pain and palpitations.   Gastrointestinal: Negative for abdominal pain.   Endocrine: Negative for cold intolerance.   Musculoskeletal:        Hip pain   Neurological: Positive for numbness.   Psychiatric/Behavioral: Negative for behavioral problems and dysphoric mood. The patient is not nervous/anxious.      /72   Pulse 94   Temp 97.8 °F (36.6 °C) (Oral)   Resp 18   Ht 177.8 cm (70\")   Wt 87.5 kg (193 lb)   SpO2 98%   BMI 27.69 kg/m²     Objective   Physical Exam   Constitutional: He appears well-developed and well-nourished.   Neck: Neck supple. No thyromegaly present.   Cardiovascular: Normal rate and regular rhythm.   No murmur heard.  Pulmonary/Chest: Effort normal and breath sounds normal.   Abdominal: Bowel sounds are normal. There is no tenderness.   Musculoskeletal: He exhibits tenderness (right SI joint).   Positive SLR right   Psychiatric: He has a normal mood and affect. His behavior is normal.   Nursing note and vitals reviewed.  XR Hip and Lumbar: ordered due to pain, no comparisons, read by me: OA but otherwise " SOREN.    Assessment/Plan   Ben was seen today for right hip pain and bilateral feet tingle.    Diagnoses and all orders for this visit:    Right hip pain  -     XR Hip With or Without Pelvis 2 - 3 View Right  -     methylPREDNISolone (MEDROL) 4 MG tablet; follow package directions    Lumbar back pain  -     XR Spine Lumbar 2 or 3 View  -     methylPREDNISolone (MEDROL) 4 MG tablet; follow package directions    Pt to take these XRs to his pain MD and discuss further treatments.

## 2019-03-25 DIAGNOSIS — R80.9 TYPE 2 DIABETES MELLITUS WITH MICROALBUMINURIA, WITHOUT LONG-TERM CURRENT USE OF INSULIN (HCC): ICD-10-CM

## 2019-03-25 DIAGNOSIS — E11.29 TYPE 2 DIABETES MELLITUS WITH MICROALBUMINURIA, WITHOUT LONG-TERM CURRENT USE OF INSULIN (HCC): ICD-10-CM

## 2019-05-08 DIAGNOSIS — R80.9 TYPE 2 DIABETES MELLITUS WITH MICROALBUMINURIA, WITHOUT LONG-TERM CURRENT USE OF INSULIN (HCC): ICD-10-CM

## 2019-05-08 DIAGNOSIS — E11.29 TYPE 2 DIABETES MELLITUS WITH MICROALBUMINURIA, WITHOUT LONG-TERM CURRENT USE OF INSULIN (HCC): ICD-10-CM

## 2019-05-08 RX ORDER — CHOLECALCIFEROL (VITAMIN D3) 1250 MCG
CAPSULE ORAL
Qty: 13 CAPSULE | Refills: 3 | Status: SHIPPED | OUTPATIENT
Start: 2019-05-08 | End: 2020-03-19 | Stop reason: SDUPTHER

## 2019-05-08 RX ORDER — GLIPIZIDE 10 MG/1
TABLET ORAL
Qty: 90 TABLET | Refills: 1 | Status: SHIPPED | OUTPATIENT
Start: 2019-05-08 | End: 2019-11-08

## 2019-05-29 ENCOUNTER — OFFICE VISIT (OUTPATIENT)
Dept: FAMILY MEDICINE CLINIC | Facility: CLINIC | Age: 74
End: 2019-05-29

## 2019-05-29 VITALS
SYSTOLIC BLOOD PRESSURE: 121 MMHG | RESPIRATION RATE: 16 BRPM | DIASTOLIC BLOOD PRESSURE: 75 MMHG | BODY MASS INDEX: 27.06 KG/M2 | TEMPERATURE: 97.8 F | HEART RATE: 82 BPM | HEIGHT: 70 IN | WEIGHT: 189 LBS

## 2019-05-29 DIAGNOSIS — F51.01 PRIMARY INSOMNIA: Primary | ICD-10-CM

## 2019-05-29 DIAGNOSIS — I10 ESSENTIAL HYPERTENSION: ICD-10-CM

## 2019-05-29 PROCEDURE — 99213 OFFICE O/P EST LOW 20 MIN: CPT | Performed by: FAMILY MEDICINE

## 2019-05-29 RX ORDER — ZOLPIDEM TARTRATE 10 MG/1
10 TABLET ORAL NIGHTLY PRN
Qty: 90 TABLET | Refills: 0 | Status: SHIPPED | OUTPATIENT
Start: 2019-05-29 | End: 2019-08-29 | Stop reason: SDUPTHER

## 2019-05-29 NOTE — PROGRESS NOTES
Subjective   Ben Walsh is a 73 y.o. male.     History of Present Illness     Chief Complaint:   Chief Complaint   Patient presents with   • Insomnia     MED REFILL - NISHANT    • EPIDURAL BLOCK     PAIN MANAGEMENT DR LEE - ALISHA DISCUSS    • Hypertension       Ben Walsh 73 y.o. male who presents today for Medical Management of the below listed issues and medication refills.  he has a problem list of   Patient Active Problem List   Diagnosis   • Allergic rhinitis   • ADD (attention deficit disorder)   • DDD (degenerative disc disease), lumbar   • BPH (benign prostatic hypertrophy)   • Chronic pain   • ASCVD (arteriosclerotic cardiovascular disease)   • Depression   • Diverticulosis of colon   • GERD (gastroesophageal reflux disease)   • Hiatal hernia   • Hyperlipidemia   • Essential hypertension   • Primary hypothyroidism   • Insomnia   • Osteoarthritis, multiple sites   • Acute pancreatitis   • Vitamin D deficiency   • Glaucoma   • Abnormal nuclear stress test   • Two-vessel coronary artery disease   • SAEED (dyspnea on exertion)   • Obstructive sleep apnea syndrome   • Patent foramen ovale   • Snoring   • Disorder of rotator cuff   • History of COPD   • Type 2 diabetes mellitus with microalbuminuria, without long-term current use of insulin (CMS/HCC)   • Carpal tunnel syndrome of right wrist   • Abnormal electrocardiogram   • Atypical chest pain   • Type 2 diabetes mellitus with peripheral neuropathy (CMS/HCC)   • Atrial fibrillation and flutter (CMS/HCC)   .  Since the last visit, he has overall felt well.  he has been compliant with   Current Outpatient Medications:   •  zolpidem (AMBIEN) 10 MG tablet, Take 1 tablet by mouth At Night As Needed for Sleep., Disp: 90 tablet, Rfl: 0  •  ACCU-CHEK GUIDE test strip, Dx code E11.29 testing bs 3 x day, Disp: 300 each, Rfl: 1  •  amLODIPine (NORVASC) 5 MG tablet, Take 1 tablet by mouth Daily., Disp: 90 tablet, Rfl: 1  •  atorvastatin (LIPITOR) 40 MG tablet,  "TAKE ONE TABLET BY MOUTH EVERY NIGHT AT BEDTIME, Disp: 30 tablet, Rfl: 5  •  Cholecalciferol (VITAMIN D3) 42019 units capsule, TAKE 1 CAPSULE EVERY 7 DAYS, Disp: 13 capsule, Rfl: 3  •  clopidogrel (PLAVIX) 75 MG tablet, Take 1 tablet by mouth Daily. For heart disease, Disp: 90 tablet, Rfl: 1  •  coenzyme Q10 100 MG capsule, Take 100 mg by mouth Daily., Disp: , Rfl:   •  cyclobenzaprine (FLEXERIL) 10 MG tablet, Take 1 tablet by mouth 3 (Three) Times a Day As Needed for Muscle Spasms., Disp: 90 tablet, Rfl: 5  •  escitalopram (LEXAPRO) 20 MG tablet, Take 1 tablet by mouth Daily., Disp: 90 tablet, Rfl: 1  •  esomeprazole (NexIUM) 20 MG capsule, Take 20 mg by mouth every morning before breakfast., Disp: , Rfl:   •  fenofibrate (TRICOR) 145 MG tablet, TAKE 1 TABLET EVERY DAY, Disp: 90 tablet, Rfl: 1  •  gabapentin (NEURONTIN) 100 MG capsule, 2 capsules every morning, 2 capsules every afternoon, and 3 capsules every evening.  Prescribed by Dr. Jacobo, Disp: , Rfl:   •  glipiZIDE (GLUCOTROL) 10 MG tablet, TAKE 1 TABLET EVERY MORNING, Disp: 90 tablet, Rfl: 1  •  HYDROcodone-acetaminophen (NORCO) 7.5-325 MG per tablet, Take 1 tablet by mouth 2 (Two) Times a Day. Prescribed by Dr. Jacobo, Disp: , Rfl:   •  Insulin Degludec 200 UNIT/ML solution pen-injector, Inject 20 Units under the skin Daily. Titration max of 100 units daily, Disp: 6 pen, Rfl: 4  •  Insulin Pen Needle (PEN NEEDLES 5/16\") 31G X 8 MM misc, USE FOR INJECTIONS DAILY, Disp: 100 each, Rfl: 11  •  Lancets (ACCU-CHEK SOFT TOUCH) lancets, Dx code E11.29 testing bs 3 x day, Disp: 300 each, Rfl: 1  •  levothyroxine (SYNTHROID, LEVOTHROID) 50 MCG tablet, Take 1 tablet by mouth Daily. For thyroid, Disp: 90 tablet, Rfl: 1  •  lisinopril (PRINIVIL,ZESTRIL) 20 MG tablet, Take 1 tablet by mouth Daily., Disp: 90 tablet, Rfl: 1  •  metFORMIN (GLUCOPHAGE) 1000 MG tablet, TAKE 1 TABLET TWICE DAILY WITH MEALS, Disp: 180 tablet, Rfl: 1  •  Multiple Vitamins-Minerals (MULTIVITAMIN " "ADULTS 50+ PO), Take 1 tablet by mouth daily. PT HOLDING FOR SURGERY, Disp: , Rfl:   •  NITROSTAT 0.4 MG SL tablet, Place 0.4 mg under the tongue every 5 (five) minutes as needed., Disp: , Rfl:   •  omega-3 acid ethyl esters (LOVAZA) 1 g capsule, Take 2 capsules by mouth 2 (Two) Times a Day., Disp: 120 capsule, Rfl: 5  •  Probiotic Product (PROBIOTIC DAILY) capsule, Take 1 capsule by mouth daily., Disp: , Rfl:   •  tamsulosin (FLOMAX) 0.4 MG capsule 24 hr capsule, Take 1 capsule by mouth Daily. For prostate, Disp: 90 capsule, Rfl: 1.  he denies medication side effects.    All of the chronic condition(s) listed above are stable w/o issues.    /75   Pulse 82   Temp 97.8 °F (36.6 °C) (Oral)   Resp 16   Ht 177.8 cm (70\")   Wt 85.7 kg (189 lb)   BMI 27.12 kg/m²     Results for orders placed or performed in visit on 02/06/19   Microalbumin / Creatinine Urine Ratio - Urine, Clean Catch   Result Value Ref Range    Creatinine, Urine 126.1 Not Estab. mg/dL    Microalbumin, Urine 4.4 Not Estab. ug/mL    Microalbumin/Creatinine Ratio 3.5 0.0 - 30.0 mg/g creat           The following portions of the patient's history were reviewed and updated as appropriate: allergies, current medications, past family history, past medical history, past social history, past surgical history and problem list.    Review of Systems   Constitutional: Negative for activity change, chills, fatigue and fever.   Respiratory: Negative for cough and shortness of breath.    Cardiovascular: Negative for chest pain and palpitations.   Gastrointestinal: Negative for abdominal pain.   Endocrine: Negative for cold intolerance.   Psychiatric/Behavioral: Negative for behavioral problems and dysphoric mood. The patient is not nervous/anxious.        Objective   Physical Exam   Constitutional: He appears well-developed and well-nourished.   Neck: Neck supple. No thyromegaly present.   Cardiovascular: Normal rate and regular rhythm.   No murmur " heard.  Pulmonary/Chest: Effort normal and breath sounds normal.   Abdominal: Bowel sounds are normal. There is no tenderness.   Psychiatric: He has a normal mood and affect. His behavior is normal.   Nursing note and vitals reviewed.    The patient has read and signed the [facility] Controlled Substance Contract.  I will continue to see patient for regular follow up appointments.  They are well controlled on their medication.  NISHANT has been reviewed by me and is updated every 3 months. The patient is aware of the potential for addiction and dependence.    Assessment/Plan   Ben was seen today for insomnia, epidural block and hypertension.    Diagnoses and all orders for this visit:    Primary insomnia  -     zolpidem (AMBIEN) 10 MG tablet; Take 1 tablet by mouth At Night As Needed for Sleep.    Essential hypertension    Continue with current BP meds/diet/exercise.

## 2019-06-13 RX ORDER — ATORVASTATIN CALCIUM 40 MG/1
TABLET, FILM COATED ORAL
Qty: 90 TABLET | Refills: 1 | Status: SHIPPED | OUTPATIENT
Start: 2019-06-13 | End: 2020-02-04

## 2019-07-01 ENCOUNTER — OFFICE VISIT (OUTPATIENT)
Dept: ENDOCRINOLOGY | Age: 74
End: 2019-07-01

## 2019-07-01 VITALS
DIASTOLIC BLOOD PRESSURE: 70 MMHG | HEART RATE: 79 BPM | WEIGHT: 189.6 LBS | BODY MASS INDEX: 27.14 KG/M2 | OXYGEN SATURATION: 94 % | HEIGHT: 70 IN | SYSTOLIC BLOOD PRESSURE: 116 MMHG

## 2019-07-01 DIAGNOSIS — I10 ESSENTIAL HYPERTENSION: ICD-10-CM

## 2019-07-01 DIAGNOSIS — E03.9 PRIMARY HYPOTHYROIDISM: ICD-10-CM

## 2019-07-01 DIAGNOSIS — I25.10 ASCVD (ARTERIOSCLEROTIC CARDIOVASCULAR DISEASE): ICD-10-CM

## 2019-07-01 DIAGNOSIS — G47.33 OBSTRUCTIVE SLEEP APNEA SYNDROME: ICD-10-CM

## 2019-07-01 DIAGNOSIS — E78.5 HYPERLIPIDEMIA, UNSPECIFIED HYPERLIPIDEMIA TYPE: ICD-10-CM

## 2019-07-01 DIAGNOSIS — R80.9 TYPE 2 DIABETES MELLITUS WITH MICROALBUMINURIA, WITHOUT LONG-TERM CURRENT USE OF INSULIN (HCC): Primary | ICD-10-CM

## 2019-07-01 DIAGNOSIS — E11.29 TYPE 2 DIABETES MELLITUS WITH MICROALBUMINURIA, WITHOUT LONG-TERM CURRENT USE OF INSULIN (HCC): Primary | ICD-10-CM

## 2019-07-01 LAB
ALBUMIN SERPL-MCNC: 4.8 G/DL (ref 3.5–5.2)
ALBUMIN/GLOB SERPL: 2.8 G/DL
ALP SERPL-CCNC: 53 U/L (ref 39–117)
ALT SERPL-CCNC: 62 U/L (ref 1–41)
AST SERPL-CCNC: 35 U/L (ref 1–40)
BILIRUB SERPL-MCNC: 0.5 MG/DL (ref 0.2–1.2)
BUN SERPL-MCNC: 13 MG/DL (ref 8–23)
BUN/CREAT SERPL: 13.4 (ref 7–25)
CALCIUM SERPL-MCNC: 9.5 MG/DL (ref 8.6–10.5)
CHLORIDE SERPL-SCNC: 104 MMOL/L (ref 98–107)
CHOLEST SERPL-MCNC: 127 MG/DL (ref 0–200)
CO2 SERPL-SCNC: 25.4 MMOL/L (ref 22–29)
CREAT SERPL-MCNC: 0.97 MG/DL (ref 0.76–1.27)
GLOBULIN SER CALC-MCNC: 1.7 GM/DL
GLUCOSE SERPL-MCNC: 163 MG/DL (ref 65–99)
HBA1C MFR BLD: 7 % (ref 4.8–5.6)
HDLC SERPL-MCNC: 35 MG/DL (ref 40–60)
INTERPRETATION: NORMAL
LDLC SERPL CALC-MCNC: 71 MG/DL (ref 0–100)
Lab: NORMAL
POTASSIUM SERPL-SCNC: 4.9 MMOL/L (ref 3.5–5.2)
PROT SERPL-MCNC: 6.5 G/DL (ref 6–8.5)
SODIUM SERPL-SCNC: 141 MMOL/L (ref 136–145)
TRIGL SERPL-MCNC: 107 MG/DL (ref 0–150)
TSH SERPL DL<=0.005 MIU/L-ACNC: 2.93 MIU/ML (ref 0.27–4.2)
VLDLC SERPL CALC-MCNC: 21.4 MG/DL

## 2019-07-01 PROCEDURE — 99214 OFFICE O/P EST MOD 30 MIN: CPT | Performed by: INTERNAL MEDICINE

## 2019-07-25 RX ORDER — PEN NEEDLE, DIABETIC 31 GX5/16"
NEEDLE, DISPOSABLE MISCELLANEOUS
Qty: 100 EACH | Refills: 10 | Status: SHIPPED | OUTPATIENT
Start: 2019-07-25 | End: 2020-09-23

## 2019-08-17 DIAGNOSIS — E78.5 HYPERLIPIDEMIA, UNSPECIFIED HYPERLIPIDEMIA TYPE: ICD-10-CM

## 2019-08-19 RX ORDER — FENOFIBRATE 145 MG/1
TABLET, COATED ORAL
Qty: 90 TABLET | Refills: 1 | Status: SHIPPED | OUTPATIENT
Start: 2019-08-19 | End: 2020-02-24 | Stop reason: SDUPTHER

## 2019-08-29 ENCOUNTER — OFFICE VISIT (OUTPATIENT)
Dept: FAMILY MEDICINE CLINIC | Facility: CLINIC | Age: 74
End: 2019-08-29

## 2019-08-29 VITALS
DIASTOLIC BLOOD PRESSURE: 85 MMHG | RESPIRATION RATE: 16 BRPM | HEART RATE: 76 BPM | HEIGHT: 70 IN | TEMPERATURE: 98 F | BODY MASS INDEX: 27.35 KG/M2 | WEIGHT: 191 LBS | SYSTOLIC BLOOD PRESSURE: 136 MMHG

## 2019-08-29 DIAGNOSIS — E03.9 PRIMARY HYPOTHYROIDISM: ICD-10-CM

## 2019-08-29 DIAGNOSIS — I25.10 TWO-VESSEL CORONARY ARTERY DISEASE: ICD-10-CM

## 2019-08-29 DIAGNOSIS — N40.0 BENIGN PROSTATIC HYPERPLASIA WITHOUT LOWER URINARY TRACT SYMPTOMS: ICD-10-CM

## 2019-08-29 DIAGNOSIS — F51.01 PRIMARY INSOMNIA: ICD-10-CM

## 2019-08-29 DIAGNOSIS — F33.42 RECURRENT MAJOR DEPRESSIVE DISORDER, IN FULL REMISSION (HCC): ICD-10-CM

## 2019-08-29 DIAGNOSIS — I10 ESSENTIAL HYPERTENSION: ICD-10-CM

## 2019-08-29 PROCEDURE — 99214 OFFICE O/P EST MOD 30 MIN: CPT | Performed by: FAMILY MEDICINE

## 2019-08-29 RX ORDER — AMLODIPINE BESYLATE 5 MG/1
5 TABLET ORAL DAILY
Qty: 90 TABLET | Refills: 1 | Status: SHIPPED | OUTPATIENT
Start: 2019-08-29 | End: 2020-03-03 | Stop reason: SDUPTHER

## 2019-08-29 RX ORDER — ZOLPIDEM TARTRATE 10 MG/1
10 TABLET ORAL NIGHTLY PRN
Qty: 90 TABLET | Refills: 0 | Status: SHIPPED | OUTPATIENT
Start: 2019-08-29 | End: 2019-12-02 | Stop reason: SDUPTHER

## 2019-08-29 RX ORDER — TAMSULOSIN HYDROCHLORIDE 0.4 MG/1
1 CAPSULE ORAL DAILY
Qty: 90 CAPSULE | Refills: 1 | Status: SHIPPED | OUTPATIENT
Start: 2019-08-29 | End: 2020-03-03 | Stop reason: SDUPTHER

## 2019-08-29 RX ORDER — CLOPIDOGREL BISULFATE 75 MG/1
75 TABLET ORAL DAILY
Qty: 90 TABLET | Refills: 1 | Status: SHIPPED | OUTPATIENT
Start: 2019-08-29 | End: 2020-03-03 | Stop reason: SDUPTHER

## 2019-08-29 RX ORDER — CYCLOBENZAPRINE HCL 10 MG
10 TABLET ORAL 3 TIMES DAILY PRN
Qty: 90 TABLET | Refills: 5 | Status: CANCELLED | OUTPATIENT
Start: 2019-08-29

## 2019-08-29 RX ORDER — ESCITALOPRAM OXALATE 20 MG/1
20 TABLET ORAL DAILY
Qty: 90 TABLET | Refills: 1 | Status: SHIPPED | OUTPATIENT
Start: 2019-08-29 | End: 2020-03-03 | Stop reason: SDUPTHER

## 2019-08-29 RX ORDER — LEVOTHYROXINE SODIUM 0.05 MG/1
50 TABLET ORAL DAILY
Qty: 90 TABLET | Refills: 1 | Status: SHIPPED | OUTPATIENT
Start: 2019-08-29 | End: 2020-03-03 | Stop reason: SDUPTHER

## 2019-08-29 NOTE — PROGRESS NOTES
Subjective   Ben Walsh is a 74 y.o. male.     History of Present Illness     Chief Complaint:   Chief Complaint   Patient presents with   • Insomnia     med refill - reza    • Hypertension   • Hyperlipidemia   • Hypothyroidism   • Coronary Artery Disease   • Arthritis       Ben Walsh 74 y.o. male who presents today for Medical Management of the below listed issues and medication refills.  he has a problem list of   Patient Active Problem List   Diagnosis   • Allergic rhinitis   • ADD (attention deficit disorder)   • DDD (degenerative disc disease), lumbar   • BPH (benign prostatic hypertrophy)   • Chronic pain   • ASCVD (arteriosclerotic cardiovascular disease)   • Depression   • Diverticulosis of colon   • GERD (gastroesophageal reflux disease)   • Hiatal hernia   • Hyperlipidemia   • Essential hypertension   • Primary hypothyroidism   • Insomnia   • Osteoarthritis, multiple sites   • Acute pancreatitis   • Vitamin D deficiency   • Glaucoma   • Abnormal nuclear stress test   • Two-vessel coronary artery disease   • SAEED (dyspnea on exertion)   • Obstructive sleep apnea syndrome   • Patent foramen ovale   • Snoring   • Disorder of rotator cuff   • History of COPD   • Type 2 diabetes mellitus with microalbuminuria, without long-term current use of insulin (CMS/HCC)   • Carpal tunnel syndrome of right wrist   • Abnormal electrocardiogram   • Atypical chest pain   • Type 2 diabetes mellitus with peripheral neuropathy (CMS/HCC)   • Atrial fibrillation and flutter (CMS/HCC)   .  Since the last visit, he has overall felt well.  he has been compliant with   Current Outpatient Medications:   •  amLODIPine (NORVASC) 5 MG tablet, Take 1 tablet by mouth Daily., Disp: 90 tablet, Rfl: 1  •  clopidogrel (PLAVIX) 75 MG tablet, Take 1 tablet by mouth Daily. For heart disease, Disp: 90 tablet, Rfl: 1  •  cyclobenzaprine (FLEXERIL) 10 MG tablet, Take 1 tablet by mouth 3 (Three) Times a Day As Needed for Muscle  Spasms., Disp: 90 tablet, Rfl: 5  •  escitalopram (LEXAPRO) 20 MG tablet, Take 1 tablet by mouth Daily., Disp: 90 tablet, Rfl: 1  •  levothyroxine (SYNTHROID, LEVOTHROID) 50 MCG tablet, Take 1 tablet by mouth Daily. For thyroid, Disp: 90 tablet, Rfl: 1  •  lisinopril (PRINIVIL,ZESTRIL) 20 MG tablet, Take 1 tablet by mouth Daily., Disp: 90 tablet, Rfl: 1  •  tamsulosin (FLOMAX) 0.4 MG capsule 24 hr capsule, Take 1 capsule by mouth Daily. For prostate, Disp: 90 capsule, Rfl: 1  •  zolpidem (AMBIEN) 10 MG tablet, Take 1 tablet by mouth At Night As Needed for Sleep., Disp: 90 tablet, Rfl: 0  •  ACCU-CHEK GUIDE test strip, Dx code E11.29 testing bs 3 x day, Disp: 300 each, Rfl: 1  •  atorvastatin (LIPITOR) 40 MG tablet, TAKE ONE TABLET BY MOUTH ONCE NIGHTLY, Disp: 90 tablet, Rfl: 1  •  B-D ULTRAFINE III SHORT PEN 31G X 8 MM misc, USE FOR INJECTIONS DAILY, Disp: 100 each, Rfl: 10  •  Cholecalciferol (VITAMIN D3) 08881 units capsule, TAKE 1 CAPSULE EVERY 7 DAYS, Disp: 13 capsule, Rfl: 3  •  coenzyme Q10 100 MG capsule, Take 100 mg by mouth Daily., Disp: , Rfl:   •  esomeprazole (NexIUM) 20 MG capsule, Take 20 mg by mouth every morning before breakfast., Disp: , Rfl:   •  fenofibrate (TRICOR) 145 MG tablet, TAKE 1 TABLET EVERY DAY, Disp: 90 tablet, Rfl: 1  •  gabapentin (NEURONTIN) 100 MG capsule, 2 capsules every morning, 2 capsules every afternoon, and 3 capsules every evening.  Prescribed by Dr. Jacobo, Disp: , Rfl:   •  glipiZIDE (GLUCOTROL) 10 MG tablet, TAKE 1 TABLET EVERY MORNING, Disp: 90 tablet, Rfl: 1  •  HYDROcodone-acetaminophen (NORCO) 7.5-325 MG per tablet, Take 1 tablet by mouth 2 (Two) Times a Day. Prescribed by Dr. Jacobo, Disp: , Rfl:   •  Insulin Degludec 200 UNIT/ML solution pen-injector, Inject 20 Units under the skin Daily. Titration max of 100 units daily, Disp: 6 pen, Rfl: 4  •  Lancets (ACCU-CHEK SOFT TOUCH) lancets, Dx code E11.29 testing bs 3 x day, Disp: 300 each, Rfl: 1  •  metFORMIN  "(GLUCOPHAGE) 1000 MG tablet, TAKE 1 TABLET TWICE DAILY WITH MEALS, Disp: 180 tablet, Rfl: 1  •  Multiple Vitamins-Minerals (MULTIVITAMIN ADULTS 50+ PO), Take 1 tablet by mouth daily. PT HOLDING FOR SURGERY, Disp: , Rfl:   •  NITROSTAT 0.4 MG SL tablet, Place 0.4 mg under the tongue every 5 (five) minutes as needed., Disp: , Rfl:   •  omega-3 acid ethyl esters (LOVAZA) 1 g capsule, Take 2 capsules by mouth 2 (Two) Times a Day., Disp: 120 capsule, Rfl: 5  •  Probiotic Product (PROBIOTIC DAILY) capsule, Take 1 capsule by mouth daily., Disp: , Rfl: .  he denies medication side effects.    All of the chronic condition(s) listed above are stable w/o issues.    /85   Pulse 76   Temp 98 °F (36.7 °C) (Oral)   Resp 16   Ht 177.8 cm (70\")   Wt 86.6 kg (191 lb)   BMI 27.41 kg/m²     Results for orders placed or performed in visit on 07/01/19   Comprehensive Metabolic Panel   Result Value Ref Range    Glucose 163 (H) 65 - 99 mg/dL    BUN 13 8 - 23 mg/dL    Creatinine 0.97 0.76 - 1.27 mg/dL    eGFR Non African Am 75 >60 mL/min/1.73    eGFR African Am 91 >60 mL/min/1.73    BUN/Creatinine Ratio 13.4 7.0 - 25.0    Sodium 141 136 - 145 mmol/L    Potassium 4.9 3.5 - 5.2 mmol/L    Chloride 104 98 - 107 mmol/L    Total CO2 25.4 22.0 - 29.0 mmol/L    Calcium 9.5 8.6 - 10.5 mg/dL    Total Protein 6.5 6.0 - 8.5 g/dL    Albumin 4.80 3.50 - 5.20 g/dL    Globulin 1.7 gm/dL    A/G Ratio 2.8 g/dL    Total Bilirubin 0.5 0.2 - 1.2 mg/dL    Alkaline Phosphatase 53 39 - 117 U/L    AST (SGOT) 35 1 - 40 U/L    ALT (SGPT) 62 (H) 1 - 41 U/L   Lipid Panel   Result Value Ref Range    Total Cholesterol 127 0 - 200 mg/dL    Triglycerides 107 0 - 150 mg/dL    HDL Cholesterol 35 (L) 40 - 60 mg/dL    VLDL Cholesterol 21.4 mg/dL    LDL Cholesterol  71 0 - 100 mg/dL   Hemoglobin A1c   Result Value Ref Range    Hemoglobin A1C 7.00 (H) 4.80 - 5.60 %   TSH   Result Value Ref Range    TSH 2.930 0.270 - 4.200 mIU/mL   Cardiovascular Risk Assessment "   Result Value Ref Range    Interpretation Note    Diabetes Patient Education   Result Value Ref Range    PDF Image Not applicable            The following portions of the patient's history were reviewed and updated as appropriate: allergies, current medications, past family history, past medical history, past social history, past surgical history and problem list.    Review of Systems   Constitutional: Negative for activity change, chills, fatigue and fever.   Respiratory: Negative for cough and shortness of breath.    Cardiovascular: Negative for chest pain and palpitations.   Gastrointestinal: Negative for abdominal pain.   Endocrine: Negative for cold intolerance.   Psychiatric/Behavioral: Negative for behavioral problems and dysphoric mood. The patient is not nervous/anxious.        Objective   Physical Exam   Constitutional: He appears well-developed and well-nourished.   Neck: Neck supple. No thyromegaly present.   Cardiovascular: Normal rate and regular rhythm.   No murmur heard.  Pulmonary/Chest: Effort normal and breath sounds normal.   Abdominal: Bowel sounds are normal. There is no tenderness.   Psychiatric: He has a normal mood and affect. His behavior is normal.   Nursing note and vitals reviewed.  Labs reviewed with pt today during visit. All questions answered.    The patient has read and signed the [facility] Controlled Substance Contract.  I will continue to see patient for regular follow up appointments.  They are well controlled on their medication.  NISHANT has been reviewed by me and is updated every 3 months. The patient is aware of the potential for addiction and dependence.    Assessment/Plan   Ben was seen today for insomnia, hypertension, hyperlipidemia, hypothyroidism, coronary artery disease and arthritis.    Diagnoses and all orders for this visit:    Essential hypertension  -     amLODIPine (NORVASC) 5 MG tablet; Take 1 tablet by mouth Daily.    Primary insomnia  -     zolpidem  (AMBIEN) 10 MG tablet; Take 1 tablet by mouth At Night As Needed for Sleep.    Two-vessel coronary artery disease  -     clopidogrel (PLAVIX) 75 MG tablet; Take 1 tablet by mouth Daily. For heart disease    Benign prostatic hyperplasia without lower urinary tract symptoms  -     tamsulosin (FLOMAX) 0.4 MG capsule 24 hr capsule; Take 1 capsule by mouth Daily. For prostate    Recurrent major depressive disorder, in full remission (CMS/HCC)  -     escitalopram (LEXAPRO) 20 MG tablet; Take 1 tablet by mouth Daily.    Primary hypothyroidism  -     levothyroxine (SYNTHROID, LEVOTHROID) 50 MCG tablet; Take 1 tablet by mouth Daily. For thyroid    Other orders  -     Cancel: cyclobenzaprine (FLEXERIL) 10 MG tablet; Take 1 tablet by mouth 3 (Three) Times a Day As Needed for Muscle Spasms.

## 2019-09-21 DIAGNOSIS — I10 ESSENTIAL HYPERTENSION: ICD-10-CM

## 2019-09-23 RX ORDER — LISINOPRIL 20 MG/1
TABLET ORAL
Qty: 90 TABLET | Refills: 0 | Status: SHIPPED | OUTPATIENT
Start: 2019-09-23 | End: 2019-12-02 | Stop reason: SDUPTHER

## 2019-10-14 ENCOUNTER — TELEPHONE (OUTPATIENT)
Dept: ENDOCRINOLOGY | Age: 74
End: 2019-10-14

## 2019-10-14 DIAGNOSIS — R80.9 TYPE 2 DIABETES MELLITUS WITH MICROALBUMINURIA, WITHOUT LONG-TERM CURRENT USE OF INSULIN (HCC): ICD-10-CM

## 2019-10-14 DIAGNOSIS — E11.29 TYPE 2 DIABETES MELLITUS WITH MICROALBUMINURIA, WITHOUT LONG-TERM CURRENT USE OF INSULIN (HCC): ICD-10-CM

## 2019-10-14 NOTE — TELEPHONE ENCOUNTER
Metformin   Needs a prescription for metformin  [facility]end [facility] on [address]    90 day supply

## 2019-10-18 ENCOUNTER — FLU SHOT (OUTPATIENT)
Dept: FAMILY MEDICINE CLINIC | Facility: CLINIC | Age: 74
End: 2019-10-18

## 2019-10-18 DIAGNOSIS — Z23 IMMUNIZATION, PNEUMOCOCCUS AND INFLUENZA: ICD-10-CM

## 2019-10-18 PROCEDURE — 90653 IIV ADJUVANT VACCINE IM: CPT | Performed by: FAMILY MEDICINE

## 2019-10-18 PROCEDURE — G0008 ADMIN INFLUENZA VIRUS VAC: HCPCS | Performed by: FAMILY MEDICINE

## 2019-11-06 NOTE — PROGRESS NOTES
Subjective   Ben Walsh is a 74 y.o. male.     F/u for dm 2, hypertension, hyperlipidemia, vitamin d def, BPH,COPD / testing bs 1 x day / last dm eye exam 2/6/19 with dr Francois / last dm foot exam 2/6/19 with dr Miller / flu vaccine @ PCP        Patient is a 74-year-old male who came in for follow-up.  He has known diabetes since 2013.  He has been on metformin 1000 mg twice a day, glipizide 10 mg every AM  and Tresiba 20 every AM.  Fasting glucose .  Random glucose .  He has hypoglycemic symptoms after lunch.   He has gained 2 pounds since July 2019. His last meal was at 12:30 PM     He has microalbuminuria on urine sample taken in 2014.  Repeat urine microalbumin done in 2/19 was normal.  He has been on lisinopril.  His last eye examination was in October 2019.  He has no retinopathy.     He has chronic neck and lower back pain due to degenerative disc disease.  He has burning in feet and pain in his calves, not related to activity.    He denies muscle weakness or incontinence.  He is on gabapentin 200 mg every morning, 200 mg every afternoon and 300 mg every evening prescribed by Dr. Jacobo.  Epidural injections did not help.       He has hyperlipidemia and has been on Lipitor 40 mg once a day, TriCor 145 mg once a day, and Vascepa 2 g twice a day..  He denies any myalgia.  CT scan of the abdomen and ultrasound of the abdomen in 2012 showed fatty infiltration of the liver and a right kidney stone.     He has hypothyroidism and has been on levothyroxine 50 µg per day.  He denies heat or cold intolerance.       He has hypertension and has been on amlodipine and lisinopril.  He had a false positive stress test in February 2016.  He had a cardiac catheterization in March 2016.  The stent to the mid LAD is patent.  There is mild luminal irregularities of the proximal LAD.  The left circumflex complex has mild luminal plaque.  The proximal RCA has a stable 50% stenosis.  He is on Plavix 75 mg once a  "day.  He denies chest pain or shortness of breath.  He follows with Dr. Borges.      He has chronic mild iron deficiency anemia since at least June 2014.  He is not on iron supplements.  He had a colonoscopy in March 2014 and a mixed adenomatous/hyperplastic polyp with low-grade dysplasia was removed by Dr. Wynne. He had a normal colonoscopy in March 2017 which was done by Dr. Wynne.  He denies melena, hematochezia, or hematuria.  Anemia is being monitored by Dr. Marcus.     He was diagnosed to have sleep apnea many years ago but has not use a CPAP.  He wakes up tired.  He denies waking himself up snoring.    The following portions of the patient's history were reviewed and updated as appropriate: allergies, current medications, past family history, past medical history, past social history, past surgical history and problem list.    Review of Systems   Constitutional: Negative.    HENT: Negative.    Eyes: Negative.    Respiratory: Negative.    Cardiovascular: Negative.    Gastrointestinal: Negative.    Endocrine: Negative.    Genitourinary: Negative.    Musculoskeletal: Positive for back pain. Negative for myalgias.   Neurological: Negative for dizziness and syncope.     Objective      Vitals:    11/07/19 1554   BP: 118/68   BP Location: Right arm   Patient Position: Sitting   Cuff Size: Large Adult   Pulse: 82   SpO2: 99%   Weight: 86.6 kg (191 lb)   Height: 177.8 cm (70\")     Physical Exam   Constitutional: He appears well-developed and well-nourished. No distress.   HENT:   Head: Normocephalic.   Right Ear: External ear normal.   Left Ear: External ear normal.   Nose: Nose normal.   Mouth/Throat: Oropharynx is clear and moist. No oropharyngeal exudate.   Eyes: Conjunctivae and EOM are normal. Right eye exhibits no discharge. Left eye exhibits no discharge. No scleral icterus.   Neck: Neck supple. No JVD present. No tracheal deviation present. No thyromegaly present.   Cardiovascular: Normal rate, regular " rhythm, normal heart sounds and intact distal pulses. Exam reveals no gallop and no friction rub.   No murmur heard.  Pulmonary/Chest: Effort normal and breath sounds normal. No stridor. No respiratory distress. He has no wheezes. He has no rales. He exhibits no tenderness.   Abdominal: Soft. Bowel sounds are normal. He exhibits no distension and no mass. There is no tenderness. There is no guarding.   Musculoskeletal: Normal range of motion. He exhibits no edema, tenderness or deformity.   Lymphadenopathy:     He has no cervical adenopathy.   Neurological: He is alert. He displays normal reflexes.   Decreased light touch in distal left leg   Skin: Skin is warm and dry. No rash noted. No erythema.     Office Visit on 07/01/2019   Component Date Value Ref Range Status   • Glucose 07/01/2019 163* 65 - 99 mg/dL Final   • BUN 07/01/2019 13  8 - 23 mg/dL Final   • Creatinine 07/01/2019 0.97  0.76 - 1.27 mg/dL Final   • eGFR Non African Am 07/01/2019 75  >60 mL/min/1.73 Final    Comment: The MDRD GFR formula is only valid for adults with stable  renal function between ages 18 and 70.     • eGFR  Am 07/01/2019 91  >60 mL/min/1.73 Final   • BUN/Creatinine Ratio 07/01/2019 13.4  7.0 - 25.0 Final   • Sodium 07/01/2019 141  136 - 145 mmol/L Final   • Potassium 07/01/2019 4.9  3.5 - 5.2 mmol/L Final   • Chloride 07/01/2019 104  98 - 107 mmol/L Final   • Total CO2 07/01/2019 25.4  22.0 - 29.0 mmol/L Final   • Calcium 07/01/2019 9.5  8.6 - 10.5 mg/dL Final   • Total Protein 07/01/2019 6.5  6.0 - 8.5 g/dL Final   • Albumin 07/01/2019 4.80  3.50 - 5.20 g/dL Final   • Globulin 07/01/2019 1.7  gm/dL Final   • A/G Ratio 07/01/2019 2.8  g/dL Final   • Total Bilirubin 07/01/2019 0.5  0.2 - 1.2 mg/dL Final   • Alkaline Phosphatase 07/01/2019 53  39 - 117 U/L Final   • AST (SGOT) 07/01/2019 35  1 - 40 U/L Final   • ALT (SGPT) 07/01/2019 62* 1 - 41 U/L Final   • Total Cholesterol 07/01/2019 127  0 - 200 mg/dL Final   • Triglycerides  07/01/2019 107  0 - 150 mg/dL Final   • HDL Cholesterol 07/01/2019 35* 40 - 60 mg/dL Final   • VLDL Cholesterol 07/01/2019 21.4  mg/dL Final   • LDL Cholesterol  07/01/2019 71  0 - 100 mg/dL Final   • Hemoglobin A1C 07/01/2019 7.00* 4.80 - 5.60 % Final    Comment: Hemoglobin A1C Ranges:  Increased Risk for Diabetes  5.7% to 6.4%  Diabetes                     >= 6.5%  Diabetic Goal                < 7.0%     • TSH 07/01/2019 2.930  0.270 - 4.200 mIU/mL Final   • Interpretation 07/01/2019 Note   Final    Supplemental report is available.   • PDF Image 07/01/2019 Not applicable   Final     Assessment/Plan   Ben was seen today for diabetes, hyperlipidemia, hypertension, vitamin d deficiency, benign prostatic hypertrophy and copd.    Diagnoses and all orders for this visit:    Type 2 diabetes mellitus with microalbuminuria, without long-term current use of insulin (CMS/[facility])  -     Comprehensive Metabolic Panel  -     Lipid Panel  -     Hemoglobin A1c  -     TSH    Type 2 diabetes mellitus with peripheral neuropathy (CMS/[facility])  -     Comprehensive Metabolic Panel  -     Lipid Panel  -     Hemoglobin A1c    Primary hypothyroidism  -     Comprehensive Metabolic Panel  -     TSH    Hyperlipidemia, unspecified hyperlipidemia type  -     Comprehensive Metabolic Panel  -     Lipid Panel  -     TSH    Essential hypertension  -     Comprehensive Metabolic Panel      Increase glipizide 10 mg 1/2 tablet every morning.  Continue metformin and Tresiba.    Continue levothyroxine 50 mcg/day.  Continue amlodipine and lisinopril.  Continue Lipitor, TriCor, and Vascepa.    Copy of my note sent to Dr. Jake Marcus, and Dr. Jacobo    RTC 4 mos

## 2019-11-07 ENCOUNTER — OFFICE VISIT (OUTPATIENT)
Dept: ENDOCRINOLOGY | Age: 74
End: 2019-11-07

## 2019-11-07 VITALS
HEART RATE: 82 BPM | WEIGHT: 191 LBS | BODY MASS INDEX: 27.35 KG/M2 | DIASTOLIC BLOOD PRESSURE: 68 MMHG | OXYGEN SATURATION: 99 % | SYSTOLIC BLOOD PRESSURE: 118 MMHG | HEIGHT: 70 IN

## 2019-11-07 DIAGNOSIS — E11.29 TYPE 2 DIABETES MELLITUS WITH MICROALBUMINURIA, WITHOUT LONG-TERM CURRENT USE OF INSULIN (HCC): Primary | ICD-10-CM

## 2019-11-07 DIAGNOSIS — R80.9 TYPE 2 DIABETES MELLITUS WITH MICROALBUMINURIA, WITHOUT LONG-TERM CURRENT USE OF INSULIN (HCC): Primary | ICD-10-CM

## 2019-11-07 DIAGNOSIS — E03.9 PRIMARY HYPOTHYROIDISM: ICD-10-CM

## 2019-11-07 DIAGNOSIS — E78.5 HYPERLIPIDEMIA, UNSPECIFIED HYPERLIPIDEMIA TYPE: ICD-10-CM

## 2019-11-07 DIAGNOSIS — I10 ESSENTIAL HYPERTENSION: ICD-10-CM

## 2019-11-07 DIAGNOSIS — E11.42 TYPE 2 DIABETES MELLITUS WITH PERIPHERAL NEUROPATHY (HCC): ICD-10-CM

## 2019-11-07 PROCEDURE — 99214 OFFICE O/P EST MOD 30 MIN: CPT | Performed by: INTERNAL MEDICINE

## 2019-11-08 DIAGNOSIS — R80.9 TYPE 2 DIABETES MELLITUS WITH MICROALBUMINURIA, WITHOUT LONG-TERM CURRENT USE OF INSULIN (HCC): ICD-10-CM

## 2019-11-08 DIAGNOSIS — E11.29 TYPE 2 DIABETES MELLITUS WITH MICROALBUMINURIA, WITHOUT LONG-TERM CURRENT USE OF INSULIN (HCC): ICD-10-CM

## 2019-11-08 RX ORDER — GLIPIZIDE 10 MG/1
TABLET ORAL
Qty: 90 TABLET | Refills: 1
Start: 2019-11-08 | End: 2019-12-19 | Stop reason: SDUPTHER

## 2019-12-02 ENCOUNTER — OFFICE VISIT (OUTPATIENT)
Dept: FAMILY MEDICINE CLINIC | Facility: CLINIC | Age: 74
End: 2019-12-02

## 2019-12-02 VITALS
SYSTOLIC BLOOD PRESSURE: 134 MMHG | TEMPERATURE: 97.6 F | BODY MASS INDEX: 27.35 KG/M2 | RESPIRATION RATE: 16 BRPM | DIASTOLIC BLOOD PRESSURE: 88 MMHG | HEIGHT: 70 IN | HEART RATE: 76 BPM | WEIGHT: 191 LBS

## 2019-12-02 DIAGNOSIS — F51.01 PRIMARY INSOMNIA: Primary | ICD-10-CM

## 2019-12-02 DIAGNOSIS — I10 ESSENTIAL HYPERTENSION: ICD-10-CM

## 2019-12-02 PROCEDURE — 99213 OFFICE O/P EST LOW 20 MIN: CPT | Performed by: FAMILY MEDICINE

## 2019-12-02 RX ORDER — LISINOPRIL 20 MG/1
20 TABLET ORAL DAILY
Qty: 90 TABLET | Refills: 1 | Status: SHIPPED | OUTPATIENT
Start: 2019-12-02 | End: 2020-03-03 | Stop reason: SDUPTHER

## 2019-12-02 RX ORDER — ZOLPIDEM TARTRATE 10 MG/1
10 TABLET ORAL NIGHTLY PRN
Qty: 90 TABLET | Refills: 0 | Status: SHIPPED | OUTPATIENT
Start: 2019-12-02 | End: 2020-03-03 | Stop reason: SDUPTHER

## 2019-12-02 RX ORDER — KETOCONAZOLE 20 MG/ML
SHAMPOO TOPICAL
COMMUNITY
Start: 2019-08-26 | End: 2021-07-02

## 2019-12-02 RX ORDER — CLOBETASOL PROPIONATE 0.46 MG/ML
SOLUTION TOPICAL
COMMUNITY
Start: 2019-08-26 | End: 2020-03-03

## 2019-12-02 NOTE — PROGRESS NOTES
Subjective   Ben Walsh is a 74 y.o. male.     History of Present Illness     Chief Complaint:   Chief Complaint   Patient presents with   • Insomnia     MED REFILL - NISHANT -    • Hypertension       Ben Walsh 74 y.o. male who presents today for Medical Management of the below listed issues and medication refills.  he has a problem list of   Patient Active Problem List   Diagnosis   • Allergic rhinitis   • ADD (attention deficit disorder)   • DDD (degenerative disc disease), lumbar   • BPH (benign prostatic hypertrophy)   • Chronic pain   • ASCVD (arteriosclerotic cardiovascular disease)   • Depression   • Diverticulosis of colon   • GERD (gastroesophageal reflux disease)   • Hiatal hernia   • Hyperlipidemia   • Essential hypertension   • Primary hypothyroidism   • Insomnia   • Osteoarthritis, multiple sites   • Acute pancreatitis   • Vitamin D deficiency   • Glaucoma   • Abnormal nuclear stress test   • Two-vessel coronary artery disease   • SAEED (dyspnea on exertion)   • Obstructive sleep apnea syndrome   • Patent foramen ovale   • Snoring   • Disorder of rotator cuff   • History of COPD   • Type 2 diabetes mellitus with microalbuminuria, without long-term current use of insulin (CMS/HCC)   • Carpal tunnel syndrome of right wrist   • Abnormal electrocardiogram   • Atypical chest pain   • Type 2 diabetes mellitus with peripheral neuropathy (CMS/HCC)   • Atrial fibrillation and flutter (CMS/HCC)   .  Since the last visit, he has overall felt well.  he has been compliant with   Current Outpatient Medications:   •  lisinopril (PRINIVIL,ZESTRIL) 20 MG tablet, Take 1 tablet by mouth Daily., Disp: 90 tablet, Rfl: 1  •  zolpidem (AMBIEN) 10 MG tablet, Take 1 tablet by mouth At Night As Needed for Sleep., Disp: 90 tablet, Rfl: 0  •  ACCU-CHEK GUIDE test strip, Dx code E11.29 testing bs 3 x day, Disp: 300 each, Rfl: 1  •  amLODIPine (NORVASC) 5 MG tablet, Take 1 tablet by mouth Daily., Disp: 90 tablet, Rfl:  1  •  atorvastatin (LIPITOR) 40 MG tablet, TAKE ONE TABLET BY MOUTH ONCE NIGHTLY, Disp: 90 tablet, Rfl: 1  •  azelastine (ASTELIN) 0.1 % nasal spray, , Disp: , Rfl:   •  B-D ULTRAFINE III SHORT PEN 31G X 8 MM misc, USE FOR INJECTIONS DAILY, Disp: 100 each, Rfl: 10  •  Cholecalciferol (VITAMIN D3) 19110 units capsule, TAKE 1 CAPSULE EVERY 7 DAYS, Disp: 13 capsule, Rfl: 3  •  clobetasol (TEMOVATE) 0.05 % external solution, , Disp: , Rfl:   •  clopidogrel (PLAVIX) 75 MG tablet, Take 1 tablet by mouth Daily. For heart disease, Disp: 90 tablet, Rfl: 1  •  coenzyme Q10 100 MG capsule, Take 100 mg by mouth Daily., Disp: , Rfl:   •  cyclobenzaprine (FLEXERIL) 10 MG tablet, Take 1 tablet by mouth 3 (Three) Times a Day As Needed for Muscle Spasms., Disp: 90 tablet, Rfl: 5  •  escitalopram (LEXAPRO) 20 MG tablet, Take 1 tablet by mouth Daily., Disp: 90 tablet, Rfl: 1  •  esomeprazole (NexIUM) 20 MG capsule, Take 20 mg by mouth every morning before breakfast., Disp: , Rfl:   •  fenofibrate (TRICOR) 145 MG tablet, TAKE 1 TABLET EVERY DAY, Disp: 90 tablet, Rfl: 1  •  fluticasone (FLONASE) 50 MCG/ACT nasal spray, , Disp: , Rfl:   •  gabapentin (NEURONTIN) 100 MG capsule, 2 capsules every morning, 2 capsules every afternoon, and 3 capsules every evening.  Prescribed by Dr. Jacobo, Disp: , Rfl:   •  glipizide (GLUCOTROL) 10 MG tablet, Take 1/2 tablet in the morning, Disp: 90 tablet, Rfl: 1  •  HYDROcodone-acetaminophen (NORCO) 7.5-325 MG per tablet, Take 1 tablet by mouth 2 (Two) Times a Day. Prescribed by Dr. Jacobo (Patient taking differently: Take 1 tablet by mouth Every 8 (Eight) Hours As Needed. Prescribed by Dr. Jacobo), Disp: , Rfl:   •  Insulin Degludec 200 UNIT/ML solution pen-injector, Inject 20 Units under the skin Daily. Titration max of 100 units daily, Disp: 6 pen, Rfl: 4  •  ketoconazole (NIZORAL) 2 % shampoo, , Disp: , Rfl:   •  Lancets (ACCU-CHEK SOFT TOUCH) lancets, Dx code E11.29 testing bs 3 x day, Disp: 300  "each, Rfl: 1  •  levothyroxine (SYNTHROID, LEVOTHROID) 50 MCG tablet, Take 1 tablet by mouth Daily. For thyroid, Disp: 90 tablet, Rfl: 1  •  metFORMIN (GLUCOPHAGE) 1000 MG tablet, TAKE 1 TABLET TWICE DAILY WITH MEALS, Disp: 180 tablet, Rfl: 1  •  montelukast (SINGULAIR) 10 MG tablet, , Disp: , Rfl:   •  Multiple Vitamins-Minerals (MULTIVITAMIN ADULTS 50+ PO), Take 1 tablet by mouth daily. PT HOLDING FOR SURGERY, Disp: , Rfl:   •  NITROSTAT 0.4 MG SL tablet, Place 0.4 mg under the tongue every 5 (five) minutes as needed., Disp: , Rfl:   •  omega-3 acid ethyl esters (LOVAZA) 1 g capsule, Take 2 capsules by mouth 2 (Two) Times a Day., Disp: 120 capsule, Rfl: 5  •  Probiotic Product (PROBIOTIC DAILY) capsule, Take 1 capsule by mouth daily., Disp: , Rfl:   •  tamsulosin (FLOMAX) 0.4 MG capsule 24 hr capsule, Take 1 capsule by mouth Daily. For prostate, Disp: 90 capsule, Rfl: 1.  he denies medication side effects.    All of the other chronic condition(s) listed above are stable w/o issues.    /88   Pulse 76   Temp 97.6 °F (36.4 °C) (Oral)   Resp 16   Ht 177.8 cm (70\")   Wt 86.6 kg (191 lb)   BMI 27.41 kg/m²     Results for orders placed or performed in visit on 07/01/19   Comprehensive Metabolic Panel   Result Value Ref Range    Glucose 163 (H) 65 - 99 mg/dL    BUN 13 8 - 23 mg/dL    Creatinine 0.97 0.76 - 1.27 mg/dL    eGFR Non African Am 75 >60 mL/min/1.73    eGFR African Am 91 >60 mL/min/1.73    BUN/Creatinine Ratio 13.4 7.0 - 25.0    Sodium 141 136 - 145 mmol/L    Potassium 4.9 3.5 - 5.2 mmol/L    Chloride 104 98 - 107 mmol/L    Total CO2 25.4 22.0 - 29.0 mmol/L    Calcium 9.5 8.6 - 10.5 mg/dL    Total Protein 6.5 6.0 - 8.5 g/dL    Albumin 4.80 3.50 - 5.20 g/dL    Globulin 1.7 gm/dL    A/G Ratio 2.8 g/dL    Total Bilirubin 0.5 0.2 - 1.2 mg/dL    Alkaline Phosphatase 53 39 - 117 U/L    AST (SGOT) 35 1 - 40 U/L    ALT (SGPT) 62 (H) 1 - 41 U/L   Lipid Panel   Result Value Ref Range    Total Cholesterol 127 0 " - 200 mg/dL    Triglycerides 107 0 - 150 mg/dL    HDL Cholesterol 35 (L) 40 - 60 mg/dL    VLDL Cholesterol 21.4 mg/dL    LDL Cholesterol  71 0 - 100 mg/dL   Hemoglobin A1c   Result Value Ref Range    Hemoglobin A1C 7.00 (H) 4.80 - 5.60 %   TSH   Result Value Ref Range    TSH 2.930 0.270 - 4.200 mIU/mL   Cardiovascular Risk Assessment   Result Value Ref Range    Interpretation Note    Diabetes Patient Education   Result Value Ref Range    PDF Image Not applicable            The following portions of the patient's history were reviewed and updated as appropriate: allergies, current medications, past family history, past medical history, past social history, past surgical history and problem list.    Review of Systems   Constitutional: Negative for activity change, chills, fatigue and fever.   Respiratory: Negative for cough and shortness of breath.    Cardiovascular: Negative for chest pain and palpitations.   Gastrointestinal: Negative for abdominal pain.   Endocrine: Negative for cold intolerance.   Psychiatric/Behavioral: Negative for behavioral problems and dysphoric mood. The patient is not nervous/anxious.        Objective   Physical Exam   Constitutional: He appears well-developed and well-nourished.   Neck: Neck supple. No thyromegaly present.   Cardiovascular: Normal rate and regular rhythm.   No murmur heard.  Pulmonary/Chest: Effort normal and breath sounds normal.   Abdominal: Bowel sounds are normal. There is no tenderness.   Psychiatric: He has a normal mood and affect. His behavior is normal.   Nursing note and vitals reviewed.    The patient has read and signed the [facility] Controlled Substance Contract.  I will continue to see patient for regular follow up appointments.  They are well controlled on their medication.  NISHANT has been reviewed by me and is updated every 3 months. The patient is aware of the potential for addiction and dependence.    Assessment/Plan   Ben was seen today for  insomnia and hypertension.    Diagnoses and all orders for this visit:    Primary insomnia  -     zolpidem (AMBIEN) 10 MG tablet; Take 1 tablet by mouth At Night As Needed for Sleep.    Essential hypertension  -     lisinopril (PRINIVIL,ZESTRIL) 20 MG tablet; Take 1 tablet by mouth Daily.

## 2019-12-19 ENCOUNTER — TELEPHONE (OUTPATIENT)
Dept: ENDOCRINOLOGY | Age: 74
End: 2019-12-19

## 2019-12-19 DIAGNOSIS — R80.9 TYPE 2 DIABETES MELLITUS WITH MICROALBUMINURIA, WITHOUT LONG-TERM CURRENT USE OF INSULIN (HCC): ICD-10-CM

## 2019-12-19 DIAGNOSIS — E11.29 TYPE 2 DIABETES MELLITUS WITH MICROALBUMINURIA, WITHOUT LONG-TERM CURRENT USE OF INSULIN (HCC): ICD-10-CM

## 2019-12-19 RX ORDER — GLIPIZIDE 10 MG/1
TABLET ORAL
Qty: 45 TABLET | Refills: 1 | Status: SHIPPED | OUTPATIENT
Start: 2019-12-19 | End: 2020-07-06

## 2020-02-04 RX ORDER — ATORVASTATIN CALCIUM 40 MG/1
TABLET, FILM COATED ORAL
Qty: 90 TABLET | Refills: 0 | Status: SHIPPED | OUTPATIENT
Start: 2020-02-04 | End: 2020-02-10

## 2020-02-10 RX ORDER — ATORVASTATIN CALCIUM 40 MG/1
TABLET, FILM COATED ORAL
Qty: 90 TABLET | Refills: 1 | Status: SHIPPED | OUTPATIENT
Start: 2020-02-10 | End: 2020-11-11

## 2020-02-24 DIAGNOSIS — E78.5 HYPERLIPIDEMIA, UNSPECIFIED HYPERLIPIDEMIA TYPE: ICD-10-CM

## 2020-02-24 RX ORDER — FENOFIBRATE 145 MG/1
TABLET, COATED ORAL
Qty: 90 TABLET | Refills: 1 | Status: SHIPPED | OUTPATIENT
Start: 2020-02-24 | End: 2020-09-08 | Stop reason: SDUPTHER

## 2020-02-24 RX ORDER — FENOFIBRATE 145 MG/1
145 TABLET, COATED ORAL DAILY
Qty: 90 TABLET | Refills: 0 | Status: SHIPPED | OUTPATIENT
Start: 2020-02-24 | End: 2020-02-24

## 2020-03-03 ENCOUNTER — OFFICE VISIT (OUTPATIENT)
Dept: FAMILY MEDICINE CLINIC | Facility: CLINIC | Age: 75
End: 2020-03-03

## 2020-03-03 VITALS
BODY MASS INDEX: 26.92 KG/M2 | WEIGHT: 188 LBS | DIASTOLIC BLOOD PRESSURE: 78 MMHG | TEMPERATURE: 98.4 F | RESPIRATION RATE: 16 BRPM | SYSTOLIC BLOOD PRESSURE: 137 MMHG | HEIGHT: 70 IN | HEART RATE: 84 BPM

## 2020-03-03 DIAGNOSIS — F33.42 RECURRENT MAJOR DEPRESSIVE DISORDER, IN FULL REMISSION (HCC): ICD-10-CM

## 2020-03-03 DIAGNOSIS — F51.01 PRIMARY INSOMNIA: ICD-10-CM

## 2020-03-03 DIAGNOSIS — E03.9 PRIMARY HYPOTHYROIDISM: ICD-10-CM

## 2020-03-03 DIAGNOSIS — I10 ESSENTIAL HYPERTENSION: ICD-10-CM

## 2020-03-03 DIAGNOSIS — I25.10 TWO-VESSEL CORONARY ARTERY DISEASE: ICD-10-CM

## 2020-03-03 DIAGNOSIS — N40.0 BENIGN PROSTATIC HYPERPLASIA WITHOUT LOWER URINARY TRACT SYMPTOMS: ICD-10-CM

## 2020-03-03 DIAGNOSIS — Z00.00 MEDICARE ANNUAL WELLNESS VISIT, SUBSEQUENT: Primary | ICD-10-CM

## 2020-03-03 PROCEDURE — 99214 OFFICE O/P EST MOD 30 MIN: CPT | Performed by: FAMILY MEDICINE

## 2020-03-03 PROCEDURE — G0439 PPPS, SUBSEQ VISIT: HCPCS | Performed by: FAMILY MEDICINE

## 2020-03-03 PROCEDURE — 96160 PT-FOCUSED HLTH RISK ASSMT: CPT | Performed by: FAMILY MEDICINE

## 2020-03-03 RX ORDER — LISINOPRIL 20 MG/1
20 TABLET ORAL DAILY
Qty: 90 TABLET | Refills: 1 | Status: SHIPPED | OUTPATIENT
Start: 2020-03-03 | End: 2020-09-03 | Stop reason: SDUPTHER

## 2020-03-03 RX ORDER — GABAPENTIN 600 MG/1
600 TABLET ORAL 3 TIMES DAILY
COMMUNITY
Start: 2020-02-15 | End: 2022-10-04 | Stop reason: DRUGHIGH

## 2020-03-03 RX ORDER — AMOXICILLIN 875 MG/1
TABLET, COATED ORAL
COMMUNITY
Start: 2020-02-28 | End: 2020-03-03

## 2020-03-03 RX ORDER — AMLODIPINE BESYLATE 5 MG/1
5 TABLET ORAL DAILY
Qty: 90 TABLET | Refills: 1 | Status: SHIPPED | OUTPATIENT
Start: 2020-03-03 | End: 2020-09-03 | Stop reason: SDUPTHER

## 2020-03-03 RX ORDER — ZOLPIDEM TARTRATE 10 MG/1
10 TABLET ORAL NIGHTLY PRN
Qty: 90 TABLET | Refills: 0 | Status: SHIPPED | OUTPATIENT
Start: 2020-03-03 | End: 2020-08-10 | Stop reason: SDUPTHER

## 2020-03-03 RX ORDER — LEVOTHYROXINE SODIUM 0.05 MG/1
50 TABLET ORAL DAILY
Qty: 90 TABLET | Refills: 1 | Status: SHIPPED | OUTPATIENT
Start: 2020-03-03 | End: 2020-09-03 | Stop reason: SDUPTHER

## 2020-03-03 RX ORDER — ESCITALOPRAM OXALATE 20 MG/1
20 TABLET ORAL DAILY
Qty: 90 TABLET | Refills: 1 | Status: SHIPPED | OUTPATIENT
Start: 2020-03-03 | End: 2020-09-03 | Stop reason: SDUPTHER

## 2020-03-03 RX ORDER — TAMSULOSIN HYDROCHLORIDE 0.4 MG/1
1 CAPSULE ORAL DAILY
Qty: 90 CAPSULE | Refills: 1 | Status: SHIPPED | OUTPATIENT
Start: 2020-03-03 | End: 2020-09-03 | Stop reason: SDUPTHER

## 2020-03-03 RX ORDER — CLOPIDOGREL BISULFATE 75 MG/1
75 TABLET ORAL DAILY
Qty: 90 TABLET | Refills: 1 | Status: SHIPPED | OUTPATIENT
Start: 2020-03-03 | End: 2020-09-03 | Stop reason: SDUPTHER

## 2020-03-03 NOTE — PROGRESS NOTES
The ABCs of the Annual Wellness Visit  Subsequent Medicare Wellness Visit    Chief Complaint   Patient presents with   • medicare wellness   • Hypertension     med refill reza    • Hypothyroidism   • Hyperlipidemia   • Insomnia       Subjective   History of Present Illness:  Ben Walsh is a 74 y.o. male who presents for a Subsequent Medicare Wellness Visit.    HEALTH RISK ASSESSMENT    Recent Hospitalizations:  No hospitalization(s) within the last year.    Current Medical Providers:  Patient Care Team:  Jake Marcus MD as PCP - General (Family Medicine)  Guerrero Kruse MD as Consulting Physician (Orthopedic Surgery)  Kelton Francois MD as Consulting Physician (Ophthalmology)  Justin Miller MD as Consulting Physician (Endocrinology)  Angel Borges MD as Consulting Physician (Cardiology)  Grupo Jacobo MD (Pain Medicine)    Smoking Status:  Social History     Tobacco Use   Smoking Status Never Smoker   Smokeless Tobacco Never Used   Tobacco Comment    no caffeine       Alcohol Consumption:  Social History     Substance and Sexual Activity   Alcohol Use No       Depression Screen:   PHQ-2/PHQ-9 Depression Screening 3/3/2020   Little interest or pleasure in doing things 0   Feeling down, depressed, or hopeless 0   Trouble falling or staying asleep, or sleeping too much -   Feeling tired or having little energy -   Poor appetite or overeating -   Feeling bad about yourself - or that you are a failure or have let yourself or your family down -   Trouble concentrating on things, such as reading the newspaper or watching television -   Moving or speaking so slowly that other people could have noticed. Or the opposite - being so fidgety or restless that you have been moving around a lot more than usual -   Thoughts that you would be better off dead, or of hurting yourself in some way -   Total Score 0   If you checked off any problems, how difficult have these problems made it for you to do your  work, take care of things at home, or get along with other people? -       Fall Risk Screen:  KIANNA Fall Risk Assessment was completed, and patient is at LOW risk for falls.Assessment completed on:3/3/2020    Health Habits and Functional and Cognitive Screening:  Functional & Cognitive Status 3/3/2020   Do you have difficulty preparing food and eating? No   Do you have difficulty bathing yourself, getting dressed or grooming yourself? No   Do you have difficulty using the toilet? No   Do you have difficulty moving around from place to place? No   Do you have trouble with steps or getting out of a bed or a chair? No   Current Diet Well Balanced Diet   Dental Exam Up to date   Eye Exam Up to date   Exercise (times per week) 3 times per week   Current Exercise Activities Include Walking   Do you need help using the phone?  No   Are you deaf or do you have serious difficulty hearing?  No   Do you need help with transportation? No   Do you need help shopping? No   Do you need help preparing meals?  No   Do you need help with housework?  No   Do you need help with laundry? No   Do you need help taking your medications? No   Do you need help managing money? No   Do you ever drive or ride in a car without wearing a seat belt? No   Have you felt unusual stress, anger or loneliness in the last month? No   Who do you live with? Spouse   If you need help, do you have trouble finding someone available to you? Yes   Have you been bothered in the last four weeks by sexual problems? No   Do you have difficulty concentrating, remembering or making decisions? No         Does the patient have evidence of cognitive impairment? No    Asprin use counseling:Does not need ASA (and currently is not on it)    Age-appropriate Screening Schedule:  Refer to the list below for future screening recommendations based on patient's age, sex and/or medical conditions. Orders for these recommended tests are listed in the plan section. The patient has  been provided with a written plan.    Health Maintenance   Topic Date Due   • TDAP/TD VACCINES (1 - Tdap) 05/31/1956   • DIABETIC EYE EXAM  10/17/2018   • HEMOGLOBIN A1C  01/01/2020   • URINE MICROALBUMIN  02/06/2020   • LIPID PANEL  07/01/2020   • COLONOSCOPY  03/06/2027   • PNEUMOCOCCAL VACCINE (65+ HIGH RISK)  Completed   • INFLUENZA VACCINE  Completed   • ZOSTER VACCINE  Discontinued          The following portions of the patient's history were reviewed and updated as appropriate: allergies, current medications, past family history, past medical history, past social history, past surgical history and problem list.    Outpatient Medications Prior to Visit   Medication Sig Dispense Refill   • amLODIPine (NORVASC) 5 MG tablet Take 1 tablet by mouth Daily. 90 tablet 1   • clopidogrel (PLAVIX) 75 MG tablet Take 1 tablet by mouth Daily. For heart disease 90 tablet 1   • levothyroxine (SYNTHROID, LEVOTHROID) 50 MCG tablet Take 1 tablet by mouth Daily. For thyroid 90 tablet 1   • lisinopril (PRINIVIL,ZESTRIL) 20 MG tablet Take 1 tablet by mouth Daily. 90 tablet 1   • tamsulosin (FLOMAX) 0.4 MG capsule 24 hr capsule Take 1 capsule by mouth Daily. For prostate 90 capsule 1   • zolpidem (AMBIEN) 10 MG tablet Take 1 tablet by mouth At Night As Needed for Sleep. 90 tablet 0   • ACCU-CHEK GUIDE test strip Dx code E11.29 testing bs 3 x day 300 each 1   • atorvastatin (LIPITOR) 40 MG tablet TAKE ONE TABLET BY MOUTH ONCE NIGHTLY 90 tablet 1   • azelastine (ASTELIN) 0.1 % nasal spray      • B-D ULTRAFINE III SHORT PEN 31G X 8 MM misc USE FOR INJECTIONS DAILY 100 each 10   • Cholecalciferol (VITAMIN D3) 58431 units capsule TAKE 1 CAPSULE EVERY 7 DAYS 13 capsule 3   • coenzyme Q10 100 MG capsule Take 100 mg by mouth Daily.     • cyclobenzaprine (FLEXERIL) 10 MG tablet Take 1 tablet by mouth 3 (Three) Times a Day As Needed for Muscle Spasms. 90 tablet 5   • esomeprazole (NexIUM) 20 MG capsule Take 20 mg by mouth every morning before  breakfast.     • fenofibrate (TRICOR) 145 MG tablet TAKE 1 TABLET EVERY DAY 90 tablet 1   • fluticasone (FLONASE) 50 MCG/ACT nasal spray      • gabapentin (NEURONTIN) 300 MG capsule      • glipizide (GLUCOTROL) 10 MG tablet Take 1/2 tablet in the morning 45 tablet 1   • HYDROcodone-acetaminophen (NORCO) 7.5-325 MG per tablet Take 1 tablet by mouth 2 (Two) Times a Day. Prescribed by Dr. Jacobo (Patient taking differently: Take 1 tablet by mouth Every 8 (Eight) Hours As Needed. Prescribed by Dr. Jacobo)     • Insulin Degludec 200 UNIT/ML solution pen-injector Inject 20 Units under the skin Daily. Titration max of 100 units daily 6 pen 4   • ketoconazole (NIZORAL) 2 % shampoo      • Lancets (ACCU-CHEK SOFT TOUCH) lancets Dx code E11.29 testing bs 3 x day 300 each 1   • metFORMIN (GLUCOPHAGE) 1000 MG tablet TAKE 1 TABLET TWICE DAILY WITH MEALS 180 tablet 1   • montelukast (SINGULAIR) 10 MG tablet      • Multiple Vitamins-Minerals (MULTIVITAMIN ADULTS 50+ PO) Take 1 tablet by mouth daily. PT HOLDING FOR SURGERY     • NITROSTAT 0.4 MG SL tablet Place 0.4 mg under the tongue every 5 (five) minutes as needed.     • omega-3 acid ethyl esters (LOVAZA) 1 g capsule Take 2 capsules by mouth 2 (Two) Times a Day. 120 capsule 5   • Probiotic Product (PROBIOTIC DAILY) capsule Take 1 capsule by mouth daily.     • amoxicillin (AMOXIL) 875 MG tablet      • clobetasol (TEMOVATE) 0.05 % external solution      • escitalopram (LEXAPRO) 20 MG tablet Take 1 tablet by mouth Daily. 90 tablet 1     No facility-administered medications prior to visit.        Patient Active Problem List   Diagnosis   • Allergic rhinitis   • ADD (attention deficit disorder)   • DDD (degenerative disc disease), lumbar   • BPH (benign prostatic hypertrophy)   • Chronic pain   • ASCVD (arteriosclerotic cardiovascular disease)   • Depression   • Diverticulosis of colon   • GERD (gastroesophageal reflux disease)   • Hiatal hernia   • Hyperlipidemia   • Essential  "hypertension   • Primary hypothyroidism   • Insomnia   • Osteoarthritis, multiple sites   • Acute pancreatitis   • Vitamin D deficiency   • Glaucoma   • Abnormal nuclear stress test   • Two-vessel coronary artery disease   • SAEED (dyspnea on exertion)   • Obstructive sleep apnea syndrome   • Patent foramen ovale   • Snoring   • Disorder of rotator cuff   • History of COPD   • Type 2 diabetes mellitus with microalbuminuria, without long-term current use of insulin (CMS/[facility])   • Carpal tunnel syndrome of right wrist   • Abnormal electrocardiogram   • Atypical chest pain   • Type 2 diabetes mellitus with peripheral neuropathy (CMS/[facility])   • Atrial fibrillation and flutter (CMS/[facility])       Advanced Care Planning:  ACP discussion was held with the patient during this visit. Patient has an advance directive in EMR which is still valid.     Review of Systems    Compared to one year ago, the patient feels his physical health is the same.  Compared to one year ago, the patient feels his mental health is the same.    Reviewed chart for potential of high risk medication in the elderly: yes  Reviewed chart for potential of harmful drug interactions in the elderly:yes    Objective         Vitals:    03/03/20 1258   BP: 137/78   Pulse: 84   Resp: 16   Temp: 98.4 °F (36.9 °C)   TempSrc: Oral   Weight: 85.3 kg (188 lb)   Height: 177.8 cm (70\")   PainSc:   5       Body mass index is 26.98 kg/m².  Discussed the patient's BMI with him. The BMI is in the acceptable range.    Physical Exam          Assessment/Plan   Medicare Risks and Personalized Health Plan  CMS Preventative Services Quick Reference  Cardiovascular risk    The above risks/problems have been discussed with the patient.  Pertinent information has been shared with the patient in the After Visit Summary.  Follow up plans and orders are seen below in the Assessment/Plan Section.    Diagnoses and all orders for this visit:    1. Medicare annual wellness visit, subsequent " (Primary)    2. Essential hypertension  -     amLODIPine (NORVASC) 5 MG tablet; Take 1 tablet by mouth Daily.  Dispense: 90 tablet; Refill: 1  -     lisinopril (PRINIVIL,ZESTRIL) 20 MG tablet; Take 1 tablet by mouth Daily.  Dispense: 90 tablet; Refill: 1    3. Two-vessel coronary artery disease  -     clopidogrel (PLAVIX) 75 MG tablet; Take 1 tablet by mouth Daily. For heart disease  Dispense: 90 tablet; Refill: 1    4. Recurrent major depressive disorder, in full remission (CMS/HCC)  -     escitalopram (LEXAPRO) 20 MG tablet; Take 1 tablet by mouth Daily.  Dispense: 90 tablet; Refill: 1    5. Primary hypothyroidism  -     levothyroxine (SYNTHROID, LEVOTHROID) 50 MCG tablet; Take 1 tablet by mouth Daily. For thyroid  Dispense: 90 tablet; Refill: 1    6. Benign prostatic hyperplasia without lower urinary tract symptoms  -     tamsulosin (FLOMAX) 0.4 MG capsule 24 hr capsule; Take 1 capsule by mouth Daily. For prostate  Dispense: 90 capsule; Refill: 1    7. Primary insomnia  -     zolpidem (AMBIEN) 10 MG tablet; Take 1 tablet by mouth At Night As Needed for Sleep.  Dispense: 90 tablet; Refill: 0      Follow Up:  Return in about 6 months (around 9/3/2020) for Recheck.     An After Visit Summary and PPPS were given to the patient.

## 2020-03-03 NOTE — PROGRESS NOTES
Subjective   Ben Walsh is a 74 y.o. male.     History of Present Illness     Chief Complaint:   Chief Complaint   Patient presents with   • medicare wellness   • Hypertension     med refill reza    • Hypothyroidism   • Hyperlipidemia   • Insomnia       Ben Walsh 74 y.o. male who presents today for Medical Management of the below listed issues and medication refills.  he has a problem list of   Patient Active Problem List   Diagnosis   • Allergic rhinitis   • ADD (attention deficit disorder)   • DDD (degenerative disc disease), lumbar   • BPH (benign prostatic hypertrophy)   • Chronic pain   • ASCVD (arteriosclerotic cardiovascular disease)   • Depression   • Diverticulosis of colon   • GERD (gastroesophageal reflux disease)   • Hiatal hernia   • Hyperlipidemia   • Essential hypertension   • Primary hypothyroidism   • Insomnia   • Osteoarthritis, multiple sites   • Acute pancreatitis   • Vitamin D deficiency   • Glaucoma   • Abnormal nuclear stress test   • Two-vessel coronary artery disease   • SAEED (dyspnea on exertion)   • Obstructive sleep apnea syndrome   • Patent foramen ovale   • Snoring   • Disorder of rotator cuff   • History of COPD   • Type 2 diabetes mellitus with microalbuminuria, without long-term current use of insulin (CMS/HCC)   • Carpal tunnel syndrome of right wrist   • Abnormal electrocardiogram   • Atypical chest pain   • Type 2 diabetes mellitus with peripheral neuropathy (CMS/HCC)   • Atrial fibrillation and flutter (CMS/HCC)   .  Since the last visit, he has overall felt well.  he has been compliant with   Current Outpatient Medications:   •  ACCU-CHEK GUIDE test strip, Dx code E11.29 testing bs 3 x day, Disp: 300 each, Rfl: 1  •  amLODIPine (NORVASC) 5 MG tablet, Take 1 tablet by mouth Daily., Disp: 90 tablet, Rfl: 1  •  atorvastatin (LIPITOR) 40 MG tablet, TAKE ONE TABLET BY MOUTH ONCE NIGHTLY, Disp: 90 tablet, Rfl: 1  •  azelastine (ASTELIN) 0.1 % nasal spray, , Disp: ,  Rfl:   •  B-D ULTRAFINE III SHORT PEN 31G X 8 MM misc, USE FOR INJECTIONS DAILY, Disp: 100 each, Rfl: 10  •  Cholecalciferol (VITAMIN D3) 81288 units capsule, TAKE 1 CAPSULE EVERY 7 DAYS, Disp: 13 capsule, Rfl: 3  •  clopidogrel (PLAVIX) 75 MG tablet, Take 1 tablet by mouth Daily. For heart disease, Disp: 90 tablet, Rfl: 1  •  coenzyme Q10 100 MG capsule, Take 100 mg by mouth Daily., Disp: , Rfl:   •  cyclobenzaprine (FLEXERIL) 10 MG tablet, Take 1 tablet by mouth 3 (Three) Times a Day As Needed for Muscle Spasms., Disp: 90 tablet, Rfl: 5  •  escitalopram (LEXAPRO) 20 MG tablet, Take 1 tablet by mouth Daily., Disp: 90 tablet, Rfl: 1  •  esomeprazole (NexIUM) 20 MG capsule, Take 20 mg by mouth every morning before breakfast., Disp: , Rfl:   •  fenofibrate (TRICOR) 145 MG tablet, TAKE 1 TABLET EVERY DAY, Disp: 90 tablet, Rfl: 1  •  fluticasone (FLONASE) 50 MCG/ACT nasal spray, , Disp: , Rfl:   •  gabapentin (NEURONTIN) 300 MG capsule, , Disp: , Rfl:   •  glipizide (GLUCOTROL) 10 MG tablet, Take 1/2 tablet in the morning, Disp: 45 tablet, Rfl: 1  •  HYDROcodone-acetaminophen (NORCO) 7.5-325 MG per tablet, Take 1 tablet by mouth 2 (Two) Times a Day. Prescribed by Dr. Jacobo (Patient taking differently: Take 1 tablet by mouth Every 8 (Eight) Hours As Needed. Prescribed by Dr. Jacobo), Disp: , Rfl:   •  Insulin Degludec 200 UNIT/ML solution pen-injector, Inject 20 Units under the skin Daily. Titration max of 100 units daily, Disp: 6 pen, Rfl: 4  •  ketoconazole (NIZORAL) 2 % shampoo, , Disp: , Rfl:   •  Lancets (ACCU-CHEK SOFT TOUCH) lancets, Dx code E11.29 testing bs 3 x day, Disp: 300 each, Rfl: 1  •  levothyroxine (SYNTHROID, LEVOTHROID) 50 MCG tablet, Take 1 tablet by mouth Daily. For thyroid, Disp: 90 tablet, Rfl: 1  •  lisinopril (PRINIVIL,ZESTRIL) 20 MG tablet, Take 1 tablet by mouth Daily., Disp: 90 tablet, Rfl: 1  •  metFORMIN (GLUCOPHAGE) 1000 MG tablet, TAKE 1 TABLET TWICE DAILY WITH MEALS, Disp: 180 tablet,  "Rfl: 1  •  montelukast (SINGULAIR) 10 MG tablet, , Disp: , Rfl:   •  Multiple Vitamins-Minerals (MULTIVITAMIN ADULTS 50+ PO), Take 1 tablet by mouth daily. PT HOLDING FOR SURGERY, Disp: , Rfl:   •  NITROSTAT 0.4 MG SL tablet, Place 0.4 mg under the tongue every 5 (five) minutes as needed., Disp: , Rfl:   •  omega-3 acid ethyl esters (LOVAZA) 1 g capsule, Take 2 capsules by mouth 2 (Two) Times a Day., Disp: 120 capsule, Rfl: 5  •  Probiotic Product (PROBIOTIC DAILY) capsule, Take 1 capsule by mouth daily., Disp: , Rfl:   •  tamsulosin (FLOMAX) 0.4 MG capsule 24 hr capsule, Take 1 capsule by mouth Daily. For prostate, Disp: 90 capsule, Rfl: 1  •  zolpidem (AMBIEN) 10 MG tablet, Take 1 tablet by mouth At Night As Needed for Sleep., Disp: 90 tablet, Rfl: 0.  he denies medication side effects.    All of the other chronic condition(s) listed above are stable w/o issues.    /78   Pulse 84   Temp 98.4 °F (36.9 °C) (Oral)   Resp 16   Ht 177.8 cm (70\")   Wt 85.3 kg (188 lb)   BMI 26.98 kg/m²     Results for orders placed or performed in visit on 07/01/19   Comprehensive Metabolic Panel   Result Value Ref Range    Glucose 163 (H) 65 - 99 mg/dL    BUN 13 8 - 23 mg/dL    Creatinine 0.97 0.76 - 1.27 mg/dL    eGFR Non African Am 75 >60 mL/min/1.73    eGFR African Am 91 >60 mL/min/1.73    BUN/Creatinine Ratio 13.4 7.0 - 25.0    Sodium 141 136 - 145 mmol/L    Potassium 4.9 3.5 - 5.2 mmol/L    Chloride 104 98 - 107 mmol/L    Total CO2 25.4 22.0 - 29.0 mmol/L    Calcium 9.5 8.6 - 10.5 mg/dL    Total Protein 6.5 6.0 - 8.5 g/dL    Albumin 4.80 3.50 - 5.20 g/dL    Globulin 1.7 gm/dL    A/G Ratio 2.8 g/dL    Total Bilirubin 0.5 0.2 - 1.2 mg/dL    Alkaline Phosphatase 53 39 - 117 U/L    AST (SGOT) 35 1 - 40 U/L    ALT (SGPT) 62 (H) 1 - 41 U/L   Lipid Panel   Result Value Ref Range    Total Cholesterol 127 0 - 200 mg/dL    Triglycerides 107 0 - 150 mg/dL    HDL Cholesterol 35 (L) 40 - 60 mg/dL    VLDL Cholesterol 21.4 mg/dL    " LDL Cholesterol  71 0 - 100 mg/dL   Hemoglobin A1c   Result Value Ref Range    Hemoglobin A1C 7.00 (H) 4.80 - 5.60 %   TSH   Result Value Ref Range    TSH 2.930 0.270 - 4.200 mIU/mL   Cardiovascular Risk Assessment   Result Value Ref Range    Interpretation Note    Diabetes Patient Education   Result Value Ref Range    PDF Image Not applicable      Pt is UTD with his endocrinologist and pain management MDs.      The following portions of the patient's history were reviewed and updated as appropriate: allergies, current medications, past family history, past medical history, past social history, past surgical history and problem list.    Review of Systems   Constitutional: Negative for activity change, chills, fatigue and fever.   Respiratory: Negative for cough and shortness of breath.    Cardiovascular: Negative for chest pain and palpitations.   Gastrointestinal: Negative for abdominal pain.   Endocrine: Negative for cold intolerance.   Psychiatric/Behavioral: Negative for behavioral problems and dysphoric mood. The patient is not nervous/anxious.        Objective   Physical Exam   Constitutional: He appears well-developed and well-nourished.   Neck: Neck supple. No thyromegaly present.   Cardiovascular: Normal rate and regular rhythm.   No murmur heard.  Pulmonary/Chest: Effort normal and breath sounds normal.   Abdominal: Bowel sounds are normal. There is no tenderness.   Psychiatric: He has a normal mood and affect. His behavior is normal.   Nursing note and vitals reviewed.    The patient has read and signed the [facility] Controlled Substance Contract.  I will continue to see patient for regular follow up appointments.  They are well controlled on their medication.  NISHANT has been reviewed by me and is updated every 3 months. The patient is aware of the potential for addiction and dependence.    Assessment/Plan   Ben was seen today for medicare wellness, hypertension, hypothyroidism, hyperlipidemia  and insomnia.    Diagnoses and all orders for this visit:    Medicare annual wellness visit, subsequent    Essential hypertension  -     amLODIPine (NORVASC) 5 MG tablet; Take 1 tablet by mouth Daily.  -     lisinopril (PRINIVIL,ZESTRIL) 20 MG tablet; Take 1 tablet by mouth Daily.    Two-vessel coronary artery disease  -     clopidogrel (PLAVIX) 75 MG tablet; Take 1 tablet by mouth Daily. For heart disease    Recurrent major depressive disorder, in full remission (CMS/[facility])  -     escitalopram (LEXAPRO) 20 MG tablet; Take 1 tablet by mouth Daily.    Primary hypothyroidism  -     levothyroxine (SYNTHROID, LEVOTHROID) 50 MCG tablet; Take 1 tablet by mouth Daily. For thyroid    Benign prostatic hyperplasia without lower urinary tract symptoms  -     tamsulosin (FLOMAX) 0.4 MG capsule 24 hr capsule; Take 1 capsule by mouth Daily. For prostate    Primary insomnia  -     zolpidem (AMBIEN) 10 MG tablet; Take 1 tablet by mouth At Night As Needed for Sleep.

## 2020-03-03 NOTE — PROGRESS NOTES
Subjective   Ben Walsh is a 74 y.o. male.     F/u for dm 2, hypertension, hyperlipidemia, vitamin d def, BPH, COPD/ testing bs 1 x day / last dm eye exam 2/6/19 with dr Francois / last dm foot exam today with dr Miller / flu vaccine @[facility]     Patient is a 74-year-old male who came in for follow-up.  He has known diabetes since 2013.  He has been on metformin 1000 mg twice a day, glipizide 10 mg 1/2 tab every AM  and Tresiba 20 every AM.  He stopped checking his blood sugars.    He has lost 2 pounds since 11/19. His last meal was at 12:30 PM     He has microalbuminuria on urine sample taken in 2014.  Repeat urine microalbumin done in 2/19 was normal.  He has been on lisinopril.  His last eye examination was in October 2019.  He has no retinopathy.     He has chronic neck and lower back pain due to degenerative disc disease.  He has burning in feet.    He denies muscle weakness or incontinence.  He has pain in his calves after walking one fourth of a mile.  He is on gabapentin 600 mg every morning, 600 mg every afternoon and 600 mg every evening prescribed by Dr. Jacobo.  Epidural injections did not help.       He has hyperlipidemia and has been on Lipitor 40 mg once a day, TriCor 145 mg once a day, and Vascepa 2 g twice a day..  He denies any myalgia.  CT scan of the abdomen and ultrasound of the abdomen in 2012 showed fatty infiltration of the liver and a right kidney stone.  Lipid profile done in March 2020 are as follows: Cholesterol 108.  HDL 34.  LDL 55.  Triglycerides 95.     He has hypothyroidism and has been on levothyroxine 50 µg per day.  He denies heat or cold intolerance.  TSH done in March 2020 is normal at 3.95.     He has hypertension and has been on amlodipine and lisinopril.  He had a false positive stress test in February 2016.  He had a cardiac catheterization in March 2016.  The stent to the mid LAD is patent.  There is mild luminal irregularities of the proximal LAD.  The left circumflex  "complex has mild luminal plaque.  The proximal RCA has a stable 50% stenosis.  He is on Plavix 75 mg once a day.  He denies chest pain or shortness of breath.  He follows with Dr. Borges.     He was never a smoker    The following portions of the patient's history were reviewed and updated as appropriate: allergies, current medications, past family history, past medical history, past social history, past surgical history and problem list.    Review of Systems   Constitutional: Negative.    HENT: Negative.    Eyes: Negative.    Respiratory: Negative for shortness of breath and wheezing.    Cardiovascular: Negative for chest pain and palpitations.   Gastrointestinal: Negative.    Genitourinary: Negative.    Musculoskeletal: Positive for back pain and neck pain.   Neurological: Negative for dizziness and weakness.     Objective      Vitals:    03/11/20 1352   BP: 114/56   BP Location: Right arm   Patient Position: Sitting   Cuff Size: Large Adult   Pulse: 83   SpO2: 98%   Weight: 85.7 kg (189 lb)   Height: 177.8 cm (70\")     Physical Exam   Constitutional: He is oriented to person, place, and time. He appears well-developed and well-nourished. No distress.   HENT:   Head: Normocephalic.   Right Ear: External ear normal.   Left Ear: External ear normal.   Nose: Nose normal.   Mouth/Throat: Oropharynx is clear and moist. No oropharyngeal exudate.   Eyes: Conjunctivae and EOM are normal. Right eye exhibits no discharge. Left eye exhibits no discharge. No scleral icterus.   Neck: Neck supple. No JVD present. No tracheal deviation present. No thyromegaly present.   Cardiovascular: Normal rate, regular rhythm, normal heart sounds and intact distal pulses. Exam reveals no gallop and no friction rub.   No murmur heard.  Pulmonary/Chest: Effort normal and breath sounds normal. No stridor. No respiratory distress. He has no wheezes. He has no rales.   Abdominal: Soft. Bowel sounds are normal. He exhibits no distension and no " mass. There is no tenderness. There is no rebound and no guarding.   Musculoskeletal: Normal range of motion. He exhibits no edema, tenderness or deformity.   No plantar ulcers.  Faint dorsalis pedis pulses.   Lymphadenopathy:     He has no cervical adenopathy.   Neurological: He is alert and oriented to person, place, and time. He displays normal reflexes.   Intact light touch on lower ext   Skin: Skin is warm and dry. No rash noted. He is not diaphoretic. No erythema.     Office Visit on 07/01/2019   Component Date Value Ref Range Status   • Glucose 07/01/2019 163* 65 - 99 mg/dL Final   • BUN 07/01/2019 13  8 - 23 mg/dL Final   • Creatinine 07/01/2019 0.97  0.76 - 1.27 mg/dL Final   • eGFR Non African Am 07/01/2019 75  >60 mL/min/1.73 Final    Comment: The MDRD GFR formula is only valid for adults with stable  renal function between ages 18 and 70.     • eGFR  Am 07/01/2019 91  >60 mL/min/1.73 Final   • BUN/Creatinine Ratio 07/01/2019 13.4  7.0 - 25.0 Final   • Sodium 07/01/2019 141  136 - 145 mmol/L Final   • Potassium 07/01/2019 4.9  3.5 - 5.2 mmol/L Final   • Chloride 07/01/2019 104  98 - 107 mmol/L Final   • Total CO2 07/01/2019 25.4  22.0 - 29.0 mmol/L Final   • Calcium 07/01/2019 9.5  8.6 - 10.5 mg/dL Final   • Total Protein 07/01/2019 6.5  6.0 - 8.5 g/dL Final   • Albumin 07/01/2019 4.80  3.50 - 5.20 g/dL Final   • Globulin 07/01/2019 1.7  gm/dL Final   • A/G Ratio 07/01/2019 2.8  g/dL Final   • Total Bilirubin 07/01/2019 0.5  0.2 - 1.2 mg/dL Final   • Alkaline Phosphatase 07/01/2019 53  39 - 117 U/L Final   • AST (SGOT) 07/01/2019 35  1 - 40 U/L Final   • ALT (SGPT) 07/01/2019 62* 1 - 41 U/L Final   • Total Cholesterol 07/01/2019 127  0 - 200 mg/dL Final   • Triglycerides 07/01/2019 107  0 - 150 mg/dL Final   • HDL Cholesterol 07/01/2019 35* 40 - 60 mg/dL Final   • VLDL Cholesterol 07/01/2019 21.4  mg/dL Final   • LDL Cholesterol  07/01/2019 71  0 - 100 mg/dL Final   • Hemoglobin A1C 07/01/2019  7.00* 4.80 - 5.60 % Final    Comment: Hemoglobin A1C Ranges:  Increased Risk for Diabetes  5.7% to 6.4%  Diabetes                     >= 6.5%  Diabetic Goal                < 7.0%     • TSH 07/01/2019 2.930  0.270 - 4.200 mIU/mL Final   • Interpretation 07/01/2019 Note   Final    Supplemental report is available.   • PDF Image 07/01/2019 Not applicable   Final     Assessment/Plan   Ben was seen today for diabetes, hyperlipidemia, vitamin d deficiency, benign prostatic hypertrophy, copd and hypertension.    Diagnoses and all orders for this visit:    Type 2 diabetes mellitus with microalbuminuria, without long-term current use of insulin (CMS/HCC)  -     Duplex Lower Extremity Art / Grafts - Bilateral CAR    Type 2 diabetes mellitus with peripheral neuropathy (CMS/HCC)    Hyperlipidemia, unspecified hyperlipidemia type    Essential hypertension    Primary hypothyroidism    Claudication (CMS/HCC)  -     Duplex Lower Extremity Art / Grafts - Bilateral CAR      Continue Tresiba 20 units every morning, glipizide, and metformin.  Schedule arterial ultrasound of the lower extremities.  Continue Lipitor 40 mg/day, TriCor 145 mg/day, and Vascepa 2 g twice a day.  Continue levothyroxine 50 mcg/day.  Advised to give the Tdap vaccine    Copy of my note sent to Dr. Marcus, Dr. Kelton Francois, Dr. Jacobo and Dr. Vasquez    RT 4 mos.

## 2020-03-11 ENCOUNTER — OFFICE VISIT (OUTPATIENT)
Dept: ENDOCRINOLOGY | Age: 75
End: 2020-03-11

## 2020-03-11 VITALS
OXYGEN SATURATION: 98 % | HEIGHT: 70 IN | DIASTOLIC BLOOD PRESSURE: 56 MMHG | HEART RATE: 83 BPM | BODY MASS INDEX: 27.06 KG/M2 | WEIGHT: 189 LBS | SYSTOLIC BLOOD PRESSURE: 114 MMHG

## 2020-03-11 DIAGNOSIS — I73.9 CLAUDICATION (HCC): ICD-10-CM

## 2020-03-11 DIAGNOSIS — E78.5 HYPERLIPIDEMIA, UNSPECIFIED HYPERLIPIDEMIA TYPE: ICD-10-CM

## 2020-03-11 DIAGNOSIS — E03.9 PRIMARY HYPOTHYROIDISM: ICD-10-CM

## 2020-03-11 DIAGNOSIS — E11.29 TYPE 2 DIABETES MELLITUS WITH MICROALBUMINURIA, WITHOUT LONG-TERM CURRENT USE OF INSULIN (HCC): Primary | ICD-10-CM

## 2020-03-11 DIAGNOSIS — E11.42 TYPE 2 DIABETES MELLITUS WITH PERIPHERAL NEUROPATHY (HCC): ICD-10-CM

## 2020-03-11 DIAGNOSIS — I10 ESSENTIAL HYPERTENSION: ICD-10-CM

## 2020-03-11 DIAGNOSIS — R80.9 TYPE 2 DIABETES MELLITUS WITH MICROALBUMINURIA, WITHOUT LONG-TERM CURRENT USE OF INSULIN (HCC): Primary | ICD-10-CM

## 2020-03-11 PROCEDURE — 99214 OFFICE O/P EST MOD 30 MIN: CPT | Performed by: INTERNAL MEDICINE

## 2020-03-12 LAB
ALBUMIN SERPL-MCNC: 4.5 G/DL (ref 3.5–5.2)
ALBUMIN/CREAT UR: 4 MG/G CREAT (ref 0–29)
ALBUMIN/GLOB SERPL: 2.1 G/DL
ALP SERPL-CCNC: 59 U/L (ref 39–117)
ALT SERPL-CCNC: 41 U/L (ref 1–41)
AST SERPL-CCNC: 31 U/L (ref 1–40)
BILIRUB SERPL-MCNC: 0.3 MG/DL (ref 0.2–1.2)
BUN SERPL-MCNC: 7 MG/DL (ref 8–23)
BUN/CREAT SERPL: 8 (ref 7–25)
CALCIUM SERPL-MCNC: 9.9 MG/DL (ref 8.6–10.5)
CHLORIDE SERPL-SCNC: 104 MMOL/L (ref 98–107)
CHOLEST SERPL-MCNC: 108 MG/DL (ref 0–200)
CO2 SERPL-SCNC: 26.1 MMOL/L (ref 22–29)
CREAT SERPL-MCNC: 0.88 MG/DL (ref 0.76–1.27)
CREAT UR-MCNC: 103.6 MG/DL
GLOBULIN SER CALC-MCNC: 2.1 GM/DL
GLUCOSE SERPL-MCNC: 95 MG/DL (ref 65–99)
HBA1C MFR BLD: 7.1 % (ref 4.8–5.6)
HDLC SERPL-MCNC: 34 MG/DL (ref 40–60)
INTERPRETATION: NORMAL
LDLC SERPL CALC-MCNC: 55 MG/DL (ref 0–100)
Lab: NORMAL
Lab: NORMAL
MICROALBUMIN UR-MCNC: 4 UG/ML
POTASSIUM SERPL-SCNC: 5 MMOL/L (ref 3.5–5.2)
PROT SERPL-MCNC: 6.6 G/DL (ref 6–8.5)
SODIUM SERPL-SCNC: 141 MMOL/L (ref 136–145)
TRIGL SERPL-MCNC: 95 MG/DL (ref 0–150)
TSH SERPL DL<=0.005 MIU/L-ACNC: 3.95 UIU/ML (ref 0.27–4.2)
VLDLC SERPL CALC-MCNC: 19 MG/DL

## 2020-03-17 DIAGNOSIS — I73.9 CLAUDICATION OF BOTH LOWER EXTREMITIES (HCC): Primary | ICD-10-CM

## 2020-03-18 ENCOUNTER — HOSPITAL ENCOUNTER (OUTPATIENT)
Dept: CARDIOLOGY | Facility: HOSPITAL | Age: 75
Discharge: HOME | End: 2020-03-18
Admitting: INTERNAL MEDICINE

## 2020-03-18 LAB
BH CV LOWER ARTERIAL LEFT ABI RATIO: 1.32
BH CV LOWER ARTERIAL LEFT DORSALIS PEDIS SYS MAX: 157 MMHG
BH CV LOWER ARTERIAL LEFT GREAT TOE SYS MAX: 133 MMHG
BH CV LOWER ARTERIAL LEFT POST EX ABI RATIO: 1.32
BH CV LOWER ARTERIAL LEFT POST TIBIAL SYS MAX: 160 MMHG
BH CV LOWER ARTERIAL LEFT TBI RATIO: 1.1
BH CV LOWER ARTERIAL RIGHT ABI RATIO: 1.36
BH CV LOWER ARTERIAL RIGHT DORSALIS PEDIS SYS MAX: 153 MMHG
BH CV LOWER ARTERIAL RIGHT GREAT TOE SYS MAX: 128 MMHG
BH CV LOWER ARTERIAL RIGHT POST EX ABI RATIO: 1.36
BH CV LOWER ARTERIAL RIGHT POST TIBIAL SYS MAX: 164 MMHG
BH CV LOWER ARTERIAL RIGHT TBI RATIO: 1.06
UPPER ARTERIAL LEFT ARM BRACHIAL SYS MAX: 118 MMHG
UPPER ARTERIAL RIGHT ARM BRACHIAL SYS MAX: 121 MMHG

## 2020-03-18 PROCEDURE — 93924 LWR XTR VASC STDY BILAT: CPT

## 2020-03-19 RX ORDER — CHOLECALCIFEROL (VITAMIN D3) 1250 MCG
50000 CAPSULE ORAL
Qty: 13 CAPSULE | Refills: 3 | Status: SHIPPED | OUTPATIENT
Start: 2020-03-19 | End: 2020-09-03 | Stop reason: SDUPTHER

## 2020-05-03 DIAGNOSIS — E11.29 TYPE 2 DIABETES MELLITUS WITH MICROALBUMINURIA, WITHOUT LONG-TERM CURRENT USE OF INSULIN (HCC): ICD-10-CM

## 2020-05-03 DIAGNOSIS — R80.9 TYPE 2 DIABETES MELLITUS WITH MICROALBUMINURIA, WITHOUT LONG-TERM CURRENT USE OF INSULIN (HCC): ICD-10-CM

## 2020-06-09 DIAGNOSIS — R80.9 TYPE 2 DIABETES MELLITUS WITH MICROALBUMINURIA, WITHOUT LONG-TERM CURRENT USE OF INSULIN (HCC): ICD-10-CM

## 2020-06-09 DIAGNOSIS — E11.29 TYPE 2 DIABETES MELLITUS WITH MICROALBUMINURIA, WITHOUT LONG-TERM CURRENT USE OF INSULIN (HCC): ICD-10-CM

## 2020-06-11 ENCOUNTER — TELEPHONE (OUTPATIENT)
Dept: FAMILY MEDICINE CLINIC | Facility: CLINIC | Age: 75
End: 2020-06-11

## 2020-06-11 DIAGNOSIS — M54.50 LUMBAR BACK PAIN: Primary | ICD-10-CM

## 2020-06-11 DIAGNOSIS — M54.16 LUMBAR RADICULOPATHY: ICD-10-CM

## 2020-06-11 NOTE — TELEPHONE ENCOUNTER
He called me today and let me know his lumbar disc disease with the radiculopathy is getting worse.  He went to a Ortho for his hips and they concluded it was from his back and he needs to see a spine surgeon.  I will place referral to Dr. Guzman

## 2020-06-16 DIAGNOSIS — R80.9 TYPE 2 DIABETES MELLITUS WITH MICROALBUMINURIA, WITHOUT LONG-TERM CURRENT USE OF INSULIN (HCC): ICD-10-CM

## 2020-06-16 DIAGNOSIS — E11.29 TYPE 2 DIABETES MELLITUS WITH MICROALBUMINURIA, WITHOUT LONG-TERM CURRENT USE OF INSULIN (HCC): ICD-10-CM

## 2020-07-04 DIAGNOSIS — E11.29 TYPE 2 DIABETES MELLITUS WITH MICROALBUMINURIA, WITHOUT LONG-TERM CURRENT USE OF INSULIN (HCC): ICD-10-CM

## 2020-07-04 DIAGNOSIS — R80.9 TYPE 2 DIABETES MELLITUS WITH MICROALBUMINURIA, WITHOUT LONG-TERM CURRENT USE OF INSULIN (HCC): ICD-10-CM

## 2020-07-06 RX ORDER — GLIPIZIDE 10 MG/1
TABLET ORAL
Qty: 45 TABLET | Refills: 0 | Status: SHIPPED | OUTPATIENT
Start: 2020-07-06 | End: 2020-08-05 | Stop reason: ALTCHOICE

## 2020-07-10 ENCOUNTER — OFFICE VISIT (OUTPATIENT)
Dept: ORTHOPEDIC SURGERY | Facility: CLINIC | Age: 75
End: 2020-07-10

## 2020-07-10 VITALS — WEIGHT: 187 LBS | TEMPERATURE: 97.8 F | HEIGHT: 70 IN | BODY MASS INDEX: 26.77 KG/M2

## 2020-07-10 DIAGNOSIS — M54.50 LUMBAR SPINE PAIN: Primary | ICD-10-CM

## 2020-07-10 DIAGNOSIS — M48.062 SPINAL STENOSIS OF LUMBAR REGION WITH NEUROGENIC CLAUDICATION: ICD-10-CM

## 2020-07-10 PROCEDURE — 72100 X-RAY EXAM L-S SPINE 2/3 VWS: CPT | Performed by: ORTHOPAEDIC SURGERY

## 2020-07-10 PROCEDURE — 99204 OFFICE O/P NEW MOD 45 MIN: CPT | Performed by: ORTHOPAEDIC SURGERY

## 2020-07-10 NOTE — PROGRESS NOTES
New patient or new problem visit    Chief Complaint   Patient presents with   • Lumbar Spine - Establish Care       HPI: He complains of a long history of increasing back pain and left hip pain.  The pain is moderate stabbing aching worse with activity.  Saw Dr. Dia vinson who determined it was not intrinsic to the hip.  The hip pain exceeds the back pain occasionally is on the right.  He is undergone surgery in the past by Dr. Martinez who performed fusion from L4-S1 in several stages.  Presently the patient takes hydrocodone and has undergone pain management and sounds like he had a intrathecal injection as a trial for morphine.  He states it did not help much.    PFSH: See chart- reviewed    Review of Systems   Constitutional: Positive for fatigue.   HENT: Positive for dental problem, tinnitus and trouble swallowing.    Eyes: Positive for visual disturbance.   Musculoskeletal: Positive for arthralgias, back pain and neck pain.   Allergic/Immunologic: Positive for environmental allergies.   Psychiatric/Behavioral: Positive for sleep disturbance.       PE: Constitutional: Vital signs above-noted.  Awake, alert and oriented    Psychiatric: Affect and insight do not appear grossly disturbed.    Pulmonary: Breathing is unlabored, color is good.    Skin: Warm, dry and normal turgor    Cardiac: Pedal pulses intact.  No edema.    Eyesight and hearing appear adequate for examination purposes      Musculoskeletal:  There is some tenderness to percussion and palpation of the spine. Motion appears undisturbed.  Posture is unremarkable to coronal and sagittal inspection.    The skin about the area is notable for well-healed midline incision..  There is no palpable or visible deformity.  There is no local spasm.       Neurologic:   Reflexes are 2+ and symmetrical in the patellae and absent in the Achilles.   Motor function is undisturbed in quadriceps, EHL, and gastrocnemius   sensation appears symmetrically intact to light  touch.  In the bilateral lower extremities there is no evidence of atrophy.   Clonus is absent..  Gait appears undisturbed.  SLR test negative      MEDICAL DECISION MAKING    XRAY: Plain film x-rays of the lumbar spine demonstrate a solid fusion L4-S1, L4-5 is instrumented.  Posture is fairly unremarkable lordosis is satisfactory some upper lumbar degenerative changes.  Slight retrolisthesis at L2-3.  No comparison views are available.  MRI scan from [facility] demonstrates some stenosis at 3 4 above I would call it mild to moderate the radiologist called it moderate but in any event is not overwhelming.    Other: n/a    Impression: L3-4 adjacent level stenosis may be contributing to his hip pain.    Plan: Lets try an epidural as a empiric test as well as treatment.  This may be adequate and it can be repeated as needed and if nothing else would help confirm that this is indeed the source of his hip pain.  Otherwise I think a myelogram and CT scan before any surgery might be useful for further diagnostic work-up.

## 2020-08-02 DIAGNOSIS — F51.01 PRIMARY INSOMNIA: ICD-10-CM

## 2020-08-03 RX ORDER — ZOLPIDEM TARTRATE 10 MG/1
TABLET ORAL
Qty: 90 TABLET | Refills: 0 | OUTPATIENT
Start: 2020-08-03

## 2020-08-05 ENCOUNTER — TELEPHONE (OUTPATIENT)
Dept: CARDIOLOGY | Facility: CLINIC | Age: 75
End: 2020-08-05

## 2020-08-05 ENCOUNTER — OFFICE VISIT (OUTPATIENT)
Dept: ENDOCRINOLOGY | Age: 75
End: 2020-08-05

## 2020-08-05 VITALS
HEIGHT: 70 IN | WEIGHT: 185.6 LBS | DIASTOLIC BLOOD PRESSURE: 58 MMHG | BODY MASS INDEX: 26.57 KG/M2 | HEART RATE: 94 BPM | OXYGEN SATURATION: 98 % | SYSTOLIC BLOOD PRESSURE: 116 MMHG

## 2020-08-05 DIAGNOSIS — R80.9 TYPE 2 DIABETES MELLITUS WITH MICROALBUMINURIA, WITHOUT LONG-TERM CURRENT USE OF INSULIN (HCC): Primary | ICD-10-CM

## 2020-08-05 DIAGNOSIS — E11.42 TYPE 2 DIABETES MELLITUS WITH PERIPHERAL NEUROPATHY (HCC): ICD-10-CM

## 2020-08-05 DIAGNOSIS — E11.29 TYPE 2 DIABETES MELLITUS WITH MICROALBUMINURIA, WITHOUT LONG-TERM CURRENT USE OF INSULIN (HCC): Primary | ICD-10-CM

## 2020-08-05 DIAGNOSIS — E03.9 PRIMARY HYPOTHYROIDISM: ICD-10-CM

## 2020-08-05 DIAGNOSIS — I10 ESSENTIAL HYPERTENSION: ICD-10-CM

## 2020-08-05 DIAGNOSIS — E78.5 HYPERLIPIDEMIA, UNSPECIFIED HYPERLIPIDEMIA TYPE: ICD-10-CM

## 2020-08-05 DIAGNOSIS — I25.10 TWO-VESSEL CORONARY ARTERY DISEASE: ICD-10-CM

## 2020-08-05 PROCEDURE — 99214 OFFICE O/P EST MOD 30 MIN: CPT | Performed by: INTERNAL MEDICINE

## 2020-08-06 LAB
ALBUMIN SERPL-MCNC: 4.7 G/DL (ref 3.5–5.2)
ALBUMIN/GLOB SERPL: 2.6 G/DL
ALP SERPL-CCNC: 56 U/L (ref 39–117)
ALT SERPL-CCNC: 50 U/L (ref 1–41)
AST SERPL-CCNC: 40 U/L (ref 1–40)
BILIRUB SERPL-MCNC: 0.4 MG/DL (ref 0–1.2)
BUN SERPL-MCNC: 15 MG/DL (ref 8–23)
BUN/CREAT SERPL: 16.5 (ref 7–25)
CALCIUM SERPL-MCNC: 10.1 MG/DL (ref 8.6–10.5)
CHLORIDE SERPL-SCNC: 104 MMOL/L (ref 98–107)
CHOLEST SERPL-MCNC: 116 MG/DL (ref 0–200)
CO2 SERPL-SCNC: 24.4 MMOL/L (ref 22–29)
CREAT SERPL-MCNC: 0.91 MG/DL (ref 0.76–1.27)
GLOBULIN SER CALC-MCNC: 1.8 GM/DL
GLUCOSE SERPL-MCNC: 151 MG/DL (ref 65–99)
HBA1C MFR BLD: 6.7 % (ref 4.8–5.6)
HDLC SERPL-MCNC: 32 MG/DL (ref 40–60)
INTERPRETATION: NORMAL
LDLC SERPL CALC-MCNC: 44 MG/DL (ref 0–100)
Lab: NORMAL
POTASSIUM SERPL-SCNC: 4.7 MMOL/L (ref 3.5–5.2)
PROT SERPL-MCNC: 6.5 G/DL (ref 6–8.5)
SODIUM SERPL-SCNC: 140 MMOL/L (ref 136–145)
TRIGL SERPL-MCNC: 202 MG/DL (ref 0–150)
TSH SERPL DL<=0.005 MIU/L-ACNC: 0.88 UIU/ML (ref 0.27–4.2)
VLDLC SERPL CALC-MCNC: 40.4 MG/DL

## 2020-08-11 ENCOUNTER — TELEMEDICINE (OUTPATIENT)
Dept: FAMILY MEDICINE CLINIC | Facility: CLINIC | Age: 75
End: 2020-08-11

## 2020-08-11 DIAGNOSIS — F51.01 PRIMARY INSOMNIA: ICD-10-CM

## 2020-08-11 PROCEDURE — 99212 OFFICE O/P EST SF 10 MIN: CPT | Performed by: FAMILY MEDICINE

## 2020-08-11 RX ORDER — ZOLPIDEM TARTRATE 10 MG/1
10 TABLET ORAL NIGHTLY PRN
Qty: 90 TABLET | Refills: 0 | Status: SHIPPED | OUTPATIENT
Start: 2020-08-11 | End: 2020-09-03 | Stop reason: SDUPTHER

## 2020-08-11 NOTE — PROGRESS NOTES
Subjective   Ben Walsh is a 75 y.o. male.     CC: Video Visit for Medical Management    History of Present Illness     Chief Complaint:   Chief Complaint   Patient presents with   • Insomnia     med refill reza  - dox visit        Ben Walsh 75 y.o. male who presents today for Medical Management of the below listed issues and medication refills.  he has a problem list of   Patient Active Problem List   Diagnosis   • Allergic rhinitis   • ADD (attention deficit disorder)   • DDD (degenerative disc disease), lumbar   • BPH (benign prostatic hypertrophy)   • Chronic pain   • ASCVD (arteriosclerotic cardiovascular disease)   • Depression   • Diverticulosis of colon   • GERD (gastroesophageal reflux disease)   • Hiatal hernia   • Hyperlipidemia   • Essential hypertension   • Primary hypothyroidism   • Insomnia   • Osteoarthritis, multiple sites   • Acute pancreatitis   • Vitamin D deficiency   • Glaucoma   • Abnormal nuclear stress test   • Two-vessel coronary artery disease   • SAEED (dyspnea on exertion)   • Obstructive sleep apnea syndrome   • Patent foramen ovale   • Snoring   • Disorder of rotator cuff   • History of COPD   • Type 2 diabetes mellitus with microalbuminuria, without long-term current use of insulin (CMS/HCC)   • Carpal tunnel syndrome of right wrist   • Abnormal electrocardiogram   • Atypical chest pain   • Type 2 diabetes mellitus with peripheral neuropathy (CMS/HCC)   • Atrial fibrillation and flutter (CMS/HCC)   .  Since the last visit, he has overall felt well.  he has been compliant with   Current Outpatient Medications:   •  zolpidem (AMBIEN) 10 MG tablet, Take 1 tablet by mouth At Night As Needed for Sleep., Disp: 90 tablet, Rfl: 0  •  ACCU-CHEK GUIDE test strip, Dx code E11.29 testing bs 3 x day, Disp: 300 each, Rfl: 1  •  amLODIPine (NORVASC) 5 MG tablet, Take 1 tablet by mouth Daily., Disp: 90 tablet, Rfl: 1  •  atorvastatin (LIPITOR) 40 MG tablet, TAKE ONE TABLET BY MOUTH  ONCE NIGHTLY, Disp: 90 tablet, Rfl: 1  •  azelastine (ASTELIN) 0.1 % nasal spray, , Disp: , Rfl:   •  B-D ULTRAFINE III SHORT PEN 31G X 8 MM misc, USE FOR INJECTIONS DAILY, Disp: 100 each, Rfl: 10  •  Cholecalciferol (VITAMIN D3) 1.25 MG (30755 UT) capsule, Take 1 capsule by mouth Every 7 (Seven) Days., Disp: 13 capsule, Rfl: 3  •  clopidogrel (PLAVIX) 75 MG tablet, Take 1 tablet by mouth Daily. For heart disease, Disp: 90 tablet, Rfl: 1  •  cyclobenzaprine (FLEXERIL) 10 MG tablet, Take 1 tablet by mouth 3 (Three) Times a Day As Needed for Muscle Spasms., Disp: 90 tablet, Rfl: 5  •  escitalopram (LEXAPRO) 20 MG tablet, Take 1 tablet by mouth Daily., Disp: 90 tablet, Rfl: 1  •  esomeprazole (NexIUM) 20 MG capsule, Take 20 mg by mouth every morning before breakfast., Disp: , Rfl:   •  fenofibrate (TRICOR) 145 MG tablet, TAKE 1 TABLET EVERY DAY, Disp: 90 tablet, Rfl: 1  •  fluticasone (FLONASE) 50 MCG/ACT nasal spray, , Disp: , Rfl:   •  gabapentin (NEURONTIN) 600 MG tablet, Take 600 mg by mouth 3 (Three) Times a Day., Disp: , Rfl:   •  HYDROcodone-acetaminophen (NORCO) 7.5-325 MG per tablet, Take 1 tablet by mouth 2 (Two) Times a Day. Prescribed by Dr. Jacobo (Patient taking differently: Take 1 tablet by mouth Every 8 (Eight) Hours As Needed. Prescribed by Dr. Jacobo), Disp: , Rfl:   •  Insulin Degludec (TRESIBA FLEXTOUCH) 200 UNIT/ML solution pen-injector pen injection, Inject 20 Units under the skin into the appropriate area as directed Every Morning. Titration max of 100 units daily, Disp: 20 mL, Rfl: 1  •  ketoconazole (NIZORAL) 2 % shampoo, , Disp: , Rfl:   •  Lancets (ACCU-CHEK SOFT TOUCH) lancets, Dx code E11.29 testing bs 3 x day, Disp: 300 each, Rfl: 1  •  levothyroxine (SYNTHROID, LEVOTHROID) 50 MCG tablet, Take 1 tablet by mouth Daily. For thyroid, Disp: 90 tablet, Rfl: 1  •  lisinopril (PRINIVIL,ZESTRIL) 20 MG tablet, Take 1 tablet by mouth Daily., Disp: 90 tablet, Rfl: 1  •  metFORMIN (GLUCOPHAGE) 1000 MG  tablet, TAKE ONE TABLET BY MOUTH TWICE A DAY WITH MEALS, Disp: 180 tablet, Rfl: 1  •  montelukast (SINGULAIR) 10 MG tablet, , Disp: , Rfl:   •  NITROSTAT 0.4 MG SL tablet, Place 0.4 mg under the tongue every 5 (five) minutes as needed., Disp: , Rfl:   •  omega-3 acid ethyl esters (LOVAZA) 1 g capsule, Take 2 capsules by mouth 2 (Two) Times a Day., Disp: 120 capsule, Rfl: 5  •  Probiotic Product (PROBIOTIC DAILY) capsule, Take 1 capsule by mouth daily., Disp: , Rfl:   •  tamsulosin (FLOMAX) 0.4 MG capsule 24 hr capsule, Take 1 capsule by mouth Daily. For prostate, Disp: 90 capsule, Rfl: 1.  he denies medication side effects.    All of the other chronic condition(s) listed above are stable w/o issues.    There were no vitals taken for this visit.    Results for orders placed or performed in visit on 08/05/20   Comprehensive Metabolic Panel   Result Value Ref Range    Glucose 151 (H) 65 - 99 mg/dL    BUN 15 8 - 23 mg/dL    Creatinine 0.91 0.76 - 1.27 mg/dL    eGFR Non African Am 81 >60 mL/min/1.73    eGFR African Am 98 >60 mL/min/1.73    BUN/Creatinine Ratio 16.5 7.0 - 25.0    Sodium 140 136 - 145 mmol/L    Potassium 4.7 3.5 - 5.2 mmol/L    Chloride 104 98 - 107 mmol/L    Total CO2 24.4 22.0 - 29.0 mmol/L    Calcium 10.1 8.6 - 10.5 mg/dL    Total Protein 6.5 6.0 - 8.5 g/dL    Albumin 4.70 3.50 - 5.20 g/dL    Globulin 1.8 gm/dL    A/G Ratio 2.6 g/dL    Total Bilirubin 0.4 0.0 - 1.2 mg/dL    Alkaline Phosphatase 56 39 - 117 U/L    AST (SGOT) 40 1 - 40 U/L    ALT (SGPT) 50 (H) 1 - 41 U/L   Lipid Panel   Result Value Ref Range    Total Cholesterol 116 0 - 200 mg/dL    Triglycerides 202 (H) 0 - 150 mg/dL    HDL Cholesterol 32 (L) 40 - 60 mg/dL    VLDL Cholesterol 40.4 mg/dL    LDL Cholesterol  44 0 - 100 mg/dL   Hemoglobin A1c   Result Value Ref Range    Hemoglobin A1C 6.70 (H) 4.80 - 5.60 %   TSH   Result Value Ref Range    TSH 0.875 0.270 - 4.200 uIU/mL   Cardiovascular Risk Assessment   Result Value Ref Range     Interpretation Note    Diabetes Patient Education   Result Value Ref Range    PDF Image Not applicable            The following portions of the patient's history were reviewed and updated as appropriate: allergies, current medications, past family history, past medical history, past social history, past surgical history and problem list.    Review of Systems   Constitutional: Negative for activity change, chills and fever.   Respiratory: Negative for cough.    Cardiovascular: Negative for chest pain.   Psychiatric/Behavioral: Negative for dysphoric mood.       Objective   Physical Exam   Constitutional: He is oriented to person, place, and time. He appears well-developed and well-nourished. No distress.   Pulmonary/Chest: Effort normal.   Neurological: He is alert and oriented to person, place, and time.   Psychiatric: He has a normal mood and affect. His behavior is normal. Thought content normal.       Assessment/Plan   Ben was seen today for insomnia.    Diagnoses and all orders for this visit:    Primary insomnia  -     zolpidem (AMBIEN) 10 MG tablet; Take 1 tablet by mouth At Night As Needed for Sleep.    Spent  13   minutes with chart and interview and consent for this encounter given by the patient.  You have chosen to receive care through a telehealth visit.  Do you consent to use a video/audio connection for your medical care today? Yes

## 2020-09-03 ENCOUNTER — OFFICE VISIT (OUTPATIENT)
Dept: FAMILY MEDICINE CLINIC | Facility: CLINIC | Age: 75
End: 2020-09-03

## 2020-09-03 VITALS
SYSTOLIC BLOOD PRESSURE: 134 MMHG | DIASTOLIC BLOOD PRESSURE: 71 MMHG | WEIGHT: 182 LBS | HEART RATE: 80 BPM | TEMPERATURE: 98.1 F | HEIGHT: 70 IN | BODY MASS INDEX: 26.05 KG/M2 | RESPIRATION RATE: 16 BRPM

## 2020-09-03 DIAGNOSIS — I25.10 TWO-VESSEL CORONARY ARTERY DISEASE: ICD-10-CM

## 2020-09-03 DIAGNOSIS — E03.9 PRIMARY HYPOTHYROIDISM: ICD-10-CM

## 2020-09-03 DIAGNOSIS — F33.42 RECURRENT MAJOR DEPRESSIVE DISORDER, IN FULL REMISSION (HCC): ICD-10-CM

## 2020-09-03 DIAGNOSIS — I10 ESSENTIAL HYPERTENSION: Primary | ICD-10-CM

## 2020-09-03 DIAGNOSIS — N40.0 BENIGN PROSTATIC HYPERPLASIA WITHOUT LOWER URINARY TRACT SYMPTOMS: ICD-10-CM

## 2020-09-03 DIAGNOSIS — F51.01 PRIMARY INSOMNIA: ICD-10-CM

## 2020-09-03 DIAGNOSIS — E55.9 VITAMIN D DEFICIENCY: ICD-10-CM

## 2020-09-03 PROCEDURE — 99214 OFFICE O/P EST MOD 30 MIN: CPT | Performed by: FAMILY MEDICINE

## 2020-09-03 RX ORDER — ZOLPIDEM TARTRATE 10 MG/1
10 TABLET ORAL NIGHTLY PRN
Qty: 90 TABLET | Refills: 0 | Status: SHIPPED | OUTPATIENT
Start: 2020-09-03 | End: 2021-03-25 | Stop reason: SDUPTHER

## 2020-09-03 RX ORDER — LISINOPRIL 20 MG/1
20 TABLET ORAL DAILY
Qty: 90 TABLET | Refills: 1 | Status: SHIPPED | OUTPATIENT
Start: 2020-09-03 | End: 2021-03-15

## 2020-09-03 RX ORDER — ESCITALOPRAM OXALATE 20 MG/1
20 TABLET ORAL DAILY
Qty: 90 TABLET | Refills: 1 | Status: SHIPPED | OUTPATIENT
Start: 2020-09-03 | End: 2021-03-15

## 2020-09-03 RX ORDER — AMLODIPINE BESYLATE 5 MG/1
5 TABLET ORAL DAILY
Qty: 90 TABLET | Refills: 1 | Status: SHIPPED | OUTPATIENT
Start: 2020-09-03 | End: 2021-03-15

## 2020-09-03 RX ORDER — TAMSULOSIN HYDROCHLORIDE 0.4 MG/1
1 CAPSULE ORAL DAILY
Qty: 90 CAPSULE | Refills: 1 | Status: SHIPPED | OUTPATIENT
Start: 2020-09-03 | End: 2021-03-15

## 2020-09-03 RX ORDER — CLOPIDOGREL BISULFATE 75 MG/1
75 TABLET ORAL DAILY
Qty: 90 TABLET | Refills: 1 | Status: SHIPPED | OUTPATIENT
Start: 2020-09-03 | End: 2021-03-25 | Stop reason: SDUPTHER

## 2020-09-03 RX ORDER — CHOLECALCIFEROL (VITAMIN D3) 1250 MCG
50000 CAPSULE ORAL
Qty: 15 CAPSULE | Refills: 3 | Status: SHIPPED | OUTPATIENT
Start: 2020-09-03 | End: 2021-03-25 | Stop reason: SDUPTHER

## 2020-09-03 RX ORDER — LEVOTHYROXINE SODIUM 0.05 MG/1
50 TABLET ORAL DAILY
Qty: 90 TABLET | Refills: 1 | Status: SHIPPED | OUTPATIENT
Start: 2020-09-03 | End: 2021-02-11

## 2020-09-03 NOTE — PROGRESS NOTES
Subjective   Ben Walsh is a 75 y.o. male.     History of Present Illness     Chief Complaint:   Chief Complaint   Patient presents with   • Hypertension     med refill  - reza - no labs-     • Hyperlipidemia     pt unsure of meds per pt    • Benign Prostatic Hypertrophy   • Hypothyroidism   • Coronary Artery Disease   • Depression   • Vitamin D Deficiency   • Insomnia       Ben Walsh 75 y.o. male who presents today for Medical Management of the below listed issues and medication refills.  he has a problem list of   Patient Active Problem List   Diagnosis   • Allergic rhinitis   • ADD (attention deficit disorder)   • DDD (degenerative disc disease), lumbar   • BPH (benign prostatic hypertrophy)   • Chronic pain   • ASCVD (arteriosclerotic cardiovascular disease)   • Depression   • Diverticulosis of colon   • GERD (gastroesophageal reflux disease)   • Hiatal hernia   • Hyperlipidemia   • Essential hypertension   • Primary hypothyroidism   • Insomnia   • Osteoarthritis, multiple sites   • Acute pancreatitis   • Vitamin D deficiency   • Glaucoma   • Abnormal nuclear stress test   • Two-vessel coronary artery disease   • SAEED (dyspnea on exertion)   • Obstructive sleep apnea syndrome   • Patent foramen ovale   • Snoring   • Disorder of rotator cuff   • History of COPD   • Type 2 diabetes mellitus with microalbuminuria, without long-term current use of insulin (CMS/HCC)   • Carpal tunnel syndrome of right wrist   • Abnormal electrocardiogram   • Atypical chest pain   • Type 2 diabetes mellitus with peripheral neuropathy (CMS/HCC)   • Atrial fibrillation and flutter (CMS/HCC)   .  Since the last visit, he has overall felt well.  he has been compliant with   Current Outpatient Medications:   •  ACCU-CHEK GUIDE test strip, Dx code E11.29 testing bs 3 x day, Disp: 300 each, Rfl: 1  •  amLODIPine (NORVASC) 5 MG tablet, Take 1 tablet by mouth Daily., Disp: 90 tablet, Rfl: 1  •  atorvastatin (LIPITOR) 40 MG  tablet, TAKE ONE TABLET BY MOUTH ONCE NIGHTLY, Disp: 90 tablet, Rfl: 1  •  azelastine (ASTELIN) 0.1 % nasal spray, , Disp: , Rfl:   •  B-D ULTRAFINE III SHORT PEN 31G X 8 MM misc, USE FOR INJECTIONS DAILY, Disp: 100 each, Rfl: 10  •  Cholecalciferol (VITAMIN D3) 1.25 MG (09513 UT) capsule, Take 1 capsule by mouth Every 7 (Seven) Days., Disp: 15 capsule, Rfl: 3  •  clopidogrel (PLAVIX) 75 MG tablet, Take 1 tablet by mouth Daily. For heart disease, Disp: 90 tablet, Rfl: 1  •  cyclobenzaprine (FLEXERIL) 10 MG tablet, Take 1 tablet by mouth 3 (Three) Times a Day As Needed for Muscle Spasms., Disp: 90 tablet, Rfl: 5  •  escitalopram (Lexapro) 20 MG tablet, Take 1 tablet by mouth Daily., Disp: 90 tablet, Rfl: 1  •  esomeprazole (NexIUM) 20 MG capsule, Take 20 mg by mouth every morning before breakfast., Disp: , Rfl:   •  fenofibrate (TRICOR) 145 MG tablet, TAKE 1 TABLET EVERY DAY, Disp: 90 tablet, Rfl: 1  •  fluticasone (FLONASE) 50 MCG/ACT nasal spray, , Disp: , Rfl:   •  gabapentin (NEURONTIN) 600 MG tablet, Take 600 mg by mouth 3 (Three) Times a Day., Disp: , Rfl:   •  HYDROcodone-acetaminophen (NORCO) 7.5-325 MG per tablet, Take 1 tablet by mouth 2 (Two) Times a Day. Prescribed by Dr. Jacobo (Patient taking differently: Take 1 tablet by mouth Every 8 (Eight) Hours As Needed. Prescribed by Dr. Jacobo), Disp: , Rfl:   •  Insulin Degludec (TRESIBA FLEXTOUCH) 200 UNIT/ML solution pen-injector pen injection, Inject 20 Units under the skin into the appropriate area as directed Every Morning. Titration max of 100 units daily, Disp: 20 mL, Rfl: 1  •  ketoconazole (NIZORAL) 2 % shampoo, , Disp: , Rfl:   •  Lancets (ACCU-CHEK SOFT TOUCH) lancets, Dx code E11.29 testing bs 3 x day, Disp: 300 each, Rfl: 1  •  levothyroxine (SYNTHROID, LEVOTHROID) 50 MCG tablet, Take 1 tablet by mouth Daily. For thyroid, Disp: 90 tablet, Rfl: 1  •  lisinopril (PRINIVIL,ZESTRIL) 20 MG tablet, Take 1 tablet by mouth Daily., Disp: 90 tablet, Rfl:  "1  •  metFORMIN (GLUCOPHAGE) 1000 MG tablet, TAKE ONE TABLET BY MOUTH TWICE A DAY WITH MEALS, Disp: 180 tablet, Rfl: 1  •  montelukast (SINGULAIR) 10 MG tablet, , Disp: , Rfl:   •  NITROSTAT 0.4 MG SL tablet, Place 0.4 mg under the tongue every 5 (five) minutes as needed., Disp: , Rfl:   •  omega-3 acid ethyl esters (LOVAZA) 1 g capsule, Take 2 capsules by mouth 2 (Two) Times a Day., Disp: 120 capsule, Rfl: 5  •  Probiotic Product (PROBIOTIC DAILY) capsule, Take 1 capsule by mouth daily., Disp: , Rfl:   •  tamsulosin (FLOMAX) 0.4 MG capsule 24 hr capsule, Take 1 capsule by mouth Daily. For prostate, Disp: 90 capsule, Rfl: 1  •  zolpidem (AMBIEN) 10 MG tablet, Take 1 tablet by mouth At Night As Needed for Sleep., Disp: 90 tablet, Rfl: 0.  he denies medication side effects.    All of the other chronic condition(s) listed above are stable w/o issues.  Pt sees endocrine and pain management.    /71   Pulse 80   Temp 98.1 °F (36.7 °C) (Oral)   Resp 16   Ht 177.8 cm (70\")   Wt 82.6 kg (182 lb)   BMI 26.11 kg/m²     Results for orders placed or performed in visit on 08/05/20   Comprehensive Metabolic Panel   Result Value Ref Range    Glucose 151 (H) 65 - 99 mg/dL    BUN 15 8 - 23 mg/dL    Creatinine 0.91 0.76 - 1.27 mg/dL    eGFR Non African Am 81 >60 mL/min/1.73    eGFR African Am 98 >60 mL/min/1.73    BUN/Creatinine Ratio 16.5 7.0 - 25.0    Sodium 140 136 - 145 mmol/L    Potassium 4.7 3.5 - 5.2 mmol/L    Chloride 104 98 - 107 mmol/L    Total CO2 24.4 22.0 - 29.0 mmol/L    Calcium 10.1 8.6 - 10.5 mg/dL    Total Protein 6.5 6.0 - 8.5 g/dL    Albumin 4.70 3.50 - 5.20 g/dL    Globulin 1.8 gm/dL    A/G Ratio 2.6 g/dL    Total Bilirubin 0.4 0.0 - 1.2 mg/dL    Alkaline Phosphatase 56 39 - 117 U/L    AST (SGOT) 40 1 - 40 U/L    ALT (SGPT) 50 (H) 1 - 41 U/L   Lipid Panel   Result Value Ref Range    Total Cholesterol 116 0 - 200 mg/dL    Triglycerides 202 (H) 0 - 150 mg/dL    HDL Cholesterol 32 (L) 40 - 60 mg/dL    VLDL " Cholesterol 40.4 mg/dL    LDL Cholesterol  44 0 - 100 mg/dL   Hemoglobin A1c   Result Value Ref Range    Hemoglobin A1C 6.70 (H) 4.80 - 5.60 %   TSH   Result Value Ref Range    TSH 0.875 0.270 - 4.200 uIU/mL   Cardiovascular Risk Assessment   Result Value Ref Range    Interpretation Note    Diabetes Patient Education   Result Value Ref Range    PDF Image Not applicable            The following portions of the patient's history were reviewed and updated as appropriate: allergies, current medications, past family history, past medical history, past social history, past surgical history and problem list.    Review of Systems   Constitutional: Negative for activity change, chills, fatigue and fever.   Respiratory: Negative for cough and shortness of breath.    Cardiovascular: Negative for chest pain and palpitations.   Gastrointestinal: Negative for abdominal pain.   Endocrine: Negative for cold intolerance.   Psychiatric/Behavioral: Negative for behavioral problems and dysphoric mood. The patient is not nervous/anxious.        Objective   Physical Exam   Constitutional: He appears well-developed and well-nourished.   Neck: Neck supple. No thyromegaly present.   Cardiovascular: Normal rate and regular rhythm.   No murmur heard.  Pulmonary/Chest: Effort normal and breath sounds normal.   Abdominal: Bowel sounds are normal. There is no tenderness.   Psychiatric: He has a normal mood and affect. His behavior is normal.   Nursing note and vitals reviewed.  Labs from endocrine reviewed with pt today.    Assessment/Plan   Ben was seen today for hypertension, hyperlipidemia, benign prostatic hypertrophy, hypothyroidism, coronary artery disease, depression, vitamin d deficiency and insomnia.    Diagnoses and all orders for this visit:    Essential hypertension  -     amLODIPine (NORVASC) 5 MG tablet; Take 1 tablet by mouth Daily.  -     lisinopril (PRINIVIL,ZESTRIL) 20 MG tablet; Take 1 tablet by mouth Daily.    Two-vessel  coronary artery disease  -     clopidogrel (PLAVIX) 75 MG tablet; Take 1 tablet by mouth Daily. For heart disease    Recurrent major depressive disorder, in full remission (CMS/HCC)  -     escitalopram (Lexapro) 20 MG tablet; Take 1 tablet by mouth Daily.    Primary hypothyroidism  -     levothyroxine (SYNTHROID, LEVOTHROID) 50 MCG tablet; Take 1 tablet by mouth Daily. For thyroid    Benign prostatic hyperplasia without lower urinary tract symptoms  -     tamsulosin (FLOMAX) 0.4 MG capsule 24 hr capsule; Take 1 capsule by mouth Daily. For prostate    Primary insomnia  -     zolpidem (AMBIEN) 10 MG tablet; Take 1 tablet by mouth At Night As Needed for Sleep.    Vitamin D deficiency  -     Cholecalciferol (VITAMIN D3) 1.25 MG (60451 UT) capsule; Take 1 capsule by mouth Every 7 (Seven) Days.

## 2020-09-04 ENCOUNTER — TELEPHONE (OUTPATIENT)
Dept: ENDOCRINOLOGY | Age: 75
End: 2020-09-04

## 2020-09-08 DIAGNOSIS — E78.5 HYPERLIPIDEMIA, UNSPECIFIED HYPERLIPIDEMIA TYPE: ICD-10-CM

## 2020-09-08 RX ORDER — FENOFIBRATE 145 MG/1
145 TABLET, COATED ORAL DAILY
Qty: 90 TABLET | Refills: 1 | Status: SHIPPED | OUTPATIENT
Start: 2020-09-08 | End: 2021-02-08

## 2020-09-23 RX ORDER — PEN NEEDLE, DIABETIC 31 GX5/16"
NEEDLE, DISPOSABLE MISCELLANEOUS
Qty: 100 EACH | Refills: 1 | Status: SHIPPED | OUTPATIENT
Start: 2020-09-23 | End: 2021-04-14

## 2020-11-10 DIAGNOSIS — E11.29 TYPE 2 DIABETES MELLITUS WITH MICROALBUMINURIA, WITHOUT LONG-TERM CURRENT USE OF INSULIN (HCC): ICD-10-CM

## 2020-11-10 DIAGNOSIS — R80.9 TYPE 2 DIABETES MELLITUS WITH MICROALBUMINURIA, WITHOUT LONG-TERM CURRENT USE OF INSULIN (HCC): ICD-10-CM

## 2020-11-11 RX ORDER — ATORVASTATIN CALCIUM 40 MG/1
TABLET, FILM COATED ORAL
Qty: 90 TABLET | Refills: 2 | Status: SHIPPED | OUTPATIENT
Start: 2020-11-11 | End: 2021-11-01

## 2021-01-02 DIAGNOSIS — M62.838 MUSCLE SPASM: ICD-10-CM

## 2021-01-02 RX ORDER — CYCLOBENZAPRINE HCL 10 MG
10 TABLET ORAL 3 TIMES DAILY PRN
Qty: 270 TABLET | Refills: 3 | Status: SHIPPED | OUTPATIENT
Start: 2021-01-02 | End: 2022-03-12

## 2021-01-14 LAB
ALBUMIN SERPL-MCNC: 4.3 G/DL (ref 3.7–4.7)
ALBUMIN/GLOB SERPL: 1.8 {RATIO} (ref 1.2–2.2)
ALP SERPL-CCNC: 64 IU/L (ref 39–117)
ALT SERPL-CCNC: 44 IU/L (ref 0–44)
AST SERPL-CCNC: 29 IU/L (ref 0–40)
BILIRUB SERPL-MCNC: 0.4 MG/DL (ref 0–1.2)
BUN SERPL-MCNC: 10 MG/DL (ref 8–27)
BUN/CREAT SERPL: 10 (ref 10–24)
CALCIUM SERPL-MCNC: 9.7 MG/DL (ref 8.6–10.2)
CHLORIDE SERPL-SCNC: 104 MMOL/L (ref 96–106)
CHOLEST SERPL-MCNC: 122 MG/DL (ref 100–199)
CO2 SERPL-SCNC: 24 MMOL/L (ref 20–29)
CREAT SERPL-MCNC: 0.96 MG/DL (ref 0.76–1.27)
GLOBULIN SER CALC-MCNC: 2.4 G/DL (ref 1.5–4.5)
GLUCOSE SERPL-MCNC: 168 MG/DL (ref 65–99)
HBA1C MFR BLD: 7.4 % (ref 4.8–5.6)
HDLC SERPL-MCNC: 33 MG/DL
INTERPRETATION: NORMAL
LDLC SERPL CALC-MCNC: 69 MG/DL (ref 0–99)
Lab: NORMAL
POTASSIUM SERPL-SCNC: 4 MMOL/L (ref 3.5–5.2)
PROT SERPL-MCNC: 6.7 G/DL (ref 6–8.5)
SODIUM SERPL-SCNC: 143 MMOL/L (ref 134–144)
TRIGL SERPL-MCNC: 110 MG/DL (ref 0–149)
TSH SERPL DL<=0.005 MIU/L-ACNC: 1.38 UIU/ML (ref 0.45–4.5)
VLDLC SERPL CALC-MCNC: 20 MG/DL (ref 5–40)

## 2021-01-18 DIAGNOSIS — R80.9 TYPE 2 DIABETES MELLITUS WITH MICROALBUMINURIA, WITHOUT LONG-TERM CURRENT USE OF INSULIN (HCC): Primary | ICD-10-CM

## 2021-01-18 DIAGNOSIS — E11.29 TYPE 2 DIABETES MELLITUS WITH MICROALBUMINURIA, WITHOUT LONG-TERM CURRENT USE OF INSULIN (HCC): Primary | ICD-10-CM

## 2021-01-18 DIAGNOSIS — I10 ESSENTIAL HYPERTENSION: ICD-10-CM

## 2021-01-18 RX ORDER — EMPAGLIFLOZIN 10 MG/1
10 TABLET, FILM COATED ORAL DAILY
Qty: 30 TABLET | Refills: 5 | Status: SHIPPED | OUTPATIENT
Start: 2021-01-18 | End: 2021-07-10

## 2021-02-07 DIAGNOSIS — E78.5 HYPERLIPIDEMIA, UNSPECIFIED HYPERLIPIDEMIA TYPE: ICD-10-CM

## 2021-02-08 RX ORDER — FENOFIBRATE 145 MG/1
TABLET, COATED ORAL
Qty: 60 TABLET | Refills: 2 | Status: SHIPPED | OUTPATIENT
Start: 2021-02-08 | End: 2021-08-23

## 2021-02-11 DIAGNOSIS — E03.9 PRIMARY HYPOTHYROIDISM: ICD-10-CM

## 2021-02-11 RX ORDER — LEVOTHYROXINE SODIUM 0.05 MG/1
TABLET ORAL
Qty: 30 TABLET | Refills: 0 | Status: SHIPPED | OUTPATIENT
Start: 2021-02-11 | End: 2021-03-25 | Stop reason: SDUPTHER

## 2021-02-26 ENCOUNTER — OFFICE VISIT (OUTPATIENT)
Dept: ORTHOPEDIC SURGERY | Facility: CLINIC | Age: 76
End: 2021-02-26

## 2021-02-26 VITALS — TEMPERATURE: 97 F | WEIGHT: 182.1 LBS | HEIGHT: 70 IN | BODY MASS INDEX: 26.07 KG/M2

## 2021-02-26 DIAGNOSIS — M48.062 SPINAL STENOSIS OF LUMBAR REGION WITH NEUROGENIC CLAUDICATION: ICD-10-CM

## 2021-02-26 DIAGNOSIS — R52 PAIN: Primary | ICD-10-CM

## 2021-02-26 PROCEDURE — 72100 X-RAY EXAM L-S SPINE 2/3 VWS: CPT | Performed by: ORTHOPAEDIC SURGERY

## 2021-02-26 PROCEDURE — 99213 OFFICE O/P EST LOW 20 MIN: CPT | Performed by: ORTHOPAEDIC SURGERY

## 2021-03-13 DIAGNOSIS — I10 ESSENTIAL HYPERTENSION: ICD-10-CM

## 2021-03-13 DIAGNOSIS — N40.0 BENIGN PROSTATIC HYPERPLASIA WITHOUT LOWER URINARY TRACT SYMPTOMS: ICD-10-CM

## 2021-03-13 DIAGNOSIS — F33.42 RECURRENT MAJOR DEPRESSIVE DISORDER, IN FULL REMISSION (HCC): ICD-10-CM

## 2021-03-15 RX ORDER — AMLODIPINE BESYLATE 5 MG/1
TABLET ORAL
Qty: 90 TABLET | Refills: 0 | Status: SHIPPED | OUTPATIENT
Start: 2021-03-15 | End: 2021-03-25 | Stop reason: SDUPTHER

## 2021-03-15 RX ORDER — LISINOPRIL 20 MG/1
TABLET ORAL
Qty: 90 TABLET | Refills: 0 | Status: SHIPPED | OUTPATIENT
Start: 2021-03-15 | End: 2021-03-25 | Stop reason: SDUPTHER

## 2021-03-15 RX ORDER — TAMSULOSIN HYDROCHLORIDE 0.4 MG/1
CAPSULE ORAL
Qty: 90 CAPSULE | Refills: 0 | Status: SHIPPED | OUTPATIENT
Start: 2021-03-15 | End: 2021-03-25 | Stop reason: SDUPTHER

## 2021-03-15 RX ORDER — ESCITALOPRAM OXALATE 20 MG/1
TABLET ORAL
Qty: 90 TABLET | Refills: 0 | Status: SHIPPED | OUTPATIENT
Start: 2021-03-15 | End: 2021-03-25 | Stop reason: SDUPTHER

## 2021-03-25 ENCOUNTER — OFFICE VISIT (OUTPATIENT)
Dept: FAMILY MEDICINE CLINIC | Facility: CLINIC | Age: 76
End: 2021-03-25

## 2021-03-25 VITALS
RESPIRATION RATE: 20 BRPM | BODY MASS INDEX: 25.91 KG/M2 | HEIGHT: 70 IN | HEART RATE: 106 BPM | TEMPERATURE: 97.3 F | SYSTOLIC BLOOD PRESSURE: 125 MMHG | DIASTOLIC BLOOD PRESSURE: 80 MMHG | WEIGHT: 181 LBS

## 2021-03-25 DIAGNOSIS — F51.01 PRIMARY INSOMNIA: Chronic | ICD-10-CM

## 2021-03-25 DIAGNOSIS — I25.10 TWO-VESSEL CORONARY ARTERY DISEASE: Chronic | ICD-10-CM

## 2021-03-25 DIAGNOSIS — E03.9 PRIMARY HYPOTHYROIDISM: Chronic | ICD-10-CM

## 2021-03-25 DIAGNOSIS — N40.0 BENIGN PROSTATIC HYPERPLASIA WITHOUT LOWER URINARY TRACT SYMPTOMS: Chronic | ICD-10-CM

## 2021-03-25 DIAGNOSIS — E55.9 VITAMIN D DEFICIENCY: Chronic | ICD-10-CM

## 2021-03-25 DIAGNOSIS — Z00.00 MEDICARE ANNUAL WELLNESS VISIT, SUBSEQUENT: Primary | ICD-10-CM

## 2021-03-25 DIAGNOSIS — I10 ESSENTIAL HYPERTENSION: Chronic | ICD-10-CM

## 2021-03-25 DIAGNOSIS — F33.42 RECURRENT MAJOR DEPRESSIVE DISORDER, IN FULL REMISSION (HCC): Chronic | ICD-10-CM

## 2021-03-25 PROCEDURE — 99214 OFFICE O/P EST MOD 30 MIN: CPT | Performed by: FAMILY MEDICINE

## 2021-03-25 PROCEDURE — G0439 PPPS, SUBSEQ VISIT: HCPCS | Performed by: FAMILY MEDICINE

## 2021-03-25 PROCEDURE — 1125F AMNT PAIN NOTED PAIN PRSNT: CPT | Performed by: FAMILY MEDICINE

## 2021-03-25 PROCEDURE — 1159F MED LIST DOCD IN RCRD: CPT | Performed by: FAMILY MEDICINE

## 2021-03-25 PROCEDURE — 1170F FXNL STATUS ASSESSED: CPT | Performed by: FAMILY MEDICINE

## 2021-03-25 PROCEDURE — 96160 PT-FOCUSED HLTH RISK ASSMT: CPT | Performed by: FAMILY MEDICINE

## 2021-03-25 RX ORDER — AMLODIPINE BESYLATE 5 MG/1
5 TABLET ORAL DAILY
Qty: 90 TABLET | Refills: 1 | Status: SHIPPED | OUTPATIENT
Start: 2021-03-25 | End: 2021-08-30 | Stop reason: SDUPTHER

## 2021-03-25 RX ORDER — CHOLECALCIFEROL (VITAMIN D3) 1250 MCG
50000 CAPSULE ORAL
Qty: 15 CAPSULE | Refills: 3 | Status: SHIPPED | OUTPATIENT
Start: 2021-03-25 | End: 2021-08-30 | Stop reason: SDUPTHER

## 2021-03-25 RX ORDER — LEVOTHYROXINE SODIUM 0.05 MG/1
50 TABLET ORAL DAILY
Qty: 90 TABLET | Refills: 1 | Status: SHIPPED | OUTPATIENT
Start: 2021-03-25 | End: 2021-08-31 | Stop reason: SDUPTHER

## 2021-03-25 RX ORDER — CLOPIDOGREL BISULFATE 75 MG/1
75 TABLET ORAL DAILY
Qty: 90 TABLET | Refills: 1 | Status: SHIPPED | OUTPATIENT
Start: 2021-03-25 | End: 2021-06-24

## 2021-03-25 RX ORDER — LISINOPRIL 20 MG/1
20 TABLET ORAL DAILY
Qty: 90 TABLET | Refills: 1 | Status: SHIPPED | OUTPATIENT
Start: 2021-03-25 | End: 2021-08-30 | Stop reason: SDUPTHER

## 2021-03-25 RX ORDER — ESCITALOPRAM OXALATE 20 MG/1
20 TABLET ORAL DAILY
Qty: 90 TABLET | Refills: 1 | Status: SHIPPED | OUTPATIENT
Start: 2021-03-25 | End: 2021-08-31 | Stop reason: SDUPTHER

## 2021-03-25 RX ORDER — ZOLPIDEM TARTRATE 10 MG/1
10 TABLET ORAL NIGHTLY PRN
Qty: 90 TABLET | Refills: 0 | Status: SHIPPED | OUTPATIENT
Start: 2021-03-25 | End: 2021-08-26 | Stop reason: SDUPTHER

## 2021-03-25 RX ORDER — TAMSULOSIN HYDROCHLORIDE 0.4 MG/1
1 CAPSULE ORAL DAILY
Qty: 90 CAPSULE | Refills: 1 | Status: SHIPPED | OUTPATIENT
Start: 2021-03-25 | End: 2021-08-31 | Stop reason: SDUPTHER

## 2021-03-25 NOTE — PATIENT INSTRUCTIONS
Medicare Wellness  Personal Prevention Plan of Service     Date of Office Visit:  2021  Encounter Provider:  Jake Marcus MD  Place of Service:  [facility]  Patient Name: Ben Walsh  :  1945    As part of the Medicare Wellness portion of your visit today, we are providing you with this personalized preventive plan of services (PPPS). This plan is based upon recommendations of the [place] Preventive Services Task Force (USPSTF) and the Advisory Committee on Immunization Practices (ACIP).    This lists the preventive care services that should be considered, and provides dates of when you are due. Items listed as completed are up-to-date and do not require any further intervention.    Health Maintenance   Topic Date Due   • COVID-19 Vaccine (1) Never done   • TDAP/TD VACCINES (1 - Tdap) Never done   • HEPATITIS C SCREENING  Never done   • INFLUENZA VACCINE  2020   • DIABETIC EYE EXAM  10/17/2020   • URINE MICROALBUMIN  2021   • HEMOGLOBIN A1C  2021   • LIPID PANEL  2022   • ANNUAL WELLNESS VISIT  2022   • COLONOSCOPY  2027   • Pneumococcal Vaccine 65+  Completed   • MENINGOCOCCAL VACCINE  Aged Out   • ZOSTER VACCINE  Discontinued       No orders of the defined types were placed in this encounter.      Return in about 6 months (around 2021) for Recheck.

## 2021-03-25 NOTE — PROGRESS NOTES
Subjective   Ben Walsh is a 75 y.o. male.     History of Present Illness     Chief Complaint:   Chief Complaint   Patient presents with   • medicare wellness     due    • Hypertension   • Hyperlipidemia     med refill due    • Insomnia     labs endo dr ambriz    • Coronary Artery Disease   • Hypothyroidism       Ben Walsh 75 y.o. male who presents today for Medical Management of the below listed issues and medication refills.  he has a problem list of   Patient Active Problem List   Diagnosis   • Allergic rhinitis   • ADD (attention deficit disorder)   • DDD (degenerative disc disease), lumbar   • BPH (benign prostatic hypertrophy)   • Chronic pain   • ASCVD (arteriosclerotic cardiovascular disease)   • Depression   • Diverticulosis of colon   • GERD (gastroesophageal reflux disease)   • Hiatal hernia   • Hyperlipidemia   • Essential hypertension   • Primary hypothyroidism   • Insomnia   • Osteoarthritis, multiple sites   • Acute pancreatitis   • Vitamin D deficiency   • Glaucoma   • Abnormal nuclear stress test   • Two-vessel coronary artery disease   • SAEED (dyspnea on exertion)   • Obstructive sleep apnea syndrome   • Patent foramen ovale   • Snoring   • Disorder of rotator cuff   • History of COPD   • Type 2 diabetes mellitus with microalbuminuria, without long-term current use of insulin (CMS/HCC)   • Carpal tunnel syndrome of right wrist   • Abnormal electrocardiogram   • Atypical chest pain   • Type 2 diabetes mellitus with peripheral neuropathy (CMS/HCC)   • Atrial fibrillation and flutter (CMS/HCC)   .  Since the last visit, he has overall felt well.  he has been compliant with   Current Outpatient Medications:   •  amLODIPine (NORVASC) 5 MG tablet, Take 1 tablet by mouth Daily., Disp: 90 tablet, Rfl: 1  •  Cholecalciferol (Vitamin D3) 1.25 MG (42779 UT) capsule, Take 1 capsule by mouth Every 7 (Seven) Days., Disp: 15 capsule, Rfl: 3  •  clopidogrel (PLAVIX) 75 MG tablet, Take 1 tablet by  mouth Daily. For heart disease, Disp: 90 tablet, Rfl: 1  •  levothyroxine (SYNTHROID, LEVOTHROID) 50 MCG tablet, Take 1 tablet by mouth Daily. for thyroid, Disp: 90 tablet, Rfl: 1  •  lisinopril (PRINIVIL,ZESTRIL) 20 MG tablet, Take 1 tablet by mouth Daily., Disp: 90 tablet, Rfl: 1  •  tamsulosin (FLOMAX) 0.4 MG capsule 24 hr capsule, Take 1 capsule by mouth Daily., Disp: 90 capsule, Rfl: 1  •  zolpidem (AMBIEN) 10 MG tablet, Take 1 tablet by mouth At Night As Needed for Sleep., Disp: 90 tablet, Rfl: 0  •  ACCU-CHEK GUIDE test strip, Dx code E11.29 testing bs 3 x day, Disp: 300 each, Rfl: 1  •  atorvastatin (LIPITOR) 40 MG tablet, TAKE ONE TABLET BY MOUTH EVERY NIGHT, Disp: 90 tablet, Rfl: 2  •  azelastine (ASTELIN) 0.1 % nasal spray, , Disp: , Rfl:   •  cyclobenzaprine (FLEXERIL) 10 MG tablet, Take 1 tablet by mouth 3 (Three) Times a Day As Needed for Muscle Spasms., Disp: 270 tablet, Rfl: 3  •  Empagliflozin (Jardiance) 10 MG tablet, Take 10 mg by mouth Daily., Disp: 30 tablet, Rfl: 5  •  escitalopram (LEXAPRO) 20 MG tablet, Take 1 tablet by mouth Daily., Disp: 90 tablet, Rfl: 1  •  esomeprazole (NexIUM) 20 MG capsule, Take 20 mg by mouth every morning before breakfast., Disp: , Rfl:   •  fenofibrate (TRICOR) 145 MG tablet, TAKE ONE TABLET BY MOUTH DAILY, Disp: 60 tablet, Rfl: 2  •  fluticasone (FLONASE) 50 MCG/ACT nasal spray, , Disp: , Rfl:   •  gabapentin (NEURONTIN) 600 MG tablet, Take 600 mg by mouth 3 (Three) Times a Day., Disp: , Rfl:   •  HYDROcodone-acetaminophen (NORCO) 7.5-325 MG per tablet, Take 1 tablet by mouth 2 (Two) Times a Day. Prescribed by Dr. Jacobo (Patient taking differently: Take 1 tablet by mouth Every 8 (Eight) Hours As Needed. Prescribed by Dr. Jacobo), Disp: , Rfl:   •  Insulin Degludec (TRESIBA FLEXTOUCH) 200 UNIT/ML solution pen-injector pen injection, Inject 20 Units under the skin into the appropriate area as directed Every Morning. Titration max of 100 units daily, Disp: 20 mL,  "Rfl: 1  •  Insulin Pen Needle (B-D ULTRAFINE III SHORT PEN) 31G X 8 MM misc, Using 1 pen needle daily, Disp: 100 each, Rfl: 1  •  ketoconazole (NIZORAL) 2 % shampoo, , Disp: , Rfl:   •  Lancets (ACCU-CHEK SOFT TOUCH) lancets, Dx code E11.29 testing bs 3 x day, Disp: 300 each, Rfl: 1  •  metFORMIN (GLUCOPHAGE) 1000 MG tablet, TAKE ONE TABLET BY MOUTH TWICE A DAY WITH MEALS, Disp: 180 tablet, Rfl: 2  •  montelukast (SINGULAIR) 10 MG tablet, , Disp: , Rfl:   •  NITROSTAT 0.4 MG SL tablet, Place 0.4 mg under the tongue every 5 (five) minutes as needed., Disp: , Rfl:   •  omega-3 acid ethyl esters (LOVAZA) 1 g capsule, Take 2 capsules by mouth 2 (Two) Times a Day., Disp: 120 capsule, Rfl: 5  •  Probiotic Product (PROBIOTIC DAILY) capsule, Take 1 capsule by mouth daily., Disp: , Rfl: .  he denies medication side effects.    All of the other chronic condition(s) listed above are stable w/o issues.    /80   Pulse 106   Temp 97.3 °F (36.3 °C) (Oral)   Resp 20   Ht 177.8 cm (70\")   Wt 82.1 kg (181 lb)   BMI 25.97 kg/m²     Results for orders placed or performed in visit on 01/13/21   Comprehensive Metabolic Panel   Result Value Ref Range    Glucose 168 (H) 65 - 99 mg/dL    BUN 10 8 - 27 mg/dL    Creatinine 0.96 0.76 - 1.27 mg/dL    eGFR Non African Am 76 >59 mL/min/1.73    eGFR African Am 88 >59 mL/min/1.73    BUN/Creatinine Ratio 10 10 - 24    Sodium 143 134 - 144 mmol/L    Potassium 4.0 3.5 - 5.2 mmol/L    Chloride 104 96 - 106 mmol/L    Total CO2 24 20 - 29 mmol/L    Calcium 9.7 8.6 - 10.2 mg/dL    Total Protein 6.7 6.0 - 8.5 g/dL    Albumin 4.3 3.7 - 4.7 g/dL    Globulin 2.4 1.5 - 4.5 g/dL    A/G Ratio 1.8 1.2 - 2.2    Total Bilirubin 0.4 0.0 - 1.2 mg/dL    Alkaline Phosphatase 64 39 - 117 IU/L    AST (SGOT) 29 0 - 40 IU/L    ALT (SGPT) 44 0 - 44 IU/L   Lipid Panel   Result Value Ref Range    Total Cholesterol 122 100 - 199 mg/dL    Triglycerides 110 0 - 149 mg/dL    HDL Cholesterol 33 (L) >39 mg/dL    VLDL " Cholesterol Joel 20 5 - 40 mg/dL    LDL Chol Calc ([facility]) 69 0 - 99 mg/dL   Hemoglobin A1c   Result Value Ref Range    Hemoglobin A1C 7.4 (H) 4.8 - 5.6 %   TSH   Result Value Ref Range    TSH 1.380 0.450 - 4.500 uIU/mL   Cardiovascular Risk Assessment   Result Value Ref Range    Interpretation Note    Diabetes Patient Education   Result Value Ref Range    PDF Image Not applicable            The following portions of the patient's history were reviewed and updated as appropriate: allergies, current medications, past family history, past medical history, past social history, past surgical history and problem list.    Review of Systems   Constitutional: Negative for activity change, chills and fever.   Respiratory: Negative for cough.    Cardiovascular: Negative for chest pain.   Psychiatric/Behavioral: Negative for dysphoric mood.       Objective   Physical Exam  Constitutional:       General: He is not in acute distress.     Appearance: He is well-developed.   Cardiovascular:      Rate and Rhythm: Normal rate and regular rhythm.   Pulmonary:      Effort: Pulmonary effort is normal.      Breath sounds: Normal breath sounds.   Neurological:      Mental Status: He is alert and oriented to person, place, and time.   Psychiatric:         Behavior: Behavior normal.         Thought Content: Thought content normal.     Labs from endocrine independently reviewed by me at today's visit.    Assessment/Plan   Diagnoses and all orders for this visit:    1. Medicare annual wellness visit, subsequent (Primary)    2. Primary hypothyroidism  -     levothyroxine (SYNTHROID, LEVOTHROID) 50 MCG tablet; Take 1 tablet by mouth Daily. for thyroid  Dispense: 90 tablet; Refill: 1    3. Benign prostatic hyperplasia without lower urinary tract symptoms  -     tamsulosin (FLOMAX) 0.4 MG capsule 24 hr capsule; Take 1 capsule by mouth Daily.  Dispense: 90 capsule; Refill: 1    4. Two-vessel coronary artery disease  -     clopidogrel (PLAVIX) 75 MG  tablet; Take 1 tablet by mouth Daily. For heart disease  Dispense: 90 tablet; Refill: 1    5. Vitamin D deficiency  -     Cholecalciferol (Vitamin D3) 1.25 MG (51049 UT) capsule; Take 1 capsule by mouth Every 7 (Seven) Days.  Dispense: 15 capsule; Refill: 3    6. Primary insomnia  -     zolpidem (AMBIEN) 10 MG tablet; Take 1 tablet by mouth At Night As Needed for Sleep.  Dispense: 90 tablet; Refill: 0    7. Essential hypertension  -     amLODIPine (NORVASC) 5 MG tablet; Take 1 tablet by mouth Daily.  Dispense: 90 tablet; Refill: 1  -     lisinopril (PRINIVIL,ZESTRIL) 20 MG tablet; Take 1 tablet by mouth Daily.  Dispense: 90 tablet; Refill: 1    8. Recurrent major depressive disorder, in full remission (CMS/HCC)  -     escitalopram (LEXAPRO) 20 MG tablet; Take 1 tablet by mouth Daily.  Dispense: 90 tablet; Refill: 1

## 2021-03-25 NOTE — PROGRESS NOTES
The ABCs of the Annual Wellness Visit  Subsequent Medicare Wellness Visit    Chief Complaint   Patient presents with   • medicare wellness     due    • Hypertension   • Hyperlipidemia     med refill due    • Insomnia     labs endo dr miller    • Coronary Artery Disease   • Hypothyroidism       Subjective   History of Present Illness:  Ben Walsh is a 75 y.o. male who presents for a Subsequent Medicare Wellness Visit.    HEALTH RISK ASSESSMENT    Recent Hospitalizations:  No hospitalization(s) within the last year.    Current Medical Providers:  Patient Care Team:  Jake Marcus MD as PCP - General (Family Medicine)  Guerrero Kruse MD as Consulting Physician (Orthopedic Surgery)  Kelton Francois MD as Consulting Physician (Ophthalmology)  Justin Miller MD as Consulting Physician (Endocrinology)  Angel Borges MD as Consulting Physician (Cardiology)  Grupo Jacobo MD (Pain Medicine)  Henrique Stahl MD (Anesthesiology)    Smoking Status:  Social History     Tobacco Use   Smoking Status Never Smoker   Smokeless Tobacco Never Used   Tobacco Comment    no caffeine       Alcohol Consumption:  Social History     Substance and Sexual Activity   Alcohol Use No       Depression Screen:   PHQ-2/PHQ-9 Depression Screening 3/25/2021   Little interest or pleasure in doing things 0   Feeling down, depressed, or hopeless 0   Trouble falling or staying asleep, or sleeping too much -   Feeling tired or having little energy -   Poor appetite or overeating -   Feeling bad about yourself - or that you are a failure or have let yourself or your family down -   Trouble concentrating on things, such as reading the newspaper or watching television -   Moving or speaking so slowly that other people could have noticed. Or the opposite - being so fidgety or restless that you have been moving around a lot more than usual -   Thoughts that you would be better off dead, or of hurting yourself in some way -   Total Score 0    If you checked off any problems, how difficult have these problems made it for you to do your work, take care of things at home, or get along with other people? -       Fall Risk Screen:  KIANNA Fall Risk Assessment has not been completed.    Health Habits and Functional and Cognitive Screening:  Functional & Cognitive Status 3/25/2021   Do you have difficulty preparing food and eating? No   Do you have difficulty bathing yourself, getting dressed or grooming yourself? No   Do you have difficulty using the toilet? No   Do you have difficulty moving around from place to place? No   Do you have trouble with steps or getting out of a bed or a chair? No   Current Diet Well Balanced Diet   Dental Exam Up to date   Eye Exam Not up to date   Exercise (times per week) 3 times per week   Current Exercises Include Walking   Current Exercise Activities Include -   Do you need help using the phone?  No   Are you deaf or do you have serious difficulty hearing?  No   Do you need help with transportation? No   Do you need help shopping? No   Do you need help preparing meals?  No   Do you need help with housework?  No   Do you need help with laundry? No   Do you need help taking your medications? No   Do you need help managing money? No   Do you ever drive or ride in a car without wearing a seat belt? No   Have you felt unusual stress, anger or loneliness in the last month? No   Who do you live with? Spouse   If you need help, do you have trouble finding someone available to you? Yes   Have you been bothered in the last four weeks by sexual problems? No   Do you have difficulty concentrating, remembering or making decisions? No         Does the patient have evidence of cognitive impairment? No    Asprin use counseling:On clopidrogel as an alternative (due to ASA contraindication)    Age-appropriate Screening Schedule:  Refer to the list below for future screening recommendations based on patient's age, sex and/or medical  conditions. Orders for these recommended tests are listed in the plan section. The patient has been provided with a written plan.    Health Maintenance   Topic Date Due   • TDAP/TD VACCINES (1 - Tdap) Never done   • URINE MICROALBUMIN  03/11/2021   • DIABETIC EYE EXAM  05/13/2021 (Originally 10/17/2020)   • HEMOGLOBIN A1C  07/13/2021   • LIPID PANEL  01/13/2022   • COLONOSCOPY  03/06/2027   • INFLUENZA VACCINE  Completed   • ZOSTER VACCINE  Discontinued          The following portions of the patient's history were reviewed and updated as appropriate: allergies, current medications, past family history, past medical history, past social history, past surgical history and problem list.    Outpatient Medications Prior to Visit   Medication Sig Dispense Refill   • amLODIPine (NORVASC) 5 MG tablet TAKE ONE TABLET BY MOUTH DAILY 90 tablet 0   • Cholecalciferol (VITAMIN D3) 1.25 MG (40266 UT) capsule Take 1 capsule by mouth Every 7 (Seven) Days. 15 capsule 3   • clopidogrel (PLAVIX) 75 MG tablet Take 1 tablet by mouth Daily. For heart disease 90 tablet 1   • levothyroxine (SYNTHROID, LEVOTHROID) 50 MCG tablet TAKE ONE TABLET BY MOUTH DAILY FOR THYROID 30 tablet 0   • lisinopril (PRINIVIL,ZESTRIL) 20 MG tablet TAKE ONE TABLET BY MOUTH DAILY 90 tablet 0   • tamsulosin (FLOMAX) 0.4 MG capsule 24 hr capsule TAKE ONE CAPSULE BY MOUTH DAILY FOR PROSTATE 90 capsule 0   • zolpidem (AMBIEN) 10 MG tablet Take 1 tablet by mouth At Night As Needed for Sleep. 90 tablet 0   • ACCU-CHEK GUIDE test strip Dx code E11.29 testing bs 3 x day 300 each 1   • atorvastatin (LIPITOR) 40 MG tablet TAKE ONE TABLET BY MOUTH EVERY NIGHT 90 tablet 2   • azelastine (ASTELIN) 0.1 % nasal spray      • cyclobenzaprine (FLEXERIL) 10 MG tablet Take 1 tablet by mouth 3 (Three) Times a Day As Needed for Muscle Spasms. 270 tablet 3   • Empagliflozin (Jardiance) 10 MG tablet Take 10 mg by mouth Daily. 30 tablet 5   • esomeprazole (NexIUM) 20 MG capsule Take 20  mg by mouth every morning before breakfast.     • fenofibrate (TRICOR) 145 MG tablet TAKE ONE TABLET BY MOUTH DAILY 60 tablet 2   • fluticasone (FLONASE) 50 MCG/ACT nasal spray      • gabapentin (NEURONTIN) 600 MG tablet Take 600 mg by mouth 3 (Three) Times a Day.     • HYDROcodone-acetaminophen (NORCO) 7.5-325 MG per tablet Take 1 tablet by mouth 2 (Two) Times a Day. Prescribed by Dr. Jacobo (Patient taking differently: Take 1 tablet by mouth Every 8 (Eight) Hours As Needed. Prescribed by Dr. Jacobo)     • Insulin Degludec (TRESIBA FLEXTOUCH) 200 UNIT/ML solution pen-injector pen injection Inject 20 Units under the skin into the appropriate area as directed Every Morning. Titration max of 100 units daily 20 mL 1   • Insulin Pen Needle (B-D ULTRAFINE III SHORT PEN) 31G X 8 MM misc Using 1 pen needle daily 100 each 1   • ketoconazole (NIZORAL) 2 % shampoo      • Lancets (ACCU-CHEK SOFT TOUCH) lancets Dx code E11.29 testing bs 3 x day 300 each 1   • metFORMIN (GLUCOPHAGE) 1000 MG tablet TAKE ONE TABLET BY MOUTH TWICE A DAY WITH MEALS 180 tablet 2   • montelukast (SINGULAIR) 10 MG tablet      • NITROSTAT 0.4 MG SL tablet Place 0.4 mg under the tongue every 5 (five) minutes as needed.     • omega-3 acid ethyl esters (LOVAZA) 1 g capsule Take 2 capsules by mouth 2 (Two) Times a Day. 120 capsule 5   • Probiotic Product (PROBIOTIC DAILY) capsule Take 1 capsule by mouth daily.     • escitalopram (LEXAPRO) 20 MG tablet TAKE ONE TABLET BY MOUTH DAILY 90 tablet 0     No facility-administered medications prior to visit.       Patient Active Problem List   Diagnosis   • Allergic rhinitis   • ADD (attention deficit disorder)   • DDD (degenerative disc disease), lumbar   • BPH (benign prostatic hypertrophy)   • Chronic pain   • ASCVD (arteriosclerotic cardiovascular disease)   • Depression   • Diverticulosis of colon   • GERD (gastroesophageal reflux disease)   • Hiatal hernia   • Hyperlipidemia   • Essential hypertension   •  "Primary hypothyroidism   • Insomnia   • Osteoarthritis, multiple sites   • Acute pancreatitis   • Vitamin D deficiency   • Glaucoma   • Abnormal nuclear stress test   • Two-vessel coronary artery disease   • SAEED (dyspnea on exertion)   • Obstructive sleep apnea syndrome   • Patent foramen ovale   • Snoring   • Disorder of rotator cuff   • History of COPD   • Type 2 diabetes mellitus with microalbuminuria, without long-term current use of insulin (CMS/[facility])   • Carpal tunnel syndrome of right wrist   • Abnormal electrocardiogram   • Atypical chest pain   • Type 2 diabetes mellitus with peripheral neuropathy (CMS/[facility])   • Atrial fibrillation and flutter (CMS/[facility])       Advanced Care Planning:  ACP discussion was held with the patient during this visit. Patient has an advance directive in EMR which is still valid.     Review of Systems    Compared to one year ago, the patient feels his physical health is the same.  Compared to one year ago, the patient feels his mental health is the same.    Reviewed chart for potential of high risk medication in the elderly: yes  Reviewed chart for potential of harmful drug interactions in the elderly:yes    Objective         Vitals:    03/25/21 1506   BP: 125/80   Pulse: 106   Resp: 20   Temp: 97.3 °F (36.3 °C)   TempSrc: Oral   Weight: 82.1 kg (181 lb)   Height: 177.8 cm (70\")   PainSc:   3       Body mass index is 25.97 kg/m².  Discussed the patient's BMI with him. The BMI is in the acceptable range.    Physical Exam    Lab Results   Component Value Date     (H) 01/13/2021    CHLPL 122 01/13/2021    TRIG 110 01/13/2021    HDL 33 (L) 01/13/2021    LDL 69 01/13/2021    VLDL 20 01/13/2021    HGBA1C 7.4 (H) 01/13/2021        Assessment/Plan   Medicare Risks and Personalized Health Plan  CMS Preventative Services Quick Reference  Cardiovascular risk    The above risks/problems have been discussed with the patient.  Pertinent information has been shared with the patient in the " After Visit Summary.  Follow up plans and orders are seen below in the Assessment/Plan Section.    Diagnoses and all orders for this visit:    1. Medicare annual wellness visit, subsequent (Primary)    2. Primary hypothyroidism  -     levothyroxine (SYNTHROID, LEVOTHROID) 50 MCG tablet; Take 1 tablet by mouth Daily. for thyroid  Dispense: 90 tablet; Refill: 1    3. Benign prostatic hyperplasia without lower urinary tract symptoms  -     tamsulosin (FLOMAX) 0.4 MG capsule 24 hr capsule; Take 1 capsule by mouth Daily.  Dispense: 90 capsule; Refill: 1    4. Two-vessel coronary artery disease  -     clopidogrel (PLAVIX) 75 MG tablet; Take 1 tablet by mouth Daily. For heart disease  Dispense: 90 tablet; Refill: 1    5. Vitamin D deficiency  -     Cholecalciferol (Vitamin D3) 1.25 MG (28531 UT) capsule; Take 1 capsule by mouth Every 7 (Seven) Days.  Dispense: 15 capsule; Refill: 3    6. Primary insomnia  -     zolpidem (AMBIEN) 10 MG tablet; Take 1 tablet by mouth At Night As Needed for Sleep.  Dispense: 90 tablet; Refill: 0    7. Essential hypertension  -     amLODIPine (NORVASC) 5 MG tablet; Take 1 tablet by mouth Daily.  Dispense: 90 tablet; Refill: 1  -     lisinopril (PRINIVIL,ZESTRIL) 20 MG tablet; Take 1 tablet by mouth Daily.  Dispense: 90 tablet; Refill: 1    8. Recurrent major depressive disorder, in full remission (CMS/HCC)  -     escitalopram (LEXAPRO) 20 MG tablet; Take 1 tablet by mouth Daily.  Dispense: 90 tablet; Refill: 1      Follow Up:  Return in about 6 months (around 9/25/2021) for Recheck.     An After Visit Summary and PPPS were given to the patient.

## 2021-04-15 RX ORDER — PEN NEEDLE, DIABETIC 31 GX5/16"
NEEDLE, DISPOSABLE MISCELLANEOUS
Qty: 100 EACH | Refills: 3 | Status: SHIPPED | OUTPATIENT
Start: 2021-04-15 | End: 2021-04-20

## 2021-04-20 RX ORDER — PEN NEEDLE, DIABETIC 31 GX5/16"
NEEDLE, DISPOSABLE MISCELLANEOUS
Qty: 100 EACH | Refills: 3 | Status: SHIPPED | OUTPATIENT
Start: 2021-04-20 | End: 2022-05-24

## 2021-04-27 ENCOUNTER — OFFICE VISIT (OUTPATIENT)
Dept: ORTHOPEDIC SURGERY | Facility: CLINIC | Age: 76
End: 2021-04-27

## 2021-04-27 VITALS — WEIGHT: 175 LBS | BODY MASS INDEX: 25.05 KG/M2 | TEMPERATURE: 97.1 F | HEIGHT: 70 IN

## 2021-04-27 DIAGNOSIS — M54.50 LUMBAR SPINE PAIN: ICD-10-CM

## 2021-04-27 DIAGNOSIS — Z98.1 HISTORY OF SPINAL FUSION: ICD-10-CM

## 2021-04-27 DIAGNOSIS — M48.062 SPINAL STENOSIS OF LUMBAR REGION WITH NEUROGENIC CLAUDICATION: Primary | ICD-10-CM

## 2021-04-27 PROCEDURE — 99214 OFFICE O/P EST MOD 30 MIN: CPT | Performed by: ORTHOPAEDIC SURGERY

## 2021-04-29 NOTE — PROGRESS NOTES
New patient or new problem visit    CC: Back and right hip pain    HPI: He has continued back and right hip pain despite epidural steroid injections.  He has known stenosis of moderate extent at L3-4 and got excellent relief from epidurals in the past but recent attempts have not been as successful.  No weakness but back and leg pain are noted.  He has a previous fusion at L5-S1 on top of which I performed an L4-5 fusion some years back.    PFSH: See attached    ROS: No bowel or bladder complaints.    PE: Good strength in the legs on examination reflexes intact in the patella sensation grossly intact    XRAY: Plain film x-rays of lumbar spine show solid fusion of 4 5 what looks like a solid fusion at 5 1 below that there is instrumentation and 4 5 slight loss of lordosis but only mild spondylotic changes above.  MRI from a while back demonstrated moderate stenosis at 3 4.    Other: n/a    Impression: The fact he responded so well the first time to epidural injection suggest that he has spinal stenosis and symptoms are likely coming from this level.  Lets get a new MRI and see how much that is changed and whether or not any new levels are involved.  He may be a good candidate for extension of decompression and fusion to L3-4.  We may choose surgery depending on the results.  I discussed the risks and benefits of posterior spinal fusion, including where applicable, laminectomy.  Risks include adverse anesthetic events such as death, stroke and myocardial infarction.  More specific surgical risks include infection, nonunion, hardware failure, spinal fluid leakage, nerve root injury or paralysis, visceral or vascular injury, persistent pain, deep venous thrombosis, and pulmonary embolism among others. There is a 70 to 90 % chance of success.   Alternatives have been discussed.  He will consider this as we await MRI results.    Plan: MRI of the lumbar spine I will call him with results, possibly with an eye toward  surgery

## 2021-05-07 ENCOUNTER — TELEPHONE (OUTPATIENT)
Dept: FAMILY MEDICINE CLINIC | Facility: CLINIC | Age: 76
End: 2021-05-07

## 2021-05-07 RX ORDER — DIAZEPAM 5 MG/1
TABLET ORAL
Qty: 2 TABLET | Refills: 0 | Status: SHIPPED | OUTPATIENT
Start: 2021-05-07 | End: 2021-10-20 | Stop reason: ALTCHOICE

## 2021-05-07 NOTE — TELEPHONE ENCOUNTER
Caller: Ben Walsh    Relationship: Self    Best call back number: [phone]     What medication are you requesting:ANXIETY MEDICATION    What are your current symptoms: PATIENT IS HAVING AN MRI ON Monday 5/10/21    If a prescription is needed, what is your preferred pharmacy and phone number:      [facility] [address] [place] [address] - [address] [facility] AT [facility] [phone] [facility] [phone]

## 2021-05-14 DIAGNOSIS — M48.062 SPINAL STENOSIS OF LUMBAR REGION WITH NEUROGENIC CLAUDICATION: Primary | ICD-10-CM

## 2021-05-14 DIAGNOSIS — M54.50 LUMBAR SPINE PAIN: ICD-10-CM

## 2021-06-08 ENCOUNTER — TELEPHONE (OUTPATIENT)
Dept: FAMILY MEDICINE CLINIC | Facility: CLINIC | Age: 76
End: 2021-06-08

## 2021-06-08 NOTE — TELEPHONE ENCOUNTER
Caller: Ben Walsh    Relationship: Self    Best call back number:[phone]     What is the best time to reach you: ANYTIME    Who are you requesting to speak with DOCTOR OR MA          What was the call regarding: PATIENT IS CALLING IN SAYS HE HAS EPIDURAL SHOT TOMORROW AND WANTS TO KNOW IF IT IS OK TO STOP TAKING HIS CLOPIDOGREL TOMORROW FOR THE PROCEDURE.    Do you require a callback: YES

## 2021-06-08 NOTE — TELEPHONE ENCOUNTER
I talked to pt the patient  understands not to take generic plavix today or tomorrow per dr hema bermudezs

## 2021-06-08 NOTE — TELEPHONE ENCOUNTER
LEFT MESSAGE PER DR SALAS - PT TOLD   Tell him to not take clopidogrel today or tomorrow.

## 2021-06-09 ENCOUNTER — HOSPITAL ENCOUNTER (OUTPATIENT)
Dept: PAIN MEDICINE | Facility: HOSPITAL | Age: 76
Discharge: HOME | End: 2021-06-09

## 2021-06-09 ENCOUNTER — ANESTHESIA EVENT (OUTPATIENT)
Dept: PAIN MEDICINE | Facility: HOSPITAL | Age: 76
End: 2021-06-09

## 2021-06-09 ENCOUNTER — ANESTHESIA (OUTPATIENT)
Dept: PAIN MEDICINE | Facility: HOSPITAL | Age: 76
End: 2021-06-09

## 2021-06-09 ENCOUNTER — HOSPITAL ENCOUNTER (OUTPATIENT)
Dept: GENERAL RADIOLOGY | Facility: HOSPITAL | Age: 76
Discharge: HOME | End: 2021-06-09

## 2021-06-09 VITALS
DIASTOLIC BLOOD PRESSURE: 79 MMHG | OXYGEN SATURATION: 96 % | TEMPERATURE: 97.5 F | HEART RATE: 84 BPM | RESPIRATION RATE: 16 BRPM | SYSTOLIC BLOOD PRESSURE: 124 MMHG

## 2021-06-09 DIAGNOSIS — M54.50 LUMBAR SPINE PAIN: ICD-10-CM

## 2021-06-09 DIAGNOSIS — M48.062 SPINAL STENOSIS OF LUMBAR REGION WITH NEUROGENIC CLAUDICATION: ICD-10-CM

## 2021-06-09 DIAGNOSIS — R52 PAIN: ICD-10-CM

## 2021-06-09 PROCEDURE — G0463 HOSPITAL OUTPT CLINIC VISIT: HCPCS

## 2021-06-09 RX ORDER — MIDAZOLAM HYDROCHLORIDE 1 MG/ML
1 INJECTION INTRAMUSCULAR; INTRAVENOUS
Status: DISCONTINUED | OUTPATIENT
Start: 2021-06-09 | End: 2021-06-10 | Stop reason: HOSPADM

## 2021-06-09 RX ORDER — LIDOCAINE HYDROCHLORIDE 10 MG/ML
1 INJECTION, SOLUTION INFILTRATION; PERINEURAL ONCE
Status: DISCONTINUED | OUTPATIENT
Start: 2021-06-09 | End: 2021-06-10 | Stop reason: HOSPADM

## 2021-06-09 RX ORDER — METHYLPREDNISOLONE ACETATE 80 MG/ML
80 INJECTION, SUSPENSION INTRA-ARTICULAR; INTRALESIONAL; INTRAMUSCULAR; SOFT TISSUE ONCE
Status: DISCONTINUED | OUTPATIENT
Start: 2021-06-09 | End: 2021-06-10 | Stop reason: HOSPADM

## 2021-06-09 RX ORDER — FENTANYL CITRATE 50 UG/ML
50 INJECTION, SOLUTION INTRAMUSCULAR; INTRAVENOUS AS NEEDED
Status: DISCONTINUED | OUTPATIENT
Start: 2021-06-09 | End: 2021-06-10 | Stop reason: HOSPADM

## 2021-06-09 NOTE — H&P (VIEW-ONLY)
[facility]    History and Physical    Patient Name: Ben Walsh  :  1945  MRN:  [id number]  Date of Admission: 2021    Subjective     Patient is a 76 y.o. male presents with chief complaint of chronic, intermitent, moderate, severe, 6-8/10, unknown etiology, sharp, burning,  low back and hips: left> right pain.  Onset of symptoms was gradual starting 12 years ago.  Symptoms are associated/aggravated by standing. Symptoms improve with relaxation, pain medication and lying down    The following portions of the patients history were reviewed and updated as appropriate: current medications, allergies, past medical history, past surgical history, past family history, past social history and problem list                Objective     Past Medical History:   Past Medical History:   Diagnosis Date   • 2-vessel coronary artery disease    • Abnormal ECG    • Abnormal liver function test    • Acute pancreatitis     thought to be from the uvia Rx,    • ADD (attention deficit disorder)    • Allergic rhinitis    • Atherosclerotic heart disease of native coronary artery without angina pectoris    • Atrial fibrillation and flutter (CMS/[facility])    • Atypical chest pain     suspect this is not cardiac related given that is not exertional. howevr he is not very functional and this makes the assessment somewhat difficult   • Back pain    • BPH (benign prostatic hypertrophy)    • CAD (coronary artery disease)    • Chronic pain    • DDD (degenerative disc disease), lumbar    • Depression    • Diabetic eye exam (CMS/[facility]) 2017   • Diverticulitis of colon    • Diverticulitis of colon with hemorrhage    • Diverticulosis of colon    • SAEED (dyspnea on exertion)    • Elevated PSA    • Essential hypertension    • GERD (gastroesophageal reflux disease)    • Glaucoma     Dr. Art   • Herpes zoster     HX   • Hiatal hernia    • History of transfusion    • Hyperlipidemia    • Hypertension    • Hypothyroidism    •  Impacted cerumen    • Incisional hernia    • Insomnia    • Joint pain    • Microalbuminuria    • Mitral regurgitation     Trace to mild   • Muscle spasm    • NAFLD (nonalcoholic fatty liver disease)    • Numbness    • SKYLAR (obstructive sleep apnea)    • Osteoarthritis    • Osteoarthritis, multiple sites    • PFO (patent foramen ovale)    • Primary snoring    • Prostatic hyperplasia, benign localized, with obstruction    • RCT (rotator cuff tear)    • Rotator cuff tear    • Tricuspid regurgitation     Trace to mild   • Type 2 diabetes mellitus (CMS/HCC)    • Vitamin D deficiency      Past Surgical History:   Past Surgical History:   Procedure Laterality Date   • BACK SURGERY     • CARDIAC CATHETERIZATION     • CARDIAC CATHETERIZATION N/A 3/11/2016    Procedure: Coronary angiography;  Surgeon: Anastacio Flores MD;  Location: [facility] CATH INVASIVE LOCATION;  Service:    • CATARACT EXTRACTION Left 03/2014   • CERVICAL SPINE SURGERY     • COLON RESECTION     • COLONOSCOPY N/A 3/6/2017    Procedure: COLONOSCOPY into cecum;  Surgeon: Mynor Wynne MD;  Location: [facility] ENDOSCOPY;  Service:    • CORONARY ANGIOPLASTY WITH STENT PLACEMENT  07/2014   • ENDOSCOPY     • EYE SURGERY Right 12/2011    Dr. River Art; had bilat YAG peripheral iridotomy for glaucoma   • HERNIA REPAIR  03/2009    also 4/09; Dr. Carson; incisional hernias   • LUMBAR DISC SURGERY     • LUMBAR FUSION     • LUMBAR LAMINECTOMY     • PROSTATE BIOPSY     • SHOULDER ARTHROSCOPY W/ ROTATOR CUFF REPAIR Left 8/31/2016    Procedure: LEFT SHOULDER ARTHROSCOPY WITH ROTATOR CUFF REPAIR,  ACROMIOPLASTY, AND DEBRIDMENT OF LABRUM;  Surgeon: Guerrero Kruse MD;  [place] [facility] [place] [facility] [place]  Service:    • SHOULDER SURGERY Left    • SPINE SURGERY      lumbrosacral     Family History:   Family History   Problem Relation Age of Onset   • Diabetes Mother    • Heart failure Mother    • Hypertension Mother    • Hyperlipidemia Mother    • Cancer Mother    • Heart attack  Mother    • Heart disease Mother    • Stroke Mother    • Alcohol abuse Father    • Cancer Father         prostate   • Heart failure Father    • Prostate cancer Father    • Heart disease Father    • Stroke Father    • Heart attack Father    • Cancer Sister    • Lung cancer Sister    • Other Brother         demyelinating disease of central nervous system   • Cancer Brother    • Heart failure Brother    • Lung cancer Brother    • Heart disease Sister    • Diabetes Maternal Grandfather      Social History:   Social History     Socioeconomic History   • Marital status:      Spouse name: Not on file   • Number of children: Not on file   • Years of education: Not on file   • Highest education level: Not on file   Tobacco Use   • Smoking status: Never Smoker   • Smokeless tobacco: Never Used   • Tobacco comment: no caffeine   Substance and Sexual Activity   • Alcohol use: No   • Drug use: No       Vital Signs Range for the last 24 hours  Temperature:     Temp Source:     BP:     Pulse:     Respirations:     SPO2:     O2 Amount (l/min):     O2 Devices     Weight:           --------------------------------------------------------------------------------    Current Outpatient Medications   Medication Sig Dispense Refill   • ACCU-CHEK GUIDE test strip Dx code E11.29 testing bs 3 x day 300 each 1   • amLODIPine (NORVASC) 5 MG tablet Take 1 tablet by mouth Daily. 90 tablet 1   • atorvastatin (LIPITOR) 40 MG tablet TAKE ONE TABLET BY MOUTH EVERY NIGHT 90 tablet 2   • azelastine (ASTELIN) 0.1 % nasal spray      • B-D ULTRAFINE III SHORT PEN 31G X 8 MM misc USE ONE PEN NEEDLE DAILY 100 each 3   • Cholecalciferol (Vitamin D3) 1.25 MG (70462 UT) capsule Take 1 capsule by mouth Every 7 (Seven) Days. 15 capsule 3   • clopidogrel (PLAVIX) 75 MG tablet Take 1 tablet by mouth Daily. For heart disease 90 tablet 1   • cyclobenzaprine (FLEXERIL) 10 MG tablet Take 1 tablet by mouth 3 (Three) Times a Day As Needed for Muscle Spasms. 270  tablet 3   • diazePAM (Valium) 5 MG tablet Take 1 tab on the way to the scan and may repeat x 1 prn upon arrival. 2 tablet 0   • Empagliflozin (Jardiance) 10 MG tablet Take 10 mg by mouth Daily. 30 tablet 5   • escitalopram (LEXAPRO) 20 MG tablet Take 1 tablet by mouth Daily. 90 tablet 1   • esomeprazole (NexIUM) 20 MG capsule Take 20 mg by mouth every morning before breakfast.     • fenofibrate (TRICOR) 145 MG tablet TAKE ONE TABLET BY MOUTH DAILY 60 tablet 2   • fluticasone (FLONASE) 50 MCG/ACT nasal spray      • gabapentin (NEURONTIN) 600 MG tablet Take 600 mg by mouth 3 (Three) Times a Day.     • HYDROcodone-acetaminophen (NORCO) 7.5-325 MG per tablet Take 1 tablet by mouth 2 (Two) Times a Day. Prescribed by Dr. Jacobo (Patient taking differently: Take 1 tablet by mouth Every 8 (Eight) Hours As Needed. Prescribed by Dr. Jacobo)     • Insulin Degludec (TRESIBA FLEXTOUCH) 200 UNIT/ML solution pen-injector pen injection Inject 20 Units under the skin into the appropriate area as directed Every Morning. Titration max of 100 units daily 20 mL 1   • ketoconazole (NIZORAL) 2 % shampoo      • Lancets (ACCU-CHEK SOFT TOUCH) lancets Dx code E11.29 testing bs 3 x day 300 each 1   • levothyroxine (SYNTHROID, LEVOTHROID) 50 MCG tablet Take 1 tablet by mouth Daily. for thyroid 90 tablet 1   • lisinopril (PRINIVIL,ZESTRIL) 20 MG tablet Take 1 tablet by mouth Daily. 90 tablet 1   • metFORMIN (GLUCOPHAGE) 1000 MG tablet TAKE ONE TABLET BY MOUTH TWICE A DAY WITH MEALS 180 tablet 2   • montelukast (SINGULAIR) 10 MG tablet      • NITROSTAT 0.4 MG SL tablet Place 0.4 mg under the tongue every 5 (five) minutes as needed.     • omega-3 acid ethyl esters (LOVAZA) 1 g capsule Take 2 capsules by mouth 2 (Two) Times a Day. 120 capsule 5   • Probiotic Product (PROBIOTIC DAILY) capsule Take 1 capsule by mouth daily.     • tamsulosin (FLOMAX) 0.4 MG capsule 24 hr capsule Take 1 capsule by mouth Daily. 90 capsule 1   • zolpidem (AMBIEN) 10 MG  tablet Take 1 tablet by mouth At Night As Needed for Sleep. 90 tablet 0     Current Facility-Administered Medications   Medication Dose Route Frequency Provider Last Rate Last Admin   • fentaNYL citrate (PF) (SUBLIMAZE) injection 50 mcg  50 mcg Intravenous PRN Vance Cline MD       • iopamidol (ISOVUE-M 200) injection 41%  3 mL Epidural Once in imaging Vance Cline MD       • lidocaine (XYLOCAINE) 1 % injection 1 mL  1 mL Intradermal Once Vance Cline MD       • methylPREDNISolone acetate (DEPO-medrol) injection 80 mg  80 mg Epidural Once Vance Cline MD       • midazolam (VERSED) injection 1 mg  1 mg Intravenous Q5 Min PRN Vance Cline MD           --------------------------------------------------------------------------------  Assessment/Plan      Anesthesia Evaluation     Patient summary reviewed and Nursing notes reviewed         Pain impairs ability to perform ADLs: Meal prep/Eating and Sleeping  Modalities previously tried to control pain with limited effectiveness within the last 4-6 weeks: Heat and Prescription medications     Airway   Mallampati: II  Dental - normal exam     Pulmonary - normal exam   (+) sleep apnea,   Cardiovascular - normal exam    (+) hypertension, valvular problems/murmurs, CAD, dysrhythmias,       Neuro/Psych- negative ROS and neuro exam normal  GI/Hepatic/Renal/Endo    (+)   liver disease, diabetes mellitus,     Musculoskeletal (-) negative ROS and normal exam    Abdominal  - normal exam   Substance History - negative use     OB/GYN negative ob/gyn ROS         Other                 Diagnosis and Plan    Treatment Plan  ASA 3      Procedures: Lumbar Epidural Steroid Injection(LESI), With fluoroscopy,       Anesthetic plan and risks discussed with patient.          Diagnosis     * Lumbar spinal stenosis [M48.061]            CHIEF COMPLAINT:       HISTORY OF PRESENT ILLNESS:      PAST MEDICAL HISTORY:  Current Outpatient Medications on File Prior to Encounter    Medication Sig Dispense Refill   • ACCU-CHEK GUIDE test strip Dx code E11.29 testing bs 3 x day 300 each 1   • amLODIPine (NORVASC) 5 MG tablet Take 1 tablet by mouth Daily. 90 tablet 1   • atorvastatin (LIPITOR) 40 MG tablet TAKE ONE TABLET BY MOUTH EVERY NIGHT 90 tablet 2   • azelastine (ASTELIN) 0.1 % nasal spray      • B-D ULTRAFINE III SHORT PEN 31G X 8 MM misc USE ONE PEN NEEDLE DAILY 100 each 3   • Cholecalciferol (Vitamin D3) 1.25 MG (42833 UT) capsule Take 1 capsule by mouth Every 7 (Seven) Days. 15 capsule 3   • clopidogrel (PLAVIX) 75 MG tablet Take 1 tablet by mouth Daily. For heart disease 90 tablet 1   • cyclobenzaprine (FLEXERIL) 10 MG tablet Take 1 tablet by mouth 3 (Three) Times a Day As Needed for Muscle Spasms. 270 tablet 3   • diazePAM (Valium) 5 MG tablet Take 1 tab on the way to the scan and may repeat x 1 prn upon arrival. 2 tablet 0   • Empagliflozin (Jardiance) 10 MG tablet Take 10 mg by mouth Daily. 30 tablet 5   • escitalopram (LEXAPRO) 20 MG tablet Take 1 tablet by mouth Daily. 90 tablet 1   • esomeprazole (NexIUM) 20 MG capsule Take 20 mg by mouth every morning before breakfast.     • fenofibrate (TRICOR) 145 MG tablet TAKE ONE TABLET BY MOUTH DAILY 60 tablet 2   • fluticasone (FLONASE) 50 MCG/ACT nasal spray      • gabapentin (NEURONTIN) 600 MG tablet Take 600 mg by mouth 3 (Three) Times a Day.     • HYDROcodone-acetaminophen (NORCO) 7.5-325 MG per tablet Take 1 tablet by mouth 2 (Two) Times a Day. Prescribed by Dr. Jacobo (Patient taking differently: Take 1 tablet by mouth Every 8 (Eight) Hours As Needed. Prescribed by Dr. Jacobo)     • Insulin Degludec (TRESIBA FLEXTOUCH) 200 UNIT/ML solution pen-injector pen injection Inject 20 Units under the skin into the appropriate area as directed Every Morning. Titration max of 100 units daily 20 mL 1   • ketoconazole (NIZORAL) 2 % shampoo      • Lancets (ACCU-CHEK SOFT TOUCH) lancets Dx code E11.29 testing bs 3 x day 300 each 1   •  levothyroxine (SYNTHROID, LEVOTHROID) 50 MCG tablet Take 1 tablet by mouth Daily. for thyroid 90 tablet 1   • lisinopril (PRINIVIL,ZESTRIL) 20 MG tablet Take 1 tablet by mouth Daily. 90 tablet 1   • metFORMIN (GLUCOPHAGE) 1000 MG tablet TAKE ONE TABLET BY MOUTH TWICE A DAY WITH MEALS 180 tablet 2   • montelukast (SINGULAIR) 10 MG tablet      • NITROSTAT 0.4 MG SL tablet Place 0.4 mg under the tongue every 5 (five) minutes as needed.     • omega-3 acid ethyl esters (LOVAZA) 1 g capsule Take 2 capsules by mouth 2 (Two) Times a Day. 120 capsule 5   • Probiotic Product (PROBIOTIC DAILY) capsule Take 1 capsule by mouth daily.     • tamsulosin (FLOMAX) 0.4 MG capsule 24 hr capsule Take 1 capsule by mouth Daily. 90 capsule 1   • zolpidem (AMBIEN) 10 MG tablet Take 1 tablet by mouth At Night As Needed for Sleep. 90 tablet 0     No current facility-administered medications on file prior to encounter.       Past Medical History:   Diagnosis Date   • 2-vessel coronary artery disease    • Abnormal ECG    • Abnormal liver function test    • Acute pancreatitis     thought to be from the Januvia Rx, 2014   • ADD (attention deficit disorder)    • Allergic rhinitis    • Atherosclerotic heart disease of native coronary artery without angina pectoris    • Atrial fibrillation and flutter (CMS/[facility])    • Atypical chest pain     suspect this is not cardiac related given that is not exertional. howevr he is not very functional and this makes the assessment somewhat difficult   • Back pain    • BPH (benign prostatic hypertrophy)    • CAD (coronary artery disease)    • Chronic pain    • DDD (degenerative disc disease), lumbar    • Depression    • Diabetic eye exam (CMS/HCC) 04/01/2017   • Diverticulitis of colon    • Diverticulitis of colon with hemorrhage    • Diverticulosis of colon    • SAEED (dyspnea on exertion)    • Elevated PSA    • Essential hypertension    • GERD (gastroesophageal reflux disease)    • Glaucoma     Dr. Art   •  Herpes zoster     HX   • Hiatal hernia    • History of transfusion    • Hyperlipidemia    • Hypertension    • Hypothyroidism    • Impacted cerumen    • Incisional hernia    • Insomnia    • Joint pain    • Microalbuminuria    • Mitral regurgitation     Trace to mild   • Muscle spasm    • NAFLD (nonalcoholic fatty liver disease)    • Numbness    • SKYLAR (obstructive sleep apnea)    • Osteoarthritis    • Osteoarthritis, multiple sites    • PFO (patent foramen ovale)    • Primary snoring    • Prostatic hyperplasia, benign localized, with obstruction    • RCT (rotator cuff tear)    • Rotator cuff tear    • Tricuspid regurgitation     Trace to mild   • Type 2 diabetes mellitus (CMS/HCC)    • Vitamin D deficiency          SOCIAL HISTORY:  No tobacco    REVIEW OF SYSTEMS:  No hematologic infectious or constitutional symptoms  Other review of systems non-contributory    PHYSICAL EXAM:  There were no vitals taken for this visit.  Well-developed well-nourished no acute distress  Extra ocular movements intact  Mallampati class 2 airway  Cardiac:  Regular rate and rhythm  Lungs:  Clear to auscultation bilaterally with good effort  Alert and oriented ×3  Deep tendon reflexes normal in the bilateral patella  Negative straight leg raise bilaterally  5 out of 5 strength bilateral upper and lower extremities  Lumbar spine without obvious deformities ecchymoses  Lumbar spine nontender to palpation      DIAGNOSIS:  Post-Op Diagnosis Codes:     * Lumbar spinal stenosis [M48.061]    PLAN:  1.  Lumbar 4 epidural steroid injections, up to 3, spaced 1-2 weeks apart.  If pain control is acceptable after 1 or 2 injections, it was discussed with the patient that they may return for the subsequent injections if and when their pain returns.  The risks were discussed with the patient including failure of relief, worsening pain, Headache (post dural puncture headache), bleeding (epidural hematoma) and infection (epidural abscess or skin  infection).  2.  Physical therapy exercises at home as prescribed by physical therapy or from the pain clinic handout (given to the patient).  Continuation of these exercises every day, or multiple times per week, even when the patient has good pain relief, was stressed to the patient as a preventative measure to decrease the frequency and severity of future pain episodes.  3.  Continue pain medicines as already prescribed.  If patient not currently taking any, it is recommended to begin Acetaminophen 1000 mg po q 8 hours.  If other medicines containing Acetaminophen are currently prescribed, maintain daily dose at 3000 mg.    4.  If they can tolerate NSAIDS, it is recommended to take Ibuprofen 600 mg po q 6 hours for 7 days during pain exacerbations.  Alternatively, they may substitute an NSAID of their choice (e.g. Aleve).  This may be taken at the same time as Acetaminophen.  5.  Heat and ice to the affected area as tolerated for pain control.  It was discussed that heating pads can cause burns.  6.  Daily low impact exercise such as walking or water exercise was recommended to maintain overall health and aid in weight control.   7.  Follow up as needed for subsequent injections.  8.  Patient was counseled to abstain from tobacco products.    Target : L 3-4    Time :   26   min    Did not stop plavix so no treatment today

## 2021-06-09 NOTE — ANESTHESIA PROCEDURE NOTES
PAIN Epidural block      Patient reassessed immediately prior to procedure    Performed By  Anesthesiologist: Vance Cline MD  Additional Notes  Dx:  Post-Op Diagnosis Codes:     * Lumbar spinal stenosis (M48.061)  80 mg depomedrol in epid    Plan : no treatment today/ return to clinic when off plavix  Prep:  Patient position: prone.  Procedure:  Guidance: c arm pa and lat and loss of resistance  Interspace: interlaminar.  Medications:  Preservative Free Saline:3mL    Post Assessment:  Post-procedure: bandaid.

## 2021-06-09 NOTE — H&P
[facility]    History and Physical    Patient Name: Ben Walsh  :  1945  MRN:  [id number]  Date of Admission: 2021    Subjective     Patient is a 76 y.o. male presents with chief complaint of chronic, intermitent, moderate, severe, 6-8/10, unknown etiology, sharp, burning,  low back and hips: left> right pain.  Onset of symptoms was gradual starting 12 years ago.  Symptoms are associated/aggravated by standing. Symptoms improve with relaxation, pain medication and lying down    The following portions of the patients history were reviewed and updated as appropriate: current medications, allergies, past medical history, past surgical history, past family history, past social history and problem list                Objective     Past Medical History:   Past Medical History:   Diagnosis Date   • 2-vessel coronary artery disease    • Abnormal ECG    • Abnormal liver function test    • Acute pancreatitis     thought to be from the uvia Rx,    • ADD (attention deficit disorder)    • Allergic rhinitis    • Atherosclerotic heart disease of native coronary artery without angina pectoris    • Atrial fibrillation and flutter (CMS/[facility])    • Atypical chest pain     suspect this is not cardiac related given that is not exertional. howevr he is not very functional and this makes the assessment somewhat difficult   • Back pain    • BPH (benign prostatic hypertrophy)    • CAD (coronary artery disease)    • Chronic pain    • DDD (degenerative disc disease), lumbar    • Depression    • Diabetic eye exam (CMS/[facility]) 2017   • Diverticulitis of colon    • Diverticulitis of colon with hemorrhage    • Diverticulosis of colon    • SAEED (dyspnea on exertion)    • Elevated PSA    • Essential hypertension    • GERD (gastroesophageal reflux disease)    • Glaucoma     Dr. Art   • Herpes zoster     HX   • Hiatal hernia    • History of transfusion    • Hyperlipidemia    • Hypertension    • Hypothyroidism    •  Impacted cerumen    • Incisional hernia    • Insomnia    • Joint pain    • Microalbuminuria    • Mitral regurgitation     Trace to mild   • Muscle spasm    • NAFLD (nonalcoholic fatty liver disease)    • Numbness    • SKYLAR (obstructive sleep apnea)    • Osteoarthritis    • Osteoarthritis, multiple sites    • PFO (patent foramen ovale)    • Primary snoring    • Prostatic hyperplasia, benign localized, with obstruction    • RCT (rotator cuff tear)    • Rotator cuff tear    • Tricuspid regurgitation     Trace to mild   • Type 2 diabetes mellitus (CMS/HCC)    • Vitamin D deficiency      Past Surgical History:   Past Surgical History:   Procedure Laterality Date   • BACK SURGERY     • CARDIAC CATHETERIZATION     • CARDIAC CATHETERIZATION N/A 3/11/2016    Procedure: Coronary angiography;  Surgeon: Anastacio Flores MD;  Location: [facility] CATH INVASIVE LOCATION;  Service:    • CATARACT EXTRACTION Left 03/2014   • CERVICAL SPINE SURGERY     • COLON RESECTION     • COLONOSCOPY N/A 3/6/2017    Procedure: COLONOSCOPY into cecum;  Surgeon: Mynor Wynne MD;  Location: [facility] ENDOSCOPY;  Service:    • CORONARY ANGIOPLASTY WITH STENT PLACEMENT  07/2014   • ENDOSCOPY     • EYE SURGERY Right 12/2011    Dr. River Art; had bilat YAG peripheral iridotomy for glaucoma   • HERNIA REPAIR  03/2009    also 4/09; Dr. Carson; incisional hernias   • LUMBAR DISC SURGERY     • LUMBAR FUSION     • LUMBAR LAMINECTOMY     • PROSTATE BIOPSY     • SHOULDER ARTHROSCOPY W/ ROTATOR CUFF REPAIR Left 8/31/2016    Procedure: LEFT SHOULDER ARTHROSCOPY WITH ROTATOR CUFF REPAIR,  ACROMIOPLASTY, AND DEBRIDMENT OF LABRUM;  Surgeon: Guerrero Kruse MD;  Location: [facility] OR [facility];  Service:    • SHOULDER SURGERY Left    • SPINE SURGERY      lumbrosacral     Family History:   Family History   Problem Relation Age of Onset   • Diabetes Mother    • Heart failure Mother    • Hypertension Mother    • Hyperlipidemia Mother    • Cancer Mother    • Heart attack  Mother    • Heart disease Mother    • Stroke Mother    • Alcohol abuse Father    • Cancer Father         prostate   • Heart failure Father    • Prostate cancer Father    • Heart disease Father    • Stroke Father    • Heart attack Father    • Cancer Sister    • Lung cancer Sister    • Other Brother         demyelinating disease of central nervous system   • Cancer Brother    • Heart failure Brother    • Lung cancer Brother    • Heart disease Sister    • Diabetes Maternal Grandfather      Social History:   Social History     Socioeconomic History   • Marital status:      Spouse name: Not on file   • Number of children: Not on file   • Years of education: Not on file   • Highest education level: Not on file   Tobacco Use   • Smoking status: Never Smoker   • Smokeless tobacco: Never Used   • Tobacco comment: no caffeine   Substance and Sexual Activity   • Alcohol use: No   • Drug use: No       Vital Signs Range for the last 24 hours  Temperature:     Temp Source:     BP:     Pulse:     Respirations:     SPO2:     O2 Amount (l/min):     O2 Devices     Weight:           --------------------------------------------------------------------------------    Current Outpatient Medications   Medication Sig Dispense Refill   • ACCU-CHEK GUIDE test strip Dx code E11.29 testing bs 3 x day 300 each 1   • amLODIPine (NORVASC) 5 MG tablet Take 1 tablet by mouth Daily. 90 tablet 1   • atorvastatin (LIPITOR) 40 MG tablet TAKE ONE TABLET BY MOUTH EVERY NIGHT 90 tablet 2   • azelastine (ASTELIN) 0.1 % nasal spray      • B-D ULTRAFINE III SHORT PEN 31G X 8 MM misc USE ONE PEN NEEDLE DAILY 100 each 3   • Cholecalciferol (Vitamin D3) 1.25 MG (58127 UT) capsule Take 1 capsule by mouth Every 7 (Seven) Days. 15 capsule 3   • clopidogrel (PLAVIX) 75 MG tablet Take 1 tablet by mouth Daily. For heart disease 90 tablet 1   • cyclobenzaprine (FLEXERIL) 10 MG tablet Take 1 tablet by mouth 3 (Three) Times a Day As Needed for Muscle Spasms. 270  tablet 3   • diazePAM (Valium) 5 MG tablet Take 1 tab on the way to the scan and may repeat x 1 prn upon arrival. 2 tablet 0   • Empagliflozin (Jardiance) 10 MG tablet Take 10 mg by mouth Daily. 30 tablet 5   • escitalopram (LEXAPRO) 20 MG tablet Take 1 tablet by mouth Daily. 90 tablet 1   • esomeprazole (NexIUM) 20 MG capsule Take 20 mg by mouth every morning before breakfast.     • fenofibrate (TRICOR) 145 MG tablet TAKE ONE TABLET BY MOUTH DAILY 60 tablet 2   • fluticasone (FLONASE) 50 MCG/ACT nasal spray      • gabapentin (NEURONTIN) 600 MG tablet Take 600 mg by mouth 3 (Three) Times a Day.     • HYDROcodone-acetaminophen (NORCO) 7.5-325 MG per tablet Take 1 tablet by mouth 2 (Two) Times a Day. Prescribed by Dr. Jacobo (Patient taking differently: Take 1 tablet by mouth Every 8 (Eight) Hours As Needed. Prescribed by Dr. Jacobo)     • Insulin Degludec (TRESIBA FLEXTOUCH) 200 UNIT/ML solution pen-injector pen injection Inject 20 Units under the skin into the appropriate area as directed Every Morning. Titration max of 100 units daily 20 mL 1   • ketoconazole (NIZORAL) 2 % shampoo      • Lancets (ACCU-CHEK SOFT TOUCH) lancets Dx code E11.29 testing bs 3 x day 300 each 1   • levothyroxine (SYNTHROID, LEVOTHROID) 50 MCG tablet Take 1 tablet by mouth Daily. for thyroid 90 tablet 1   • lisinopril (PRINIVIL,ZESTRIL) 20 MG tablet Take 1 tablet by mouth Daily. 90 tablet 1   • metFORMIN (GLUCOPHAGE) 1000 MG tablet TAKE ONE TABLET BY MOUTH TWICE A DAY WITH MEALS 180 tablet 2   • montelukast (SINGULAIR) 10 MG tablet      • NITROSTAT 0.4 MG SL tablet Place 0.4 mg under the tongue every 5 (five) minutes as needed.     • omega-3 acid ethyl esters (LOVAZA) 1 g capsule Take 2 capsules by mouth 2 (Two) Times a Day. 120 capsule 5   • Probiotic Product (PROBIOTIC DAILY) capsule Take 1 capsule by mouth daily.     • tamsulosin (FLOMAX) 0.4 MG capsule 24 hr capsule Take 1 capsule by mouth Daily. 90 capsule 1   • zolpidem (AMBIEN) 10 MG  tablet Take 1 tablet by mouth At Night As Needed for Sleep. 90 tablet 0     Current Facility-Administered Medications   Medication Dose Route Frequency Provider Last Rate Last Admin   • fentaNYL citrate (PF) (SUBLIMAZE) injection 50 mcg  50 mcg Intravenous PRN Vance Cline MD       • iopamidol (ISOVUE-M 200) injection 41%  3 mL Epidural Once in imaging Vance Cline MD       • lidocaine (XYLOCAINE) 1 % injection 1 mL  1 mL Intradermal Once Vance Cline MD       • methylPREDNISolone acetate (DEPO-medrol) injection 80 mg  80 mg Epidural Once Vance Cline MD       • midazolam (VERSED) injection 1 mg  1 mg Intravenous Q5 Min PRN Vance Cline MD           --------------------------------------------------------------------------------  Assessment/Plan      Anesthesia Evaluation     Patient summary reviewed and Nursing notes reviewed         Pain impairs ability to perform ADLs: Meal prep/Eating and Sleeping  Modalities previously tried to control pain with limited effectiveness within the last 4-6 weeks: Heat and Prescription medications     Airway   Mallampati: II  Dental - normal exam     Pulmonary - normal exam   (+) sleep apnea,   Cardiovascular - normal exam    (+) hypertension, valvular problems/murmurs, CAD, dysrhythmias,       Neuro/Psych- negative ROS and neuro exam normal  GI/Hepatic/Renal/Endo    (+)   liver disease, diabetes mellitus,     Musculoskeletal (-) negative ROS and normal exam    Abdominal  - normal exam   Substance History - negative use     OB/GYN negative ob/gyn ROS         Other                 Diagnosis and Plan    Treatment Plan  ASA 3      Procedures: Lumbar Epidural Steroid Injection(LESI), With fluoroscopy,       Anesthetic plan and risks discussed with patient.          Diagnosis     * Lumbar spinal stenosis [M48.061]            CHIEF COMPLAINT:       HISTORY OF PRESENT ILLNESS:      PAST MEDICAL HISTORY:  Current Outpatient Medications on File Prior to Encounter    Medication Sig Dispense Refill   • ACCU-CHEK GUIDE test strip Dx code E11.29 testing bs 3 x day 300 each 1   • amLODIPine (NORVASC) 5 MG tablet Take 1 tablet by mouth Daily. 90 tablet 1   • atorvastatin (LIPITOR) 40 MG tablet TAKE ONE TABLET BY MOUTH EVERY NIGHT 90 tablet 2   • azelastine (ASTELIN) 0.1 % nasal spray      • B-D ULTRAFINE III SHORT PEN 31G X 8 MM misc USE ONE PEN NEEDLE DAILY 100 each 3   • Cholecalciferol (Vitamin D3) 1.25 MG (58553 UT) capsule Take 1 capsule by mouth Every 7 (Seven) Days. 15 capsule 3   • clopidogrel (PLAVIX) 75 MG tablet Take 1 tablet by mouth Daily. For heart disease 90 tablet 1   • cyclobenzaprine (FLEXERIL) 10 MG tablet Take 1 tablet by mouth 3 (Three) Times a Day As Needed for Muscle Spasms. 270 tablet 3   • diazePAM (Valium) 5 MG tablet Take 1 tab on the way to the scan and may repeat x 1 prn upon arrival. 2 tablet 0   • Empagliflozin (Jardiance) 10 MG tablet Take 10 mg by mouth Daily. 30 tablet 5   • escitalopram (LEXAPRO) 20 MG tablet Take 1 tablet by mouth Daily. 90 tablet 1   • esomeprazole (NexIUM) 20 MG capsule Take 20 mg by mouth every morning before breakfast.     • fenofibrate (TRICOR) 145 MG tablet TAKE ONE TABLET BY MOUTH DAILY 60 tablet 2   • fluticasone (FLONASE) 50 MCG/ACT nasal spray      • gabapentin (NEURONTIN) 600 MG tablet Take 600 mg by mouth 3 (Three) Times a Day.     • HYDROcodone-acetaminophen (NORCO) 7.5-325 MG per tablet Take 1 tablet by mouth 2 (Two) Times a Day. Prescribed by Dr. Jacobo (Patient taking differently: Take 1 tablet by mouth Every 8 (Eight) Hours As Needed. Prescribed by Dr. Jacobo)     • Insulin Degludec (TRESIBA FLEXTOUCH) 200 UNIT/ML solution pen-injector pen injection Inject 20 Units under the skin into the appropriate area as directed Every Morning. Titration max of 100 units daily 20 mL 1   • ketoconazole (NIZORAL) 2 % shampoo      • Lancets (ACCU-CHEK SOFT TOUCH) lancets Dx code E11.29 testing bs 3 x day 300 each 1   •  levothyroxine (SYNTHROID, LEVOTHROID) 50 MCG tablet Take 1 tablet by mouth Daily. for thyroid 90 tablet 1   • lisinopril (PRINIVIL,ZESTRIL) 20 MG tablet Take 1 tablet by mouth Daily. 90 tablet 1   • metFORMIN (GLUCOPHAGE) 1000 MG tablet TAKE ONE TABLET BY MOUTH TWICE A DAY WITH MEALS 180 tablet 2   • montelukast (SINGULAIR) 10 MG tablet      • NITROSTAT 0.4 MG SL tablet Place 0.4 mg under the tongue every 5 (five) minutes as needed.     • omega-3 acid ethyl esters (LOVAZA) 1 g capsule Take 2 capsules by mouth 2 (Two) Times a Day. 120 capsule 5   • Probiotic Product (PROBIOTIC DAILY) capsule Take 1 capsule by mouth daily.     • tamsulosin (FLOMAX) 0.4 MG capsule 24 hr capsule Take 1 capsule by mouth Daily. 90 capsule 1   • zolpidem (AMBIEN) 10 MG tablet Take 1 tablet by mouth At Night As Needed for Sleep. 90 tablet 0     No current facility-administered medications on file prior to encounter.       Past Medical History:   Diagnosis Date   • 2-vessel coronary artery disease    • Abnormal ECG    • Abnormal liver function test    • Acute pancreatitis     thought to be from the Januvia Rx, 2014   • ADD (attention deficit disorder)    • Allergic rhinitis    • Atherosclerotic heart disease of native coronary artery without angina pectoris    • Atrial fibrillation and flutter (CMS/[facility])    • Atypical chest pain     suspect this is not cardiac related given that is not exertional. howevr he is not very functional and this makes the assessment somewhat difficult   • Back pain    • BPH (benign prostatic hypertrophy)    • CAD (coronary artery disease)    • Chronic pain    • DDD (degenerative disc disease), lumbar    • Depression    • Diabetic eye exam (CMS/HCC) 04/01/2017   • Diverticulitis of colon    • Diverticulitis of colon with hemorrhage    • Diverticulosis of colon    • SAEED (dyspnea on exertion)    • Elevated PSA    • Essential hypertension    • GERD (gastroesophageal reflux disease)    • Glaucoma     Dr. Art   •  Herpes zoster     HX   • Hiatal hernia    • History of transfusion    • Hyperlipidemia    • Hypertension    • Hypothyroidism    • Impacted cerumen    • Incisional hernia    • Insomnia    • Joint pain    • Microalbuminuria    • Mitral regurgitation     Trace to mild   • Muscle spasm    • NAFLD (nonalcoholic fatty liver disease)    • Numbness    • SKYLAR (obstructive sleep apnea)    • Osteoarthritis    • Osteoarthritis, multiple sites    • PFO (patent foramen ovale)    • Primary snoring    • Prostatic hyperplasia, benign localized, with obstruction    • RCT (rotator cuff tear)    • Rotator cuff tear    • Tricuspid regurgitation     Trace to mild   • Type 2 diabetes mellitus (CMS/HCC)    • Vitamin D deficiency          SOCIAL HISTORY:  No tobacco    REVIEW OF SYSTEMS:  No hematologic infectious or constitutional symptoms  Other review of systems non-contributory    PHYSICAL EXAM:  There were no vitals taken for this visit.  Well-developed well-nourished no acute distress  Extra ocular movements intact  Mallampati class 2 airway  Cardiac:  Regular rate and rhythm  Lungs:  Clear to auscultation bilaterally with good effort  Alert and oriented ×3  Deep tendon reflexes normal in the bilateral patella  Negative straight leg raise bilaterally  5 out of 5 strength bilateral upper and lower extremities  Lumbar spine without obvious deformities ecchymoses  Lumbar spine nontender to palpation      DIAGNOSIS:  Post-Op Diagnosis Codes:     * Lumbar spinal stenosis [M48.061]    PLAN:  1.  Lumbar 4 epidural steroid injections, up to 3, spaced 1-2 weeks apart.  If pain control is acceptable after 1 or 2 injections, it was discussed with the patient that they may return for the subsequent injections if and when their pain returns.  The risks were discussed with the patient including failure of relief, worsening pain, Headache (post dural puncture headache), bleeding (epidural hematoma) and infection (epidural abscess or skin  infection).  2.  Physical therapy exercises at home as prescribed by physical therapy or from the pain clinic handout (given to the patient).  Continuation of these exercises every day, or multiple times per week, even when the patient has good pain relief, was stressed to the patient as a preventative measure to decrease the frequency and severity of future pain episodes.  3.  Continue pain medicines as already prescribed.  If patient not currently taking any, it is recommended to begin Acetaminophen 1000 mg po q 8 hours.  If other medicines containing Acetaminophen are currently prescribed, maintain daily dose at 3000 mg.    4.  If they can tolerate NSAIDS, it is recommended to take Ibuprofen 600 mg po q 6 hours for 7 days during pain exacerbations.  Alternatively, they may substitute an NSAID of their choice (e.g. Aleve).  This may be taken at the same time as Acetaminophen.  5.  Heat and ice to the affected area as tolerated for pain control.  It was discussed that heating pads can cause burns.  6.  Daily low impact exercise such as walking or water exercise was recommended to maintain overall health and aid in weight control.   7.  Follow up as needed for subsequent injections.  8.  Patient was counseled to abstain from tobacco products.    Target : L 3-4    Time :   26   min    Did not stop plavix so no treatment today

## 2021-06-16 ENCOUNTER — ANESTHESIA (OUTPATIENT)
Dept: PAIN MEDICINE | Facility: HOSPITAL | Age: 76
End: 2021-06-16

## 2021-06-16 ENCOUNTER — HOSPITAL ENCOUNTER (OUTPATIENT)
Dept: GENERAL RADIOLOGY | Facility: HOSPITAL | Age: 76
Discharge: HOME | End: 2021-06-16

## 2021-06-16 ENCOUNTER — ANESTHESIA EVENT (OUTPATIENT)
Dept: PAIN MEDICINE | Facility: HOSPITAL | Age: 76
End: 2021-06-16

## 2021-06-16 ENCOUNTER — HOSPITAL ENCOUNTER (OUTPATIENT)
Dept: PAIN MEDICINE | Facility: HOSPITAL | Age: 76
Discharge: HOME | End: 2021-06-16

## 2021-06-16 VITALS
WEIGHT: 174 LBS | TEMPERATURE: 97.1 F | SYSTOLIC BLOOD PRESSURE: 112 MMHG | DIASTOLIC BLOOD PRESSURE: 67 MMHG | OXYGEN SATURATION: 92 % | RESPIRATION RATE: 16 BRPM | BODY MASS INDEX: 24.91 KG/M2 | HEIGHT: 70 IN | HEART RATE: 79 BPM

## 2021-06-16 DIAGNOSIS — M51.37 DEGENERATION OF LUMBAR OR LUMBOSACRAL INTERVERTEBRAL DISC: Primary | ICD-10-CM

## 2021-06-16 DIAGNOSIS — R52 PAIN: ICD-10-CM

## 2021-06-16 LAB — GLUCOSE BLDC GLUCOMTR-MCNC: 114 MG/DL (ref 70–130)

## 2021-06-16 PROCEDURE — 82962 GLUCOSE BLOOD TEST: CPT

## 2021-06-16 PROCEDURE — 0 IOPAMIDOL 41 % SOLUTION: Performed by: ANESTHESIOLOGY

## 2021-06-16 PROCEDURE — 25010000002 METHYLPREDNISOLONE PER 80 MG: Performed by: ANESTHESIOLOGY

## 2021-06-16 PROCEDURE — 77003 FLUOROGUIDE FOR SPINE INJECT: CPT

## 2021-06-16 RX ORDER — METHYLPREDNISOLONE ACETATE 80 MG/ML
80 INJECTION, SUSPENSION INTRA-ARTICULAR; INTRALESIONAL; INTRAMUSCULAR; SOFT TISSUE ONCE
Status: COMPLETED | OUTPATIENT
Start: 2021-06-16 | End: 2021-06-16

## 2021-06-16 RX ORDER — FENTANYL CITRATE 50 UG/ML
50 INJECTION, SOLUTION INTRAMUSCULAR; INTRAVENOUS AS NEEDED
Status: DISCONTINUED | OUTPATIENT
Start: 2021-06-16 | End: 2021-06-17 | Stop reason: HOSPADM

## 2021-06-16 RX ORDER — LIDOCAINE HYDROCHLORIDE 10 MG/ML
1 INJECTION, SOLUTION INFILTRATION; PERINEURAL ONCE AS NEEDED
Status: DISCONTINUED | OUTPATIENT
Start: 2021-06-16 | End: 2021-06-17 | Stop reason: HOSPADM

## 2021-06-16 RX ORDER — SODIUM CHLORIDE 0.9 % (FLUSH) 0.9 %
1-10 SYRINGE (ML) INJECTION AS NEEDED
Status: DISCONTINUED | OUTPATIENT
Start: 2021-06-16 | End: 2021-06-17 | Stop reason: HOSPADM

## 2021-06-16 RX ORDER — MIDAZOLAM HYDROCHLORIDE 1 MG/ML
1 INJECTION INTRAMUSCULAR; INTRAVENOUS AS NEEDED
Status: DISCONTINUED | OUTPATIENT
Start: 2021-06-16 | End: 2021-06-17 | Stop reason: HOSPADM

## 2021-06-16 RX ADMIN — METHYLPREDNISOLONE ACETATE 80 MG: 80 INJECTION, SUSPENSION INTRA-ARTICULAR; INTRALESIONAL; INTRAMUSCULAR; SOFT TISSUE at 14:48

## 2021-06-16 RX ADMIN — IOPAMIDOL 10 ML: 408 INJECTION, SOLUTION INTRATHECAL at 14:47

## 2021-06-16 NOTE — INTERVAL H&P NOTE
[facility]  H&P reviewed. No changes since last visit.  Patient states   0% improvement since the last procedure/injection.    Diagnosis     * Spinal stenosis of lumbar region without neurogenic claudication [M48.061]      Airway assessed since last visit. Airway class equals: 2.    No procedure performed at the last time as he had not been off his Plavix.  Today will be his first epidural steroid injection

## 2021-06-16 NOTE — ANESTHESIA PROCEDURE NOTES
PAIN Epidural block    Pre-sedation assessment completed: 6/16/2021 2:42 PM    Patient reassessed immediately prior to procedure    Patient location during procedure: pain clinic  Start Time: 6/16/2021 2:42 PM  Stop Time: 6/16/2021 2:52 PM  Indication:procedure for pain  Performed By  Anesthesiologist: Gee Madden MD  Preanesthetic Checklist  Completed: patient identified, IV checked, risks and benefits discussed, surgical consent, monitors and equipment checked, pre-op evaluation and timeout performed  Prep:  Sterile Tech:cap, gloves and sterile barrier  Prep:chlorhexidine gluconate and isopropyl alcohol  Monitoring:EKG, continuous pulse oximetry and blood pressure monitoring  Procedure:Sedation: no     Approach:left paramedian  Guidance: fluoroscopy  Location:lumbar  Level:4-5  Needle Type:Tuohy  Aspiration:negative  Medications:  Depomedrol:80 mg  Preservative Free Saline:2mL  Isovue:2mL    Post Assessment:  Pt Tolerance:patient tolerated the procedure well with no apparent complications  Complications:no

## 2021-06-24 DIAGNOSIS — I25.10 TWO-VESSEL CORONARY ARTERY DISEASE: Chronic | ICD-10-CM

## 2021-06-24 RX ORDER — CLOPIDOGREL BISULFATE 75 MG/1
TABLET ORAL
Qty: 30 TABLET | Refills: 0 | Status: SHIPPED | OUTPATIENT
Start: 2021-06-24 | End: 2021-08-31 | Stop reason: SDUPTHER

## 2021-06-30 ENCOUNTER — PATIENT OUTREACH (OUTPATIENT)
Dept: PHARMACY | Facility: HOSPITAL | Age: 76
End: 2021-06-30

## 2021-06-30 NOTE — OUTREACH NOTE
Medication Adherence Call    Patient was called today to discuss medication adherence with atorvastatin, as the patient was identified as having care opportunities.     The patient denies issues with adherence and denies any barriers to receiving medications. He states he just recently refilled several of his prescriptions.    Haydee Prater, PharmD  Population Health Pharmacist  06/30/21

## 2021-07-02 ENCOUNTER — OFFICE VISIT (OUTPATIENT)
Dept: ENDOCRINOLOGY | Age: 76
End: 2021-07-02

## 2021-07-02 VITALS
SYSTOLIC BLOOD PRESSURE: 122 MMHG | WEIGHT: 180.2 LBS | HEART RATE: 81 BPM | DIASTOLIC BLOOD PRESSURE: 74 MMHG | OXYGEN SATURATION: 97 % | HEIGHT: 70 IN | BODY MASS INDEX: 25.8 KG/M2

## 2021-07-02 DIAGNOSIS — I10 ESSENTIAL HYPERTENSION: ICD-10-CM

## 2021-07-02 DIAGNOSIS — R80.9 TYPE 2 DIABETES MELLITUS WITH MICROALBUMINURIA, WITHOUT LONG-TERM CURRENT USE OF INSULIN (HCC): ICD-10-CM

## 2021-07-02 DIAGNOSIS — E78.5 HYPERLIPIDEMIA, UNSPECIFIED HYPERLIPIDEMIA TYPE: Primary | ICD-10-CM

## 2021-07-02 DIAGNOSIS — E11.29 TYPE 2 DIABETES MELLITUS WITH MICROALBUMINURIA, WITHOUT LONG-TERM CURRENT USE OF INSULIN (HCC): ICD-10-CM

## 2021-07-02 DIAGNOSIS — E55.9 VITAMIN D DEFICIENCY: ICD-10-CM

## 2021-07-02 DIAGNOSIS — G47.33 OBSTRUCTIVE SLEEP APNEA SYNDROME: ICD-10-CM

## 2021-07-02 DIAGNOSIS — E11.42 TYPE 2 DIABETES MELLITUS WITH PERIPHERAL NEUROPATHY (HCC): ICD-10-CM

## 2021-07-02 DIAGNOSIS — E03.9 PRIMARY HYPOTHYROIDISM: ICD-10-CM

## 2021-07-02 PROCEDURE — 99214 OFFICE O/P EST MOD 30 MIN: CPT | Performed by: INTERNAL MEDICINE

## 2021-07-08 LAB
25(OH)D3+25(OH)D2 SERPL-MCNC: 56.6 NG/ML (ref 30–100)
ALBUMIN SERPL-MCNC: 4.8 G/DL (ref 3.7–4.7)
ALBUMIN/CREAT UR: <6 MG/G CREAT (ref 0–29)
ALBUMIN/GLOB SERPL: 2.1 {RATIO} (ref 1.2–2.2)
ALP SERPL-CCNC: 61 IU/L (ref 48–121)
ALT SERPL-CCNC: 47 IU/L (ref 0–44)
AST SERPL-CCNC: 34 IU/L (ref 0–40)
BILIRUB SERPL-MCNC: 0.5 MG/DL (ref 0–1.2)
BUN SERPL-MCNC: 12 MG/DL (ref 8–27)
BUN/CREAT SERPL: 13 (ref 10–24)
C PEPTIDE SERPL-MCNC: 6.2 NG/ML (ref 1.1–4.4)
CALCIUM SERPL-MCNC: 9.6 MG/DL (ref 8.6–10.2)
CHLORIDE SERPL-SCNC: 105 MMOL/L (ref 96–106)
CHOLEST SERPL-MCNC: 150 MG/DL (ref 100–199)
CO2 SERPL-SCNC: 22 MMOL/L (ref 20–29)
CREAT SERPL-MCNC: 0.94 MG/DL (ref 0.76–1.27)
CREAT UR-MCNC: 49.9 MG/DL
GAD65 AB SER IA-ACNC: <5 U/ML (ref 0–5)
GLOBULIN SER CALC-MCNC: 2.3 G/DL (ref 1.5–4.5)
GLUCOSE SERPL-MCNC: 119 MG/DL (ref 65–99)
HBA1C MFR BLD: 7.4 % (ref 4.8–5.6)
HDLC SERPL-MCNC: 34 MG/DL
IMP & REVIEW OF LAB RESULTS: NORMAL
LDLC SERPL CALC-MCNC: 87 MG/DL (ref 0–99)
MICROALBUMIN UR-MCNC: <3 UG/ML
POTASSIUM SERPL-SCNC: 4.7 MMOL/L (ref 3.5–5.2)
PROT SERPL-MCNC: 7.1 G/DL (ref 6–8.5)
SODIUM SERPL-SCNC: 142 MMOL/L (ref 134–144)
T4 FREE SERPL-MCNC: 1.05 NG/DL (ref 0.82–1.77)
TRIGL SERPL-MCNC: 168 MG/DL (ref 0–149)
TSH SERPL DL<=0.005 MIU/L-ACNC: 2.39 UIU/ML (ref 0.45–4.5)
VLDLC SERPL CALC-MCNC: 29 MG/DL (ref 5–40)

## 2021-07-10 DIAGNOSIS — E11.29 TYPE 2 DIABETES MELLITUS WITH MICROALBUMINURIA, WITHOUT LONG-TERM CURRENT USE OF INSULIN (HCC): ICD-10-CM

## 2021-07-10 DIAGNOSIS — E11.42 TYPE 2 DIABETES MELLITUS WITH PERIPHERAL NEUROPATHY (HCC): Primary | ICD-10-CM

## 2021-07-10 DIAGNOSIS — R80.9 TYPE 2 DIABETES MELLITUS WITH MICROALBUMINURIA, WITHOUT LONG-TERM CURRENT USE OF INSULIN (HCC): ICD-10-CM

## 2021-07-10 NOTE — PROGRESS NOTES
LDL okay.  HDL low.  Continue Lipitor 40 mg/day, fenofibrate 145 mg/day, and Vascepa 2 g twice a day.Hemoglobin A1c 7.4%.  Add Jardiance 10 mg/day.  Prescription sent to pharmacist.  Repeat BMP in 2 weeks after starting Jardiance.  Orders placed on chart.C-peptide detectable.  JIM antibody negative.  Patient is still making insulin on his own.Normal thyroid function test.  Continue levothyroxine 50 mcg/day.  Continue lisinopril.Urine microalbumin normal.Normal vitamin D.  On vitamin D3 50,000 units weekly prescribed by Dr. Marcus.  Please notify patient of results and instructions.Copy of labs sent to Dr. Marcus.

## 2021-07-14 ENCOUNTER — HOSPITAL ENCOUNTER (OUTPATIENT)
Dept: PAIN MEDICINE | Facility: HOSPITAL | Age: 76
Discharge: HOME | End: 2021-07-14

## 2021-07-14 ENCOUNTER — ANESTHESIA EVENT (OUTPATIENT)
Dept: PAIN MEDICINE | Facility: HOSPITAL | Age: 76
End: 2021-07-14

## 2021-07-14 ENCOUNTER — HOSPITAL ENCOUNTER (OUTPATIENT)
Dept: GENERAL RADIOLOGY | Facility: HOSPITAL | Age: 76
Discharge: HOME | End: 2021-07-14

## 2021-07-14 ENCOUNTER — ANESTHESIA (OUTPATIENT)
Dept: PAIN MEDICINE | Facility: HOSPITAL | Age: 76
End: 2021-07-14

## 2021-07-14 VITALS
DIASTOLIC BLOOD PRESSURE: 69 MMHG | OXYGEN SATURATION: 97 % | RESPIRATION RATE: 14 BRPM | TEMPERATURE: 97.5 F | HEART RATE: 73 BPM | SYSTOLIC BLOOD PRESSURE: 120 MMHG

## 2021-07-14 DIAGNOSIS — M51.36 DDD (DEGENERATIVE DISC DISEASE), LUMBAR: Primary | ICD-10-CM

## 2021-07-14 DIAGNOSIS — M51.37 DEGENERATION OF LUMBAR OR LUMBOSACRAL INTERVERTEBRAL DISC: ICD-10-CM

## 2021-07-14 DIAGNOSIS — M54.50 LUMBAR PAIN: ICD-10-CM

## 2021-07-14 LAB — GLUCOSE BLDC GLUCOMTR-MCNC: 104 MG/DL (ref 70–130)

## 2021-07-14 PROCEDURE — 0 IOPAMIDOL 41 % SOLUTION: Performed by: ANESTHESIOLOGY

## 2021-07-14 PROCEDURE — 82962 GLUCOSE BLOOD TEST: CPT

## 2021-07-14 PROCEDURE — 25010000002 METHYLPREDNISOLONE PER 80 MG: Performed by: ANESTHESIOLOGY

## 2021-07-14 PROCEDURE — 77003 FLUOROGUIDE FOR SPINE INJECT: CPT

## 2021-07-14 PROCEDURE — 25010000002 MIDAZOLAM PER 1 MG: Performed by: ANESTHESIOLOGY

## 2021-07-14 RX ORDER — METHYLPREDNISOLONE ACETATE 80 MG/ML
80 INJECTION, SUSPENSION INTRA-ARTICULAR; INTRALESIONAL; INTRAMUSCULAR; SOFT TISSUE ONCE
Status: COMPLETED | OUTPATIENT
Start: 2021-07-14 | End: 2021-07-14

## 2021-07-14 RX ORDER — LIDOCAINE HYDROCHLORIDE 10 MG/ML
1 INJECTION, SOLUTION INFILTRATION; PERINEURAL ONCE
Status: DISCONTINUED | OUTPATIENT
Start: 2021-07-14 | End: 2021-07-15 | Stop reason: HOSPADM

## 2021-07-14 RX ORDER — MIDAZOLAM HYDROCHLORIDE 1 MG/ML
1 INJECTION INTRAMUSCULAR; INTRAVENOUS
Status: DISCONTINUED | OUTPATIENT
Start: 2021-07-14 | End: 2021-07-15 | Stop reason: HOSPADM

## 2021-07-14 RX ORDER — FENTANYL CITRATE 50 UG/ML
50 INJECTION, SOLUTION INTRAMUSCULAR; INTRAVENOUS AS NEEDED
Status: DISCONTINUED | OUTPATIENT
Start: 2021-07-14 | End: 2021-07-15 | Stop reason: HOSPADM

## 2021-07-14 RX ORDER — SODIUM CHLORIDE 0.9 % (FLUSH) 0.9 %
3 SYRINGE (ML) INJECTION EVERY 12 HOURS SCHEDULED
Status: DISCONTINUED | OUTPATIENT
Start: 2021-07-14 | End: 2021-07-15 | Stop reason: HOSPADM

## 2021-07-14 RX ORDER — SODIUM CHLORIDE 0.9 % (FLUSH) 0.9 %
3-10 SYRINGE (ML) INJECTION AS NEEDED
Status: DISCONTINUED | OUTPATIENT
Start: 2021-07-14 | End: 2021-07-15 | Stop reason: HOSPADM

## 2021-07-14 RX ADMIN — MIDAZOLAM 1 MG: 1 INJECTION INTRAMUSCULAR; INTRAVENOUS at 13:01

## 2021-07-14 RX ADMIN — METHYLPREDNISOLONE ACETATE 80 MG: 80 INJECTION, SUSPENSION INTRA-ARTICULAR; INTRALESIONAL; INTRAMUSCULAR; SOFT TISSUE at 13:04

## 2021-07-14 RX ADMIN — IOPAMIDOL 2 ML: 408 INJECTION, SOLUTION INTRATHECAL at 13:04

## 2021-07-14 NOTE — H&P
INTERVAL HISTORY:    The patient returns for another Lumbar epidural steroid injection 2 today.  They have received 40 % improvement since their last injection with a pain level of 1-9 /10 at its worst recently.    Conservative measures tried in the interim. Daily activities are still affected by the pain.    Radiology reports and/or previous notes have been reviewed and are consistent with their diagnosis of Post-Op Diagnosis Codes:     * Lumbar spinal stenosis [M48.061]    Alert and oriented  MP - 2  Lungs - clear  CV - rrr    Diagnosis:  Post-Op Diagnosis Codes:     * Lumbar spinal stenosis [M48.061]      Plan:  Lumbar epidural steroid injection under fluoroscopic guidance        Target : L4-5    I have encouraged them to continue:  1.  Physical therapy exercises at home as prescribed by physical therapy or from the pain clinic handout (given to the patient).  Continuation of these exercises every day, or multiple times per week, even when the patient has good pain relief, was stressed to the patient as a preventative measure to decrease the frequency and severity of future pain episodes.  2.  Continue pain medicines as already prescribed.  If patient not currently taking any, it is recommended to begin Acetaminophen 1000 mg po q 8 hours.  If other medicines containing Acetaminophen are currently prescribed, maintain daily dose at 3000mg.    3.  If they can tolerate NSAIDS, it is recommended to take Ibuprofen 600 mg po q 6 hours for 7 days during pain exacerbations.   Alternatively, they may substitute an NSAID of their choice (e.g. Aleve)  4.  Heat and ice to the affected area as tolerated for pain control.  It was discussed that heating pads can cause burns.  5.  Low impact exercise such as walking or water exercise was recommended to maintain overall health and aid in weight control.   6.  Follow up as needed for subsequent injections.  7.  Patient was counseled to abstain from tobacco products.    Time : 14    min

## 2021-07-14 NOTE — ANESTHESIA PROCEDURE NOTES
PAIN Epidural block    Pre-sedation assessment completed: 7/14/2021 12:55 PM    Patient reassessed immediately prior to procedure    Patient location during procedure: pain clinic  Start Time: 7/14/2021 12:55 PM  Stop Time: 7/14/2021 1:05 PM  Indication:at surgeon's request and procedure for pain  Performed By  Anesthesiologist: Vance Cline MD  Preanesthetic Checklist  Completed: patient identified, IV checked, site marked, risks and benefits discussed, surgical consent, monitors and equipment checked, pre-op evaluation and timeout performed  Additional Notes  Dx:  Post-Op Diagnosis Codes:     * Lumbar spinal stenosis (M48.061)  80 mg depomedrol in epid    Plan : return to clinic as needed  Prep:  Pt Position:prone (prone)  Sterile Tech:cap, gloves, mask and sterile barrier  Prep:chlorhexidine gluconate and isopropyl alcohol  Monitoring:blood pressure monitoring, EKG and continuous pulse oximetry  Procedure:Sedation: yes     Approach:midline  Guidance: fluoroscopy and c arm pa and lat and loss of resistance  Location:lumbar  Level:4-5 (interlaminar)  Needle Type:PTS Consultingelizabeth  Needle Gauge:20  Aspiration:negative  Medications:  Depomedrol:80 mg  Preservative Free Saline:3mL  Isovue:2mL    Post Assessment:  Post-procedure: bandaid.  Pt Tolerance:patient tolerated the procedure well with no apparent complications  Complications:no

## 2021-07-19 ENCOUNTER — TELEPHONE (OUTPATIENT)
Dept: FAMILY MEDICINE CLINIC | Facility: CLINIC | Age: 76
End: 2021-07-19

## 2021-07-19 NOTE — TELEPHONE ENCOUNTER
.    Caller: Ben Walsh    Relationship: Self    Best call back number: [phone]    Medication needed: ANTIBIOTIC      When do you need the refill by: 07/19/21.    What additional details did the patient provide when requesting the medication: PATIENT IS CALLING TO REQUEST AN ANTIBIOTIC FOR A POSSIBLE SINUS INFECTION.  HE STATES HE HAS HEADACHE, FACIAL PAIN, HEAD STOPPED UP, DRAINAGE TO THE THROAT AND SORE THROAT WITH SWOLLEN GLANDS.    Does the patient have less than a 3 day supply:  [x] Yes  [] No    What is the patient's preferred pharmacy:      [facility] AT [facility] - [phone] [facility] [phone]   [phone]      PLEASE ADVISE.

## 2021-07-22 DIAGNOSIS — F51.01 PRIMARY INSOMNIA: Chronic | ICD-10-CM

## 2021-07-22 RX ORDER — ZOLPIDEM TARTRATE 10 MG/1
TABLET ORAL
Qty: 90 TABLET | Refills: 0 | OUTPATIENT
Start: 2021-07-22

## 2021-07-22 RX ORDER — DIAZEPAM 5 MG/1
TABLET ORAL
Qty: 2 TABLET | Refills: 0 | OUTPATIENT
Start: 2021-07-22

## 2021-07-23 DIAGNOSIS — E11.29 TYPE 2 DIABETES MELLITUS WITH MICROALBUMINURIA, WITHOUT LONG-TERM CURRENT USE OF INSULIN (HCC): ICD-10-CM

## 2021-07-23 DIAGNOSIS — R80.9 TYPE 2 DIABETES MELLITUS WITH MICROALBUMINURIA, WITHOUT LONG-TERM CURRENT USE OF INSULIN (HCC): ICD-10-CM

## 2021-08-05 DIAGNOSIS — E11.29 TYPE 2 DIABETES MELLITUS WITH MICROALBUMINURIA, WITHOUT LONG-TERM CURRENT USE OF INSULIN (HCC): ICD-10-CM

## 2021-08-05 DIAGNOSIS — R80.9 TYPE 2 DIABETES MELLITUS WITH MICROALBUMINURIA, WITHOUT LONG-TERM CURRENT USE OF INSULIN (HCC): ICD-10-CM

## 2021-08-21 DIAGNOSIS — E78.5 HYPERLIPIDEMIA, UNSPECIFIED HYPERLIPIDEMIA TYPE: ICD-10-CM

## 2021-08-23 RX ORDER — FENOFIBRATE 145 MG/1
TABLET, COATED ORAL
Qty: 90 TABLET | Refills: 1 | Status: SHIPPED | OUTPATIENT
Start: 2021-08-23 | End: 2022-03-14

## 2021-08-26 ENCOUNTER — TELEPHONE (OUTPATIENT)
Dept: FAMILY MEDICINE CLINIC | Facility: CLINIC | Age: 76
End: 2021-08-26

## 2021-08-26 DIAGNOSIS — F51.01 PRIMARY INSOMNIA: Chronic | ICD-10-CM

## 2021-08-26 NOTE — TELEPHONE ENCOUNTER
Caller: Nico Ben PAT    Relationship: Self    Best call back number: [phone]     Medication needed:   Requested Prescriptions     Pending Prescriptions Disp Refills   • zolpidem (AMBIEN) 10 MG tablet 90 tablet 0     Sig: Take 1 tablet by mouth At Night As Needed for Sleep.       When do you need the refill by: 8-26-21    What additional details did the patient provide when requesting the medication: PATIENT IS OUT OF MEDICATION. PATIENT WENT AHEAD AND SCHEDULED APPT WITH DR BARBA AND IS ASKING FOR A REFILL BEFORE APPT.     Does the patient have less than a 3 day supply:  [x] Yes  [] No    What is the patient's preferred pharmacy: [facility] [address] AT [facility] [phone] [facility] [phone]

## 2021-08-27 RX ORDER — ZOLPIDEM TARTRATE 10 MG/1
10 TABLET ORAL NIGHTLY PRN
Qty: 10 TABLET | Refills: 0 | Status: SHIPPED | OUTPATIENT
Start: 2021-08-27 | End: 2021-08-30 | Stop reason: SDUPTHER

## 2021-08-31 ENCOUNTER — OFFICE VISIT (OUTPATIENT)
Dept: FAMILY MEDICINE CLINIC | Facility: CLINIC | Age: 76
End: 2021-08-31

## 2021-08-31 VITALS
HEIGHT: 70 IN | RESPIRATION RATE: 18 BRPM | DIASTOLIC BLOOD PRESSURE: 74 MMHG | TEMPERATURE: 98.2 F | HEART RATE: 96 BPM | SYSTOLIC BLOOD PRESSURE: 127 MMHG | BODY MASS INDEX: 25.77 KG/M2 | WEIGHT: 180 LBS

## 2021-08-31 DIAGNOSIS — I10 ESSENTIAL HYPERTENSION: Chronic | ICD-10-CM

## 2021-08-31 DIAGNOSIS — E03.9 PRIMARY HYPOTHYROIDISM: Chronic | ICD-10-CM

## 2021-08-31 DIAGNOSIS — I25.10 TWO-VESSEL CORONARY ARTERY DISEASE: Chronic | ICD-10-CM

## 2021-08-31 DIAGNOSIS — E55.9 VITAMIN D DEFICIENCY: Chronic | ICD-10-CM

## 2021-08-31 DIAGNOSIS — F51.01 PRIMARY INSOMNIA: Chronic | ICD-10-CM

## 2021-08-31 DIAGNOSIS — N40.0 BENIGN PROSTATIC HYPERPLASIA WITHOUT LOWER URINARY TRACT SYMPTOMS: Chronic | ICD-10-CM

## 2021-08-31 DIAGNOSIS — F33.42 RECURRENT MAJOR DEPRESSIVE DISORDER, IN FULL REMISSION (HCC): Chronic | ICD-10-CM

## 2021-08-31 PROCEDURE — 99214 OFFICE O/P EST MOD 30 MIN: CPT | Performed by: FAMILY MEDICINE

## 2021-08-31 RX ORDER — LISINOPRIL 20 MG/1
20 TABLET ORAL DAILY
Qty: 90 TABLET | Refills: 1 | Status: SHIPPED | OUTPATIENT
Start: 2021-08-31 | End: 2022-01-15 | Stop reason: SDUPTHER

## 2021-08-31 RX ORDER — CHOLECALCIFEROL (VITAMIN D3) 1250 MCG
50000 CAPSULE ORAL
Qty: 15 CAPSULE | Refills: 3 | Status: SHIPPED | OUTPATIENT
Start: 2021-08-31 | End: 2022-06-07 | Stop reason: SDUPTHER

## 2021-08-31 RX ORDER — CLOPIDOGREL BISULFATE 75 MG/1
75 TABLET ORAL DAILY
Qty: 90 TABLET | Refills: 1 | Status: SHIPPED | OUTPATIENT
Start: 2021-08-31 | End: 2022-01-15 | Stop reason: SDUPTHER

## 2021-08-31 RX ORDER — AMLODIPINE BESYLATE 5 MG/1
5 TABLET ORAL DAILY
Qty: 90 TABLET | Refills: 1 | Status: SHIPPED | OUTPATIENT
Start: 2021-08-31 | End: 2022-01-15 | Stop reason: SDUPTHER

## 2021-08-31 RX ORDER — ZOLPIDEM TARTRATE 10 MG/1
10 TABLET ORAL NIGHTLY PRN
Qty: 30 TABLET | Refills: 2 | Status: SHIPPED | OUTPATIENT
Start: 2021-08-31 | End: 2022-01-15 | Stop reason: SDUPTHER

## 2021-08-31 RX ORDER — TAMSULOSIN HYDROCHLORIDE 0.4 MG/1
1 CAPSULE ORAL DAILY
Qty: 90 CAPSULE | Refills: 1 | Status: SHIPPED | OUTPATIENT
Start: 2021-08-31 | End: 2022-01-15 | Stop reason: SDUPTHER

## 2021-08-31 RX ORDER — DIAZEPAM 5 MG/1
TABLET ORAL
Qty: 30 TABLET | Refills: 2 | Status: CANCELLED | OUTPATIENT
Start: 2021-08-31

## 2021-08-31 RX ORDER — ESCITALOPRAM OXALATE 20 MG/1
20 TABLET ORAL DAILY
Qty: 90 TABLET | Refills: 1 | Status: SHIPPED | OUTPATIENT
Start: 2021-08-31 | End: 2022-01-15 | Stop reason: SDUPTHER

## 2021-08-31 RX ORDER — LEVOTHYROXINE SODIUM 0.05 MG/1
50 TABLET ORAL DAILY
Qty: 90 TABLET | Refills: 1 | Status: SHIPPED | OUTPATIENT
Start: 2021-08-31 | End: 2022-01-15 | Stop reason: SDUPTHER

## 2021-08-31 NOTE — PROGRESS NOTES
Subjective   Ben Walsh is a 76 y.o. male.     History of Present Illness     Chief Complaint:   Chief Complaint   Patient presents with   • Hypertension     MED REFILL DUE/    • Hyperlipidemia     meds reviewed with pt today [facility]   • Insomnia   • Anxiety   • Depression   • Hypothyroidism   • Benign Prostatic Hypertrophy       Ben Walsh 76 y.o. male who presents today for Medical Management of the below listed issues and medication refills. He  has a problem list of   Patient Active Problem List   Diagnosis   • Allergic rhinitis   • ADD (attention deficit disorder)   • DDD (degenerative disc disease), lumbar   • BPH (benign prostatic hypertrophy)   • Chronic pain   • ASCVD (arteriosclerotic cardiovascular disease)   • Depression   • Diverticulosis of colon   • GERD (gastroesophageal reflux disease)   • Hiatal hernia   • Hyperlipidemia   • Essential hypertension   • Primary hypothyroidism   • Insomnia   • Osteoarthritis, multiple sites   • Acute pancreatitis   • Vitamin D deficiency   • Glaucoma   • Abnormal nuclear stress test   • Two-vessel coronary artery disease   • SAEED (dyspnea on exertion)   • Obstructive sleep apnea syndrome   • Patent foramen ovale   • Snoring   • Disorder of rotator cuff   • History of COPD   • Type 2 diabetes mellitus with microalbuminuria, without long-term current use of insulin (CMS/HCC)   • Carpal tunnel syndrome of right wrist   • Abnormal electrocardiogram   • Atypical chest pain   • Type 2 diabetes mellitus with peripheral neuropathy (CMS/HCC)   • Atrial fibrillation and flutter (CMS/HCC)   .  Since the last visit, He has overall felt well.  he has been compliant with   Current Outpatient Medications:   •  amLODIPine (NORVASC) 5 MG tablet, Take 1 tablet by mouth Daily., Disp: 90 tablet, Rfl: 1  •  Cholecalciferol (Vitamin D3) 1.25 MG (25923 UT) capsule, Take 1 capsule by mouth Every 7 (Seven) Days., Disp: 15 capsule, Rfl: 3  •  diazePAM (Valium) 5 MG tablet,  Take 1 tab on the way to the scan and may repeat x 1 prn upon arrival., Disp: 2 tablet, Rfl: 0  •  escitalopram (LEXAPRO) 20 MG tablet, Take 1 tablet by mouth Daily., Disp: 90 tablet, Rfl: 1  •  levothyroxine (SYNTHROID, LEVOTHROID) 50 MCG tablet, Take 1 tablet by mouth Daily. for thyroid, Disp: 90 tablet, Rfl: 1  •  lisinopril (PRINIVIL,ZESTRIL) 20 MG tablet, Take 1 tablet by mouth Daily., Disp: 90 tablet, Rfl: 1  •  tamsulosin (FLOMAX) 0.4 MG capsule 24 hr capsule, Take 1 capsule by mouth Daily., Disp: 90 capsule, Rfl: 1  •  zolpidem (AMBIEN) 10 MG tablet, Take 1 tablet by mouth At Night As Needed for Sleep., Disp: 30 tablet, Rfl: 2  •  ACCU-CHEK GUIDE test strip, Dx code E11.29 testing bs 3 x day, Disp: 300 each, Rfl: 1  •  atorvastatin (LIPITOR) 40 MG tablet, TAKE ONE TABLET BY MOUTH EVERY NIGHT, Disp: 90 tablet, Rfl: 2  •  azelastine (ASTELIN) 0.1 % nasal spray, , Disp: , Rfl:   •  B-D ULTRAFINE III SHORT PEN 31G X 8 MM misc, USE ONE PEN NEEDLE DAILY, Disp: 100 each, Rfl: 3  •  clopidogrel (PLAVIX) 75 MG tablet, Take 1 tablet by mouth Daily., Disp: 90 tablet, Rfl: 1  •  cyclobenzaprine (FLEXERIL) 10 MG tablet, Take 1 tablet by mouth 3 (Three) Times a Day As Needed for Muscle Spasms., Disp: 270 tablet, Rfl: 3  •  empagliflozin (Jardiance) 25 MG tablet tablet, Take 1 tablet by mouth Daily., Disp: 30 tablet, Rfl: 5  •  esomeprazole (NexIUM) 20 MG capsule, Take 20 mg by mouth every morning before breakfast., Disp: , Rfl:   •  fenofibrate (TRICOR) 145 MG tablet, TAKE ONE TABLET BY MOUTH DAILY, Disp: 90 tablet, Rfl: 1  •  fluticasone (FLONASE) 50 MCG/ACT nasal spray, , Disp: , Rfl:   •  gabapentin (NEURONTIN) 600 MG tablet, Take 600 mg by mouth 3 (Three) Times a Day., Disp: , Rfl:   •  HYDROcodone-acetaminophen (NORCO) 7.5-325 MG per tablet, Take 1 tablet by mouth 2 (Two) Times a Day. Prescribed by Dr. Jacobo (Patient taking differently: Take 1 tablet by mouth Every 8 (Eight) Hours As Needed. Prescribed by   "Odalis), Disp: , Rfl:   •  Insulin Degludec (TRESIBA FLEXTOUCH) 200 UNIT/ML solution pen-injector pen injection, Inject 20 Units under the skin into the appropriate area as directed Every Morning. Titration max of 100 units daily, Disp: 15 mL, Rfl: 0  •  Lancets (ACCU-CHEK SOFT TOUCH) lancets, Dx code E11.29 testing bs 3 x day, Disp: 300 each, Rfl: 1  •  metFORMIN (GLUCOPHAGE) 1000 MG tablet, TAKE ONE TABLET BY MOUTH TWICE A DAY WITH MEALS, Disp: 180 tablet, Rfl: 2  •  montelukast (SINGULAIR) 10 MG tablet, , Disp: , Rfl:   •  NITROSTAT 0.4 MG SL tablet, Place 0.4 mg under the tongue every 5 (five) minutes as needed., Disp: , Rfl:   •  omega-3 acid ethyl esters (LOVAZA) 1 g capsule, Take 2 capsules by mouth 2 (Two) Times a Day., Disp: 120 capsule, Rfl: 5  •  Probiotic Product (PROBIOTIC DAILY) capsule, Take 1 capsule by mouth daily., Disp: , Rfl: .  He denies medication side effects.    All of the other chronic condition(s) listed above are stable w/o issues.    /74   Pulse 96   Temp 98.2 °F (36.8 °C) (Oral)   Resp 18   Ht 177.8 cm (70\")   Wt 81.6 kg (180 lb)   BMI 25.83 kg/m²     Results for orders placed or performed during the hospital encounter of 07/14/21   POC Glucose Once    Specimen: Blood   Result Value Ref Range    Glucose 104 70 - 130 mg/dL             The following portions of the patient's history were reviewed and updated as appropriate: allergies, current medications, past family history, past medical history, past social history, past surgical history, and problem list.    Review of Systems   Constitutional: Negative for activity change, chills and fever.   Respiratory: Negative for cough.    Cardiovascular: Negative for chest pain.   Psychiatric/Behavioral: Negative for dysphoric mood.       Objective   Physical Exam  Constitutional:       General: He is not in acute distress.     Appearance: He is well-developed.   Cardiovascular:      Rate and Rhythm: Normal rate and regular rhythm. "   Pulmonary:      Effort: Pulmonary effort is normal.      Breath sounds: Normal breath sounds.   Neurological:      Mental Status: He is alert and oriented to person, place, and time.   Psychiatric:         Behavior: Behavior normal.         Thought Content: Thought content normal.     Labs form endocrine reviewed by me at today's visit.      Assessment/Plan   Diagnoses and all orders for this visit:    1. Vitamin D deficiency  -     Cholecalciferol (Vitamin D3) 1.25 MG (26172 UT) capsule; Take 1 capsule by mouth Every 7 (Seven) Days.  Dispense: 15 capsule; Refill: 3    2. Primary insomnia  -     zolpidem (AMBIEN) 10 MG tablet; Take 1 tablet by mouth At Night As Needed for Sleep.  Dispense: 30 tablet; Refill: 2    3. Essential hypertension  -     amLODIPine (NORVASC) 5 MG tablet; Take 1 tablet by mouth Daily.  Dispense: 90 tablet; Refill: 1  -     lisinopril (PRINIVIL,ZESTRIL) 20 MG tablet; Take 1 tablet by mouth Daily.  Dispense: 90 tablet; Refill: 1    4. Benign prostatic hyperplasia without lower urinary tract symptoms  -     tamsulosin (FLOMAX) 0.4 MG capsule 24 hr capsule; Take 1 capsule by mouth Daily.  Dispense: 90 capsule; Refill: 1    5. Recurrent major depressive disorder, in full remission (CMS/HCC)  -     escitalopram (LEXAPRO) 20 MG tablet; Take 1 tablet by mouth Daily.  Dispense: 90 tablet; Refill: 1    6. Primary hypothyroidism  -     levothyroxine (SYNTHROID, LEVOTHROID) 50 MCG tablet; Take 1 tablet by mouth Daily. for thyroid  Dispense: 90 tablet; Refill: 1    7. Two-vessel coronary artery disease  -     clopidogrel (PLAVIX) 75 MG tablet; Take 1 tablet by mouth Daily.  Dispense: 90 tablet; Refill: 1    Other orders  -     Cancel: diazePAM (Valium) 5 MG tablet; Take 1 tab on the way to the scan and may repeat x 1 prn upon arrival.  Dispense: 30 tablet; Refill: 2

## 2021-10-05 RX ORDER — AMOXICILLIN AND CLAVULANATE POTASSIUM 875; 125 MG/1; MG/1
1 TABLET, FILM COATED ORAL EVERY 12 HOURS SCHEDULED
Qty: 20 TABLET | Refills: 5 | Status: SHIPPED | OUTPATIENT
Start: 2021-10-05 | End: 2022-10-20

## 2021-10-12 ENCOUNTER — TELEPHONE (OUTPATIENT)
Dept: CARDIOLOGY | Facility: CLINIC | Age: 76
End: 2021-10-12

## 2021-10-12 NOTE — TELEPHONE ENCOUNTER
Pt needs surgery clearance for a circumcision but hasn't been seen since 02-.  Please call/schedule pt-sometime next week if we have anything.    Thanks,  Leyla

## 2021-10-16 DIAGNOSIS — E11.29 TYPE 2 DIABETES MELLITUS WITH MICROALBUMINURIA, WITHOUT LONG-TERM CURRENT USE OF INSULIN (HCC): ICD-10-CM

## 2021-10-16 DIAGNOSIS — R80.9 TYPE 2 DIABETES MELLITUS WITH MICROALBUMINURIA, WITHOUT LONG-TERM CURRENT USE OF INSULIN (HCC): ICD-10-CM

## 2021-10-20 ENCOUNTER — OFFICE VISIT (OUTPATIENT)
Dept: CARDIOLOGY | Facility: CLINIC | Age: 76
End: 2021-10-20

## 2021-10-20 VITALS
WEIGHT: 181.4 LBS | DIASTOLIC BLOOD PRESSURE: 78 MMHG | HEART RATE: 86 BPM | BODY MASS INDEX: 25.97 KG/M2 | SYSTOLIC BLOOD PRESSURE: 126 MMHG | HEIGHT: 70 IN

## 2021-10-20 DIAGNOSIS — I25.10 TWO-VESSEL CORONARY ARTERY DISEASE: Primary | ICD-10-CM

## 2021-10-20 PROCEDURE — 93000 ELECTROCARDIOGRAM COMPLETE: CPT | Performed by: INTERNAL MEDICINE

## 2021-10-20 PROCEDURE — 99204 OFFICE O/P NEW MOD 45 MIN: CPT | Performed by: INTERNAL MEDICINE

## 2021-10-20 NOTE — PROGRESS NOTES
"      CARDIOLOGY    Angel Borges MD    ENCOUNTER DATE:  10/20/2021    Ben Walsh / 76 y.o. / male        CHIEF COMPLAINT / REASON FOR OFFICE VISIT     Surgical Clearance      HISTORY OF PRESENT ILLNESS       HPI  Ben Walsh is a 76 y.o. male who presents today for perioperative risk assessment for upcoming circumcision.  Patient saw me in 2018 and unfortunately during the pandemic did not follow-up.  He has known coronary artery disease.  He was followed by Dr. Foy years ago.  He had angioplasty to his LAD and he also had a borderline lesion in his right coronary artery but his FFR was not flow-limiting.  Therefore that lesion was not intervened upon.  Patient does say he gets tired after lunch and seems to be more tired here recently.  He says from time to time about every 2 to 3 weeks he will have some chest discomfort that will last for about 10 minutes.  He was fairly vague on what would bring this on.  He will he also walks about a quarter of a mile with his dog who is 18 years old.  Says that he does no exercise.  Patient has multiple cardiac risk factors including diabetes hypertension and hyperlipidemia.      The following portions of the patient's history were reviewed and updated as appropriate: allergies, current medications, past family history, past medical history, past social history, past surgical history and problem list.      VITAL SIGNS     Visit Vitals  /78 (BP Location: Left arm)   Pulse 86   Ht 177.8 cm (70\")   Wt 82.3 kg (181 lb 6.4 oz)   BMI 26.03 kg/m²         Wt Readings from Last 3 Encounters:   10/20/21 82.3 kg (181 lb 6.4 oz)   08/31/21 81.6 kg (180 lb)   07/02/21 81.7 kg (180 lb 3.2 oz)     Body mass index is 26.03 kg/m².      REVIEW OF SYSTEMS   Review of Systems   Constitutional: Positive for weight loss.   Musculoskeletal: Positive for joint pain and joint swelling.   Psychiatric/Behavioral: Positive for depression.   All other systems reviewed and " are negative.          PHYSICAL EXAMINATION     Vitals reviewed.   Constitutional:       Appearance: Not in distress.   Pulmonary:      Effort: Pulmonary effort is normal.      Breath sounds: Normal breath sounds.   Cardiovascular:      Normal rate. Regular rhythm. Normal S1. Normal S2.      Murmurs: There is no murmur.      No gallop. No click. No rub.   Pulses:     Intact distal pulses.   Edema:     Peripheral edema absent.   Neurological:      Mental Status: Alert and oriented to person, place and time.           REVIEWED DATA       ECG 12 Lead    Date/Time: 10/20/2021 3:33 PM  Performed by: Angel Borges MD  Authorized by: Angel Borges MD   Comparison: compared with previous ECG from 6/11/2018  Similar to previous ECG  Rhythm: sinus rhythm  Other findings: non-specific ST-T wave changes    Clinical impression: non-specific ECG            Cardiac Procedures:  1.     Lipid Panel    Lipid Panel 1/13/21 7/2/21   Total Cholesterol 122 150   Triglycerides 110 168 (A)   HDL Cholesterol 33 (A) 34 (A)   VLDL Cholesterol 20 29   LDL Cholesterol  69 87   (A) Abnormal value                ASSESSMENT & PLAN      Diagnosis Plan   1. Two-vessel coronary artery disease  Stress Test With Myocardial Perfusion One Day         SUMMARY/DISCUSSION  1. Coronary artery disease.  Patient with known two-vessel coronary artery disease with a borderline lesion by cardiac catheterization in 2016.  With his upcoming surgery its been 5 years since his perfusion status has been reassessed.  Letter that he is going need a nuclear perfusion study prior to proceeding with any type of surgery.  2.   Hypertension blood pressure is good today.  3.  History of sleep apnea patient does not utilize a CPAP.          MEDICATIONS         Discharge Medications          Accurate as of October 20, 2021  3:31 PM. If you have any questions, ask your nurse or doctor.            Changes to Medications      Instructions Start Date    HYDROcodone-acetaminophen 7.5-325 MG per tablet  Commonly known as: NORCO  What changed:   · when to take this  · reasons to take this   1 tablet, Oral, 2 Times Daily, Prescribed by Dr. Jacobo         Continue These Medications      Instructions Start Date   Accu-Chek Guide test strip  Generic drug: glucose blood   Dx code E11.29 testing bs 3 x day      accu-chek soft touch lancets   Dx code E11.29 testing bs 3 x day      amLODIPine 5 MG tablet  Commonly known as: NORVASC   5 mg, Oral, Daily      amoxicillin-clavulanate 875-125 MG per tablet  Commonly known as: Augmentin   1 tablet, Oral, Every 12 Hours Scheduled, One PO BID for infection with food      atorvastatin 40 MG tablet  Commonly known as: LIPITOR   TAKE ONE TABLET BY MOUTH EVERY NIGHT      azelastine 0.1 % nasal spray  Commonly known as: ASTELIN   No dose, route, or frequency recorded.      B-D ULTRAFINE III SHORT PEN 31G X 8 MM misc  Generic drug: Insulin Pen Needle   USE ONE PEN NEEDLE DAILY      clopidogrel 75 MG tablet  Commonly known as: PLAVIX   75 mg, Oral, Daily      cyclobenzaprine 10 MG tablet  Commonly known as: FLEXERIL   10 mg, Oral, 3 Times Daily PRN      empagliflozin 25 MG tablet tablet  Commonly known as: Jardiance   25 mg, Oral, Daily      escitalopram 20 MG tablet  Commonly known as: LEXAPRO   20 mg, Oral, Daily      esomeprazole 20 MG capsule  Commonly known as: nexIUM   20 mg, Oral, Every Morning Before Breakfast      fenofibrate 145 MG tablet  Commonly known as: TRICOR   TAKE ONE TABLET BY MOUTH DAILY      fluticasone 50 MCG/ACT nasal spray  Commonly known as: FLONASE   No dose, route, or frequency recorded.      gabapentin 600 MG tablet  Commonly known as: NEURONTIN   600 mg, Oral, 3 Times Daily      Insulin Degludec 200 UNIT/ML solution pen-injector pen injection  Commonly known as: TRESIBA FLEXTOUCH   20 Units, Subcutaneous, Every Morning, Titration max of 100 units daily      levothyroxine 50 MCG tablet  Commonly known as:  SYNTHROID, LEVOTHROID   50 mcg, Oral, Daily, for thyroid      lisinopril 20 MG tablet  Commonly known as: PRINIVIL,ZESTRIL   20 mg, Oral, Daily      metFORMIN 1000 MG tablet  Commonly known as: GLUCOPHAGE   TAKE ONE TABLET BY MOUTH TWICE A DAY WITH MEALS      montelukast 10 MG tablet  Commonly known as: SINGULAIR   No dose, route, or frequency recorded.      Nitrostat 0.4 MG SL tablet  Generic drug: nitroglycerin   0.4 mg, Sublingual, Every 5 Minutes PRN      omega-3 acid ethyl esters 1 g capsule  Commonly known as: Lovaza   2 g, Oral, 2 Times Daily      tamsulosin 0.4 MG capsule 24 hr capsule  Commonly known as: FLOMAX   0.4 mg, Oral, Daily      Vitamin D3 1.25 MG (64323 UT) capsule   50,000 Units, Oral, Every 7 Days      zolpidem 10 MG tablet  Commonly known as: AMBIEN   10 mg, Oral, Nightly PRN                 **Dragon Disclaimer:   Much of this encounter note is an electronic transcription/translation of spoken language to printed text. The electronic translation of spoken language may permit erroneous, or at times, nonsensical words or phrases to be inadvertently transcribed. Although I have reviewed the note for such errors, some may still exist.

## 2021-10-25 ENCOUNTER — TELEPHONE (OUTPATIENT)
Dept: FAMILY MEDICINE CLINIC | Facility: CLINIC | Age: 76
End: 2021-10-25

## 2021-10-25 DIAGNOSIS — J34.89 MASS OF CAVITY OF NOSE: Primary | ICD-10-CM

## 2021-11-01 RX ORDER — ATORVASTATIN CALCIUM 40 MG/1
TABLET, FILM COATED ORAL
Qty: 90 TABLET | Refills: 2 | Status: SHIPPED | OUTPATIENT
Start: 2021-11-01 | End: 2022-08-22 | Stop reason: SDUPTHER

## 2021-11-02 ENCOUNTER — HOSPITAL ENCOUNTER (OUTPATIENT)
Dept: CARDIOLOGY | Facility: HOSPITAL | Age: 76
Discharge: HOME | End: 2021-11-02
Admitting: INTERNAL MEDICINE

## 2021-11-02 VITALS — WEIGHT: 180 LBS | BODY MASS INDEX: 25.77 KG/M2 | HEIGHT: 70 IN

## 2021-11-02 DIAGNOSIS — I25.10 TWO-VESSEL CORONARY ARTERY DISEASE: ICD-10-CM

## 2021-11-02 LAB
BH CV NUCLEAR PRIOR STUDY: 3
BH CV REST NUCLEAR ISOTOPE DOSE: 10.1 MCI
BH CV STRESS BP STAGE 1: NORMAL
BH CV STRESS COMMENTS STAGE 1: NORMAL
BH CV STRESS DOSE REGADENOSON STAGE 1: 0.4
BH CV STRESS DURATION MIN STAGE 1: 0
BH CV STRESS DURATION SEC STAGE 1: 10
BH CV STRESS HR STAGE 1: 108
BH CV STRESS NUCLEAR ISOTOPE DOSE: 35.4 MCI
BH CV STRESS PROTOCOL 1: NORMAL
BH CV STRESS RECOVERY BP: NORMAL MMHG
BH CV STRESS RECOVERY HR: 90 BPM
BH CV STRESS STAGE 1: 1
LV EF NUC BP: 58 %
MAXIMAL PREDICTED HEART RATE: 142 BPM
PERCENT MAX PREDICTED HR: 76.06 %
STRESS BASELINE BP: NORMAL MMHG
STRESS BASELINE HR: 94 BPM
STRESS PERCENT HR: 89 %
STRESS POST EXERCISE DUR SEC: 10 SEC
STRESS POST PEAK BP: NORMAL MMHG
STRESS POST PEAK HR: 108 BPM
STRESS TARGET HR: 121 BPM

## 2021-11-02 PROCEDURE — 78452 HT MUSCLE IMAGE SPECT MULT: CPT

## 2021-11-02 PROCEDURE — 93017 CV STRESS TEST TRACING ONLY: CPT

## 2021-11-02 PROCEDURE — A9502 TC99M TETROFOSMIN: HCPCS | Performed by: INTERNAL MEDICINE

## 2021-11-02 PROCEDURE — 93016 CV STRESS TEST SUPVJ ONLY: CPT | Performed by: INTERNAL MEDICINE

## 2021-11-02 PROCEDURE — 25010000002 REGADENOSON 0.4 MG/5ML SOLUTION: Performed by: INTERNAL MEDICINE

## 2021-11-02 PROCEDURE — 0 TECHNETIUM TETROFOSMIN KIT: Performed by: INTERNAL MEDICINE

## 2021-11-02 PROCEDURE — 78452 HT MUSCLE IMAGE SPECT MULT: CPT | Performed by: INTERNAL MEDICINE

## 2021-11-02 PROCEDURE — 93018 CV STRESS TEST I&R ONLY: CPT | Performed by: INTERNAL MEDICINE

## 2021-11-02 RX ADMIN — TETROFOSMIN 1 DOSE: 1.38 INJECTION, POWDER, LYOPHILIZED, FOR SOLUTION INTRAVENOUS at 13:00

## 2021-11-02 RX ADMIN — TETROFOSMIN 1 DOSE: 1.38 INJECTION, POWDER, LYOPHILIZED, FOR SOLUTION INTRAVENOUS at 14:16

## 2021-11-02 RX ADMIN — REGADENOSON 0.4 MG: 0.08 INJECTION, SOLUTION INTRAVENOUS at 14:16

## 2021-11-24 ENCOUNTER — TELEPHONE (OUTPATIENT)
Dept: CARDIOLOGY | Facility: CLINIC | Age: 76
End: 2021-11-24

## 2021-11-24 NOTE — TELEPHONE ENCOUNTER
"Sofie with  Office is calling regarding clearance.    He is schedule for a circumcision in the next week. Is it ok for the pt to hold his plavix 7 days prior?    Can you give clearance on his blood thinner or should it come from his PCP since they refilled the RX last?    #: [phone]  Fax#\" [phone]   "

## 2022-01-15 NOTE — PROGRESS NOTES
Subjective   Ben Walsh is a 76 y.o. male.     CC: Video Visit for Medical Management    History of Present Illness     Chief Complaint:   Chief Complaint   Patient presents with   • Insomnia     med refill due / video    • Hypertension   • Benign Prostatic Hypertrophy   • difficulty to swallow / dry mouth     hoarsness  1-2 months worse x  2 weeks   • Hypothyroidism   • Depression       Ben Walsh 76 y.o. male who presents today for Medical Management of the below listed issues and medication refills. He  has a problem list of   Patient Active Problem List   Diagnosis   • Allergic rhinitis   • ADD (attention deficit disorder)   • DDD (degenerative disc disease), lumbar   • BPH (benign prostatic hypertrophy)   • Chronic pain   • ASCVD (arteriosclerotic cardiovascular disease)   • Depression   • Diverticulosis of colon   • GERD (gastroesophageal reflux disease)   • Hiatal hernia   • Hyperlipidemia   • Essential hypertension   • Primary hypothyroidism   • Insomnia   • Osteoarthritis, multiple sites   • Acute pancreatitis   • Vitamin D deficiency   • Glaucoma   • Abnormal nuclear stress test   • Two-vessel coronary artery disease   • SAEED (dyspnea on exertion)   • Obstructive sleep apnea syndrome   • Patent foramen ovale   • Snoring   • Disorder of rotator cuff   • History of COPD   • Type 2 diabetes mellitus with microalbuminuria, without long-term current use of insulin (HCC)   • Carpal tunnel syndrome of right wrist   • Abnormal electrocardiogram   • Atypical chest pain   • Type 2 diabetes mellitus with peripheral neuropathy ([facility])   • Atrial fibrillation and flutter ([facility])   .  Since the last visit, He has overall felt well except for a roughly 1-2 month h/o some dry mouth and difficulty swallowing. Reports a lot of hoarseness with it, although no ST. Swallowing worse when supine. Worse over the past few weeks. No choking reported.   he has been compliant with   Current Outpatient Medications:   •   ACCU-CHEK GUIDE test strip, Dx code E11.29 testing bs 3 x day, Disp: 300 each, Rfl: 1  •  amLODIPine (NORVASC) 5 MG tablet, Take 1 tablet by mouth Daily., Disp: 90 tablet, Rfl: 1  •  amoxicillin-clavulanate (Augmentin) 875-125 MG per tablet, Take 1 tablet by mouth Every 12 (Twelve) Hours. One PO BID for infection with food, Disp: 20 tablet, Rfl: 5  •  atorvastatin (LIPITOR) 40 MG tablet, TAKE ONE TABLET BY MOUTH ONCE NIGHTLY, Disp: 90 tablet, Rfl: 2  •  azelastine (ASTELIN) 0.1 % nasal spray, , Disp: , Rfl:   •  B-D ULTRAFINE III SHORT PEN 31G X 8 MM misc, USE ONE PEN NEEDLE DAILY, Disp: 100 each, Rfl: 3  •  Cholecalciferol (Vitamin D3) 1.25 MG (77542 UT) capsule, Take 1 capsule by mouth Every 7 (Seven) Days., Disp: 15 capsule, Rfl: 3  •  clopidogrel (PLAVIX) 75 MG tablet, Take 1 tablet by mouth Daily., Disp: 90 tablet, Rfl: 1  •  cyclobenzaprine (FLEXERIL) 10 MG tablet, Take 1 tablet by mouth 3 (Three) Times a Day As Needed for Muscle Spasms., Disp: 270 tablet, Rfl: 3  •  empagliflozin (Jardiance) 25 MG tablet tablet, Take 1 tablet by mouth Daily., Disp: 30 tablet, Rfl: 5  •  escitalopram (LEXAPRO) 20 MG tablet, Take 1 tablet by mouth Daily., Disp: 90 tablet, Rfl: 1  •  esomeprazole (NexIUM) 20 MG capsule, Take 20 mg by mouth every morning before breakfast., Disp: , Rfl:   •  fenofibrate (TRICOR) 145 MG tablet, TAKE ONE TABLET BY MOUTH DAILY, Disp: 90 tablet, Rfl: 1  •  fluticasone (FLONASE) 50 MCG/ACT nasal spray, , Disp: , Rfl:   •  gabapentin (NEURONTIN) 600 MG tablet, Take 600 mg by mouth 3 (Three) Times a Day., Disp: , Rfl:   •  HYDROcodone-acetaminophen (NORCO) 7.5-325 MG per tablet, Take 1 tablet by mouth 2 (Two) Times a Day. Prescribed by Dr. Jacobo (Patient taking differently: Take 1 tablet by mouth Every 8 (Eight) Hours As Needed. Prescribed by Dr. Jacobo), Disp: , Rfl:   •  Insulin Degludec (TRESIBA FLEXTOUCH) 200 UNIT/ML solution pen-injector pen injection, Inject 20 Units under the skin into the  "appropriate area as directed Every Morning. Titration max of 100 units daily, Disp: 15 mL, Rfl: 0  •  Lancets (ACCU-CHEK SOFT TOUCH) lancets, Dx code E11.29 testing bs 3 x day, Disp: 300 each, Rfl: 1  •  levothyroxine (SYNTHROID, LEVOTHROID) 50 MCG tablet, Take 1 tablet by mouth Daily. for thyroid, Disp: 90 tablet, Rfl: 1  •  lisinopril (PRINIVIL,ZESTRIL) 20 MG tablet, Take 1 tablet by mouth Daily., Disp: 90 tablet, Rfl: 1  •  metFORMIN (GLUCOPHAGE) 1000 MG tablet, TAKE ONE TABLET BY MOUTH TWICE A DAY WITH MEALS, Disp: 180 tablet, Rfl: 1  •  montelukast (SINGULAIR) 10 MG tablet, , Disp: , Rfl:   •  NITROSTAT 0.4 MG SL tablet, Place 0.4 mg under the tongue every 5 (five) minutes as needed., Disp: , Rfl:   •  omega-3 acid ethyl esters (LOVAZA) 1 g capsule, Take 2 capsules by mouth 2 (Two) Times a Day., Disp: 120 capsule, Rfl: 5  •  tamsulosin (FLOMAX) 0.4 MG capsule 24 hr capsule, Take 1 capsule by mouth Daily., Disp: 90 capsule, Rfl: 1  •  zolpidem (AMBIEN) 10 MG tablet, Take 1 tablet by mouth At Night As Needed for Sleep., Disp: 30 tablet, Rfl: 2.  He denies medication side effects.    All of the other chronic condition(s) listed above are stable w/o issues.    Ht 177.8 cm (70\")   Wt 81.6 kg (180 lb)   BMI 25.83 kg/m²     Results for orders placed or performed during the hospital encounter of 11/02/21   Stress Test With Myocardial Perfusion One Day   Result Value Ref Range    Target HR (85%) 121 bpm    Max. Pred. HR (100%) 142 bpm    Nuclear Prior Study 3     BH CV REST NUCLEAR ISOTOPE DOSE 10.1 mCi    BH CV STRESS NUCLEAR ISOTOPE DOSE 35.4 mCi     CV STRESS PROTOCOL 1 Pharmacologic     Stage 1 1     HR Stage 1 108     BP Stage 1 124/74     Duration Min Stage 1 0     Duration Sec Stage 1 10     Stress Dose Regadenoson Stage 1 0.4     Stress Comments Stage 1 10 sec bolus injection     Baseline HR 94 bpm    Baseline /70 mmHg    Peak  bpm    Percent Max Pred HR 76.06 %    Percent Target HR 89 %    Peak " /74 mmHg    Recovery HR 90 bpm    Recovery /70 mmHg    Exercise duration (sec) 10 sec    Nuc Stress EF 58 %       Pt sees endocrine for his DM.      The following portions of the patient's history were reviewed and updated as appropriate: allergies, current medications, past family history, past medical history, past social history, past surgical history, and problem list.    Review of Systems   Constitutional: Negative for activity change, chills and fever.   Respiratory: Negative for cough.    Cardiovascular: Negative for chest pain.   Psychiatric/Behavioral: Negative for dysphoric mood.       Objective   Physical Exam  Constitutional:       General: He is not in acute distress.     Appearance: He is well-developed.   Cardiovascular:      Rate and Rhythm: Normal rate and regular rhythm.   Pulmonary:      Effort: Pulmonary effort is normal.      Breath sounds: Normal breath sounds.   Neurological:      Mental Status: He is alert and oriented to person, place, and time.   Psychiatric:         Behavior: Behavior normal.         Thought Content: Thought content normal.       The patient has read and signed the [facility] Controlled Substance Contract.  I will continue to see patient for regular follow up appointments.  They are well controlled on their medication.  NISHANT has been reviewed by me and is updated every 3 months. The patient is aware of the potential for addiction and dependence.      Assessment/Plan   Diagnoses and all orders for this visit:    1. Essential hypertension (Primary)  -     amLODIPine (NORVASC) 5 MG tablet; Take 1 tablet by mouth Daily.  Dispense: 90 tablet; Refill: 1  -     lisinopril (PRINIVIL,ZESTRIL) 20 MG tablet; Take 1 tablet by mouth Daily.  Dispense: 90 tablet; Refill: 1    2. Two-vessel coronary artery disease  -     clopidogrel (PLAVIX) 75 MG tablet; Take 1 tablet by mouth Daily.  Dispense: 90 tablet; Refill: 1    3. Recurrent major depressive disorder, in full  remission (HCC)  -     escitalopram (LEXAPRO) 20 MG tablet; Take 1 tablet by mouth Daily.  Dispense: 90 tablet; Refill: 1    4. Primary hypothyroidism  -     levothyroxine (SYNTHROID, LEVOTHROID) 50 MCG tablet; Take 1 tablet by mouth Daily. for thyroid  Dispense: 90 tablet; Refill: 1    5. Primary insomnia  -     zolpidem (AMBIEN) 10 MG tablet; Take 1 tablet by mouth At Night As Needed for Sleep.  Dispense: 30 tablet; Refill: 2    6. Benign prostatic hyperplasia without lower urinary tract symptoms  -     tamsulosin (FLOMAX) 0.4 MG capsule 24 hr capsule; Take 1 capsule by mouth Daily.  Dispense: 90 capsule; Refill: 1    7. Dysphagia, unspecified type  -     Ambulatory Referral to ENT (Otolaryngology)    Pt to elevate the HOB and move his Nexium to PM. Will get ENT to see and r/o pathology (LPR vs Polyps vs ?).  Spent  14   minutes with chart and interview and consent for this encounter given by the patient.  You have chosen to receive care through a telehealth visit.  Do you consent to use a video/audio connection for your medical care today? Yes

## 2022-01-17 ENCOUNTER — TELEMEDICINE (OUTPATIENT)
Dept: FAMILY MEDICINE CLINIC | Facility: CLINIC | Age: 77
End: 2022-01-17

## 2022-01-17 VITALS — BODY MASS INDEX: 25.77 KG/M2 | HEIGHT: 70 IN | WEIGHT: 180 LBS

## 2022-01-17 DIAGNOSIS — I25.10 TWO-VESSEL CORONARY ARTERY DISEASE: Chronic | ICD-10-CM

## 2022-01-17 DIAGNOSIS — E03.9 PRIMARY HYPOTHYROIDISM: Chronic | ICD-10-CM

## 2022-01-17 DIAGNOSIS — R13.10 DYSPHAGIA, UNSPECIFIED TYPE: ICD-10-CM

## 2022-01-17 DIAGNOSIS — N40.0 BENIGN PROSTATIC HYPERPLASIA WITHOUT LOWER URINARY TRACT SYMPTOMS: Chronic | ICD-10-CM

## 2022-01-17 DIAGNOSIS — F33.42 RECURRENT MAJOR DEPRESSIVE DISORDER, IN FULL REMISSION: Chronic | ICD-10-CM

## 2022-01-17 DIAGNOSIS — F51.01 PRIMARY INSOMNIA: Chronic | ICD-10-CM

## 2022-01-17 DIAGNOSIS — I10 ESSENTIAL HYPERTENSION: Primary | Chronic | ICD-10-CM

## 2022-01-17 PROCEDURE — 99214 OFFICE O/P EST MOD 30 MIN: CPT | Performed by: FAMILY MEDICINE

## 2022-01-17 RX ORDER — ESCITALOPRAM OXALATE 20 MG/1
20 TABLET ORAL DAILY
Qty: 90 TABLET | Refills: 1 | Status: SHIPPED | OUTPATIENT
Start: 2022-01-17 | End: 2022-06-07 | Stop reason: SDUPTHER

## 2022-01-17 RX ORDER — AMLODIPINE BESYLATE 5 MG/1
5 TABLET ORAL DAILY
Qty: 90 TABLET | Refills: 1 | Status: SHIPPED | OUTPATIENT
Start: 2022-01-17 | End: 2022-06-07 | Stop reason: SDUPTHER

## 2022-01-17 RX ORDER — LISINOPRIL 20 MG/1
20 TABLET ORAL DAILY
Qty: 90 TABLET | Refills: 1 | Status: SHIPPED | OUTPATIENT
Start: 2022-01-17 | End: 2022-05-16

## 2022-01-17 RX ORDER — LEVOTHYROXINE SODIUM 0.05 MG/1
50 TABLET ORAL DAILY
Qty: 90 TABLET | Refills: 1 | Status: SHIPPED | OUTPATIENT
Start: 2022-01-17 | End: 2022-06-07 | Stop reason: SDUPTHER

## 2022-01-17 RX ORDER — CLOPIDOGREL BISULFATE 75 MG/1
75 TABLET ORAL DAILY
Qty: 90 TABLET | Refills: 1 | Status: SHIPPED | OUTPATIENT
Start: 2022-01-17 | End: 2022-06-07 | Stop reason: SDUPTHER

## 2022-01-17 RX ORDER — TAMSULOSIN HYDROCHLORIDE 0.4 MG/1
1 CAPSULE ORAL DAILY
Qty: 90 CAPSULE | Refills: 1 | Status: SHIPPED | OUTPATIENT
Start: 2022-01-17 | End: 2022-06-07 | Stop reason: SDUPTHER

## 2022-01-17 RX ORDER — ZOLPIDEM TARTRATE 10 MG/1
10 TABLET ORAL NIGHTLY PRN
Qty: 30 TABLET | Refills: 2 | Status: SHIPPED | OUTPATIENT
Start: 2022-01-17 | End: 2022-06-07 | Stop reason: SDUPTHER

## 2022-02-18 NOTE — PROGRESS NOTES
Subjective   Ben Walsh is a 76 y.o. male.     F/u for dm 2, hypertension, hyperlipidemia, vitamin d def, BPH, COPD/ testing bs 1-3  x day / last dm eye exam 2/6/19 with dr Francois/ last dm foot exam today  with dr Miller           Patient is a 76-year-old male who came in for follow-up.       He has known diabetes since 2013.  He has been on metformin 1000 mg twice a day, Jardiance 25 mg/day and Tresiba 20 every AM.  Fasting glucose 100-125 .  After supper glucose 110-180.    He has gained 3 lbs since 7/21. His last meal was at 2 PM     He has microalbuminuria on urine sample taken in 2014.  Repeat urine microalbumin done in 7/21 was normal.  He has been on lisinopril.  His last eye examination was in 2021.  He has no retinopathy.     He has chronic neck and lower back pain due to degenerative disc disease.  He is no longer having burning in his feet.    He denies muscle weakness or incontinence.  He has pain in his calves after walking one fourth of a mile. Arterial ultrasound of the lower extremities done in March 2020 is normal. He is on gabapentin 600 mg every morning, 600 mg every afternoon and 600 mg every evening prescribed by Dr. Jacobo.  He had epidural injection with partial improvement for 2-3 days.     He has hyperlipidemia and has been on Lipitor 40 mg once a day, TriCor 145 mg once a day, and fish oil 2 g twice a day..  He denies any myalgia.  CT scan of the abdomen and ultrasound of the abdomen in 2012 showed fatty infiltration of the liver and a right kidney stone.       He has hypothyroidism and has been on levothyroxine 50 µg per day.  He denies heat or cold intolerance.  He denies bowel changes.     He has hypertension and has been on amlodipine and lisinopril.  He had a false positive stress test in February 2016.  He had a cardiac catheterization in March 2016.  The stent to the mid LAD is patent.  There is mild luminal irregularities of the proximal LAD.  The left circumflex complex  "has mild luminal plaque.  The proximal RCA has a stable 50% stenosis.  He is on Plavix 75 mg once a day.  He denies chest pain or shortness of breath.  He follows with Dr. Borges.      He has history of vitamin D deficiency and is on vitamin D3 50,000 units weekly prescribed by Dr. Marcus.  He denies chronic diarrhea.     The following portions of the patient's history were reviewed and updated as appropriate: allergies, current medications, past family history, past medical history, past social history, past surgical history and problem list.    Review of Systems   Eyes: Negative for visual disturbance.   Respiratory: Negative for shortness of breath and wheezing.    Cardiovascular: Negative.    Gastrointestinal: Negative.    Endocrine: Negative for cold intolerance and heat intolerance.   Genitourinary: Negative.    Musculoskeletal: Negative for myalgias.   Neurological: Negative for numbness.     Objective      Vitals:    02/25/22 1459   BP: 122/72   Pulse: 84   SpO2: 98%   Weight: 83.1 kg (183 lb 3.2 oz)   Height: 177.8 cm (70\")     Physical Exam  Constitutional:       General: He is not in acute distress.     Appearance: Normal appearance. He is obese. He is not ill-appearing, toxic-appearing or diaphoretic.   HENT:      Mouth/Throat:      Pharynx: No oropharyngeal exudate or posterior oropharyngeal erythema.   Eyes:      General: No scleral icterus.        Right eye: No discharge.         Left eye: No discharge.      Extraocular Movements: Extraocular movements intact.      Conjunctiva/sclera: Conjunctivae normal.   Neck:      Vascular: No carotid bruit.   Cardiovascular:      Rate and Rhythm: Normal rate and regular rhythm.      Pulses: Normal pulses.      Heart sounds: Normal heart sounds. No murmur heard.  No friction rub. No gallop.    Pulmonary:      Effort: Pulmonary effort is normal. No respiratory distress.      Breath sounds: Normal breath sounds. No stridor. No rales.   Chest:      Chest wall: No " tenderness.   Abdominal:      General: Bowel sounds are normal. There is no distension.      Palpations: Abdomen is soft. There is no mass.      Tenderness: There is no right CVA tenderness or left CVA tenderness.   Musculoskeletal:         General: Normal range of motion.      Cervical back: Normal range of motion. No rigidity or tenderness.   Lymphadenopathy:      Cervical: No cervical adenopathy.   Skin:     General: Skin is warm and dry.      Coloration: Skin is not jaundiced or pale.      Findings: No lesion or rash.   Neurological:      General: No focal deficit present.      Mental Status: He is alert and oriented to person, place, and time.   Psychiatric:         Mood and Affect: Mood normal.         Behavior: Behavior normal.       Hospital Outpatient Visit on 11/02/2021   Component Date Value Ref Range Status   • Target HR (85%) 11/02/2021 121  bpm Final   • Max. Pred. HR (100%) 11/02/2021 142  bpm Final   • Nuclear Prior Study 11/02/2021 3   Final   • BH CV REST NUCLEAR ISOTOPE DOSE 11/02/2021 10.1  mCi Final   • BH CV STRESS NUCLEAR ISOTOPE DOSE 11/02/2021 35.4  mCi Final   • BH CV STRESS PROTOCOL 1 11/02/2021 Pharmacologic   Final   • Stage 1 11/02/2021 1   Final   • HR Stage 1 11/02/2021 108   Final   • BP Stage 1 11/02/2021 124/74   Final   • Duration Min Stage 1 11/02/2021 0   Final   • Duration Sec Stage 1 11/02/2021 10   Final   • Stress Dose Regadenoson Stage 1 11/02/2021 0.4   Final   • Stress Comments Stage 1 11/02/2021 10 sec bolus injection   Final   • Baseline HR 11/02/2021 94  bpm Final   • Baseline BP 11/02/2021 126/70  mmHg Final   • Peak HR 11/02/2021 108  bpm Final   • Percent Max Pred HR 11/02/2021 76.06  % Final   • Percent Target HR 11/02/2021 89  % Final   • Peak BP 11/02/2021 124/74  mmHg Final   • Recovery HR 11/02/2021 90  bpm Final   • Recovery BP 11/02/2021 118/70  mmHg Final   • Exercise duration (sec) 11/02/2021 10  sec Final   • Nuc Stress EF 11/02/2021 58  % Final      Assessment/Plan   Diagnoses and all orders for this visit:    1. Type 2 diabetes mellitus with peripheral neuropathy (HCC) (Primary)  -     Comprehensive Metabolic Panel  -     Hemoglobin A1c    2. Hyperlipidemia, unspecified hyperlipidemia type  -     Comprehensive Metabolic Panel  -     Lipid Panel    3. Essential hypertension  -     Comprehensive Metabolic Panel    4. Primary hypothyroidism  -     TSH  -     T4, Free    5. Vitamin D deficiency  -     Vitamin D 25 Hydroxy    6. Type 2 diabetes mellitus with microalbuminuria, without long-term current use of insulin (HCC)  -     Comprehensive Metabolic Panel  -     Hemoglobin A1c      Continue Jardiance 25 mg/day, Metformin 1000 mg twice daily, and Tresiba 20 units every morning.  Continue lisinopril, and amlodipine  Continue gabapentin.  Continue Lipitor 40 mg/day, TriCor 145 mg/day and Vascepa 2 g twice a day.  Continue levothyroxine 50 mcg/day.  Continue vitamin D supplements per Dr. Marcus.    Copy of my note sent to Dr. Marcus, Dr. Jacobo and Dr. Borges    RTC 4 mos.

## 2022-02-25 ENCOUNTER — OFFICE VISIT (OUTPATIENT)
Dept: ENDOCRINOLOGY | Age: 77
End: 2022-02-25

## 2022-02-25 VITALS
OXYGEN SATURATION: 98 % | DIASTOLIC BLOOD PRESSURE: 72 MMHG | HEIGHT: 70 IN | BODY MASS INDEX: 26.23 KG/M2 | WEIGHT: 183.2 LBS | SYSTOLIC BLOOD PRESSURE: 122 MMHG | HEART RATE: 84 BPM

## 2022-02-25 DIAGNOSIS — R80.9 TYPE 2 DIABETES MELLITUS WITH MICROALBUMINURIA, WITHOUT LONG-TERM CURRENT USE OF INSULIN: ICD-10-CM

## 2022-02-25 DIAGNOSIS — E78.5 HYPERLIPIDEMIA, UNSPECIFIED HYPERLIPIDEMIA TYPE: ICD-10-CM

## 2022-02-25 DIAGNOSIS — E11.42 TYPE 2 DIABETES MELLITUS WITH PERIPHERAL NEUROPATHY: Primary | ICD-10-CM

## 2022-02-25 DIAGNOSIS — E03.9 PRIMARY HYPOTHYROIDISM: ICD-10-CM

## 2022-02-25 DIAGNOSIS — E11.29 TYPE 2 DIABETES MELLITUS WITH MICROALBUMINURIA, WITHOUT LONG-TERM CURRENT USE OF INSULIN: ICD-10-CM

## 2022-02-25 DIAGNOSIS — E55.9 VITAMIN D DEFICIENCY: ICD-10-CM

## 2022-02-25 DIAGNOSIS — I10 ESSENTIAL HYPERTENSION: ICD-10-CM

## 2022-02-25 PROCEDURE — 99214 OFFICE O/P EST MOD 30 MIN: CPT | Performed by: INTERNAL MEDICINE

## 2022-03-11 DIAGNOSIS — M62.838 MUSCLE SPASM: ICD-10-CM

## 2022-03-12 DIAGNOSIS — E78.5 HYPERLIPIDEMIA, UNSPECIFIED HYPERLIPIDEMIA TYPE: ICD-10-CM

## 2022-03-12 DIAGNOSIS — R80.9 TYPE 2 DIABETES MELLITUS WITH MICROALBUMINURIA, WITHOUT LONG-TERM CURRENT USE OF INSULIN: ICD-10-CM

## 2022-03-12 DIAGNOSIS — E11.29 TYPE 2 DIABETES MELLITUS WITH MICROALBUMINURIA, WITHOUT LONG-TERM CURRENT USE OF INSULIN: ICD-10-CM

## 2022-03-12 RX ORDER — CYCLOBENZAPRINE HCL 10 MG
TABLET ORAL
Qty: 90 TABLET | Refills: 5 | Status: SHIPPED | OUTPATIENT
Start: 2022-03-12 | End: 2023-02-18 | Stop reason: SDUPTHER

## 2022-03-14 RX ORDER — FENOFIBRATE 145 MG/1
TABLET, COATED ORAL
Qty: 90 TABLET | Refills: 1 | Status: SHIPPED | OUTPATIENT
Start: 2022-03-14 | End: 2022-09-27

## 2022-03-14 RX ORDER — EMPAGLIFLOZIN 25 MG/1
TABLET, FILM COATED ORAL
Qty: 30 TABLET | Refills: 5 | Status: SHIPPED | OUTPATIENT
Start: 2022-03-14 | End: 2022-09-12

## 2022-04-30 DIAGNOSIS — R80.9 TYPE 2 DIABETES MELLITUS WITH MICROALBUMINURIA, WITHOUT LONG-TERM CURRENT USE OF INSULIN: ICD-10-CM

## 2022-04-30 DIAGNOSIS — E11.29 TYPE 2 DIABETES MELLITUS WITH MICROALBUMINURIA, WITHOUT LONG-TERM CURRENT USE OF INSULIN: ICD-10-CM

## 2022-05-04 NOTE — TELEPHONE ENCOUNTER
Antihyperglycemics Protocol Failed 04/30/2022 12:52 PM   Protocol Details  HgA1c in past 6 months

## 2022-05-15 ENCOUNTER — TELEPHONE (OUTPATIENT)
Dept: FAMILY MEDICINE CLINIC | Facility: CLINIC | Age: 77
End: 2022-05-15

## 2022-05-15 NOTE — TELEPHONE ENCOUNTER
My step dad is having tickle cough.  Can he stop Lisinopril and change to ARB?  ? Valsartan 160mg daily.  It was happening at his last visit.  He forgot.  Saw ENT already.  Not GERD either---had him try PPI BID.  Can I order CXR to make sure clear?   Or make appt.  Would also f/u if all neg and cough does not resolve with change to ARB    Per Dr. Marcus I did change to valsartan and plan chest x-ray if continued cough after 2 weeks

## 2022-05-16 RX ORDER — VALSARTAN 160 MG/1
TABLET ORAL
Qty: 90 TABLET | Refills: 1 | Status: SHIPPED | OUTPATIENT
Start: 2022-05-16 | End: 2022-06-07 | Stop reason: SDUPTHER

## 2022-05-24 RX ORDER — PEN NEEDLE, DIABETIC 31 GX5/16"
NEEDLE, DISPOSABLE MISCELLANEOUS
Qty: 100 EACH | Refills: 3 | Status: SHIPPED | OUTPATIENT
Start: 2022-05-24

## 2022-06-07 NOTE — PROGRESS NOTES
Subjective   Ben Walsh is a 77 y.o. male.     CC: Video Visit for Medical Management    History of Present Illness     Chief Complaint:   Chief Complaint   Patient presents with   • Hypertension     VIDEO VISIT PER PT    • Hyperlipidemia   • Coronary Artery Disease   • Insomnia       Ben Walsh 77 y.o. male who presents today for Medical Management of the below listed issues. He  has a problem list of   Patient Active Problem List   Diagnosis   • Allergic rhinitis   • ADD (attention deficit disorder)   • DDD (degenerative disc disease), lumbar   • BPH (benign prostatic hypertrophy)   • Chronic pain   • ASCVD (arteriosclerotic cardiovascular disease)   • Depression   • Diverticulosis of colon   • GERD (gastroesophageal reflux disease)   • Hiatal hernia   • Hyperlipidemia   • Essential hypertension   • Primary hypothyroidism   • Insomnia   • Osteoarthritis, multiple sites   • Acute pancreatitis   • Vitamin D deficiency   • Glaucoma   • Abnormal nuclear stress test   • Two-vessel coronary artery disease   • SAEED (dyspnea on exertion)   • Obstructive sleep apnea syndrome   • Patent foramen ovale   • Snoring   • Disorder of rotator cuff   • History of COPD   • Type 2 diabetes mellitus with microalbuminuria, without long-term current use of insulin (HCC)   • Carpal tunnel syndrome of right wrist   • Abnormal electrocardiogram   • Atypical chest pain   • Type 2 diabetes mellitus with peripheral neuropathy (HCC)   • Atrial fibrillation and flutter (HCC)   .  Since the last visit, He has overall felt well.  he has been compliant with   Current Outpatient Medications:   •  amLODIPine (NORVASC) 5 MG tablet, Take 1 tablet by mouth Daily., Disp: 90 tablet, Rfl: 1  •  Cholecalciferol (Vitamin D3) 1.25 MG (02863 UT) capsule, Take 1 capsule by mouth Every 7 (Seven) Days., Disp: 15 capsule, Rfl: 3  •  clopidogrel (PLAVIX) 75 MG tablet, Take 1 tablet by mouth Daily., Disp: 90 tablet, Rfl: 1  •  escitalopram (LEXAPRO)  20 MG tablet, Take 1 tablet by mouth Daily., Disp: 90 tablet, Rfl: 1  •  levothyroxine (SYNTHROID, LEVOTHROID) 50 MCG tablet, Take 1 tablet by mouth Daily. for thyroid, Disp: 90 tablet, Rfl: 1  •  tamsulosin (FLOMAX) 0.4 MG capsule 24 hr capsule, Take 1 capsule by mouth Daily., Disp: 90 capsule, Rfl: 1  •  valsartan (DIOVAN) 160 MG tablet, Take 1 tablet by mouth Daily., Disp: 90 tablet, Rfl: 1  •  zolpidem (AMBIEN) 10 MG tablet, Take 1 tablet by mouth At Night As Needed for Sleep., Disp: 30 tablet, Rfl: 2  •  ACCU-CHEK GUIDE test strip, Dx code E11.29 testing bs 3 x day, Disp: 300 each, Rfl: 1  •  amoxicillin-clavulanate (Augmentin) 875-125 MG per tablet, Take 1 tablet by mouth Every 12 (Twelve) Hours. One PO BID for infection with food, Disp: 20 tablet, Rfl: 5  •  atorvastatin (LIPITOR) 40 MG tablet, TAKE ONE TABLET BY MOUTH ONCE NIGHTLY, Disp: 90 tablet, Rfl: 2  •  azelastine (ASTELIN) 0.1 % nasal spray, , Disp: , Rfl:   •  B-D ULTRAFINE III SHORT PEN 31G X 8 MM misc, USE ONE PEN NEEDLE DAILY, Disp: 100 each, Rfl: 3  •  cyclobenzaprine (FLEXERIL) 10 MG tablet, TAKE ONE TABLET BY MOUTH THREE TIMES A DAY AS NEEDED FOR MUSCLE SPASMS, Disp: 90 tablet, Rfl: 5  •  esomeprazole (NexIUM) 20 MG capsule, Take 20 mg by mouth every morning before breakfast., Disp: , Rfl:   •  fenofibrate (TRICOR) 145 MG tablet, TAKE ONE TABLET BY MOUTH DAILY, Disp: 90 tablet, Rfl: 1  •  fluticasone (FLONASE) 50 MCG/ACT nasal spray, , Disp: , Rfl:   •  gabapentin (NEURONTIN) 600 MG tablet, Take 600 mg by mouth 3 (Three) Times a Day., Disp: , Rfl:   •  HYDROcodone-acetaminophen (NORCO) 7.5-325 MG per tablet, Take 1 tablet by mouth 2 (Two) Times a Day. Prescribed by Dr. Jacobo (Patient taking differently: Take 1 tablet by mouth Every 8 (Eight) Hours As Needed. Prescribed by Dr. Jacobo), Disp: , Rfl:   •  Insulin Degludec (TRESIBA FLEXTOUCH) 200 UNIT/ML solution pen-injector pen injection, Inject 20 Units under the skin into the appropriate area  "as directed Every Morning. Titration max of 100 units daily, Disp: 15 mL, Rfl: 0  •  Jardiance 25 MG tablet tablet, TAKE ONE TABLET BY MOUTH DAILY, Disp: 30 tablet, Rfl: 5  •  Lancets (ACCU-CHEK SOFT TOUCH) lancets, Dx code E11.29 testing bs 3 x day, Disp: 300 each, Rfl: 1  •  metFORMIN (GLUCOPHAGE) 1000 MG tablet, TAKE ONE TABLET BY MOUTH TWICE A DAY WITH MEALS, Disp: 180 tablet, Rfl: 1  •  montelukast (SINGULAIR) 10 MG tablet, , Disp: , Rfl:   •  NITROSTAT 0.4 MG SL tablet, Place 0.4 mg under the tongue every 5 (five) minutes as needed., Disp: , Rfl: .  He denies medication side effects.    All of the other chronic condition(s) listed above are stable w/o issues.    Ht 177.8 cm (70\")   BMI 26.29 kg/m²     Results for orders placed or performed during the hospital encounter of 11/02/21   Stress Test With Myocardial Perfusion One Day   Result Value Ref Range    Target HR (85%) 121 bpm    Max. Pred. HR (100%) 142 bpm    Nuclear Prior Study 3     BH CV REST NUCLEAR ISOTOPE DOSE 10.1 mCi    BH CV STRESS NUCLEAR ISOTOPE DOSE 35.4 mCi    BH CV STRESS PROTOCOL 1 Pharmacologic     Stage 1 1     HR Stage 1 108     BP Stage 1 124/74     Duration Min Stage 1 0     Duration Sec Stage 1 10     Stress Dose Regadenoson Stage 1 0.4     Stress Comments Stage 1 10 sec bolus injection     Baseline HR 94 bpm    Baseline /70 mmHg    Peak  bpm    Percent Max Pred HR 76.06 %    Percent Target HR 89 %    Peak /74 mmHg    Recovery HR 90 bpm    Recovery /70 mmHg    Exercise duration (sec) 10 sec    Nuc Stress EF 58 %             The following portions of the patient's history were reviewed and updated as appropriate: allergies, current medications, past family history, past medical history, past social history, past surgical history, and problem list.    Review of Systems   Constitutional: Negative for activity change, chills and fever.   Respiratory: Negative for cough.    Cardiovascular: Negative for chest pain. "   Psychiatric/Behavioral: Negative for dysphoric mood.       Objective   Physical Exam  Constitutional:       General: He is not in acute distress.     Appearance: He is well-developed.   Cardiovascular:      Rate and Rhythm: Normal rate and regular rhythm.   Pulmonary:      Effort: Pulmonary effort is normal.      Breath sounds: Normal breath sounds.   Neurological:      Mental Status: He is alert and oriented to person, place, and time.   Psychiatric:         Behavior: Behavior normal.         Thought Content: Thought content normal.     Pt never completed the 2/22 labs and is handed the order and asked to do.    The patient has read and signed the [facility] Controlled Substance Contract.  I will continue to see patient for regular follow up appointments.  They are well controlled on their medication.  NISHANT has been reviewed by me and is updated every 3 months. The patient is aware of the potential for addiction and dependence.        Diagnoses and all orders for this visit:    1. Essential hypertension (Primary)  -     valsartan (DIOVAN) 160 MG tablet; Take 1 tablet by mouth Daily.  Dispense: 90 tablet; Refill: 1  -     amLODIPine (NORVASC) 5 MG tablet; Take 1 tablet by mouth Daily.  Dispense: 90 tablet; Refill: 1    2. Vitamin D deficiency  -     Cholecalciferol (Vitamin D3) 1.25 MG (21255 UT) capsule; Take 1 capsule by mouth Every 7 (Seven) Days.  Dispense: 15 capsule; Refill: 3    3. Two-vessel coronary artery disease  -     clopidogrel (PLAVIX) 75 MG tablet; Take 1 tablet by mouth Daily.  Dispense: 90 tablet; Refill: 1    4. Recurrent major depressive disorder, in full remission (HCC)  -     escitalopram (LEXAPRO) 20 MG tablet; Take 1 tablet by mouth Daily.  Dispense: 90 tablet; Refill: 1    5. Primary hypothyroidism  -     levothyroxine (SYNTHROID, LEVOTHROID) 50 MCG tablet; Take 1 tablet by mouth Daily. for thyroid  Dispense: 90 tablet; Refill: 1    6. Primary insomnia  -     zolpidem (AMBIEN) 10 MG  tablet; Take 1 tablet by mouth At Night As Needed for Sleep.  Dispense: 30 tablet; Refill: 2    7. Benign prostatic hyperplasia without lower urinary tract symptoms  -     tamsulosin (FLOMAX) 0.4 MG capsule 24 hr capsule; Take 1 capsule by mouth Daily.  Dispense: 90 capsule; Refill: 1    Spent  11   minutes with chart and interview and consent for this encounter given by the patient.  You have chosen to receive care through a telehealth visit.  Do you consent to use a video/audio connection for your medical care today? Yes

## 2022-06-08 ENCOUNTER — TELEMEDICINE (OUTPATIENT)
Dept: FAMILY MEDICINE CLINIC | Facility: CLINIC | Age: 77
End: 2022-06-08

## 2022-06-08 VITALS — HEIGHT: 70 IN | BODY MASS INDEX: 26.29 KG/M2

## 2022-06-08 DIAGNOSIS — I10 ESSENTIAL HYPERTENSION: Primary | Chronic | ICD-10-CM

## 2022-06-08 DIAGNOSIS — E55.9 VITAMIN D DEFICIENCY: Chronic | ICD-10-CM

## 2022-06-08 DIAGNOSIS — E03.9 PRIMARY HYPOTHYROIDISM: Chronic | ICD-10-CM

## 2022-06-08 DIAGNOSIS — I25.10 TWO-VESSEL CORONARY ARTERY DISEASE: Chronic | ICD-10-CM

## 2022-06-08 DIAGNOSIS — F33.42 RECURRENT MAJOR DEPRESSIVE DISORDER, IN FULL REMISSION: Chronic | ICD-10-CM

## 2022-06-08 DIAGNOSIS — N40.0 BENIGN PROSTATIC HYPERPLASIA WITHOUT LOWER URINARY TRACT SYMPTOMS: Chronic | ICD-10-CM

## 2022-06-08 DIAGNOSIS — F51.01 PRIMARY INSOMNIA: Chronic | ICD-10-CM

## 2022-06-08 PROCEDURE — 99214 OFFICE O/P EST MOD 30 MIN: CPT | Performed by: FAMILY MEDICINE

## 2022-06-08 RX ORDER — CHOLECALCIFEROL (VITAMIN D3) 1250 MCG
50000 CAPSULE ORAL
Qty: 15 CAPSULE | Refills: 3 | Status: SHIPPED | OUTPATIENT
Start: 2022-06-08

## 2022-06-08 RX ORDER — AMLODIPINE BESYLATE 5 MG/1
5 TABLET ORAL DAILY
Qty: 90 TABLET | Refills: 1 | Status: SHIPPED | OUTPATIENT
Start: 2022-06-08 | End: 2022-12-06 | Stop reason: SDUPTHER

## 2022-06-08 RX ORDER — ESCITALOPRAM OXALATE 20 MG/1
20 TABLET ORAL DAILY
Qty: 90 TABLET | Refills: 1 | Status: SHIPPED | OUTPATIENT
Start: 2022-06-08 | End: 2022-12-06 | Stop reason: SDUPTHER

## 2022-06-08 RX ORDER — LEVOTHYROXINE SODIUM 0.05 MG/1
50 TABLET ORAL DAILY
Qty: 90 TABLET | Refills: 1 | Status: SHIPPED | OUTPATIENT
Start: 2022-06-08 | End: 2022-12-06 | Stop reason: SDUPTHER

## 2022-06-08 RX ORDER — CLOPIDOGREL BISULFATE 75 MG/1
75 TABLET ORAL DAILY
Qty: 90 TABLET | Refills: 1 | Status: SHIPPED | OUTPATIENT
Start: 2022-06-08 | End: 2022-12-06 | Stop reason: SDUPTHER

## 2022-06-08 RX ORDER — ZOLPIDEM TARTRATE 10 MG/1
10 TABLET ORAL NIGHTLY PRN
Qty: 30 TABLET | Refills: 2 | Status: SHIPPED | OUTPATIENT
Start: 2022-06-08 | End: 2022-12-06 | Stop reason: SDUPTHER

## 2022-06-08 RX ORDER — VALSARTAN 160 MG/1
160 TABLET ORAL DAILY
Qty: 90 TABLET | Refills: 1 | Status: SHIPPED | OUTPATIENT
Start: 2022-06-08 | End: 2022-12-06 | Stop reason: SDUPTHER

## 2022-06-08 RX ORDER — TAMSULOSIN HYDROCHLORIDE 0.4 MG/1
1 CAPSULE ORAL DAILY
Qty: 90 CAPSULE | Refills: 1 | Status: SHIPPED | OUTPATIENT
Start: 2022-06-08 | End: 2022-12-06 | Stop reason: SDUPTHER

## 2022-07-29 LAB
25(OH)D3+25(OH)D2 SERPL-MCNC: 59.2 NG/ML (ref 30–100)
ALBUMIN SERPL-MCNC: 4.6 G/DL (ref 3.7–4.7)
ALBUMIN/GLOB SERPL: 1.8 {RATIO} (ref 1.2–2.2)
ALP SERPL-CCNC: 83 IU/L (ref 44–121)
ALT SERPL-CCNC: 32 IU/L (ref 0–44)
AST SERPL-CCNC: 31 IU/L (ref 0–40)
BILIRUB SERPL-MCNC: 0.6 MG/DL (ref 0–1.2)
BUN SERPL-MCNC: 21 MG/DL (ref 8–27)
BUN/CREAT SERPL: 21 (ref 10–24)
CALCIUM SERPL-MCNC: 9.9 MG/DL (ref 8.6–10.2)
CHLORIDE SERPL-SCNC: 103 MMOL/L (ref 96–106)
CHOLEST SERPL-MCNC: 140 MG/DL (ref 100–199)
CO2 SERPL-SCNC: 23 MMOL/L (ref 20–29)
CREAT SERPL-MCNC: 0.98 MG/DL (ref 0.76–1.27)
EGFRCR SERPLBLD CKD-EPI 2021: 78 ML/MIN/1.73
GLOBULIN SER CALC-MCNC: 2.5 G/DL (ref 1.5–4.5)
GLUCOSE SERPL-MCNC: 113 MG/DL (ref 65–99)
HBA1C MFR BLD: 7.1 % (ref 4.8–5.6)
HDLC SERPL-MCNC: 31 MG/DL
IMP & REVIEW OF LAB RESULTS: NORMAL
LDLC SERPL CALC-MCNC: 82 MG/DL (ref 0–99)
POTASSIUM SERPL-SCNC: 4.8 MMOL/L (ref 3.5–5.2)
PROT SERPL-MCNC: 7.1 G/DL (ref 6–8.5)
SODIUM SERPL-SCNC: 139 MMOL/L (ref 134–144)
T4 FREE SERPL-MCNC: 1.05 NG/DL (ref 0.82–1.77)
TRIGL SERPL-MCNC: 155 MG/DL (ref 0–149)
TSH SERPL DL<=0.005 MIU/L-ACNC: 1.79 UIU/ML (ref 0.45–4.5)
VLDLC SERPL CALC-MCNC: 27 MG/DL (ref 5–40)

## 2022-08-08 DIAGNOSIS — E11.29 TYPE 2 DIABETES MELLITUS WITH MICROALBUMINURIA, WITHOUT LONG-TERM CURRENT USE OF INSULIN: ICD-10-CM

## 2022-08-08 DIAGNOSIS — R80.9 TYPE 2 DIABETES MELLITUS WITH MICROALBUMINURIA, WITHOUT LONG-TERM CURRENT USE OF INSULIN: ICD-10-CM

## 2022-08-10 RX ORDER — INSULIN DEGLUDEC 200 U/ML
100 INJECTION, SOLUTION SUBCUTANEOUS DAILY
Qty: 15 ML | Refills: 0 | Status: SHIPPED | OUTPATIENT
Start: 2022-08-10 | End: 2022-09-09

## 2022-08-16 NOTE — PROGRESS NOTES
Subjective   Ben Walsh is a 77 y.o. male.     CC: Back Pain    History of Present Illness     Pt with known Lumbar DDD (prior L4-S1 fusion) and has seen Ortho Spine (with [facility] prior) comes in today reporting increased Left LBP worsening over the past 4-5 months. Pt has some Flexeril he's been taking a HS.      The following portions of the patient's history were reviewed and updated as appropriate: allergies, current medications, past family history, past medical history, past social history, past surgical history and problem list.    Review of Systems   Constitutional: Negative for activity change, chills and fever.   Respiratory: Negative for cough.    Cardiovascular: Negative for chest pain.   Musculoskeletal: Positive for back pain.   Psychiatric/Behavioral: Negative for dysphoric mood.       Objective   Physical Exam  Constitutional:       General: He is not in acute distress.     Appearance: He is well-developed.   Pulmonary:      Effort: Pulmonary effort is normal.   Musculoskeletal:        Back:       Comments: Tender to palpation   Neurological:      Mental Status: He is alert and oriented to person, place, and time.   Psychiatric:         Behavior: Behavior normal.         Thought Content: Thought content normal.         Assessment & Plan   Diagnoses and all orders for this visit:    1. DDD (degenerative disc disease), lumbar (Primary)  -     Ambulatory Referral to Orthopedic Surgery  -     Ambulatory Referral to Physical Therapy Evaluate and treat  -     predniSONE (DELTASONE) 20 MG tablet; Take 1 tablet by mouth Daily for 5 days.  Dispense: 5 tablet; Refill: 0    Pt knows to watch his sugar closely with the steroid. Last A1C good.

## 2022-08-17 ENCOUNTER — OFFICE VISIT (OUTPATIENT)
Dept: FAMILY MEDICINE CLINIC | Facility: CLINIC | Age: 77
End: 2022-08-17

## 2022-08-17 VITALS
RESPIRATION RATE: 20 BRPM | TEMPERATURE: 97.9 F | HEART RATE: 100 BPM | WEIGHT: 181 LBS | SYSTOLIC BLOOD PRESSURE: 126 MMHG | DIASTOLIC BLOOD PRESSURE: 79 MMHG | HEIGHT: 70 IN | BODY MASS INDEX: 25.91 KG/M2

## 2022-08-17 DIAGNOSIS — M51.36 DDD (DEGENERATIVE DISC DISEASE), LUMBAR: Primary | ICD-10-CM

## 2022-08-17 PROCEDURE — 99213 OFFICE O/P EST LOW 20 MIN: CPT | Performed by: FAMILY MEDICINE

## 2022-08-17 RX ORDER — PREDNISONE 20 MG/1
20 TABLET ORAL DAILY
Qty: 5 TABLET | Refills: 0 | Status: SHIPPED | OUTPATIENT
Start: 2022-08-17 | End: 2022-08-22

## 2022-08-22 RX ORDER — ATORVASTATIN CALCIUM 40 MG/1
40 TABLET, FILM COATED ORAL NIGHTLY
Qty: 90 TABLET | Refills: 3 | Status: SHIPPED | OUTPATIENT
Start: 2022-08-22

## 2022-09-10 DIAGNOSIS — E11.29 TYPE 2 DIABETES MELLITUS WITH MICROALBUMINURIA, WITHOUT LONG-TERM CURRENT USE OF INSULIN: ICD-10-CM

## 2022-09-10 DIAGNOSIS — R80.9 TYPE 2 DIABETES MELLITUS WITH MICROALBUMINURIA, WITHOUT LONG-TERM CURRENT USE OF INSULIN: ICD-10-CM

## 2022-09-12 RX ORDER — EMPAGLIFLOZIN 25 MG/1
TABLET, FILM COATED ORAL
Qty: 30 TABLET | Refills: 0 | Status: SHIPPED | OUTPATIENT
Start: 2022-09-12

## 2022-09-24 DIAGNOSIS — E78.5 HYPERLIPIDEMIA, UNSPECIFIED HYPERLIPIDEMIA TYPE: ICD-10-CM

## 2022-09-27 RX ORDER — FENOFIBRATE 145 MG/1
145 TABLET, COATED ORAL DAILY
Qty: 90 TABLET | Refills: 0 | Status: SHIPPED | OUTPATIENT
Start: 2022-09-27 | End: 2022-12-21

## 2022-10-03 NOTE — PROGRESS NOTES
Chief Complaint  Chief Complaint   Patient presents with   • Diabetes     Type 2: Patient doesn't have meter, but does have readings, is up to date on eye exam, no hx of retinopathy, moderate neuropathy.        Subjective          History of Present Illness    Ben Walsh 77 y.o. presents for a follow-up evaluation for type 2 DM    He has been diabetic since 2013    Pt is on the following medications for their DM: metformin 1,000 mg BID, Jardiance 25 mg daily and Tresiba 20 units every morning.      Pt has history of pancreatitis      Pt complains of diarrhea and numbness and tingling in feet/legs and right hand    Denies constipation, chest pain, shortness of breath or vision changes    Weight loss of 2 lbs since last visit.    Pt does not have a history of DM retinopathy.  Last eye exam was Summer time 2022    Pt does not have a history of nephropathy.  Patient is currently taking ARB    Pt does have neuropathy.  Current takes gabapentin 800 mg TID for this condition.  He has chronic neck and lower back pain due to degenerative disc disease    Pt does have a history of CAD with stent placement in 03/2016    Last A1C in 07/22 was 7.1    Last microalbumin in 07/21 was negative          Blood Sugars    Blood glucoses are checked couple times a week.    Fasting blood glucoses:     Pre-meal blood glucoses: 130-208    Pt has no episodes of hypoglycemia.        Hypothyroid    Managed by PCP    Current treatment is levothyroxine 50 mcg daily    Last labs in 07/22 showed TSH 1.790 and FT4 1.05          Hyperlipidemia     Pt denies any muscle/body aches, chest pain, or shortness of breath    Pt is currently taking atorvastatin 40 mg HS, Tricor 145 mg daily and fish oil 2 gm BID.    Last lipid panel in 07/22 showed Total 140, Triglycerides 155, HDL 31 and LDL 82            Hypertension    Pt denies any chest pain, palpitations, shortness of breath, or headache    Current regimen includes amlodipine 5 mg daily  "and valsartan 160 mg daily    He follows with Dr. Borges        I have reviewed the patient's allergies, medicines, past medical hx, family hx and social hx.    Objective   Vital Signs:   /80   Pulse 101   Temp 96.4 °F (35.8 °C) (Temporal)   Ht 177.8 cm (70\")   Wt 82.4 kg (181 lb 9.6 oz)   SpO2 95%   BMI 26.06 kg/m²       Physical Exam   Physical Exam  Constitutional:       General: He is not in acute distress.     Appearance: Normal appearance. He is not diaphoretic.   HENT:      Head: Normocephalic and atraumatic.   Eyes:      General:         Right eye: No discharge.         Left eye: No discharge.   Skin:     General: Skin is warm and dry.   Neurological:      Mental Status: He is alert.   Psychiatric:         Mood and Affect: Mood normal.         Behavior: Behavior normal.                    Results Review:   Hemoglobin A1C   Date Value Ref Range Status   07/28/2022 7.1 (H) 4.8 - 5.6 % Final     Comment:              Prediabetes: 5.7 - 6.4           Diabetes: >6.4           Glycemic control for adults with diabetes: <7.0       Triglycerides   Date Value Ref Range Status   07/28/2022 155 (H) 0 - 149 mg/dL Final     HDL Cholesterol   Date Value Ref Range Status   07/28/2022 31 (L) >39 mg/dL Final     LDL Chol Calc (NIH)   Date Value Ref Range Status   07/28/2022 82 0 - 99 mg/dL Final     VLDL Cholesterol Joel   Date Value Ref Range Status   07/28/2022 27 5 - 40 mg/dL Final         Assessment and Plan {CC Problem List  Visit Diagnosis  ROS  Review (Popup)  Health Maintenance  Quality  BestPractice  Medications  SmartSets  SnapShot Encounters  Media :23  Diagnoses and all orders for this visit:    1. Type 2 diabetes mellitus with peripheral neuropathy (HCC) (Primary)  -     metFORMIN ER (GLUCOPHAGE-XR) 500 MG 24 hr tablet; Take 2 tablets by mouth 2 (Two) Times a Day With Meals.  Dispense: 360 tablet; Refill: 1  -     Hemoglobin A1c  -     Comprehensive Metabolic Panel  -     Microalbumin / " Creatinine Urine Ratio - Urine, Clean Catch    Check labs  Continue with Jardiance 25 mg daily   Change metformin 1,000 mg BID to Metformin ER to see if it helps with diarrhea.  If not they advised to go to 500 mg 1 tablet BID and if still having diarrhea then stop.  If diarrhea goes away then it was caused by Metformin and if not then it is not caused by metformin and restart.  Advised to keep me updated.  Increase Tresiba to 22 every morning  Would like to try for CGM - try for Dexcom first      2. Hyperlipidemia, unspecified hyperlipidemia type  -     Comprehensive Metabolic Panel  -     Lipid Panel    Check labs today  Continue with statin and fenofibrate      3. Essential hypertension  -     Comprehensive Metabolic Panel    Stable  Continue with current medication regimen  Defer management to PCP/Cardiology      4. Primary hypothyroidism  -     TSH  -     T4, Free    PCP manages, but would like labs checked         No refills needed at this time      Labs today  RTC in 4 months with me and 8 months with Dr. Miller      Follow Up     Patient was given instructions and counseling regarding her condition or for health maintenance advice. Please see specific information pulled into the AVS if appropriate.              Anabel Black, MARIBEL  10/04/22

## 2022-10-04 ENCOUNTER — OFFICE VISIT (OUTPATIENT)
Dept: ENDOCRINOLOGY | Age: 77
End: 2022-10-04

## 2022-10-04 VITALS
DIASTOLIC BLOOD PRESSURE: 80 MMHG | SYSTOLIC BLOOD PRESSURE: 130 MMHG | WEIGHT: 181.6 LBS | HEIGHT: 70 IN | HEART RATE: 101 BPM | OXYGEN SATURATION: 95 % | BODY MASS INDEX: 26 KG/M2 | TEMPERATURE: 96.4 F

## 2022-10-04 DIAGNOSIS — E11.42 TYPE 2 DIABETES MELLITUS WITH PERIPHERAL NEUROPATHY: Primary | ICD-10-CM

## 2022-10-04 DIAGNOSIS — I10 ESSENTIAL HYPERTENSION: ICD-10-CM

## 2022-10-04 DIAGNOSIS — E78.5 HYPERLIPIDEMIA, UNSPECIFIED HYPERLIPIDEMIA TYPE: ICD-10-CM

## 2022-10-04 DIAGNOSIS — E03.9 PRIMARY HYPOTHYROIDISM: ICD-10-CM

## 2022-10-04 PROCEDURE — 99214 OFFICE O/P EST MOD 30 MIN: CPT | Performed by: NURSE PRACTITIONER

## 2022-10-04 RX ORDER — METFORMIN HYDROCHLORIDE 500 MG/1
1000 TABLET, EXTENDED RELEASE ORAL 2 TIMES DAILY WITH MEALS
Qty: 360 TABLET | Refills: 1 | Status: SHIPPED | OUTPATIENT
Start: 2022-10-04

## 2022-10-04 RX ORDER — GABAPENTIN 800 MG/1
TABLET ORAL
COMMUNITY
Start: 2022-10-01

## 2022-10-04 NOTE — PATIENT INSTRUCTIONS
Continue with Jardiance 25 mg daily   Change metformin 1,000 mg BID to Metformin ER - keep me updated on changes  Increase Tresiba to 22 every morning

## 2022-10-05 LAB
ALBUMIN SERPL-MCNC: 4.8 G/DL (ref 3.5–5.2)
ALBUMIN/CREAT UR: <5 MG/G CREAT (ref 0–29)
ALBUMIN/GLOB SERPL: 3.2 G/DL
ALP SERPL-CCNC: 79 U/L (ref 39–117)
ALT SERPL-CCNC: 31 U/L (ref 1–41)
AST SERPL-CCNC: 26 U/L (ref 1–40)
BILIRUB SERPL-MCNC: 0.4 MG/DL (ref 0–1.2)
BUN SERPL-MCNC: 15 MG/DL (ref 8–23)
BUN/CREAT SERPL: 17.6 (ref 7–25)
CALCIUM SERPL-MCNC: 10.1 MG/DL (ref 8.6–10.5)
CHLORIDE SERPL-SCNC: 106 MMOL/L (ref 98–107)
CHOLEST SERPL-MCNC: 124 MG/DL (ref 0–200)
CO2 SERPL-SCNC: 24 MMOL/L (ref 22–29)
CREAT SERPL-MCNC: 0.85 MG/DL (ref 0.76–1.27)
CREAT UR-MCNC: 65.7 MG/DL
EGFRCR SERPLBLD CKD-EPI 2021: 88.4 ML/MIN/1.73
GLOBULIN SER CALC-MCNC: 1.5 GM/DL
GLUCOSE SERPL-MCNC: 146 MG/DL (ref 65–99)
HBA1C MFR BLD: 7.1 % (ref 4.8–5.6)
HDLC SERPL-MCNC: 29 MG/DL (ref 40–60)
IMP & REVIEW OF LAB RESULTS: NORMAL
LDLC SERPL CALC-MCNC: 54 MG/DL (ref 0–100)
MICROALBUMIN UR-MCNC: <3 UG/ML
POTASSIUM SERPL-SCNC: 4.7 MMOL/L (ref 3.5–5.2)
PROT SERPL-MCNC: 6.3 G/DL (ref 6–8.5)
SODIUM SERPL-SCNC: 140 MMOL/L (ref 136–145)
T4 FREE SERPL-MCNC: 1.11 NG/DL (ref 0.93–1.7)
TRIGL SERPL-MCNC: 260 MG/DL (ref 0–150)
TSH SERPL DL<=0.005 MIU/L-ACNC: 1.13 UIU/ML (ref 0.27–4.2)
VLDLC SERPL CALC-MCNC: 41 MG/DL (ref 5–40)

## 2022-10-06 ENCOUNTER — TELEPHONE (OUTPATIENT)
Dept: ENDOCRINOLOGY | Age: 77
End: 2022-10-06

## 2022-10-15 DIAGNOSIS — F51.01 PRIMARY INSOMNIA: Chronic | ICD-10-CM

## 2022-10-17 RX ORDER — ZOLPIDEM TARTRATE 10 MG/1
TABLET ORAL
Qty: 30 TABLET | OUTPATIENT
Start: 2022-10-17

## 2022-10-20 RX ORDER — AMOXICILLIN AND CLAVULANATE POTASSIUM 875; 125 MG/1; MG/1
TABLET, FILM COATED ORAL
Qty: 20 TABLET | Refills: 5 | Status: SHIPPED | OUTPATIENT
Start: 2022-10-20 | End: 2022-12-07

## 2022-11-21 RX ORDER — INSULIN DEGLUDEC 200 U/ML
INJECTION, SOLUTION SUBCUTANEOUS
Qty: 12 ML | Refills: 1 | Status: SHIPPED | OUTPATIENT
Start: 2022-11-21

## 2022-12-06 NOTE — PROGRESS NOTES
The ABCs of the Annual Wellness Visit  Subsequent Medicare Wellness Visit    Subjective    Ben Walsh is a 77 y.o. male who presents for a Subsequent Medicare Wellness Visit.    The following portions of the patient's history were reviewed and   updated as appropriate: allergies, current medications, past family history, past medical history, past social history, past surgical history and problem list.    Compared to one year ago, the patient feels his physical   health is the same.    Compared to one year ago, the patient feels his mental   health is the same.    Recent Hospitalizations:  He was not admitted to the hospital during the last year.       Current Medical Providers:  Patient Care Team:  Jake Marcus MD as PCP - General (Family Medicine)  Guerrero Kruse MD as Consulting Physician (Orthopedic Surgery)  Kelton Francois MD as Consulting Physician (Ophthalmology)  Justin Miller MD as Consulting Physician (Endocrinology)  Angel Borges MD as Consulting Physician (Cardiology)  Grupo Jacobo MD (Pain Medicine)  Henrique Stahl MD (Anesthesiology)    Outpatient Medications Prior to Visit   Medication Sig Dispense Refill   • ACCU-CHEK GUIDE test strip Dx code E11.29 testing bs 3 x day 300 each 1   • atorvastatin (LIPITOR) 40 MG tablet Take 1 tablet by mouth Every Night. For cholesterol 90 tablet 3   • azelastine (ASTELIN) 0.1 % nasal spray      • B-D ULTRAFINE III SHORT PEN 31G X 8 MM misc USE ONE PEN NEEDLE DAILY 100 each 3   • Cholecalciferol (Vitamin D3) 1.25 MG (54563 UT) capsule Take 1 capsule by mouth Every 7 (Seven) Days. 15 capsule 3   • cyclobenzaprine (FLEXERIL) 10 MG tablet TAKE ONE TABLET BY MOUTH THREE TIMES A DAY AS NEEDED FOR MUSCLE SPASMS 90 tablet 5   • esomeprazole (NexIUM) 20 MG capsule Take 20 mg by mouth every morning before breakfast.     • fenofibrate (TRICOR) 145 MG tablet Take 1 tablet by mouth Daily. Please schedule follow-up visit. 90 tablet 0   • fluticasone  (FLONASE) 50 MCG/ACT nasal spray      • gabapentin (NEURONTIN) 800 MG tablet      • HYDROcodone-acetaminophen (NORCO) 7.5-325 MG per tablet Take 1 tablet by mouth 2 (Two) Times a Day. Prescribed by Dr. Jacobo (Patient taking differently: Take 1 tablet by mouth Every 8 (Eight) Hours As Needed. Prescribed by Dr. Jacobo)     • Insulin Degludec (Tresiba FlexTouch) 200 UNIT/ML solution pen-injector pen injection 22 units every morning 12 mL 1   • Jardiance 25 MG tablet tablet TAKE ONE TABLET BY MOUTH DAILY 30 tablet 0   • Lancets (ACCU-CHEK SOFT TOUCH) lancets Dx code E11.29 testing bs 3 x day 300 each 1   • metFORMIN ER (GLUCOPHAGE-XR) 500 MG 24 hr tablet Take 2 tablets by mouth 2 (Two) Times a Day With Meals. 360 tablet 1   • montelukast (SINGULAIR) 10 MG tablet      • NITROSTAT 0.4 MG SL tablet Place 0.4 mg under the tongue every 5 (five) minutes as needed.     • amLODIPine (NORVASC) 5 MG tablet Take 1 tablet by mouth Daily. 90 tablet 1   • amoxicillin-clavulanate (AUGMENTIN) 875-125 MG per tablet TAKE ONE TABLET BY MOUTH EVERY 12 HOURS FOR INFECTION WITH FOOD 20 tablet 5   • clopidogrel (PLAVIX) 75 MG tablet Take 1 tablet by mouth Daily. 90 tablet 1   • escitalopram (LEXAPRO) 20 MG tablet Take 1 tablet by mouth Daily. 90 tablet 1   • levothyroxine (SYNTHROID, LEVOTHROID) 50 MCG tablet Take 1 tablet by mouth Daily. for thyroid 90 tablet 1   • tamsulosin (FLOMAX) 0.4 MG capsule 24 hr capsule Take 1 capsule by mouth Daily. 90 capsule 1   • valsartan (DIOVAN) 160 MG tablet Take 1 tablet by mouth Daily. 90 tablet 1   • zolpidem (AMBIEN) 10 MG tablet Take 1 tablet by mouth At Night As Needed for Sleep. 30 tablet 2     No facility-administered medications prior to visit.       Opioid medication/s are on active medication list.  and I have evaluated his active treatment plan and pain score trends (see table).  Vitals:    12/07/22 0759   PainSc:   3     I have reviewed the chart for potential of high risk medication and  "harmful drug interactions in the elderly.            Aspirin is not on active medication list.  Aspirin use is not indicated based on review of current medical condition/s. Risk of harm outweighs potential benefits.  .    Patient Active Problem List   Diagnosis   • Allergic rhinitis   • ADD (attention deficit disorder)   • DDD (degenerative disc disease), lumbar   • BPH (benign prostatic hypertrophy)   • Chronic pain   • ASCVD (arteriosclerotic cardiovascular disease)   • Depression   • Diverticulosis of colon   • GERD (gastroesophageal reflux disease)   • Hiatal hernia   • Hyperlipidemia   • Essential hypertension   • Primary hypothyroidism   • Insomnia   • Osteoarthritis, multiple sites   • Acute pancreatitis   • Vitamin D deficiency   • Glaucoma   • Abnormal nuclear stress test   • Two-vessel coronary artery disease   • SAEED (dyspnea on exertion)   • Obstructive sleep apnea syndrome   • Patent foramen ovale   • Snoring   • Disorder of rotator cuff   • History of COPD   • Type 2 diabetes mellitus with microalbuminuria, without long-term current use of insulin (HCC)   • Carpal tunnel syndrome of right wrist   • Abnormal electrocardiogram   • Atypical chest pain   • Type 2 diabetes mellitus with peripheral neuropathy (HCC)   • Atrial fibrillation and flutter (HCC)     Advance Care Planning  Advance Directive is not on file.  ACP discussion was held with the patient during this visit. Patient does not have an advance directive, declines further assistance.     Objective    Vitals:    12/07/22 0759   BP: 131/64   Pulse: 90   Resp: 18   Temp: 97.4 °F (36.3 °C)   TempSrc: Oral   Weight: 83 kg (183 lb)   Height: 177.8 cm (70\")   PainSc:   3     Estimated body mass index is 26.26 kg/m² as calculated from the following:    Height as of this encounter: 177.8 cm (70\").    Weight as of this encounter: 83 kg (183 lb).    BMI is >= 25 and <30. (Overweight) The following options were offered after discussion;: exercise " counseling/recommendations and nutrition counseling/recommendations      Does the patient have evidence of cognitive impairment? No    Lab Results   Component Value Date    CHLPL 124 10/04/2022    TRIG 260 (H) 10/04/2022    HDL 29 (L) 10/04/2022    LDL 54 10/04/2022    VLDL 41 (H) 10/04/2022    HGBA1C 7.10 (H) 10/04/2022        HEALTH RISK ASSESSMENT    Smoking Status:  Social History     Tobacco Use   Smoking Status Never   Smokeless Tobacco Never   Tobacco Comments    caffeine use iced tea all day long, 3 cups coffee daily     Alcohol Consumption:  Social History     Substance and Sexual Activity   Alcohol Use No     Fall Risk Screen:    KIANNA Fall Risk Assessment has not been completed.    Depression Screening:  PHQ-2/PHQ-9 Depression Screening 12/7/2022   Retired Total Score -   Little Interest or Pleasure in Doing Things 0-->not at all   Feeling Down, Depressed or Hopeless 0-->not at all   PHQ-9: Brief Depression Severity Measure Score 0       Health Habits and Functional and Cognitive Screening:  Functional & Cognitive Status 12/7/2022   Do you have difficulty preparing food and eating? No   Do you have difficulty bathing yourself, getting dressed or grooming yourself? No   Do you have difficulty using the toilet? No   Do you have difficulty moving around from place to place? No   Do you have trouble with steps or getting out of a bed or a chair? No   Current Diet Well Balanced Diet   Dental Exam Up to date   Eye Exam Up to date   Exercise (times per week) 0 times per week   Current Exercises Include No Regular Exercise   Current Exercise Activities Include -   Do you need help using the phone?  No   Are you deaf or do you have serious difficulty hearing?  No   Do you need help with transportation? No   Do you need help shopping? No   Do you need help preparing meals?  No   Do you need help with housework?  No   Do you need help with laundry? No   Do you need help taking your medications? No   Do you need  help managing money? No   Do you ever drive or ride in a car without wearing a seat belt? No   Have you felt unusual stress, anger or loneliness in the last month? No   Who do you live with? Spouse   If you need help, do you have trouble finding someone available to you? Yes   Have you been bothered in the last four weeks by sexual problems? No   Do you have difficulty concentrating, remembering or making decisions? No       Age-appropriate Screening Schedule:  Refer to the list below for future screening recommendations based on patient's age, sex and/or medical conditions. Orders for these recommended tests are listed in the plan section. The patient has been provided with a written plan.    Health Maintenance   Topic Date Due   • TDAP/TD VACCINES (1 - Tdap) Never done   • HEMOGLOBIN A1C  04/04/2023   • DIABETIC EYE EXAM  06/29/2023   • LIPID PANEL  10/04/2023   • URINE MICROALBUMIN  10/04/2023   • INFLUENZA VACCINE  Completed   • ZOSTER VACCINE  Discontinued                CMS Preventative Services Quick Reference  Risk Factors Identified During Encounter  Cardiovascular Disease  The above risks/problems have been discussed with the patient.  Pertinent information has been shared with the patient in the After Visit Summary.  An After Visit Summary and PPPS were made available to the patient.    Follow Up:   Next Medicare Wellness visit to be scheduled in 1 year.       Additional E&M Note during same encounter follows:  Patient has multiple medical problems which are significant and separately identifiable that require additional work above and beyond the Medicare Wellness Visit.      Chief Complaint  Hypertension (MED REFILL DUE  / [facility]), Hyperlipidemia, Anxiety, Depression, Coronary Artery Disease, Hypothyroidism, Insomnia, Nasal Congestion (OTC COUGH / CONGESTION SOME HELP ), Sinusitis, Cough (CHEST CONGESTION / X  3 - 4 WEEKS / HOME COVID TEST NEG PER PT ), and MEDICARE WELLNESS DUE     Subjective   "      HPI  Ben Walsh is also being seen today for Medication Management.  Pt also has had for about 3  week h/o sinus p/p, cough/congestion. No f/c. Home COVID test was NEGATIVE.  Review of Systems   Constitutional: Negative for chills and fever.   HENT: Positive for sinus pressure. Negative for congestion and ear pain.    Eyes: Negative for pain and visual disturbance.   Respiratory: Positive for cough. Negative for shortness of breath.    Cardiovascular: Negative for chest pain.   Gastrointestinal: Negative for abdominal pain.   Genitourinary: Negative for difficulty urinating and dysuria.   Skin: Negative.    Neurological: Negative.    Psychiatric/Behavioral: Negative for dysphoric mood.       Objective   Vital Signs:  /64   Pulse 90   Temp 97.4 °F (36.3 °C) (Oral)   Resp 18   Ht 177.8 cm (70\")   Wt 83 kg (183 lb)   BMI 26.26 kg/m²     Physical Exam  Constitutional:       General: He is not in acute distress.     Appearance: He is well-developed.   HENT:      Nose:      Right Sinus: Maxillary sinus tenderness present.      Left Sinus: Maxillary sinus tenderness present.   Cardiovascular:      Rate and Rhythm: Normal rate and regular rhythm.   Pulmonary:      Effort: Pulmonary effort is normal.      Breath sounds: Normal breath sounds.   Neurological:      Mental Status: He is alert and oriented to person, place, and time.   Psychiatric:         Behavior: Behavior normal.         Thought Content: Thought content normal.          The following data was reviewed by: Jake Marcus MD on 12/07/2022:  Common labs    Common Labs 2/18/22 7/28/22 7/28/22 7/28/22 10/4/22 10/4/22 10/4/22 10/4/22     1047 1047 1047 1523 1523 1523 1523   Glucose 165 (A) 113 (A)    146 (A)     BUN 13 21    15     Creatinine 1.10 0.98    0.85     eGFR Non  Am 64          eGFR African Am 74          Sodium 141 139    140     Potassium 4.4 4.8    4.7     Chloride 107 (A) 103    106     Calcium 9.6 9.9    10.1   "   Total Protein  7.1    6.3     Albumin  4.6    4.80     Total Bilirubin  0.6    0.4     Alkaline Phosphatase  83    79     AST (SGOT)  31    26     ALT (SGPT)  32    31     Total Cholesterol   140     124   Triglycerides   155 (A)     260 (A)   HDL Cholesterol   31 (A)     29 (A)   LDL Cholesterol    82     54   Hemoglobin A1C    7.1 (A) 7.10 (A)      Microalbumin, Urine       <3.0    (A) Abnormal value       Comments are available for some flowsheets but are not being displayed.                      Assessment and Plan   Diagnoses and all orders for this visit:    1. Medicare annual wellness visit, subsequent (Primary)    2. Essential hypertension  -     amLODIPine (NORVASC) 5 MG tablet; Take 1 tablet by mouth Daily.  Dispense: 90 tablet; Refill: 1  -     valsartan (DIOVAN) 160 MG tablet; Take 1 tablet by mouth Daily.  Dispense: 90 tablet; Refill: 1    3. Two-vessel coronary artery disease  -     clopidogrel (PLAVIX) 75 MG tablet; Take 1 tablet by mouth Daily.  Dispense: 90 tablet; Refill: 1    4. Recurrent major depressive disorder, in full remission (HCC)  -     escitalopram (LEXAPRO) 20 MG tablet; Take 1 tablet by mouth Daily.  Dispense: 90 tablet; Refill: 1    5. Primary hypothyroidism  -     levothyroxine (SYNTHROID, LEVOTHROID) 50 MCG tablet; Take 1 tablet by mouth Daily. for thyroid  Dispense: 90 tablet; Refill: 1    6. Benign prostatic hyperplasia without lower urinary tract symptoms  -     tamsulosin (FLOMAX) 0.4 MG capsule 24 hr capsule; Take 1 capsule by mouth Daily.  Dispense: 90 capsule; Refill: 1    7. Primary insomnia  -     zolpidem (AMBIEN) 10 MG tablet; Take 1 tablet by mouth At Night As Needed for Sleep.  Dispense: 30 tablet; Refill: 2    8. Acute non-recurrent maxillary sinusitis  -     amoxicillin-clavulanate (Augmentin) 875-125 MG per tablet; Take 1 tablet by mouth Every 12 (Twelve) Hours.  Dispense: 20 tablet; Refill: 0           I spent 15 minutes caring for Ben on this date of service.  This time includes time spent by me in the following activities:preparing for the visit, obtaining and/or reviewing a separately obtained history, performing a medically appropriate examination and/or evaluation  and ordering medications, tests, or procedures  Follow Up   No follow-ups on file.  Patient was given instructions and counseling regarding his condition or for health maintenance advice. Please see specific information pulled into the AVS if appropriate.

## 2022-12-07 ENCOUNTER — OFFICE VISIT (OUTPATIENT)
Dept: FAMILY MEDICINE CLINIC | Facility: CLINIC | Age: 77
End: 2022-12-07

## 2022-12-07 VITALS
RESPIRATION RATE: 18 BRPM | WEIGHT: 183 LBS | SYSTOLIC BLOOD PRESSURE: 131 MMHG | DIASTOLIC BLOOD PRESSURE: 64 MMHG | HEIGHT: 70 IN | BODY MASS INDEX: 26.2 KG/M2 | HEART RATE: 90 BPM | TEMPERATURE: 97.4 F

## 2022-12-07 DIAGNOSIS — I25.10 TWO-VESSEL CORONARY ARTERY DISEASE: Chronic | ICD-10-CM

## 2022-12-07 DIAGNOSIS — I10 ESSENTIAL HYPERTENSION: Chronic | ICD-10-CM

## 2022-12-07 DIAGNOSIS — E03.9 PRIMARY HYPOTHYROIDISM: Chronic | ICD-10-CM

## 2022-12-07 DIAGNOSIS — F51.01 PRIMARY INSOMNIA: Chronic | ICD-10-CM

## 2022-12-07 DIAGNOSIS — Z00.00 MEDICARE ANNUAL WELLNESS VISIT, SUBSEQUENT: Primary | ICD-10-CM

## 2022-12-07 DIAGNOSIS — F33.42 RECURRENT MAJOR DEPRESSIVE DISORDER, IN FULL REMISSION: Chronic | ICD-10-CM

## 2022-12-07 DIAGNOSIS — N40.0 BENIGN PROSTATIC HYPERPLASIA WITHOUT LOWER URINARY TRACT SYMPTOMS: Chronic | ICD-10-CM

## 2022-12-07 DIAGNOSIS — J01.00 ACUTE NON-RECURRENT MAXILLARY SINUSITIS: ICD-10-CM

## 2022-12-07 PROCEDURE — 1159F MED LIST DOCD IN RCRD: CPT | Performed by: FAMILY MEDICINE

## 2022-12-07 PROCEDURE — 99214 OFFICE O/P EST MOD 30 MIN: CPT | Performed by: FAMILY MEDICINE

## 2022-12-07 PROCEDURE — 1170F FXNL STATUS ASSESSED: CPT | Performed by: FAMILY MEDICINE

## 2022-12-07 PROCEDURE — 1125F AMNT PAIN NOTED PAIN PRSNT: CPT | Performed by: FAMILY MEDICINE

## 2022-12-07 PROCEDURE — G0439 PPPS, SUBSEQ VISIT: HCPCS | Performed by: FAMILY MEDICINE

## 2022-12-07 PROCEDURE — 96160 PT-FOCUSED HLTH RISK ASSMT: CPT | Performed by: FAMILY MEDICINE

## 2022-12-07 RX ORDER — AMOXICILLIN AND CLAVULANATE POTASSIUM 875; 125 MG/1; MG/1
1 TABLET, FILM COATED ORAL EVERY 12 HOURS SCHEDULED
Qty: 20 TABLET | Refills: 0 | Status: SHIPPED | OUTPATIENT
Start: 2022-12-07 | End: 2023-03-09

## 2022-12-07 RX ORDER — ESCITALOPRAM OXALATE 20 MG/1
20 TABLET ORAL DAILY
Qty: 90 TABLET | Refills: 1 | Status: SHIPPED | OUTPATIENT
Start: 2022-12-07

## 2022-12-07 RX ORDER — CLOPIDOGREL BISULFATE 75 MG/1
75 TABLET ORAL DAILY
Qty: 90 TABLET | Refills: 1 | Status: SHIPPED | OUTPATIENT
Start: 2022-12-07

## 2022-12-07 RX ORDER — TAMSULOSIN HYDROCHLORIDE 0.4 MG/1
1 CAPSULE ORAL DAILY
Qty: 90 CAPSULE | Refills: 1 | Status: SHIPPED | OUTPATIENT
Start: 2022-12-07 | End: 2023-03-11 | Stop reason: SDUPTHER

## 2022-12-07 RX ORDER — LEVOTHYROXINE SODIUM 0.05 MG/1
50 TABLET ORAL DAILY
Qty: 90 TABLET | Refills: 1 | Status: SHIPPED | OUTPATIENT
Start: 2022-12-07

## 2022-12-07 RX ORDER — VALSARTAN 160 MG/1
160 TABLET ORAL DAILY
Qty: 90 TABLET | Refills: 1 | Status: SHIPPED | OUTPATIENT
Start: 2022-12-07

## 2022-12-07 RX ORDER — AMLODIPINE BESYLATE 5 MG/1
5 TABLET ORAL DAILY
Qty: 90 TABLET | Refills: 1 | Status: SHIPPED | OUTPATIENT
Start: 2022-12-07

## 2022-12-07 RX ORDER — ZOLPIDEM TARTRATE 10 MG/1
10 TABLET ORAL NIGHTLY PRN
Qty: 30 TABLET | Refills: 2 | Status: SHIPPED | OUTPATIENT
Start: 2022-12-07 | End: 2023-03-09 | Stop reason: SDUPTHER

## 2022-12-07 NOTE — PATIENT INSTRUCTIONS
Medicare Wellness  Personal Prevention Plan of Service     Date of Office Visit:    Encounter Provider:  Jake Marcus MD  Place of Service:  [facility]  Patient Name: Ben Walsh  :  1945    As part of the Medicare Wellness portion of your visit today, we are providing you with this personalized preventive plan of services (PPPS). This plan is based upon recommendations of the [place] Preventive Services Task Force (USPSTF) and the Advisory Committee on Immunization Practices (ACIP).    This lists the preventive care services that should be considered, and provides dates of when you are due. Items listed as completed are up-to-date and do not require any further intervention.    Health Maintenance   Topic Date Due    TDAP/TD VACCINES (1 - Tdap) Never done    HEPATITIS C SCREENING  Never done    COVID-19 Vaccine (3 - Booster for Pfizer series) 2023    HEMOGLOBIN A1C  2023    DIABETIC EYE EXAM  2023    LIPID PANEL  10/04/2023    URINE MICROALBUMIN  10/04/2023    ANNUAL WELLNESS VISIT  2023    COLORECTAL CANCER SCREENING  2027    INFLUENZA VACCINE  Completed    Pneumococcal Vaccine 65+  Completed    ZOSTER VACCINE  Discontinued       No orders of the defined types were placed in this encounter.      Return in about 6 months (around 2023) for Recheck.

## 2022-12-16 ENCOUNTER — TELEPHONE (OUTPATIENT)
Dept: ORTHOPEDIC SURGERY | Facility: CLINIC | Age: 77
End: 2022-12-16

## 2022-12-16 ENCOUNTER — OFFICE VISIT (OUTPATIENT)
Dept: ORTHOPEDIC SURGERY | Facility: CLINIC | Age: 77
End: 2022-12-16

## 2022-12-16 VITALS — WEIGHT: 183.2 LBS | HEIGHT: 70 IN | TEMPERATURE: 97.3 F | BODY MASS INDEX: 26.23 KG/M2

## 2022-12-16 DIAGNOSIS — Z98.1 HISTORY OF SPINAL FUSION: ICD-10-CM

## 2022-12-16 DIAGNOSIS — M48.062 SPINAL STENOSIS OF LUMBAR REGION WITH NEUROGENIC CLAUDICATION: Primary | ICD-10-CM

## 2022-12-16 DIAGNOSIS — M54.50 LUMBAR SPINE PAIN: ICD-10-CM

## 2022-12-16 PROCEDURE — 99213 OFFICE O/P EST LOW 20 MIN: CPT | Performed by: ORTHOPAEDIC SURGERY

## 2022-12-16 NOTE — PROGRESS NOTES
See my note 4/27/2021.  Persistent back right hip and buttock pain as well some groin pain mostly on the left but a little on the right.  He is status post L4-S1 laminectomy and fusion performed in sequence over the remote past and from which she did well.  Saw Dr. Ortiz Medina and from our discussion it does not sound like the hip pathology was overwhelming.  He suggested repeating epidurals which we did last summer and did not work well, at least as the patient recounts.  Probably more radiating than back pain at this point.  Good strength in the legs bilaterally slight weakness in hip flexion on either side.  He thought he had a recent MRI and brought a CD from [facility] but this has to hip pelvic MRIs but nothing recent since 4/2021 for lumbar mri which at that time wasn't too worrisome:  Moderate stenosis at L3-4, mild changes at L2-3.  I suppose we could try a flouro-guided injection of the hip or repeat lumbar mri.  I will talk to his daughter on Monday.

## 2022-12-16 NOTE — TELEPHONE ENCOUNTER
Called per request from Dr Guzman, left voicemail for Janiya Black to please return call on Monday regarding non-urgent follow up questions

## 2022-12-19 DIAGNOSIS — E78.5 HYPERLIPIDEMIA, UNSPECIFIED HYPERLIPIDEMIA TYPE: ICD-10-CM

## 2022-12-21 RX ORDER — FENOFIBRATE 145 MG/1
TABLET, COATED ORAL
Qty: 90 TABLET | Refills: 0 | Status: SHIPPED | OUTPATIENT
Start: 2022-12-21 | End: 2023-03-25 | Stop reason: SDUPTHER

## 2022-12-21 NOTE — TELEPHONE ENCOUNTER
Rx Refill Note  Requested Prescriptions     Pending Prescriptions Disp Refills   • fenofibrate (TRICOR) 145 MG tablet [Pharmacy Med Name: FENOFIBRATE 145 MG TABLET] 90 tablet 0     Sig: TAKE ONE TABLET BY MOUTH DAILY. PLEASE SCHEDULE FOLLOW-UP VISIT      Last office visit with clinician: 10/4/2022   Next telemedicine visit with clinician: 2/6/2023   Next office visit with prescribing clinician: 6/5/2023

## 2023-01-09 ENCOUNTER — TELEPHONE (OUTPATIENT)
Dept: ORTHOPEDIC SURGERY | Facility: CLINIC | Age: 78
End: 2023-01-09

## 2023-01-09 NOTE — TELEPHONE ENCOUNTER
Caller: ANDRIY LICEA    Relationship to patient: SELF    Best call back number: [phone]    Chief complaint: MRI FOLLOW UP 01/11/2023    Type of visit: MRI FOLLOW UP 01/11/2023    Requested date: NA    If rescheduling, when is the original appointment: NA     Additional notes: THIS PATIENT IS A FORMER PATIENT OF DR. MCNEILL.

## 2023-01-11 ENCOUNTER — HOSPITAL ENCOUNTER (OUTPATIENT)
Dept: MRI IMAGING | Facility: HOSPITAL | Age: 78
Discharge: HOME | End: 2023-01-11
Admitting: ORTHOPAEDIC SURGERY
Payer: MEDICARE

## 2023-01-11 DIAGNOSIS — M48.062 SPINAL STENOSIS OF LUMBAR REGION WITH NEUROGENIC CLAUDICATION: ICD-10-CM

## 2023-01-11 DIAGNOSIS — M54.50 LUMBAR SPINE PAIN: ICD-10-CM

## 2023-01-11 DIAGNOSIS — Z98.1 HISTORY OF SPINAL FUSION: ICD-10-CM

## 2023-01-11 PROCEDURE — A9577 INJ MULTIHANCE: HCPCS | Performed by: ORTHOPAEDIC SURGERY

## 2023-01-11 PROCEDURE — 72158 MRI LUMBAR SPINE W/O & W/DYE: CPT

## 2023-01-11 PROCEDURE — 0 GADOBENATE DIMEGLUMINE 529 MG/ML SOLUTION: Performed by: ORTHOPAEDIC SURGERY

## 2023-01-11 PROCEDURE — 82565 ASSAY OF CREATININE: CPT

## 2023-01-11 RX ADMIN — GADOBENATE DIMEGLUMINE 16 ML: 529 INJECTION, SOLUTION INTRAVENOUS at 14:25

## 2023-01-12 LAB — CREAT BLDA-MCNC: 1 MG/DL (ref 0.6–1.3)

## 2023-01-13 ENCOUNTER — OFFICE VISIT (OUTPATIENT)
Dept: ORTHOPEDIC SURGERY | Facility: CLINIC | Age: 78
End: 2023-01-13
Payer: MEDICARE

## 2023-01-13 VITALS — TEMPERATURE: 98.6 F | WEIGHT: 178.8 LBS | BODY MASS INDEX: 25.6 KG/M2 | HEIGHT: 70 IN

## 2023-01-13 DIAGNOSIS — R52 PAIN: ICD-10-CM

## 2023-01-13 DIAGNOSIS — M54.16 LUMBAR RADICULOPATHY: ICD-10-CM

## 2023-01-13 DIAGNOSIS — M53.3 SACROILIAC JOINT PAIN: Primary | ICD-10-CM

## 2023-01-13 PROCEDURE — 99213 OFFICE O/P EST LOW 20 MIN: CPT | Performed by: NURSE PRACTITIONER

## 2023-01-13 PROCEDURE — 72100 X-RAY EXAM L-S SPINE 2/3 VWS: CPT | Performed by: NURSE PRACTITIONER

## 2023-01-29 DIAGNOSIS — I10 ESSENTIAL HYPERTENSION: Chronic | ICD-10-CM

## 2023-02-06 ENCOUNTER — OFFICE VISIT (OUTPATIENT)
Dept: ENDOCRINOLOGY | Age: 78
End: 2023-02-06
Payer: MEDICARE

## 2023-02-06 VITALS
SYSTOLIC BLOOD PRESSURE: 140 MMHG | TEMPERATURE: 97.3 F | HEART RATE: 82 BPM | WEIGHT: 178.6 LBS | DIASTOLIC BLOOD PRESSURE: 90 MMHG | HEIGHT: 70 IN | OXYGEN SATURATION: 96 % | BODY MASS INDEX: 25.57 KG/M2

## 2023-02-06 DIAGNOSIS — E78.5 HYPERLIPIDEMIA, UNSPECIFIED HYPERLIPIDEMIA TYPE: ICD-10-CM

## 2023-02-06 DIAGNOSIS — E03.9 PRIMARY HYPOTHYROIDISM: ICD-10-CM

## 2023-02-06 DIAGNOSIS — E11.42 TYPE 2 DIABETES MELLITUS WITH PERIPHERAL NEUROPATHY: Primary | ICD-10-CM

## 2023-02-06 DIAGNOSIS — I10 ESSENTIAL HYPERTENSION: ICD-10-CM

## 2023-02-06 PROCEDURE — 99214 OFFICE O/P EST MOD 30 MIN: CPT | Performed by: NURSE PRACTITIONER

## 2023-02-06 NOTE — PROGRESS NOTES
Chief Complaint  Chief Complaint   Patient presents with   • Diabetes     Type 2: Pt doesn't use meter, is up to date on eye exam, no hx of retinopathy or neuropathy. Pt states that he is having a lot of pain in hand.        Subjective          History of Present Illness    Ben Walsh 77 y.o. presents for a follow-up evaluation for type 2 DM     He has been diabetic since 2013     Pt is on the following medications for their DM: metformin  mg twice daily, Jardiance 25 mg daily and Tresiba 22 units every morning.        Pt has history of pancreatitis        Pt complains of diarrhea, numbness and tingling in feet, legs and right hand and vision changes.    Denies constipation, chest pain and shortness of breath.    Weight loss of 2 lbs since last visit.    Pt does not have a history of DM retinopathy.  Last eye exam was Summer time 6/2022     Pt does not have a history of nephropathy.  Patient is currently taking ARB     Pt does have neuropathy.  Current takes gabapentin 800 mg TID for this condition.  He has chronic neck and lower back pain due to degenerative disc disease     Pt does have a history of CAD with stent placement in 03/2016    Last A1C in 10/22 was 7.10    Last microalbumin in 10/22 was negative          Blood Sugars    Blood glucoses are not checked.              Hypothyroid    Managed by PCP     Current treatment is levothyroxine 50 mcg daily    Last labs in 10/22 showed TSH 1.130 and FT4 1.11             Hyperlipidemia     Pt denies any muscle/body aches, chest pain or shortness of breath    Pt is currently taking  atorvastatin 40 mg HS, Tricor 145 mg daily and fish oil 2 gm BID.    Last lipid panel in 10/22 showed Total 124, Triglycerides 260, HDL 29 and LDL 54            Hypertension    Pt denies any chest pain, palpitations, shortness of breath or headache    Current regimen includes amlodipine 5 mg daily and valsartan 160 mg daily     He follows with Dr. Borges            I have  "reviewed the patient's allergies, medicines, past medical hx, family hx and social hx.    Objective   Vital Signs:   /90   Pulse 82   Temp 97.3 °F (36.3 °C) (Temporal)   Ht 177.8 cm (70\")   Wt 81 kg (178 lb 9.6 oz)   SpO2 96%   BMI 25.63 kg/m²       Physical Exam   Physical Exam  Constitutional:       General: He is not in acute distress.     Appearance: Normal appearance. He is not diaphoretic.   HENT:      Head: Normocephalic and atraumatic.   Eyes:      General:         Right eye: No discharge.         Left eye: No discharge.   Skin:     General: Skin is warm and dry.   Neurological:      Mental Status: He is alert.   Psychiatric:         Mood and Affect: Mood normal.         Behavior: Behavior normal.                    Results Review:   Hemoglobin A1C   Date Value Ref Range Status   10/04/2022 7.10 (H) 4.80 - 5.60 % Final     Comment:     Hemoglobin A1C Ranges:  Increased Risk for Diabetes  5.7% to 6.4%  Diabetes                     >= 6.5%  Diabetic Goal                < 7.0%       Triglycerides   Date Value Ref Range Status   10/04/2022 260 (H) 0 - 150 mg/dL Final     HDL Cholesterol   Date Value Ref Range Status   10/04/2022 29 (L) 40 - 60 mg/dL Final     LDL Chol Calc (NIH)   Date Value Ref Range Status   10/04/2022 54 0 - 100 mg/dL Final     VLDL Cholesterol Joel   Date Value Ref Range Status   10/04/2022 41 (H) 5 - 40 mg/dL Final         Assessment and Plan {CC Problem List  Visit Diagnosis  ROS  Review (Popup)  Health Maintenance  Quality  BestPractice  Medications  SmartSets  SnapShot Encounters  Media :23  Diagnoses and all orders for this visit:    1. Type 2 diabetes mellitus with peripheral neuropathy (HCC) (Primary)  -     Hemoglobin A1c  -     Comprehensive Metabolic Panel    Stop taking metformin to see if it helps with diarrhea and keep me updated  Continue with Jardiance 25 mg daily and Tresiba 22 units every morning  Pt has not used sulfonylureas or Actos (no history of " CHF).  Unable to use DPP4 or GLP1 due to pancreatitis  Check labs  Check blood sugars at least once a day - will check on CGM      2. Primary hypothyroidism  -     TSH  -     T4, Free    Check labs today  Continue with levothyroxine 50 mcg daily      3. Hyperlipidemia, unspecified hyperlipidemia type  -     Comprehensive Metabolic Panel  -     Lipid Panel    Check labs today  Continue with atorvastatin and Tricor        4. Essential hypertension  -     Comprehensive Metabolic Panel    Stable  Continue with current medication regimen  Defer management to PCP          Refills sent to pharmacy/No refills needed at this time      Labs today  RTC on 06/05/23 with Dr. Miller      Follow Up     Patient was given instructions and counseling regarding her condition or for health maintenance advice. Please see specific information pulled into the AVS if appropriate.              Anabel Black, MARIBEL  02/06/23

## 2023-02-06 NOTE — PATIENT INSTRUCTIONS
Stop taking metformin to see if it helps with diarrhea and keep me updated  Check blood sugars at least once a day

## 2023-02-07 LAB
ALBUMIN SERPL-MCNC: 4.8 G/DL (ref 3.5–5.2)
ALBUMIN/GLOB SERPL: 2.5 G/DL
ALP SERPL-CCNC: 59 U/L (ref 39–117)
ALT SERPL-CCNC: 27 U/L (ref 1–41)
AST SERPL-CCNC: 18 U/L (ref 1–40)
BILIRUB SERPL-MCNC: 0.5 MG/DL (ref 0–1.2)
BUN SERPL-MCNC: 18 MG/DL (ref 8–23)
BUN/CREAT SERPL: 18.4 (ref 7–25)
CALCIUM SERPL-MCNC: 10.2 MG/DL (ref 8.6–10.5)
CHLORIDE SERPL-SCNC: 105 MMOL/L (ref 98–107)
CHOLEST SERPL-MCNC: 105 MG/DL (ref 0–200)
CO2 SERPL-SCNC: 27.2 MMOL/L (ref 22–29)
CREAT SERPL-MCNC: 0.98 MG/DL (ref 0.76–1.27)
EGFRCR SERPLBLD CKD-EPI 2021: 78.4 ML/MIN/1.73
GLOBULIN SER CALC-MCNC: 1.9 GM/DL
GLUCOSE SERPL-MCNC: 114 MG/DL (ref 65–99)
HBA1C MFR BLD: 7.2 % (ref 4.8–5.6)
HDLC SERPL-MCNC: 46 MG/DL (ref 40–60)
IMP & REVIEW OF LAB RESULTS: NORMAL
LDLC SERPL CALC-MCNC: 38 MG/DL (ref 0–100)
POTASSIUM SERPL-SCNC: 4.6 MMOL/L (ref 3.5–5.2)
PROT SERPL-MCNC: 6.7 G/DL (ref 6–8.5)
SODIUM SERPL-SCNC: 139 MMOL/L (ref 136–145)
T4 FREE SERPL-MCNC: 1.08 NG/DL (ref 0.93–1.7)
TRIGL SERPL-MCNC: 117 MG/DL (ref 0–150)
TSH SERPL DL<=0.005 MIU/L-ACNC: 2.91 UIU/ML (ref 0.27–4.2)
VLDLC SERPL CALC-MCNC: 21 MG/DL (ref 5–40)

## 2023-02-18 ENCOUNTER — DOCUMENTATION (OUTPATIENT)
Dept: FAMILY MEDICINE CLINIC | Facility: CLINIC | Age: 78
End: 2023-02-18
Payer: MEDICARE

## 2023-02-18 DIAGNOSIS — M62.838 MUSCLE SPASM: Primary | ICD-10-CM

## 2023-02-18 RX ORDER — CYCLOBENZAPRINE HCL 10 MG
10 TABLET ORAL 3 TIMES DAILY PRN
Qty: 90 TABLET | Refills: 5 | Status: SHIPPED | OUTPATIENT
Start: 2023-02-18

## 2023-02-24 ENCOUNTER — TELEPHONE (OUTPATIENT)
Dept: ENDOCRINOLOGY | Age: 78
End: 2023-02-24

## 2023-02-24 RX ORDER — BLOOD SUGAR DIAGNOSTIC
STRIP MISCELLANEOUS
Qty: 300 EACH | Refills: 1 | Status: CANCELLED | OUTPATIENT
Start: 2023-02-24

## 2023-02-24 NOTE — TELEPHONE ENCOUNTER
Pt called needing a refill on his Accu -Chek guide test strips. Pt is out of his test strips      Please send it to [facility] [address]

## 2023-03-02 NOTE — TELEPHONE ENCOUNTER
Low risk of surgery okay to hold Plavix 7 to 10 days prior to procedure.   Pre-Procedure History and physical    Procedure date: 3/2/2023  Indications for procedure: Personal history of colon polyps, Diarrhea, Reflux and Nausea and vomiting   Procedure: EGD and Colonoscopy  Type of sedation: MAC IV  Possible pregnancy (LMP, if applicable): No  (No LMP recorded (lmp unknown). Patient is postmenopausal.)  Airway risk history: Unremarkable  Medication to be used: Sedation per Anesthesia      History of Present Illness: Faith Castillo is a  59 year old female who presents with reflux nausea diarrhea and history of colon polyps    Physical exam:Vitals: Blood pressure 136/72, pulse 69, temperature 98.3 °F (36.8 °C), temperature source Oral, resp. rate 16, height 5' 9\" (1.753 m), weight 126.4 kg (278 lb 8.8 oz), SpO2 96 %.    Constitutional: No acute distress.  Skin: No jaundice. Skin is warm and dry on palpation.  Pulmonary: Clear to auscultation, good respiratory effort.  Cardiovascular: Regular rate.  Abdomen: Positive bowel sounds, nontender, non-distended.  Mental Status: Alert and oriented. Judgment and insight appear appropriate.    Past Medical History:   Diagnosis Date   • Allergic rhinitis    • Asthma    • Carpal tunnel syndrome    • Cervical radiculopathy    • Cervicalgia    • Closed left ankle fracture    • Heart murmur    • HTN (hypertension)    • Hypothyroidism    • Lumbosacral radiculopathy    • Obesity    • Prediabetes    • RLS (restless legs syndrome)    • Rosacea    • Seborrheic keratosis        Past Surgical History:   Procedure Laterality Date   • Ankle fracture surgery Left     And hardware removal surgery   • Appendectomy     • Endometrial biopsy     • Fracture surgery     • Temporal artery ligatn or bx Right 10/18/2022    By Dr. East   • Tonsillectomy         (Not in a hospital admission)      ALLERGIES:   Allergen Reactions   • Penicillins Other (See Comments)     -Prick testing to Pre-Pen and Penicillin G 10,000 units negative. Intradermal testing to Pre-Pen and  Penicillin G 10,000 units negative in duplicate. See allergy note 9-6-2022  - The negative predictive value for penicillin testing with PrePen and Penicillin G is approximately 98%.  -Patient aware that testing only evaluates IgE mediated reactions   • Sulfa Antibiotics Other (See Comments)   • Latex Other (See Comments)   • Zolpidem Other (See Comments)     Disturbing dreams       Family History   Problem Relation Age of Onset   • Diabetes Mother    • Depression Mother    • Heart disease Mother    • Cancer Mother    • Natural Causes Father 89   • COPD Father    • Allergic Rhinitis Neg Hx    • Asthma Neg Hx        Social History     Socioeconomic History   • Marital status: Single     Spouse name: Not on file   • Number of children: Not on file   • Years of education: Not on file   • Highest education level: Not on file   Occupational History   • Not on file   Tobacco Use   • Smoking status: Former   • Smokeless tobacco: Never   Vaping Use   • Vaping Use: never used   Substance and Sexual Activity   • Alcohol use: Not Currently   • Drug use: Never   • Sexual activity: Not on file   Other Topics Concern   • Not on file   Social History Narrative   • Not on file     Social Determinants of Health     Financial Resource Strain: Not on file   Food Insecurity: Not on file   Transportation Needs: Not on file   Physical Activity: Not on file   Stress: Not on file   Social Connections: Not on file   Intimate Partner Violence: Not on file       REVIEW OF SYSTEMS:  A complete review of systems was performed and found to be negative except for what was stated in the History of Present Illness.    Airway Assessment: Defer to Anesthesia    Risk Status Assessment: 2 Normal patient with mild systemic disease    The Patient remains a candidate for the planned sedation and procedure.

## 2023-03-09 ENCOUNTER — TELEMEDICINE (OUTPATIENT)
Dept: FAMILY MEDICINE CLINIC | Facility: CLINIC | Age: 78
End: 2023-03-09
Payer: MEDICARE

## 2023-03-09 VITALS — HEIGHT: 70 IN | BODY MASS INDEX: 25.63 KG/M2

## 2023-03-09 DIAGNOSIS — F51.01 PRIMARY INSOMNIA: Primary | Chronic | ICD-10-CM

## 2023-03-09 DIAGNOSIS — M25.50 ARTHRALGIA, UNSPECIFIED JOINT: ICD-10-CM

## 2023-03-09 PROCEDURE — 1159F MED LIST DOCD IN RCRD: CPT | Performed by: FAMILY MEDICINE

## 2023-03-09 PROCEDURE — 1160F RVW MEDS BY RX/DR IN RCRD: CPT | Performed by: FAMILY MEDICINE

## 2023-03-09 PROCEDURE — 99214 OFFICE O/P EST MOD 30 MIN: CPT | Performed by: FAMILY MEDICINE

## 2023-03-09 RX ORDER — PREDNISONE 20 MG/1
20 TABLET ORAL DAILY
Qty: 5 TABLET | Refills: 0 | Status: SHIPPED | OUTPATIENT
Start: 2023-03-09 | End: 2023-03-14

## 2023-03-09 RX ORDER — ZOLPIDEM TARTRATE 10 MG/1
10 TABLET ORAL NIGHTLY PRN
Qty: 30 TABLET | Refills: 2 | Status: SHIPPED | OUTPATIENT
Start: 2023-03-09

## 2023-03-09 NOTE — PROGRESS NOTES
Subjective   Ben Walsh is a 77 y.o. male.     CC: Video Visit for Medical Management    History of Present Illness     Chief Complaint:   Chief Complaint   Patient presents with   • Insomnia     Video visit / med refill        Ben Walsh 77 y.o. male who presents today for Medical Management of the below listed issues. He  has a problem list of   Patient Active Problem List   Diagnosis   • Allergic rhinitis   • ADD (attention deficit disorder)   • DDD (degenerative disc disease), lumbar   • BPH (benign prostatic hypertrophy)   • Chronic pain   • ASCVD (arteriosclerotic cardiovascular disease)   • Depression   • Diverticulosis of colon   • GERD (gastroesophageal reflux disease)   • Hiatal hernia   • Hyperlipidemia   • Essential hypertension   • Primary hypothyroidism   • Insomnia   • Osteoarthritis, multiple sites   • Acute pancreatitis   • Vitamin D deficiency   • Glaucoma   • Abnormal nuclear stress test   • Two-vessel coronary artery disease   • SAEED (dyspnea on exertion)   • Obstructive sleep apnea syndrome   • Patent foramen ovale   • Snoring   • Disorder of rotator cuff   • History of COPD   • Type 2 diabetes mellitus with microalbuminuria, without long-term current use of insulin (HCC)   • Carpal tunnel syndrome of right wrist   • Abnormal electrocardiogram   • Atypical chest pain   • Type 2 diabetes mellitus with peripheral neuropathy (HCC)   • Atrial fibrillation and flutter ([facility])   .  Since the last visit, He has overall felt well, although is having some OA flare-up issues and occasionally takes Prednisone and would like a refill. Last A1C was good.  he has been compliant with   Current Outpatient Medications:   •  zolpidem (AMBIEN) 10 MG tablet, Take 1 tablet by mouth At Night As Needed for Sleep., Disp: 30 tablet, Rfl: 2  •  ACCU-CHEK GUIDE test strip, Dx code E11.29 testing bs 3 x day, Disp: 300 each, Rfl: 1  •  amLODIPine (NORVASC) 5 MG tablet, Take 1 tablet by mouth Daily., Disp: 90  tablet, Rfl: 1  •  atorvastatin (LIPITOR) 40 MG tablet, Take 1 tablet by mouth Every Night. For cholesterol, Disp: 90 tablet, Rfl: 3  •  azelastine (ASTELIN) 0.1 % nasal spray, , Disp: , Rfl:   •  B-D ULTRAFINE III SHORT PEN 31G X 8 MM misc, USE ONE PEN NEEDLE DAILY, Disp: 100 each, Rfl: 3  •  Cholecalciferol (Vitamin D3) 1.25 MG (08531 UT) capsule, Take 1 capsule by mouth Every 7 (Seven) Days., Disp: 15 capsule, Rfl: 3  •  clopidogrel (PLAVIX) 75 MG tablet, Take 1 tablet by mouth Daily., Disp: 90 tablet, Rfl: 1  •  cyclobenzaprine (FLEXERIL) 10 MG tablet, Take 1 tablet by mouth 3 (Three) Times a Day As Needed for Muscle Spasms., Disp: 90 tablet, Rfl: 5  •  escitalopram (LEXAPRO) 20 MG tablet, Take 1 tablet by mouth Daily., Disp: 90 tablet, Rfl: 1  •  esomeprazole (NexIUM) 20 MG capsule, Take 20 mg by mouth every morning before breakfast., Disp: , Rfl:   •  fenofibrate (TRICOR) 145 MG tablet, TAKE ONE TABLET BY MOUTH DAILY. PLEASE SCHEDULE FOLLOW-UP VISIT, Disp: 90 tablet, Rfl: 0  •  fluticasone (FLONASE) 50 MCG/ACT nasal spray, , Disp: , Rfl:   •  gabapentin (NEURONTIN) 800 MG tablet, , Disp: , Rfl:   •  HYDROcodone-acetaminophen (NORCO) 7.5-325 MG per tablet, Take 1 tablet by mouth 2 (Two) Times a Day. Prescribed by Dr. Jacobo (Patient taking differently: Take 1 tablet by mouth Every 8 (Eight) Hours As Needed. Prescribed by Dr. Jacobo), Disp: , Rfl:   •  Insulin Degludec (Tresiba FlexTouch) 200 UNIT/ML solution pen-injector pen injection, 22 units every morning, Disp: 12 mL, Rfl: 1  •  Jardiance 25 MG tablet tablet, TAKE ONE TABLET BY MOUTH DAILY, Disp: 30 tablet, Rfl: 0  •  Lancets (ACCU-CHEK SOFT TOUCH) lancets, Dx code E11.29 testing bs 3 x day, Disp: 300 each, Rfl: 1  •  levothyroxine (SYNTHROID, LEVOTHROID) 50 MCG tablet, Take 1 tablet by mouth Daily. for thyroid, Disp: 90 tablet, Rfl: 1  •  metFORMIN ER (GLUCOPHAGE-XR) 500 MG 24 hr tablet, Take 2 tablets by mouth 2 (Two) Times a Day With Meals., Disp: 360  "tablet, Rfl: 1  •  montelukast (SINGULAIR) 10 MG tablet, , Disp: , Rfl:   •  NITROSTAT 0.4 MG SL tablet, Place 0.4 mg under the tongue every 5 (five) minutes as needed., Disp: , Rfl:   •  predniSONE (DELTASONE) 20 MG tablet, Take 1 tablet by mouth Daily for 5 days., Disp: 5 tablet, Rfl: 0  •  tamsulosin (FLOMAX) 0.4 MG capsule 24 hr capsule, Take 1 capsule by mouth Daily., Disp: 90 capsule, Rfl: 1  •  valsartan (DIOVAN) 160 MG tablet, Take 1 tablet by mouth Daily., Disp: 90 tablet, Rfl: 1.  He denies medication side effects.    All of the other chronic condition(s) listed above are stable w/o issues.    Ht 177.8 cm (70\")   BMI 25.63 kg/m²     Results for orders placed or performed in visit on 02/06/23   Hemoglobin A1c    Specimen: Blood   Result Value Ref Range    Hemoglobin A1C 7.20 (H) 4.80 - 5.60 %   Comprehensive Metabolic Panel    Specimen: Blood   Result Value Ref Range    Glucose 114 (H) 65 - 99 mg/dL    BUN 18 8 - 23 mg/dL    Creatinine 0.98 0.76 - 1.27 mg/dL    EGFR Result 78.4 >60.0 mL/min/1.73    BUN/Creatinine Ratio 18.4 7.0 - 25.0    Sodium 139 136 - 145 mmol/L    Potassium 4.6 3.5 - 5.2 mmol/L    Chloride 105 98 - 107 mmol/L    Total CO2 27.2 22.0 - 29.0 mmol/L    Calcium 10.2 8.6 - 10.5 mg/dL    Total Protein 6.7 6.0 - 8.5 g/dL    Albumin 4.8 3.5 - 5.2 g/dL    Globulin 1.9 gm/dL    A/G Ratio 2.5 g/dL    Total Bilirubin 0.5 0.0 - 1.2 mg/dL    Alkaline Phosphatase 59 39 - 117 U/L    AST (SGOT) 18 1 - 40 U/L    ALT (SGPT) 27 1 - 41 U/L   Lipid Panel    Specimen: Blood   Result Value Ref Range    Total Cholesterol 105 0 - 200 mg/dL    Triglycerides 117 0 - 150 mg/dL    HDL Cholesterol 46 40 - 60 mg/dL    VLDL Cholesterol Joel 21 5 - 40 mg/dL    LDL Chol Calc (NIH) 38 0 - 100 mg/dL   TSH    Specimen: Blood   Result Value Ref Range    TSH 2.910 0.270 - 4.200 uIU/mL   T4, Free    Specimen: Blood   Result Value Ref Range    Free T4 1.08 0.93 - 1.70 ng/dL   Cardiovascular Risk Assessment   Result Value Ref " Range    Interpretation Note              The following portions of the patient's history were reviewed and updated as appropriate: allergies, current medications, past family history, past medical history, past social history, past surgical history, and problem list.    Review of Systems   Constitutional: Negative for activity change, chills and fever.   Respiratory: Negative for cough.    Cardiovascular: Negative for chest pain.   Psychiatric/Behavioral: Negative for dysphoric mood.       Objective   Physical Exam  Constitutional:       General: He is not in acute distress.     Appearance: He is well-developed.   Pulmonary:      Effort: Pulmonary effort is normal.   Neurological:      Mental Status: He is alert and oriented to person, place, and time.   Psychiatric:         Behavior: Behavior normal.         Thought Content: Thought content normal.             Diagnoses and all orders for this visit:    1. Primary insomnia (Primary)  -     zolpidem (AMBIEN) 10 MG tablet; Take 1 tablet by mouth At Night As Needed for Sleep.  Dispense: 30 tablet; Refill: 2    2. Arthralgia, unspecified joint  -     predniSONE (DELTASONE) 20 MG tablet; Take 1 tablet by mouth Daily for 5 days.  Dispense: 5 tablet; Refill: 0      Spent  12   minutes with chart and interview and consent for this encounter given by the patient.  You have chosen to receive care through a telehealth visit.  Do you consent to use a video/audio connection for your medical care today? Yes  Patient was in their residence when this visit took place and I was in my medical office.

## 2023-03-11 ENCOUNTER — DOCUMENTATION (OUTPATIENT)
Dept: FAMILY MEDICINE CLINIC | Facility: CLINIC | Age: 78
End: 2023-03-11
Payer: MEDICARE

## 2023-03-11 DIAGNOSIS — N40.0 BENIGN PROSTATIC HYPERPLASIA WITHOUT LOWER URINARY TRACT SYMPTOMS: Primary | Chronic | ICD-10-CM

## 2023-03-11 RX ORDER — TAMSULOSIN HYDROCHLORIDE 0.4 MG/1
1 CAPSULE ORAL DAILY
Qty: 90 CAPSULE | Refills: 3 | Status: SHIPPED | OUTPATIENT
Start: 2023-03-11

## 2023-03-20 DIAGNOSIS — F51.01 PRIMARY INSOMNIA: Chronic | ICD-10-CM

## 2023-03-20 RX ORDER — ZOLPIDEM TARTRATE 10 MG/1
10 TABLET ORAL NIGHTLY PRN
Qty: 30 TABLET | Refills: 2 | OUTPATIENT
Start: 2023-03-20

## 2023-03-20 NOTE — TELEPHONE ENCOUNTER
Caller: Ben Walsh    Relationship: Self    Best call back number:[phone]    Requested Prescriptions:   Requested Prescriptions     Pending Prescriptions Disp Refills   • zolpidem (AMBIEN) 10 MG tablet 30 tablet 2     Sig: Take 1 tablet by mouth At Night As Needed for Sleep.        Pharmacy where request should be sent: [facility] [id number] [place] [id number] [facility] AT [facility] [phone] [facility] [phone] FX     Does the patient have less than a 3 day supply:  [x] Yes  [] No    Would you like a call back once the refill request has been completed: [x] Yes [] No    If the office needs to give you a call back, can they leave a voicemail: [x] Yes [] No    Cami Francis Rep   03/20/23 09:58 EDT

## 2023-03-22 ENCOUNTER — TELEPHONE (OUTPATIENT)
Dept: FAMILY MEDICINE CLINIC | Facility: CLINIC | Age: 78
End: 2023-03-22
Payer: MEDICARE

## 2023-03-22 NOTE — TELEPHONE ENCOUNTER
I talked to patient . Some confusion . Pt told ambien was sent to [facility] on  03/09/2023 with 2 refills

## 2023-03-22 NOTE — TELEPHONE ENCOUNTER
I have this pt in the office,  he stop by because his medication has not  been filled  yet. Chivo stated that pt have  to be seen by his  Because this a control substance. Pt stated he will be here in [facility] for awhile. He did stated to pharmacy that he had a visit at  With

## 2023-03-25 ENCOUNTER — DOCUMENTATION (OUTPATIENT)
Dept: FAMILY MEDICINE CLINIC | Facility: CLINIC | Age: 78
End: 2023-03-25
Payer: MEDICARE

## 2023-03-25 DIAGNOSIS — E78.5 HYPERLIPIDEMIA, UNSPECIFIED HYPERLIPIDEMIA TYPE: Primary | ICD-10-CM

## 2023-03-25 RX ORDER — FENOFIBRATE 145 MG/1
145 TABLET, COATED ORAL DAILY
Qty: 90 TABLET | Refills: 3 | Status: SHIPPED | OUTPATIENT
Start: 2023-03-25 | End: 2023-04-01 | Stop reason: SDUPTHER

## 2023-04-01 ENCOUNTER — DOCUMENTATION (OUTPATIENT)
Dept: FAMILY MEDICINE CLINIC | Facility: CLINIC | Age: 78
End: 2023-04-01
Payer: MEDICARE

## 2023-04-01 DIAGNOSIS — E78.5 HYPERLIPIDEMIA, UNSPECIFIED HYPERLIPIDEMIA TYPE: Primary | ICD-10-CM

## 2023-04-01 RX ORDER — FENOFIBRATE 145 MG/1
145 TABLET, COATED ORAL DAILY
Qty: 90 TABLET | Refills: 3 | Status: SHIPPED | OUTPATIENT
Start: 2023-04-01 | End: 2023-06-30

## 2023-04-15 DIAGNOSIS — E55.9 VITAMIN D DEFICIENCY: Chronic | ICD-10-CM

## 2023-04-15 DIAGNOSIS — E11.42 TYPE 2 DIABETES MELLITUS WITH PERIPHERAL NEUROPATHY: ICD-10-CM

## 2023-04-15 DIAGNOSIS — I10 ESSENTIAL HYPERTENSION: Chronic | ICD-10-CM

## 2023-04-15 RX ORDER — AMLODIPINE BESYLATE 5 MG/1
5 TABLET ORAL DAILY
Qty: 90 TABLET | Refills: 1 | Status: SHIPPED | OUTPATIENT
Start: 2023-04-15

## 2023-04-15 RX ORDER — CHOLECALCIFEROL (VITAMIN D3) 1250 MCG
50000 CAPSULE ORAL
Qty: 15 CAPSULE | Refills: 3 | Status: SHIPPED | OUTPATIENT
Start: 2023-04-15

## 2023-04-15 RX ORDER — METFORMIN HYDROCHLORIDE 500 MG/1
1000 TABLET, EXTENDED RELEASE ORAL 2 TIMES DAILY WITH MEALS
Qty: 360 TABLET | Refills: 1 | Status: SHIPPED | OUTPATIENT
Start: 2023-04-15

## 2023-04-21 DIAGNOSIS — F51.01 PRIMARY INSOMNIA: Chronic | ICD-10-CM

## 2023-04-21 RX ORDER — ZOLPIDEM TARTRATE 10 MG/1
TABLET ORAL
Qty: 30 TABLET | Refills: 0 | OUTPATIENT
Start: 2023-04-21

## 2023-04-21 RX ORDER — ZOLPIDEM TARTRATE 10 MG/1
10 TABLET ORAL NIGHTLY PRN
Qty: 30 TABLET | Refills: 2 | OUTPATIENT
Start: 2023-04-21

## 2023-04-21 NOTE — TELEPHONE ENCOUNTER
Caller: Ben Walsh PAT    Relationship: Self    Best call back number: [phone]    Requested Prescriptions:   Requested Prescriptions     Pending Prescriptions Disp Refills   • zolpidem (AMBIEN) 10 MG tablet 30 tablet 2     Sig: Take 1 tablet by mouth At Night As Needed for Sleep.        Pharmacy where request should be sent: [facility] [id number] AT [facility] [phone] [facility] [phone]      Last office visit with prescribing clinician: 12/7/2022   Last telemedicine visit with prescribing clinician: Visit date not found   Next office visit with prescribing clinician: Visit date not found     Additional details provided by patient: PATIENT IS COMPLETELY OUT OF THIS MEDICATION.    Does the patient have less than a 3 day supply:  [x] Yes  [] No    Would you like a call back once the refill request has been completed: [] Yes [x] No    If the office needs to give you a call back, can they leave a voicemail: [] Yes [x] No    PLEASE ADVISE.    Cami Rayo Rep   04/21/23 11:58 EDT           
moderate assist (50% patients effort)/minimum assist (75% patients effort)

## 2023-05-11 ENCOUNTER — TELEPHONE (OUTPATIENT)
Dept: ENDOCRINOLOGY | Age: 78
End: 2023-05-11
Payer: MEDICARE

## 2023-05-11 RX ORDER — INSULIN DEGLUDEC 200 U/ML
INJECTION, SOLUTION SUBCUTANEOUS
Qty: 12 ML | Refills: 1 | Status: SHIPPED | OUTPATIENT
Start: 2023-05-11

## 2023-05-11 NOTE — TELEPHONE ENCOUNTER
Caller: ANDRIY ANUJA    Relationship: PATIENT    Best call back number: [phone]    Requested Prescriptions: INSULIN INJECTIONS TRESBIA 22 UNITS EVERY MORNING. JUST DID HIS LAST INJECTION, NEEDS A REFILL.   Requested Prescriptions      No prescriptions requested or ordered in this encounter        Pharmacy where request should be sent:      Last office visit with prescribing clinician: 2/25/2022   Last telemedicine visit with prescribing clinician: 2/24/2023   Next office visit with prescribing clinician: 6/5/2023     Additional details provided by patient:     Does the patient have less than a 3 day supply:  [x] Yes  [] No    Would you like a call back once the refill request has been completed: [x] Yes [] No    If the office needs to give you a call back, can they leave a voicemail: [x] Yes [] No    Cami Griffin Rep   05/11/23 11:50 EDT

## 2023-06-11 ENCOUNTER — DOCUMENTATION (OUTPATIENT)
Dept: FAMILY MEDICINE CLINIC | Facility: CLINIC | Age: 78
End: 2023-06-11
Payer: MEDICARE

## 2023-06-12 ENCOUNTER — TELEPHONE (OUTPATIENT)
Dept: FAMILY MEDICINE CLINIC | Facility: CLINIC | Age: 78
End: 2023-06-12
Payer: MEDICARE

## 2023-06-12 DIAGNOSIS — E55.9 VITAMIN D DEFICIENCY: ICD-10-CM

## 2023-06-12 DIAGNOSIS — E11.29 TYPE 2 DIABETES MELLITUS WITH MICROALBUMINURIA, WITHOUT LONG-TERM CURRENT USE OF INSULIN: ICD-10-CM

## 2023-06-12 DIAGNOSIS — R80.9 TYPE 2 DIABETES MELLITUS WITH MICROALBUMINURIA, WITHOUT LONG-TERM CURRENT USE OF INSULIN: ICD-10-CM

## 2023-06-12 DIAGNOSIS — E03.9 PRIMARY HYPOTHYROIDISM: Primary | ICD-10-CM

## 2023-06-12 DIAGNOSIS — F11.20 OPIOID DEPENDENCE, UNCOMPLICATED: ICD-10-CM

## 2023-06-12 DIAGNOSIS — I25.10 ASCVD (ARTERIOSCLEROTIC CARDIOVASCULAR DISEASE): ICD-10-CM

## 2023-06-12 DIAGNOSIS — F33.42 RECURRENT MAJOR DEPRESSIVE DISORDER, IN FULL REMISSION: ICD-10-CM

## 2023-06-12 DIAGNOSIS — K21.9 GASTROESOPHAGEAL REFLUX DISEASE WITHOUT ESOPHAGITIS: ICD-10-CM

## 2023-06-12 PROBLEM — I48.92 ATRIAL FIBRILLATION AND FLUTTER: Status: RESOLVED | Noted: 2019-01-31 | Resolved: 2023-06-12

## 2023-06-12 PROBLEM — I48.91 ATRIAL FIBRILLATION AND FLUTTER: Status: RESOLVED | Noted: 2019-01-31 | Resolved: 2023-06-12

## 2023-06-12 RX ORDER — BLOOD SUGAR DIAGNOSTIC
STRIP MISCELLANEOUS
Qty: 300 EACH | Refills: 1 | Status: SHIPPED | OUTPATIENT
Start: 2023-06-12

## 2023-06-12 NOTE — TELEPHONE ENCOUNTER
This is my stepdad.  I just realized he missed his appointment with you.  We just put my mom into skilled nursing home.  There has been a lot of drama.  Many apologies.  I can reorder his labs.  He will call and schedule first available this week.  Janiya HUYNH

## 2023-07-01 PROBLEM — E53.8 B12 DEFICIENCY: Status: ACTIVE | Noted: 2023-07-01

## 2023-07-01 LAB
25(OH)D3+25(OH)D2 SERPL-MCNC: 54.1 NG/ML (ref 30–100)
ALBUMIN SERPL-MCNC: 4.6 G/DL (ref 3.7–4.7)
ALBUMIN/CREAT UR: 7 MG/G CREAT (ref 0–29)
ALBUMIN/GLOB SERPL: 2 {RATIO} (ref 1.2–2.2)
ALP SERPL-CCNC: 80 IU/L (ref 44–121)
ALT SERPL-CCNC: 34 IU/L (ref 0–44)
AST SERPL-CCNC: 26 IU/L (ref 0–40)
BASOPHILS # BLD AUTO: 0.1 X10E3/UL (ref 0–0.2)
BASOPHILS NFR BLD AUTO: 1 %
BILIRUB SERPL-MCNC: 0.7 MG/DL (ref 0–1.2)
BUN SERPL-MCNC: 10 MG/DL (ref 8–27)
BUN/CREAT SERPL: 10 (ref 10–24)
CALCIUM SERPL-MCNC: 9.6 MG/DL (ref 8.6–10.2)
CHLORIDE SERPL-SCNC: 103 MMOL/L (ref 96–106)
CHOLEST SERPL-MCNC: 142 MG/DL (ref 100–199)
CO2 SERPL-SCNC: 21 MMOL/L (ref 20–29)
CREAT SERPL-MCNC: 1.05 MG/DL (ref 0.76–1.27)
CREAT UR-MCNC: 74.9 MG/DL
EGFRCR SERPLBLD CKD-EPI 2021: 72 ML/MIN/1.73
EOSINOPHIL # BLD AUTO: 0 X10E3/UL (ref 0–0.4)
EOSINOPHIL NFR BLD AUTO: 1 %
ERYTHROCYTE [DISTWIDTH] IN BLOOD BY AUTOMATED COUNT: 11.7 % (ref 11.6–15.4)
FOLATE SERPL-MCNC: 10.9 NG/ML
GLOBULIN SER CALC-MCNC: 2.3 G/DL (ref 1.5–4.5)
GLUCOSE SERPL-MCNC: 135 MG/DL (ref 70–99)
HBA1C MFR BLD: 7.5 % (ref 4.8–5.6)
HCT VFR BLD AUTO: 40.8 % (ref 37.5–51)
HDLC SERPL-MCNC: 30 MG/DL
HGB BLD-MCNC: 13.1 G/DL (ref 13–17.7)
IMM GRANULOCYTES # BLD AUTO: 0 X10E3/UL (ref 0–0.1)
IMM GRANULOCYTES NFR BLD AUTO: 0 %
LDLC SERPL CALC-MCNC: 81 MG/DL (ref 0–99)
LYMPHOCYTES # BLD AUTO: 1.5 X10E3/UL (ref 0.7–3.1)
LYMPHOCYTES NFR BLD AUTO: 26 %
MAGNESIUM SERPL-MCNC: 1.9 MG/DL (ref 1.6–2.3)
MCH RBC QN AUTO: 29.6 PG (ref 26.6–33)
MCHC RBC AUTO-ENTMCNC: 32.1 G/DL (ref 31.5–35.7)
MCV RBC AUTO: 92 FL (ref 79–97)
MICROALBUMIN UR-MCNC: 5.2 UG/ML
MONOCYTES # BLD AUTO: 0.4 X10E3/UL (ref 0.1–0.9)
MONOCYTES NFR BLD AUTO: 8 %
NEUTROPHILS # BLD AUTO: 3.7 X10E3/UL (ref 1.4–7)
NEUTROPHILS NFR BLD AUTO: 64 %
PLATELET # BLD AUTO: 289 X10E3/UL (ref 150–450)
POTASSIUM SERPL-SCNC: 4.5 MMOL/L (ref 3.5–5.2)
PROT SERPL-MCNC: 6.9 G/DL (ref 6–8.5)
RBC # BLD AUTO: 4.43 X10E6/UL (ref 4.14–5.8)
SODIUM SERPL-SCNC: 140 MMOL/L (ref 134–144)
T4 FREE SERPL-MCNC: 1.16 NG/DL (ref 0.82–1.77)
TRIGL SERPL-MCNC: 181 MG/DL (ref 0–149)
TSH SERPL DL<=0.005 MIU/L-ACNC: 0.85 UIU/ML (ref 0.45–4.5)
VIT B12 SERPL-MCNC: 165 PG/ML (ref 232–1245)
VLDLC SERPL CALC-MCNC: 31 MG/DL (ref 5–40)
WBC # BLD AUTO: 5.7 X10E3/UL (ref 3.4–10.8)

## 2023-07-24 DIAGNOSIS — I10 ESSENTIAL HYPERTENSION: Chronic | ICD-10-CM

## 2023-07-24 RX ORDER — VALSARTAN 160 MG/1
160 TABLET ORAL DAILY
Qty: 30 TABLET | OUTPATIENT
Start: 2023-07-24

## 2023-08-18 ENCOUNTER — DOCUMENTATION (OUTPATIENT)
Dept: FAMILY MEDICINE CLINIC | Facility: CLINIC | Age: 78
End: 2023-08-18
Payer: MEDICARE

## 2023-08-28 RX ORDER — MONTELUKAST SODIUM 10 MG/1
10 TABLET ORAL DAILY
Qty: 90 TABLET | Refills: 3 | Status: SHIPPED | OUTPATIENT
Start: 2023-08-28

## 2023-10-03 NOTE — PROGRESS NOTES
Chief Complaint:   Chief Complaint   Patient presents with    Insomnia       Ben Walsh 78 y.o. male who presents today for Medical Management of the below listed issues. He  has a problem list of   Patient Active Problem List   Diagnosis    Allergic rhinitis    ADD (attention deficit disorder)    DDD (degenerative disc disease), lumbar    BPH (benign prostatic hypertrophy)    Chronic pain    ASCVD (arteriosclerotic cardiovascular disease)    Depression    Diverticulosis of colon    GERD (gastroesophageal reflux disease)    Hiatal hernia    Hyperlipidemia    Essential hypertension    Primary hypothyroidism    Insomnia    Osteoarthritis, multiple sites    Acute pancreatitis    Vitamin D deficiency    Glaucoma    Abnormal nuclear stress test    Two-vessel coronary artery disease    SAEED (dyspnea on exertion)    Obstructive sleep apnea syndrome    Patent foramen ovale    Snoring    Disorder of rotator cuff    History of COPD    Type 2 diabetes mellitus with microalbuminuria, without long-term current use of insulin    Carpal tunnel syndrome of right wrist    Abnormal electrocardiogram    Atypical chest pain    Type 2 diabetes mellitus with peripheral neuropathy    B12 deficiency   .  Since the last visit, He has been having a difficult time sleeping since the passing of his wife.   he has been compliant with   Current Outpatient Medications:     zolpidem (AMBIEN) 10 MG tablet, Take 1 tablet by mouth At Night As Needed for Sleep., Disp: 30 tablet, Rfl: 2    Accu-Chek Guide test strip, Dx code E11.29 testing bs 3 x day, Disp: 300 each, Rfl: 1    amLODIPine (NORVASC) 5 MG tablet, Take 1 tablet by mouth Daily., Disp: 90 tablet, Rfl: 1    atorvastatin (LIPITOR) 40 MG tablet, Take 1 tablet by mouth Every Night. For cholesterol, Disp: 90 tablet, Rfl: 1    azelastine (ASTELIN) 0.1 % nasal spray, , Disp: , Rfl:     Cholecalciferol (Vitamin D3) 1.25 MG (21297 UT) capsule, Take 1 capsule by mouth Every 7 (Seven) Days.,  Disp: 15 capsule, Rfl: 3    clopidogrel (PLAVIX) 75 MG tablet, Take 1 tablet by mouth Daily., Disp: 90 tablet, Rfl: 1    cyclobenzaprine (FLEXERIL) 10 MG tablet, Take 1 tablet by mouth 3 (Three) Times a Day As Needed for Muscle Spasms., Disp: 90 tablet, Rfl: 5    escitalopram (LEXAPRO) 20 MG tablet, Take 1 tablet by mouth Daily., Disp: 90 tablet, Rfl: 1    esomeprazole (NexIUM) 20 MG capsule, Take 1 capsule by mouth Every Morning Before Breakfast., Disp: , Rfl:     fenofibrate (TRICOR) 145 MG tablet, Take 1 tablet by mouth Daily., Disp: 90 tablet, Rfl: 1    fluticasone (FLONASE) 50 MCG/ACT nasal spray, , Disp: , Rfl:     gabapentin (NEURONTIN) 800 MG tablet, , Disp: , Rfl:     glipizide (Glucotrol) 5 MG tablet, Take 1 tablet by mouth Daily., Disp: 90 tablet, Rfl: 1    HYDROcodone-acetaminophen (NORCO) 7.5-325 MG per tablet, Take 1 tablet by mouth 2 (Two) Times a Day. Prescribed by Dr. Jacobo (Patient taking differently: Take 1 tablet by mouth Every 8 (Eight) Hours As Needed. Prescribed by Dr. Jacobo), Disp: , Rfl:     Insulin Degludec (Tresiba FlexTouch) 200 UNIT/ML solution pen-injector pen injection, 22 units every morning, Disp: 12 mL, Rfl: 1    Insulin Pen Needle (B-D ULTRAFINE III SHORT PEN) 31G X 8 MM misc, 1 each Daily., Disp: 100 each, Rfl: 3    Jardiance 25 MG tablet tablet, TAKE ONE TABLET BY MOUTH DAILY, Disp: 30 tablet, Rfl: 0    Lancets (ACCU-CHEK SOFT TOUCH) lancets, Dx code E11.29 testing bs 3 x day, Disp: 300 each, Rfl: 1    levothyroxine (SYNTHROID, LEVOTHROID) 50 MCG tablet, Take 1 tablet by mouth Daily. for thyroid, Disp: 90 tablet, Rfl: 1    metFORMIN ER (GLUCOPHAGE-XR) 500 MG 24 hr tablet, Take 2 tablets by mouth Daily With Breakfast. For DMII, Disp: 180 tablet, Rfl: 1    montelukast (Singulair) 10 MG tablet, Take 1 tablet by mouth Daily., Disp: 90 tablet, Rfl: 3    mupirocin (BACTROBAN) 2 % ointment, Apply 1 application  topically to the appropriate area as directed 3 (Three) Times a Day.,  "Disp: 30 g, Rfl: 1    NITROSTAT 0.4 MG SL tablet, Place 1 tablet under the tongue Every 5 (Five) Minutes As Needed., Disp: , Rfl:     tamsulosin (FLOMAX) 0.4 MG capsule 24 hr capsule, Take 1 capsule by mouth Daily., Disp: 90 capsule, Rfl: 3    traZODone (DESYREL) 50 MG tablet, Take 1-2 tablets by mouth Every Night., Disp: 60 tablet, Rfl: 5    valsartan (DIOVAN) 160 MG tablet, Take 1 tablet by mouth Daily., Disp: 90 tablet, Rfl: 1.  He denies medication side effects.    All of the other chronic condition(s) listed above are stable w/o issues.    /74   Pulse 88   Temp 97.5 °F (36.4 °C) (Oral)   Resp 18   Ht 177.8 cm (70\")   Wt 82.1 kg (181 lb)   SpO2 96%   BMI 25.97 kg/m²     Results for orders placed or performed in visit on 06/12/23   Comprehensive metabolic panel    Specimen: Blood   Result Value Ref Range    Glucose 135 (H) 70 - 99 mg/dL    BUN 10 8 - 27 mg/dL    Creatinine 1.05 0.76 - 1.27 mg/dL    EGFR Result 72 >59 mL/min/1.73    BUN/Creatinine Ratio 10 10 - 24    Sodium 140 134 - 144 mmol/L    Potassium 4.5 3.5 - 5.2 mmol/L    Chloride 103 96 - 106 mmol/L    Total CO2 21 20 - 29 mmol/L    Calcium 9.6 8.6 - 10.2 mg/dL    Total Protein 6.9 6.0 - 8.5 g/dL    Albumin 4.6 3.7 - 4.7 g/dL    Globulin 2.3 1.5 - 4.5 g/dL    A/G Ratio 2.0 1.2 - 2.2    Total Bilirubin 0.7 0.0 - 1.2 mg/dL    Alkaline Phosphatase 80 44 - 121 IU/L    AST (SGOT) 26 0 - 40 IU/L    ALT (SGPT) 34 0 - 44 IU/L   Lipid panel    Specimen: Blood   Result Value Ref Range    Total Cholesterol 142 100 - 199 mg/dL    Triglycerides 181 (H) 0 - 149 mg/dL    HDL Cholesterol 30 (L) >39 mg/dL    VLDL Cholesterol Joel 31 5 - 40 mg/dL    LDL Chol Calc (NIH) 81 0 - 99 mg/dL   TSH    Specimen: Blood   Result Value Ref Range    TSH 0.848 0.450 - 4.500 uIU/mL   Hemoglobin A1c    Specimen: Blood   Result Value Ref Range    Hemoglobin A1C 7.5 (H) 4.8 - 5.6 %   Magnesium    Specimen: Blood   Result Value Ref Range    Magnesium 1.9 1.6 - 2.3 mg/dL   T4, " Free    Specimen: Blood   Result Value Ref Range    Free T4 1.16 0.82 - 1.77 ng/dL   Vitamin B12    Specimen: Blood   Result Value Ref Range    Vitamin B-12 165 (L) 232 - 1,245 pg/mL   Folate    Specimen: Blood   Result Value Ref Range    Folate 10.9 >3.0 ng/mL   Vitamin D,25-Hydroxy    Specimen: Blood   Result Value Ref Range    25 Hydroxy, Vitamin D 54.1 30.0 - 100.0 ng/mL   Microalbumin / Creatinine Urine Ratio - Urine, Clean Catch    Specimen: Urine, Clean Catch   Result Value Ref Range    Creatinine, Urine 74.9 Not Estab. mg/dL    Microalbumin, Urine 5.2 Not Estab. ug/mL    Microalbumin/Creatinine Ratio 7 0 - 29 mg/g creat   CBC and Differential    Specimen: Blood   Result Value Ref Range    WBC 5.7 3.4 - 10.8 x10E3/uL    RBC 4.43 4.14 - 5.80 x10E6/uL    Hemoglobin 13.1 13.0 - 17.7 g/dL    Hematocrit 40.8 37.5 - 51.0 %    MCV 92 79 - 97 fL    MCH 29.6 26.6 - 33.0 pg    MCHC 32.1 31.5 - 35.7 g/dL    RDW 11.7 11.6 - 15.4 %    Platelets 289 150 - 450 x10E3/uL    Neutrophil Rel % 64 Not Estab. %    Lymphocyte Rel % 26 Not Estab. %    Monocyte Rel % 8 Not Estab. %    Eosinophil Rel % 1 Not Estab. %    Basophil Rel % 1 Not Estab. %    Neutrophils Absolute 3.7 1.4 - 7.0 x10E3/uL    Lymphocytes Absolute 1.5 0.7 - 3.1 x10E3/uL    Monocytes Absolute 0.4 0.1 - 0.9 x10E3/uL    Eosinophils Absolute 0.0 0.0 - 0.4 x10E3/uL    Basophils Absolute 0.1 0.0 - 0.2 x10E3/uL    Immature Granulocyte Rel % 0 Not Estab. %    Immature Grans Absolute 0.0 0.0 - 0.1 x10E3/uL             The following portions of the patient's history were reviewed and updated as appropriate: allergies, current medications, past family history, past medical history, past social history, past surgical history, and problem list.    Review of Systems   Constitutional:  Negative for activity change, chills and fever.   Respiratory:  Negative for cough.    Cardiovascular:  Negative for chest pain.   Psychiatric/Behavioral:  Positive for sleep disturbance.  Negative for dysphoric mood.      Objective              Physical Exam  Constitutional:       General: He is not in acute distress.     Appearance: He is well-developed.   Cardiovascular:      Rate and Rhythm: Normal rate and regular rhythm.   Pulmonary:      Effort: Pulmonary effort is normal.      Breath sounds: Normal breath sounds.   Neurological:      Mental Status: He is alert and oriented to person, place, and time.   Psychiatric:         Behavior: Behavior normal.         Thought Content: Thought content normal.           Diagnoses and all orders for this visit:    1. Primary insomnia (Primary)  Comments:  currently undertreated; medication addeed at today's visit.  Orders:  -     zolpidem (AMBIEN) 10 MG tablet; Take 1 tablet by mouth At Night As Needed for Sleep.  Dispense: 30 tablet; Refill: 2  -     traZODone (DESYREL) 50 MG tablet; Take 1-2 tablets by mouth Every Night.  Dispense: 60 tablet; Refill: 5

## 2023-10-04 ENCOUNTER — OFFICE VISIT (OUTPATIENT)
Dept: FAMILY MEDICINE CLINIC | Facility: CLINIC | Age: 78
End: 2023-10-04
Payer: MEDICARE

## 2023-10-04 VITALS
HEIGHT: 70 IN | HEART RATE: 88 BPM | SYSTOLIC BLOOD PRESSURE: 123 MMHG | DIASTOLIC BLOOD PRESSURE: 74 MMHG | BODY MASS INDEX: 25.91 KG/M2 | WEIGHT: 181 LBS | RESPIRATION RATE: 18 BRPM | OXYGEN SATURATION: 96 % | TEMPERATURE: 97.5 F

## 2023-10-04 DIAGNOSIS — F51.01 PRIMARY INSOMNIA: Primary | Chronic | ICD-10-CM

## 2023-10-04 PROCEDURE — 1159F MED LIST DOCD IN RCRD: CPT | Performed by: FAMILY MEDICINE

## 2023-10-04 PROCEDURE — 3078F DIAST BP <80 MM HG: CPT | Performed by: FAMILY MEDICINE

## 2023-10-04 PROCEDURE — 1160F RVW MEDS BY RX/DR IN RCRD: CPT | Performed by: FAMILY MEDICINE

## 2023-10-04 PROCEDURE — 99214 OFFICE O/P EST MOD 30 MIN: CPT | Performed by: FAMILY MEDICINE

## 2023-10-04 PROCEDURE — 3074F SYST BP LT 130 MM HG: CPT | Performed by: FAMILY MEDICINE

## 2023-10-04 RX ORDER — ZOLPIDEM TARTRATE 10 MG/1
10 TABLET ORAL NIGHTLY PRN
Qty: 30 TABLET | Refills: 2 | Status: SHIPPED | OUTPATIENT
Start: 2023-10-04

## 2023-10-04 RX ORDER — TRAZODONE HYDROCHLORIDE 50 MG/1
50-100 TABLET ORAL NIGHTLY
Qty: 60 TABLET | Refills: 5 | Status: SHIPPED | OUTPATIENT
Start: 2023-10-04

## 2023-10-10 DIAGNOSIS — E11.42 TYPE 2 DIABETES MELLITUS WITH PERIPHERAL NEUROPATHY: ICD-10-CM

## 2023-10-10 RX ORDER — METFORMIN HYDROCHLORIDE 500 MG/1
TABLET, EXTENDED RELEASE ORAL
Qty: 360 TABLET | Refills: 1 | Status: SHIPPED | OUTPATIENT
Start: 2023-10-10

## 2023-10-21 ENCOUNTER — DOCUMENTATION (OUTPATIENT)
Dept: FAMILY MEDICINE CLINIC | Facility: CLINIC | Age: 78
End: 2023-10-21
Payer: MEDICARE

## 2023-10-21 DIAGNOSIS — E11.42 TYPE 2 DIABETES MELLITUS WITH PERIPHERAL NEUROPATHY: Primary | ICD-10-CM

## 2023-10-21 RX ORDER — INSULIN DEGLUDEC 200 U/ML
INJECTION, SOLUTION SUBCUTANEOUS
Qty: 12 ML | Refills: 1 | Status: SHIPPED | OUTPATIENT
Start: 2023-10-21

## 2023-11-13 DIAGNOSIS — E55.9 VITAMIN D DEFICIENCY: Chronic | ICD-10-CM

## 2023-11-13 RX ORDER — AMOXICILLIN AND CLAVULANATE POTASSIUM 875; 125 MG/1; MG/1
1 TABLET, FILM COATED ORAL EVERY 12 HOURS SCHEDULED
Qty: 20 TABLET | Refills: 5 | Status: SHIPPED | OUTPATIENT
Start: 2023-11-13

## 2023-11-14 ENCOUNTER — TELEPHONE (OUTPATIENT)
Dept: ENDOCRINOLOGY | Age: 78
End: 2023-11-14

## 2023-11-14 NOTE — TELEPHONE ENCOUNTER
Hub staff attempted to follow warm transfer process and was unsuccessful     Caller: Ben Walsh    Relationship to patient: Self    Best call back number: [phone]    Patient is needing: [facility] DID NOT INCLUDE HIS A1C. HE IS ATTEMPTING TO GO AGAIN TODAY TO GET THEM REDONE BUT HE IS UNSURE IF THEY WILL BE READY FOR HIS APPOINTMENT TOMORROW. HE IS WANTING TO KNOW IF HE SHOULD RESCHEDULE OR IF IT IS OKAY TO COME IN TOMORROW STILL. PLEASE CALL TO ADVISE.

## 2023-11-15 ENCOUNTER — OFFICE VISIT (OUTPATIENT)
Dept: ENDOCRINOLOGY | Age: 78
End: 2023-11-15
Payer: MEDICARE

## 2023-11-15 VITALS
TEMPERATURE: 98 F | HEART RATE: 97 BPM | SYSTOLIC BLOOD PRESSURE: 122 MMHG | OXYGEN SATURATION: 95 % | HEIGHT: 70 IN | WEIGHT: 186.6 LBS | DIASTOLIC BLOOD PRESSURE: 80 MMHG | BODY MASS INDEX: 26.71 KG/M2

## 2023-11-15 DIAGNOSIS — E78.5 HYPERLIPIDEMIA, UNSPECIFIED HYPERLIPIDEMIA TYPE: ICD-10-CM

## 2023-11-15 DIAGNOSIS — I10 ESSENTIAL HYPERTENSION: ICD-10-CM

## 2023-11-15 DIAGNOSIS — E11.42 TYPE 2 DIABETES MELLITUS WITH PERIPHERAL NEUROPATHY: Primary | ICD-10-CM

## 2023-11-15 PROCEDURE — 3074F SYST BP LT 130 MM HG: CPT | Performed by: NURSE PRACTITIONER

## 2023-11-15 PROCEDURE — 3079F DIAST BP 80-89 MM HG: CPT | Performed by: NURSE PRACTITIONER

## 2023-11-15 PROCEDURE — 99214 OFFICE O/P EST MOD 30 MIN: CPT | Performed by: NURSE PRACTITIONER

## 2023-11-15 RX ORDER — LISINOPRIL 20 MG/1
TABLET ORAL
COMMUNITY

## 2023-11-15 RX ORDER — GLIPIZIDE 10 MG/1
10 TABLET ORAL DAILY
Qty: 90 TABLET | Refills: 1 | Status: SHIPPED | OUTPATIENT
Start: 2023-11-15

## 2023-11-15 RX ORDER — CHOLECALCIFEROL (VITAMIN D3) 1250 MCG
50000 CAPSULE ORAL
Qty: 15 CAPSULE | Refills: 3 | Status: SHIPPED | OUTPATIENT
Start: 2023-11-15

## 2023-11-15 RX ORDER — CLOBETASOL PROPIONATE 0.46 MG/ML
SOLUTION TOPICAL
COMMUNITY

## 2023-11-15 NOTE — ADDENDUM NOTE
Addended by: JOSEFA JUÁREZ on: 11/15/2023 06:59 AM     Modules accepted: Orders    
Verbalized Understanding/Simple: Patient demonstrates quick and easy understanding/Returned Demonstration

## 2023-11-15 NOTE — PROGRESS NOTES
Chief Complaint  Chief Complaint   Patient presents with    Diabetes     Type 2: Pt doesn't have meter today, doesn't check bs regularly, is up to date on eye exam, no hx of retinopathy or neuropathy.        Subjective          History of Present Illness    Ben Walsh 78 y.o. presents for a follow-up evaluation for type 2 DM     He has been diabetic since 2013     Pt is on the following medications for their DM:  Jardiance 25 mg daily, Tresiba 22 units every morning, glipizide 5 mg daily and metformin  mg 2 tablets in am     metformin  mg 2 tablets twice daily caused diarrhea     Pt has history of pancreatitis - therefore avoid DPP4 and GLP1 class  Pt has not used Actos (no history of CHF).         Pt complains of numbness and tingling in feet, legs and right hand and vision changes    Pt states that he is having recurrent UTIs since being on Jardiance     Denies diarrhea, constipation, chest pain and shortness of breath    Pt does not have a history of DM retinopathy.  Last eye exam was Summer time 7/23    Pt does not have a history of nephropathy.  Patient is currently taking ARB     Pt does have neuropathy.  Current takes gabapentin 800 mg TID for this condition.  He has chronic neck and lower back pain due to degenerative disc disease     Pt does have a history of CAD with stent placement in 03/2016    Last A1C in 06/23 was 7.5    Last microalbumin in 06/23 was negative           Blood Sugars    Blood glucoses are checked 2/day.    Pre-meal blood glucoses: 96 - 168    Post-meal blood glucoses: 189 - 230    Pt has no episodes of hypoglycemia.            Hyperlipidemia     Pt denies any muscle/body aches, chest pain or shortness of breath    Pt is currently taking atorvastatin 40 mg HS and Tricor 145 mg daily    Last lipid panel in 06/23 showed Total 142, HDL 30, LDL 81 and Triglycerides 181            Hypertension    Pt denies any chest pain, palpitations, shortness of breath or  "headache    Current regimen includes amlodipine 5 mg daily and valsartan 160 mg daily     He follows with Dr. Borges                    Hypothyroid     Managed by PCP     Current treatment is levothyroxine 50 mcg daily     Last labs in 06/23 showed TSH 0.848 and FT 1.16             I have reviewed the patient's allergies, medicines, past medical hx, family hx and social hx.    Objective   Vital Signs:   /80   Pulse 97   Temp 98 °F (36.7 °C) (Oral)   Ht 177.8 cm (70\")   Wt 84.6 kg (186 lb 9.6 oz)   SpO2 95%   BMI 26.77 kg/m²       Physical Exam   Physical Exam  Constitutional:       General: He is not in acute distress.     Appearance: Normal appearance. He is not diaphoretic.   HENT:      Head: Normocephalic and atraumatic.   Eyes:      General:         Right eye: No discharge.         Left eye: No discharge.   Skin:     General: Skin is warm and dry.   Neurological:      Mental Status: He is alert.   Psychiatric:         Mood and Affect: Mood normal.         Behavior: Behavior normal.                    Results Review:   Hemoglobin A1C   Date Value Ref Range Status   06/30/2023 7.5 (H) 4.8 - 5.6 % Final     Comment:              Prediabetes: 5.7 - 6.4           Diabetes: >6.4           Glycemic control for adults with diabetes: <7.0       Triglycerides   Date Value Ref Range Status   06/30/2023 181 (H) 0 - 149 mg/dL Final     HDL Cholesterol   Date Value Ref Range Status   06/30/2023 30 (L) >39 mg/dL Final     LDL Chol Calc (NIH)   Date Value Ref Range Status   06/30/2023 81 0 - 99 mg/dL Final     VLDL Cholesterol Joel   Date Value Ref Range Status   06/30/2023 31 5 - 40 mg/dL Final         Assessment and Plan {CC Problem List  Visit Diagnosis  ROS  Review (Popup)  Health Maintenance  Quality  BestPractice  Medications  SmartSets  SnapShot Encounters  Media :23  Diagnoses and all orders for this visit:    1. Type 2 diabetes mellitus with peripheral neuropathy (Primary)  -     glipizide " (Glucotrol) 10 MG tablet; Take 1 tablet by mouth Daily.  Dispense: 90 tablet; Refill: 1  -     Hemoglobin A1c  -     Comprehensive Metabolic Panel    Stop Jardiance 25 mg daily due to recurrent UTIs  Continue with Tresiba 22 units every morning and metformin  mg 2 tablets in am  Increase glipizide 10 mg daily since we are stopping Jardiance  Continue with BG checks  Check labs today      2. Hyperlipidemia, unspecified hyperlipidemia type  -     Comprehensive Metabolic Panel  -     Lipid Panel    Check labs today  Continue with statin and Tricor      3. Essential hypertension  -     Comprehensive Metabolic Panel      Stable  Continue with current medication regimen  Defer management to PCP             Labs today  RTC on 02/20/24 with Dr. Miller      Follow Up     Patient was given instructions and counseling regarding her condition or for health maintenance advice. Please see specific information pulled into the AVS if appropriate.              Anabel Black, APRN  11/15/23

## 2023-11-15 NOTE — PATIENT INSTRUCTIONS
Stop Jardiance  Continue with Tresiba 22 units every morning and metformin  mg 2 tablets in am  Increase glipizide 10 mg daily

## 2023-11-16 LAB
ALBUMIN SERPL-MCNC: 4.6 G/DL (ref 3.5–5.2)
ALBUMIN/GLOB SERPL: 2.2 G/DL
ALP SERPL-CCNC: 89 U/L (ref 39–117)
ALT SERPL-CCNC: 33 U/L (ref 1–41)
AST SERPL-CCNC: 24 U/L (ref 1–40)
BILIRUB SERPL-MCNC: 0.4 MG/DL (ref 0–1.2)
BUN SERPL-MCNC: 14 MG/DL (ref 8–23)
BUN/CREAT SERPL: 16.9 (ref 7–25)
CALCIUM SERPL-MCNC: 9.8 MG/DL (ref 8.6–10.5)
CHLORIDE SERPL-SCNC: 103 MMOL/L (ref 98–107)
CHOLEST SERPL-MCNC: 159 MG/DL (ref 0–200)
CO2 SERPL-SCNC: 24.6 MMOL/L (ref 22–29)
CREAT SERPL-MCNC: 0.83 MG/DL (ref 0.76–1.27)
EGFRCR SERPLBLD CKD-EPI 2021: 88.5 ML/MIN/1.73
GLOBULIN SER CALC-MCNC: 2.1 GM/DL
GLUCOSE SERPL-MCNC: 181 MG/DL (ref 65–99)
HBA1C MFR BLD: 7.7 % (ref 4.8–5.6)
HDLC SERPL-MCNC: 29 MG/DL (ref 40–60)
IMP & REVIEW OF LAB RESULTS: NORMAL
LDLC SERPL CALC-MCNC: 92 MG/DL (ref 0–100)
POTASSIUM SERPL-SCNC: 4.2 MMOL/L (ref 3.5–5.2)
PROT SERPL-MCNC: 6.7 G/DL (ref 6–8.5)
SODIUM SERPL-SCNC: 137 MMOL/L (ref 136–145)
TRIGL SERPL-MCNC: 223 MG/DL (ref 0–150)
VLDLC SERPL CALC-MCNC: 38 MG/DL (ref 5–40)

## 2023-12-21 DIAGNOSIS — M25.569 ACUTE KNEE PAIN, UNSPECIFIED LATERALITY: Primary | ICD-10-CM

## 2023-12-22 DIAGNOSIS — I25.10 TWO-VESSEL CORONARY ARTERY DISEASE: Chronic | ICD-10-CM

## 2023-12-26 RX ORDER — CLOPIDOGREL BISULFATE 75 MG/1
75 TABLET ORAL DAILY
Qty: 90 TABLET | Refills: 0 | Status: SHIPPED | OUTPATIENT
Start: 2023-12-26

## 2024-01-03 ENCOUNTER — OFFICE VISIT (OUTPATIENT)
Dept: SPORTS MEDICINE | Facility: CLINIC | Age: 79
End: 2024-01-03
Payer: MEDICARE

## 2024-01-03 VITALS
HEIGHT: 70 IN | HEART RATE: 80 BPM | WEIGHT: 180 LBS | RESPIRATION RATE: 14 BRPM | SYSTOLIC BLOOD PRESSURE: 110 MMHG | DIASTOLIC BLOOD PRESSURE: 60 MMHG | BODY MASS INDEX: 25.77 KG/M2 | OXYGEN SATURATION: 94 % | TEMPERATURE: 98 F

## 2024-01-03 DIAGNOSIS — G89.29 CHRONIC PAIN OF LEFT KNEE: Primary | Chronic | ICD-10-CM

## 2024-01-03 DIAGNOSIS — M25.562 CHRONIC PAIN OF LEFT KNEE: Primary | Chronic | ICD-10-CM

## 2024-01-03 DIAGNOSIS — M17.12 ARTHRITIS OF LEFT KNEE: Chronic | ICD-10-CM

## 2024-01-03 RX ORDER — TRIAMCINOLONE ACETONIDE 40 MG/ML
40 INJECTION, SUSPENSION INTRA-ARTICULAR; INTRAMUSCULAR ONCE
Status: COMPLETED | OUTPATIENT
Start: 2024-01-03 | End: 2024-01-03

## 2024-01-03 RX ADMIN — TRIAMCINOLONE ACETONIDE 40 MG: 40 INJECTION, SUSPENSION INTRA-ARTICULAR; INTRAMUSCULAR at 14:21

## 2024-01-03 NOTE — PROGRESS NOTES
"Chief Complaint  Pain of the Left Knee (For the past 3 weeks-currently wearing a knee brace which using helps)    Subjective        Ben Walsh presents to [facility]  History of Present Illness  Patient referred to us by Janiya Black PA-C for chronic left anterior knee pain.  Patient states that about 3 weeks ago he began having pain over the left anterior medial knee, definitely worse with walking, gets slightly better with heat.  No known trauma.  Has not noted any swelling or mechanical symptoms.  Pain is described as sharp in nature and also tender to touch as he points to just below the medial joint line anteriorly at the tibial plateau.  Objective   Vital Signs:  /60 (BP Location: Left arm, Patient Position: Sitting, Cuff Size: Adult)   Pulse 80   Temp 98 °F (36.7 °C) (Infrared)   Resp 14   Ht 177.8 cm (70\")   Wt 81.6 kg (180 lb)   SpO2 94%   BMI 25.83 kg/m²   Estimated body mass index is 25.83 kg/m² as calculated from the following:    Height as of this encounter: 177.8 cm (70\").    Weight as of this encounter: 81.6 kg (180 lb).               Physical Exam  Vitals reviewed.   Constitutional:       Appearance: He is well-developed.   HENT:      Head: Normocephalic and atraumatic.   Eyes:      Conjunctiva/sclera: Conjunctivae normal.      Pupils: Pupils are equal, round, and reactive to light.   Cardiovascular:      Comments: No peripheral edema  Pulmonary:      Effort: Pulmonary effort is normal.   Musculoskeletal:      Comments: Left knee arthrosis but no effusion or erythema.  Patient lacks just a few degrees of full extension.  Patient has patellofemoral crepitus on flexion extension.  Equivocal medial Bronson.  Extensor mechanism intact.  No pain with varus or valgus stress.  Patient does have an area of tenderness over the anterior medial knee that is at or just below the joint line on the tibia.  Does not necessarily appear to be at the pes anserine " "bursa.  With the patient in the seated position with the left knee flexed the area of tenderness to palpation does appear to be at the inferior portion of the joint line anterior medially.   Skin:     General: Skin is warm and dry.   Neurological:      Mental Status: He is alert and oriented to person, place, and time.   Psychiatric:         Behavior: Behavior normal.        Result Review :          Left Knee X-Ray  Indication: Pain    Views: AP, Lateral, and [place]    Findings:  Tricompartmental arthritis, mostly involving the medial and patellofemoral compartments.  There are scattered soft tissue calcifications as well.  Mild decreased bone density.  No prior studies were available for comparison.      Discussed with the patient it is possible his pain could be coming from a pes anserine bursitis or from left knee arthritis or perhaps an insufficiency fracture tibial plateau.  Difficult to isolate specifically on physical exam.  We certainly could try topical compounded medication for this although he states he has been using a topical medication without much improvement.  We also could perform intra-articular steroid injection.  I feel it would be worthwhile to try an intra-articular steroid injection today but if there is no significant improvement in 2 weeks then consider MRI left knee.  Patient agrees and would like to proceed.    Knee Injection Procedure Note    Left knee injection was discussed with the patient in detail, including indication, risks, benefits, and alternatives. Verbal consent was given for the procedure. Injection was performed by MD.  Injection site was identified by physical examination and cleaned with Betadine and alcohol swabs. Prior to needle insertion, ethyl chloride spray was used for surface anesthesia. Sterile technique was used.  A 25-gauge, 1.5\" needle was directed to the joint from a(n) medial and anterior approach. Injectate was passed into the joint space without difficulty. " The needle was removed and a simple bandage was applied. The procedure was tolerated well without difficulty.    Injection mixture:  1% lidocaine without epinephrine: 3 mL    40 mg/mL triamcinolone acetonide: 1 mL           Assessment and Plan   Diagnoses and all orders for this visit:    1. Chronic pain of left knee (Primary)  -     XR Knee 3+ View With Sunrise Left  -     triamcinolone acetonide (KENALOG-40) injection 40 mg    2. Arthritis of left knee  -     triamcinolone acetonide (KENALOG-40) injection 40 mg    Postinjection instructions given regarding ice and activity.  If he sees no significant improvement over the next 10 to 14 days then consider MRI left knee.       I spent 45 minutes caring for Ben on this date of service. This time includes time spent by me in the following activities:obtaining and/or reviewing a separately obtained history, performing a medically appropriate examination and/or evaluation , counseling and educating the patient/family/caregiver, ordering medications, tests, or procedures, documenting information in the medical record, and independently interpreting results and communicating that information with the patient/family/caregiver  Follow Up   No follow-ups on file.  Patient was given instructions and counseling regarding his condition or for health maintenance advice. Please see specific information pulled into the AVS if appropriate.

## 2024-01-04 ENCOUNTER — PATIENT ROUNDING (BHMG ONLY) (OUTPATIENT)
Dept: SPORTS MEDICINE | Facility: CLINIC | Age: 79
End: 2024-01-04
Payer: MEDICARE

## 2024-01-04 NOTE — PROGRESS NOTES
January 4, 2024    A [employer] Message has been sent to the patient for PATIENT ROUNDING with [facility]

## 2024-01-08 DIAGNOSIS — F51.01 PRIMARY INSOMNIA: Chronic | ICD-10-CM

## 2024-01-08 RX ORDER — ZOLPIDEM TARTRATE 10 MG/1
10 TABLET ORAL NIGHTLY PRN
Qty: 30 TABLET | Refills: 2 | OUTPATIENT
Start: 2024-01-08

## 2024-01-08 NOTE — TELEPHONE ENCOUNTER
Caller: Ben Walsh    Relationship: Self    Best call back number:     Requested Prescriptions:   Requested Prescriptions     Pending Prescriptions Disp Refills    zolpidem (AMBIEN) 10 MG tablet 30 tablet 2     Sig: Take 1 tablet by mouth At Night As Needed for Sleep.        Pharmacy where request should be sent:  [facility] [address] [place] [address]  [phone]    Last office visit with prescribing clinician: 10/4/2023   Last telemedicine visit with prescribing clinician: Visit date not found   Next office visit with prescribing clinician: 1/16/2024     Additional details provided by patient: PATIENT IS OUT OF THE MEDICATION.     Does the patient have less than a 3 day supply:  [x] Yes  [] No    Would you like a call back once the refill request has been completed: [x] Yes [] No    If the office needs to give you a call back, can they leave a voicemail: [x] Yes [] No    Cami Catherine Rep   01/08/24 13:05 EST

## 2024-01-11 RX ORDER — INSULIN DEGLUDEC 200 U/ML
INJECTION, SOLUTION SUBCUTANEOUS
Qty: 12 ML | Refills: 1 | Status: CANCELLED | OUTPATIENT
Start: 2024-01-11

## 2024-01-12 DIAGNOSIS — F51.01 PRIMARY INSOMNIA: Chronic | ICD-10-CM

## 2024-01-13 RX ORDER — ZOLPIDEM TARTRATE 10 MG/1
10 TABLET ORAL NIGHTLY PRN
Qty: 30 TABLET | Refills: 0 | Status: SHIPPED | OUTPATIENT
Start: 2024-01-13

## 2024-01-15 NOTE — PROGRESS NOTES
The ABCs of the Annual Wellness Visit  Subsequent Medicare Wellness Visit    Subjective    Ben Walsh is a 78 y.o. male who presents for a Subsequent Medicare Wellness Visit.    The following portions of the patient's history were reviewed and   updated as appropriate: allergies, current medications, past family history, past medical history, past social history, past surgical history, and problem list.    Compared to one year ago, the patient feels his physical   health is the same.    Compared to one year ago, the patient feels his mental   health is the same.    Recent Hospitalizations:  He was not admitted to the hospital during the last year.       Current Medical Providers:  Patient Care Team:  Jake Marcus MD as PCP - General (Family Medicine)  Guerrero Kruse MD as Consulting Physician (Orthopedic Surgery)  Kelton Francois MD as Consulting Physician (Ophthalmology)  Justin Miller MD as Consulting Physician (Endocrinology)  Angel Borges MD as Consulting Physician (Cardiology)  Grupo Jacobo MD (Pain Medicine)  Henrique Stahl (Anesthesiology)  Anabel Black APRN as Nurse Practitioner (Nurse Practitioner)    Outpatient Medications Prior to Visit   Medication Sig Dispense Refill    Accu-Chek Guide test strip Dx code E11.29 testing bs 3 x day 300 each 1    Cholecalciferol (Vitamin D3) 1.25 MG (85995 UT) capsule Take 1 capsule by mouth Every 7 (Seven) Days. 15 capsule 3    clobetasol (TEMOVATE) 0.05 % external solution       cyclobenzaprine (FLEXERIL) 10 MG tablet Take 1 tablet by mouth 3 (Three) Times a Day As Needed for Muscle Spasms. 90 tablet 5    esomeprazole (NexIUM) 20 MG capsule Take 1 capsule by mouth Every Morning Before Breakfast.      fenofibrate (TRICOR) 145 MG tablet Take 1 tablet by mouth Daily. 90 tablet 1    fluticasone (FLONASE) 50 MCG/ACT nasal spray       gabapentin (NEURONTIN) 800 MG tablet Take 1 tablet by mouth 3 (Three) Times a Day.      glipizide (Glucotrol) 10  MG tablet Take 1 tablet by mouth Daily. 90 tablet 1    HYDROcodone-acetaminophen (NORCO) 7.5-325 MG per tablet Take 1 tablet by mouth 2 (Two) Times a Day. Prescribed by Dr. Jacobo (Patient taking differently: Take 1 tablet by mouth Every 8 (Eight) Hours As Needed. Prescribed by Dr. Jacobo)      Insulin Degludec (Tresiba FlexTouch) 200 UNIT/ML solution pen-injector pen injection 22 units every morning 12 mL 1    Insulin Pen Needle (B-D ULTRAFINE III SHORT PEN) 31G X 8 MM misc 1 each Daily. 100 each 3    Lancets (ACCU-CHEK SOFT TOUCH) lancets Dx code E11.29 testing bs 3 x day 300 each 1    lisinopril (PRINIVIL,ZESTRIL) 20 MG tablet       mupirocin (BACTROBAN) 2 % ointment Apply 1 application  topically to the appropriate area as directed 3 (Three) Times a Day. 30 g 1    NITROSTAT 0.4 MG SL tablet Place 1 tablet under the tongue Every 5 (Five) Minutes As Needed.      traZODone (DESYREL) 50 MG tablet Take 1-2 tablets by mouth Every Night. 60 tablet 5    valsartan (DIOVAN) 160 MG tablet Take 1 tablet by mouth Daily. 90 tablet 1    tamsulosin (FLOMAX) 0.4 MG capsule 24 hr capsule Take 1 capsule by mouth Daily. 90 capsule 3    zolpidem (AMBIEN) 10 MG tablet Take 1 tablet by mouth At Night As Needed for Sleep. 30 tablet 2    zolpidem (AMBIEN) 10 MG tablet Take 1 tablet by mouth At Night As Needed for Sleep. Keep appt 30 tablet 0    amLODIPine (NORVASC) 5 MG tablet Take 1 tablet by mouth Daily. 90 tablet 1    atorvastatin (LIPITOR) 40 MG tablet Take 1 tablet by mouth Every Night. For cholesterol 90 tablet 1    clopidogrel (PLAVIX) 75 MG tablet TAKE 1 TABLET BY MOUTH DAILY 90 tablet 0    escitalopram (LEXAPRO) 20 MG tablet Take 1 tablet by mouth Daily. 90 tablet 1    levothyroxine (SYNTHROID, LEVOTHROID) 50 MCG tablet Take 1 tablet by mouth Daily. for thyroid 90 tablet 1     No facility-administered medications prior to visit.       Opioid medication/s are on active medication list.  and I have evaluated his active treatment  "plan and pain score trends (see table).  Vitals:    01/11/24 0727   PainSc: 0-No pain     I have reviewed the chart for potential of high risk medication and harmful drug interactions in the elderly.          Aspirin is not on active medication list.  Aspirin use is not indicated based on review of current medical condition/s. Risk of harm outweighs potential benefits.  .    Patient Active Problem List   Diagnosis    Allergic rhinitis    ADD (attention deficit disorder)    DDD (degenerative disc disease), lumbar    BPH (benign prostatic hypertrophy)    Chronic pain    ASCVD (arteriosclerotic cardiovascular disease)    Depression    Diverticulosis of colon    GERD (gastroesophageal reflux disease)    Hiatal hernia    Hyperlipidemia    Essential hypertension    Primary hypothyroidism    Insomnia    Osteoarthritis, multiple sites    Acute pancreatitis    Vitamin D deficiency    Glaucoma    Abnormal nuclear stress test    Two-vessel coronary artery disease    SAEED (dyspnea on exertion)    Obstructive sleep apnea syndrome    Patent foramen ovale    Snoring    Disorder of rotator cuff    History of COPD    Type 2 diabetes mellitus with microalbuminuria, without long-term current use of insulin    Carpal tunnel syndrome of right wrist    Abnormal electrocardiogram    Atypical chest pain    Type 2 diabetes mellitus with peripheral neuropathy    B12 deficiency     Advance Care Planning   Advance Care Planning     Advance Directive is not on file.  ACP discussion was held with the patient during this visit. Patient has an advance directive (not in EMR), copy requested.     Objective    Vitals:    01/11/24 0727   BP: 130/60   Pulse: 84   Temp: 98.2 °F (36.8 °C)   TempSrc: Oral   SpO2: 95%   Weight: 83 kg (183 lb)   Height: 177.8 cm (70\")   PainSc: 0-No pain     Estimated body mass index is 26.26 kg/m² as calculated from the following:    Height as of this encounter: 177.8 cm (70\").    Weight as of this encounter: 83 kg (183 " lb).    BMI is >= 25 and <30. (Overweight) The following options were offered after discussion;: exercise counseling/recommendations and nutrition counseling/recommendations      Does the patient have evidence of cognitive impairment? No    Lab Results   Component Value Date    CHLPL 159 11/15/2023    TRIG 223 (H) 11/15/2023    HDL 29 (L) 11/15/2023    LDL 92 11/15/2023    VLDL 38 11/15/2023    HGBA1C 7.70 (H) 11/15/2023        HEALTH RISK ASSESSMENT    Smoking Status:  Social History     Tobacco Use   Smoking Status Never   Smokeless Tobacco Never   Tobacco Comments    caffeine use iced tea all day long, 3 cups coffee daily     Alcohol Consumption:  Social History     Substance and Sexual Activity   Alcohol Use No     Fall Risk Screen:    KIANNA Fall Risk Assessment has not been completed.    Depression Screening:      10/4/2023     2:48 PM   PHQ-2/PHQ-9 Depression Screening   Little Interest or Pleasure in Doing Things 0-->not at all   Feeling Down, Depressed or Hopeless 0-->not at all   PHQ-9: Brief Depression Severity Measure Score 0       Health Habits and Functional and Cognitive Screenin/7/2022     2:00 PM   Functional & Cognitive Status   Do you have difficulty preparing food and eating? No   Do you have difficulty bathing yourself, getting dressed or grooming yourself? No   Do you have difficulty using the toilet? No   Do you have difficulty moving around from place to place? No   Do you have trouble with steps or getting out of a bed or a chair? No   Current Diet Well Balanced Diet   Dental Exam Up to date   Eye Exam Up to date   Exercise (times per week) 0 times per week   Current Exercises Include No Regular Exercise   Do you need help using the phone?  No   Are you deaf or do you have serious difficulty hearing?  No   Do you need help to go to places out of walking distance? No   Do you need help shopping? No   Do you need help preparing meals?  No   Do you need help with housework?  No   Do  you need help with laundry? No   Do you need help taking your medications? No   Do you need help managing money? No   Do you ever drive or ride in a car without wearing a seat belt? No   Have you felt unusual stress, anger or loneliness in the last month? No   Who do you live with? Spouse   If you need help, do you have trouble finding someone available to you? Yes   Have you been bothered in the last four weeks by sexual problems? No   Do you have difficulty concentrating, remembering or making decisions? No       Age-appropriate Screening Schedule:  Refer to the list below for future screening recommendations based on patient's age, sex and/or medical conditions. Orders for these recommended tests are listed in the plan section. The patient has been provided with a written plan.    Health Maintenance   Topic Date Due    TDAP/TD VACCINES (1 - Tdap) Never done    INFLUENZA VACCINE  08/01/2023    COVID-19 Vaccine (3 - 2023-24 season) 09/01/2023    HEMOGLOBIN A1C  05/15/2024    URINE MICROALBUMIN  06/30/2024    DIABETIC EYE EXAM  07/26/2024    LIPID PANEL  11/15/2024    ANNUAL WELLNESS VISIT  01/16/2025    BMI FOLLOWUP  01/16/2025    COLORECTAL CANCER SCREENING  03/06/2027    Pneumococcal Vaccine 65+  Completed    ZOSTER VACCINE  Discontinued                  CMS Preventative Services Quick Reference  Risk Factors Identified During Encounter  None Identified  The above risks/problems have been discussed with the patient.  Pertinent information has been shared with the patient in the After Visit Summary.  An After Visit Summary and PPPS were made available to the patient.    Follow Up:   Next Medicare Wellness visit to be scheduled in 1 year.       Additional E&M Note during same encounter follows:  Patient has multiple medical problems which are significant and separately identifiable that require additional work above and beyond the Medicare Wellness Visit.      Chief Complaint  Hypertension, Hyperlipidemia,  "Hypothyroidism, Insomnia, Depression, and medicare wellness (due)    Subjective          Ben Walsh is also being seen today for medication management.    Pt doing well on the medication(s) w/o SEs, and is due refill today.    Patient has had some chronic wheezing did see ENT which ruled out any pharyngeal pathology, I would like to pursue pulmonary workup to make sure nothing else is going on.    Review of Systems   Constitutional:  Negative for chills and fever.   HENT:  Negative for congestion, ear pain and sinus pressure.    Eyes:  Negative for pain and visual disturbance.   Respiratory:  Positive for wheezing. Negative for cough and shortness of breath.    Cardiovascular:  Negative for chest pain.   Gastrointestinal:  Negative for abdominal pain.   Genitourinary:  Negative for difficulty urinating and dysuria.   Skin: Negative.    Neurological: Negative.    Psychiatric/Behavioral:  Negative for dysphoric mood. The patient has insomnia.        Objective   Vital Signs:  /60   Pulse 84   Temp 98.2 °F (36.8 °C) (Oral)   Ht 177.8 cm (70\")   Wt 83 kg (183 lb)   SpO2 95%   BMI 26.26 kg/m²     Physical Exam  Constitutional:       General: He is not in acute distress.     Appearance: He is well-developed.   Cardiovascular:      Rate and Rhythm: Normal rate and regular rhythm.   Pulmonary:      Effort: Pulmonary effort is normal.      Breath sounds: Normal breath sounds.   Neurological:      Mental Status: He is alert and oriented to person, place, and time.   Psychiatric:         Behavior: Behavior normal.         Thought Content: Thought content normal.     Labs from his endocrine office independently reviewed by me at today's visit.    The following data was reviewed by: Jake Marcus MD on 01/16/2024:  Common labs          2/6/2023    14:33 6/30/2023    14:42 11/15/2023    12:08   Common Labs   Glucose 114  135  181    BUN 18  10  14    Creatinine 0.98  1.05  0.83    Sodium 139  140  137  "   Potassium 4.6  4.5  4.2    Chloride 105  103  103    Calcium 10.2  9.6  9.8    Total Protein 6.7  6.9  6.7    Albumin 4.8  4.6  4.6    Total Bilirubin 0.5  0.7  0.4    Alkaline Phosphatase 59  80  89    AST (SGOT) 18  26  24    ALT (SGPT) 27  34  33    WBC  5.7     Hemoglobin  13.1     Hematocrit  40.8     Platelets  289     Total Cholesterol 105  142  159    Triglycerides 117  181  223    HDL Cholesterol 46  30  29    LDL Cholesterol  38  81  92    Hemoglobin A1C 7.20  7.5  7.70    Microalbumin, Urine  5.2                  Assessment and Plan   Diagnoses and all orders for this visit:    1. Encounter for subsequent annual wellness visit (AWV) in Medicare patient (Primary)    2. Primary hypothyroidism  -     levothyroxine (SYNTHROID, LEVOTHROID) 50 MCG tablet; Take 1 tablet by mouth Daily. for thyroid  Dispense: 90 tablet; Refill: 1    3. Recurrent major depressive disorder, in full remission  -     escitalopram (LEXAPRO) 20 MG tablet; Take 1 tablet by mouth Daily.  Dispense: 90 tablet; Refill: 1    4. Two-vessel coronary artery disease  -     clopidogrel (PLAVIX) 75 MG tablet; Take 1 tablet by mouth Daily.  Dispense: 90 tablet; Refill: 1    5. Primary insomnia  -     zolpidem (AMBIEN) 10 MG tablet; Take 1 tablet by mouth At Night As Needed for Sleep.  Dispense: 30 tablet; Refill: 2    6. Hyperlipidemia, unspecified hyperlipidemia type  -     atorvastatin (LIPITOR) 40 MG tablet; Take 1 tablet by mouth Every Night. For cholesterol  Dispense: 90 tablet; Refill: 1    7. Essential hypertension  -     amLODIPine (NORVASC) 5 MG tablet; Take 1 tablet by mouth Daily.  Dispense: 90 tablet; Refill: 1    8. Medication management  -     Urine Drug Screen - Urine, Clean Catch    9. Benign prostatic hyperplasia without lower urinary tract symptoms  -     tamsulosin (FLOMAX) 0.4 MG capsule 24 hr capsule; Take 1 capsule by mouth Daily.  Dispense: 90 capsule; Refill: 1    10. Wheezing  -     Ambulatory Referral to Allergy            I spent 15 minutes caring for Ben on this date of service. This time includes time spent by me in the following activities:preparing for the visit, reviewing tests, performing a medically appropriate examination and/or evaluation , ordering medications, tests, or procedures, and documenting information in the medical record  Follow Up   Return in about 6 months (around 7/16/2024) for Recheck.  Patient was given instructions and counseling regarding his condition or for health maintenance advice. Please see specific information pulled into the AVS if appropriate.

## 2024-01-16 ENCOUNTER — OFFICE VISIT (OUTPATIENT)
Dept: FAMILY MEDICINE CLINIC | Facility: CLINIC | Age: 79
End: 2024-01-16
Payer: MEDICARE

## 2024-01-16 ENCOUNTER — TELEPHONE (OUTPATIENT)
Dept: CARDIOLOGY | Facility: CLINIC | Age: 79
End: 2024-01-16

## 2024-01-16 VITALS
HEART RATE: 84 BPM | SYSTOLIC BLOOD PRESSURE: 130 MMHG | HEIGHT: 70 IN | WEIGHT: 183 LBS | BODY MASS INDEX: 26.2 KG/M2 | TEMPERATURE: 98.2 F | DIASTOLIC BLOOD PRESSURE: 60 MMHG | OXYGEN SATURATION: 95 %

## 2024-01-16 DIAGNOSIS — I10 ESSENTIAL HYPERTENSION: Chronic | ICD-10-CM

## 2024-01-16 DIAGNOSIS — F33.42 RECURRENT MAJOR DEPRESSIVE DISORDER, IN FULL REMISSION: Chronic | ICD-10-CM

## 2024-01-16 DIAGNOSIS — N40.0 BENIGN PROSTATIC HYPERPLASIA WITHOUT LOWER URINARY TRACT SYMPTOMS: Chronic | ICD-10-CM

## 2024-01-16 DIAGNOSIS — E03.9 PRIMARY HYPOTHYROIDISM: Chronic | ICD-10-CM

## 2024-01-16 DIAGNOSIS — Z79.899 MEDICATION MANAGEMENT: ICD-10-CM

## 2024-01-16 DIAGNOSIS — I25.10 TWO-VESSEL CORONARY ARTERY DISEASE: Chronic | ICD-10-CM

## 2024-01-16 DIAGNOSIS — R06.2 WHEEZING: ICD-10-CM

## 2024-01-16 DIAGNOSIS — E78.5 HYPERLIPIDEMIA, UNSPECIFIED HYPERLIPIDEMIA TYPE: Chronic | ICD-10-CM

## 2024-01-16 DIAGNOSIS — Z00.00 ENCOUNTER FOR SUBSEQUENT ANNUAL WELLNESS VISIT (AWV) IN MEDICARE PATIENT: Primary | ICD-10-CM

## 2024-01-16 DIAGNOSIS — F51.01 PRIMARY INSOMNIA: Chronic | ICD-10-CM

## 2024-01-16 PROCEDURE — 1160F RVW MEDS BY RX/DR IN RCRD: CPT | Performed by: FAMILY MEDICINE

## 2024-01-16 PROCEDURE — 1126F AMNT PAIN NOTED NONE PRSNT: CPT | Performed by: FAMILY MEDICINE

## 2024-01-16 PROCEDURE — 99214 OFFICE O/P EST MOD 30 MIN: CPT | Performed by: FAMILY MEDICINE

## 2024-01-16 PROCEDURE — 3078F DIAST BP <80 MM HG: CPT | Performed by: FAMILY MEDICINE

## 2024-01-16 PROCEDURE — 3075F SYST BP GE 130 - 139MM HG: CPT | Performed by: FAMILY MEDICINE

## 2024-01-16 PROCEDURE — 1170F FXNL STATUS ASSESSED: CPT | Performed by: FAMILY MEDICINE

## 2024-01-16 PROCEDURE — G0439 PPPS, SUBSEQ VISIT: HCPCS | Performed by: FAMILY MEDICINE

## 2024-01-16 RX ORDER — AMLODIPINE BESYLATE 5 MG/1
5 TABLET ORAL DAILY
Qty: 90 TABLET | Refills: 1 | Status: SHIPPED | OUTPATIENT
Start: 2024-01-16

## 2024-01-16 RX ORDER — ZOLPIDEM TARTRATE 10 MG/1
10 TABLET ORAL NIGHTLY PRN
Qty: 30 TABLET | Refills: 2 | Status: SHIPPED | OUTPATIENT
Start: 2024-01-16

## 2024-01-16 RX ORDER — TAMSULOSIN HYDROCHLORIDE 0.4 MG/1
1 CAPSULE ORAL DAILY
Qty: 90 CAPSULE | Refills: 1 | Status: SHIPPED | OUTPATIENT
Start: 2024-01-16

## 2024-01-16 RX ORDER — LEVOTHYROXINE SODIUM 0.05 MG/1
50 TABLET ORAL DAILY
Qty: 90 TABLET | Refills: 1 | Status: SHIPPED | OUTPATIENT
Start: 2024-01-16

## 2024-01-16 RX ORDER — ATORVASTATIN CALCIUM 40 MG/1
40 TABLET, FILM COATED ORAL NIGHTLY
Qty: 90 TABLET | Refills: 1 | Status: SHIPPED | OUTPATIENT
Start: 2024-01-16

## 2024-01-16 RX ORDER — CLOPIDOGREL BISULFATE 75 MG/1
75 TABLET ORAL DAILY
Qty: 90 TABLET | Refills: 1 | Status: SHIPPED | OUTPATIENT
Start: 2024-01-16

## 2024-01-16 RX ORDER — ESCITALOPRAM OXALATE 20 MG/1
20 TABLET ORAL DAILY
Qty: 90 TABLET | Refills: 1 | Status: SHIPPED | OUTPATIENT
Start: 2024-01-16

## 2024-01-16 NOTE — PATIENT INSTRUCTIONS
Medicare Wellness  Personal Prevention Plan of Service     Date of Office Visit:    Encounter Provider:  Jake Marcus MD  Place of Service:  [facility]  Patient Name: Ben Walsh  :  1945    As part of the Medicare Wellness portion of your visit today, we are providing you with this personalized preventive plan of services (PPPS). This plan is based upon recommendations of the [place] Preventive Services Task Force (USPSTF) and the Advisory Committee on Immunization Practices (ACIP).    This lists the preventive care services that should be considered, and provides dates of when you are due. Items listed as completed are up-to-date and do not require any further intervention.    Health Maintenance   Topic Date Due    TDAP/TD VACCINES (1 - Tdap) Never done    INFLUENZA VACCINE  2023    COVID-19 Vaccine (3 - - season) 2023    HEMOGLOBIN A1C  05/15/2024    URINE MICROALBUMIN  2024    DIABETIC EYE EXAM  2024    LIPID PANEL  11/15/2024    ANNUAL WELLNESS VISIT  2025    BMI FOLLOWUP  2025    COLORECTAL CANCER SCREENING  2027    Pneumococcal Vaccine 65+  Completed    ZOSTER VACCINE  Discontinued       No orders of the defined types were placed in this encounter.      Return in about 6 months (around 2024) for Recheck.

## 2024-01-17 ENCOUNTER — TELEPHONE (OUTPATIENT)
Dept: FAMILY MEDICINE CLINIC | Facility: CLINIC | Age: 79
End: 2024-01-17
Payer: MEDICARE

## 2024-01-17 LAB
AMPHETAMINES UR QL SCN: NEGATIVE NG/ML
BARBITURATES UR QL SCN: NEGATIVE NG/ML
BENZODIAZ UR QL SCN: NEGATIVE NG/ML
BZE UR QL SCN: NEGATIVE NG/ML
CANNABINOIDS UR QL SCN: NEGATIVE NG/ML
CREAT UR-MCNC: 64.4 MG/DL (ref 20–300)
LABORATORY COMMENT REPORT: ABNORMAL
METHADONE UR QL SCN: NEGATIVE NG/ML
OPIATES UR QL SCN: POSITIVE NG/ML
OXYCODONE+OXYMORPHONE UR QL SCN: NEGATIVE NG/ML
PCP UR QL: NEGATIVE NG/ML
PH UR: 5.8 [PH] (ref 4.5–8.9)
PROPOXYPH UR QL SCN: NEGATIVE NG/ML

## 2024-01-17 NOTE — TELEPHONE ENCOUNTER
Last ov / labs and  previous cardiac test results faxed to [facility] at [phone] confirmation attached

## 2024-01-18 ENCOUNTER — OFFICE VISIT (OUTPATIENT)
Dept: CARDIOLOGY | Facility: CLINIC | Age: 79
End: 2024-01-18
Payer: MEDICARE

## 2024-01-18 VITALS
HEART RATE: 80 BPM | DIASTOLIC BLOOD PRESSURE: 78 MMHG | OXYGEN SATURATION: 98 % | BODY MASS INDEX: 26.63 KG/M2 | HEIGHT: 70 IN | SYSTOLIC BLOOD PRESSURE: 130 MMHG | WEIGHT: 186 LBS

## 2024-01-18 DIAGNOSIS — I10 ESSENTIAL HYPERTENSION: Primary | ICD-10-CM

## 2024-01-18 DIAGNOSIS — I25.10 TWO-VESSEL CORONARY ARTERY DISEASE: ICD-10-CM

## 2024-01-18 DIAGNOSIS — E78.5 HYPERLIPIDEMIA, UNSPECIFIED HYPERLIPIDEMIA TYPE: ICD-10-CM

## 2024-01-18 DIAGNOSIS — I10 ESSENTIAL HYPERTENSION: Chronic | ICD-10-CM

## 2024-01-18 PROCEDURE — 93000 ELECTROCARDIOGRAM COMPLETE: CPT | Performed by: NURSE PRACTITIONER

## 2024-01-18 PROCEDURE — 3078F DIAST BP <80 MM HG: CPT | Performed by: NURSE PRACTITIONER

## 2024-01-18 PROCEDURE — 99214 OFFICE O/P EST MOD 30 MIN: CPT | Performed by: NURSE PRACTITIONER

## 2024-01-18 PROCEDURE — 3075F SYST BP GE 130 - 139MM HG: CPT | Performed by: NURSE PRACTITIONER

## 2024-01-18 PROCEDURE — 1160F RVW MEDS BY RX/DR IN RCRD: CPT | Performed by: NURSE PRACTITIONER

## 2024-01-18 PROCEDURE — 1159F MED LIST DOCD IN RCRD: CPT | Performed by: NURSE PRACTITIONER

## 2024-01-18 RX ORDER — VALSARTAN 160 MG/1
160 TABLET ORAL DAILY
Qty: 90 TABLET | Refills: 1 | Status: SHIPPED | OUTPATIENT
Start: 2024-01-18

## 2024-01-18 NOTE — LETTER
CARDIOLOGY    Patient Name: Ben Walsh  :1945  Age: 78 y.o.    24    To whom it may concern:    Ben Walsh was referred to my office for cardiovascular risk assessment exam for upcoming hand surgery.    From a cardiovascular standpoint, the patient is at the following risk of major cardiovascular event in the liz-operative setting:    [] Low         [] Modifiable  [x] Low-Moderate       [x] Non-Modifiable   [] Moderate  [] Moderare - high  [] High    Cardiac Testing:    [] Needs further cardiac testing  [x] Does not need further cardiac testing    Anticoagulant Status:     [] No anticoagulants    [x] The patient is on  [] ASA; hold for ___ days.  [] Coumadin; hold for ___ days.  [x] Plavix; hold for 7 days.  [] Eliquis; hold for ___ days.  [] Brilinta; hold for ___ days.  [] Xarelto; hold for ___ days.  [] Effient; hold for ___ days.    [] The patient requires Lovenox bridging, which has been prescribed.     If there are any problems during surgery, please do not hesitate to contact my office.    Thank you for allowing me to participate in this patient's care.    Sincerely,    MARIBEL Roper  [facility], [place]

## 2024-01-18 NOTE — ASSESSMENT & PLAN NOTE
Patient with prior two-vessel CAD.  He has prior angioplasty and stenting to the LAD with a borderline lesion to the RCA  Denies angina or dyspnea  Exercises in the form of walking his dog a few blocks at a time  Continue GDMT:  Amlodipine 5 mg/day  Atorvastatin 40 mg/day  Clopidogrel 75 mg/day

## 2024-01-18 NOTE — ASSESSMENT & PLAN NOTE
Blood pressure controlled in clinic at 130/78  Continue the following regimen:  Valsartan 160 mg/day  Amlodipine 5 mg/day

## 2024-01-18 NOTE — PROGRESS NOTES
"    CARDIOLOGY        Patient Name: Ben Walsh  :1945  Age: 78 y.o.  Primary Cardiologist: Angel Borges MD  Encounter Provider:  MARIBEL Roper    Date of Service: 24        CHIEF COMPLAINT / REASON FOR OFFICE VISIT     Surgery clearence      HISTORY OF PRESENT ILLNESS       HPI  Ben Walsh is a 78 y.o. male who presents today for preoperative cardiovascular risk assessment.  Patient last evaluated in clinic in 2021.     Pt has a  history significant for CAD with prior angioplasty to the LAD with borderline lesion to the RCA, diabetes, hypertension, hyperlipidemia.    Patient was last evaluated in clinic in  for preoperative cardiovascular risk assessment.  At that time in a patient with known two-vessel coronary disease and a borderline lesion in  on cardiac catheterization it was recommended that patient have a myocardial perfusion stress test.  Patient did have myocardial perfusion study in 2021 which was negative for evidence of ischemia and consistent with a low risk study.    Patient presents today for preoperative cardiovascular risk assessment for an upcoming right hand surgery.  Patient reports that from cardiovascular standpoint he has done well since last assessment.  He walks his dog a few blocks on a daily basis without any exertional symptoms.  Reports generalized fatigue that is stable for him.  Denies chest pain, dyspnea with exertion or at rest, palpitations, edema.    The following portions of the patient's history were reviewed and updated as appropriate: allergies, current medications, past family history, past medical history, past social history, past surgical history and problem list.      VITAL SIGNS     Visit Vitals  /78 (BP Location: Right arm, Patient Position: Sitting)   Pulse 80   Ht 177.8 cm (70\")   Wt 84.4 kg (186 lb)   SpO2 98%   BMI 26.69 kg/m²         Wt Readings from Last 3 Encounters:   24 84.4 kg (186 " lb)   01/11/24 83 kg (183 lb)   01/03/24 81.6 kg (180 lb)     Body mass index is 26.69 kg/m².      REVIEW OF SYSTEMS   Review of Systems   Constitutional: Positive for malaise/fatigue. Negative for chills, fever, weight gain and weight loss.   Cardiovascular:  Negative for leg swelling.   Respiratory:  Negative for cough, snoring and wheezing.    Hematologic/Lymphatic: Negative for bleeding problem. Does not bruise/bleed easily.   Skin:  Negative for color change.   Musculoskeletal:  Negative for falls, joint pain and myalgias.   Gastrointestinal:  Negative for melena.   Genitourinary:  Negative for hematuria.   Neurological:  Negative for excessive daytime sleepiness.   Psychiatric/Behavioral:  Negative for depression. The patient is not nervous/anxious.            PHYSICAL EXAMINATION     Constitutional:       Appearance: Normal appearance. Well-developed.   Eyes:      Conjunctiva/sclera: Conjunctivae normal.   Neck:      Vascular: No carotid bruit.   Pulmonary:      Effort: Pulmonary effort is normal.      Breath sounds: Normal breath sounds.   Cardiovascular:      Normal rate. Regular rhythm. Normal S1. Normal S2.       Murmurs: There is no murmur.      No gallop.  No click. No rub.   Edema:     Peripheral edema absent.   Musculoskeletal: Normal range of motion. Skin:     General: Skin is warm and dry.   Neurological:      Mental Status: Alert and oriented to person, place, and time.      GCS: GCS eye subscore is 4. GCS verbal subscore is 5. GCS motor subscore is 6.   Psychiatric:         Speech: Speech normal.         Behavior: Behavior normal.         Thought Content: Thought content normal.         Judgment: Judgment normal.           REVIEWED DATA       ECG 12 Lead    Date/Time: 1/18/2024 10:32 AM  Performed by: Haydee Parekh APRN    Authorized by: Haydee Parekh APRN  Comparison: compared with previous ECG from 10/20/2021  Rhythm: sinus rhythm  Rate: normal  BPM: 80  Conduction: conduction  "normal  ST Segments: ST segments normal  T Waves: T waves normal  QRS axis: normal    Clinical impression: normal ECG          Cardiac Procedures:  Myocardial perfusion stress test 11/2/2021.  Normal myocardial perfusion study without evidence of ischemia.  Impressions consistent with low risk study.    Lipid Panel          2/6/2023    14:33 6/30/2023    14:42 11/15/2023    12:08   Lipid Panel   Total Cholesterol 105  142  159    Triglycerides 117  181  223    HDL Cholesterol 46  30  29    VLDL Cholesterol 21  31  38    LDL Cholesterol  38  81  92        Lab Results   Component Value Date     11/15/2023     06/30/2023    K 4.2 11/15/2023    K 4.5 06/30/2023     11/15/2023     06/30/2023    CO2 24.6 11/15/2023    CO2 21 06/30/2023    BUN 14 11/15/2023    BUN 10 06/30/2023    CREATININE 0.83 11/15/2023    CREATININE 1.05 06/30/2023    EGFRIFNONA 64 02/18/2022    EGFRIFNONA 77 07/02/2021    EGFRIFAFRI 74 02/18/2022    EGFRIFAFRI 89 07/02/2021    GLUCOSE 181 (H) 11/15/2023    GLUCOSE 135 (H) 06/30/2023    CALCIUM 9.8 11/15/2023    CALCIUM 9.6 06/30/2023    PROTENTOTREF 6.7 11/15/2023    PROTENTOTREF 6.9 06/30/2023    ALBUMIN 4.6 11/15/2023    ALBUMIN 4.6 06/30/2023    BILITOT 0.4 11/15/2023    BILITOT 0.7 06/30/2023    AST 24 11/15/2023    AST 26 06/30/2023    ALT 33 11/15/2023    ALT 34 06/30/2023     Lab Results   Component Value Date    WBC 5.7 06/30/2023    WBC 4.39 (L) 01/28/2019    HGB 13.1 06/30/2023    HGB 13.2 (L) 01/28/2019    HCT 40.8 06/30/2023    HCT 40.8 01/28/2019    MCV 92 06/30/2023    MCV 94.7 01/28/2019     06/30/2023     01/28/2019     No results found for: \"PROBNP\", \"BNP\"  Lab Results   Component Value Date    CKTOTAL 87 04/23/2018     Lab Results   Component Value Date    TSH 0.848 06/30/2023    TSH 2.910 02/06/2023             ASSESSMENT & PLAN     Diagnoses and all orders for this visit:    1. Essential hypertension (Primary)  Assessment & Plan:  Blood " pressure controlled in clinic at 130/78  Continue the following regimen:  Valsartan 160 mg/day  Amlodipine 5 mg/day      2. Hyperlipidemia, unspecified hyperlipidemia type  Assessment & Plan:  LDL stable  Continue atorvastatin      3. Two-vessel coronary artery disease  Assessment & Plan:  Patient with prior two-vessel CAD.  He has prior angioplasty and stenting to the LAD with a borderline lesion to the RCA  Denies angina or dyspnea  Exercises in the form of walking his dog a few blocks at a time  Continue GDMT:  Amlodipine 5 mg/day  Atorvastatin 40 mg/day  Clopidogrel 75 mg/day    Orders:  -     ECG 12 Lead          Future Appointments         Provider Department Center    2/20/2024 11:00 AM Justin Miller MD [facility] ENDOCRINOLOGY [place]    1/21/2025 2:00 PM Angel Borges MD [facility] CARDIOLOGY [place]                    MEDICATIONS         Discharge Medications            Accurate as of January 18, 2024  1:05 PM. If you have any questions, ask your nurse or doctor.                Changes to Medications        Instructions Start Date   HYDROcodone-acetaminophen 7.5-325 MG per tablet  Commonly known as: NORCO  What changed:   when to take this  reasons to take this   1 tablet, Oral, 2 Times Daily, Prescribed by Dr. Jacobo             Continue These Medications        Instructions Start Date   Accu-Chek Guide test strip  Generic drug: glucose blood   Dx code E11.29 testing bs 3 x day      accu-chek soft touch lancets   Dx code E11.29 testing bs 3 x day      amLODIPine 5 MG tablet  Commonly known as: NORVASC   5 mg, Oral, Daily      atorvastatin 40 MG tablet  Commonly known as: LIPITOR   40 mg, Oral, Nightly, For cholesterol      B-D ULTRAFINE III SHORT PEN 31G X 8 MM misc  Generic drug: Insulin Pen Needle   1 each, Does not apply, Daily      clobetasol 0.05 % external solution  Commonly known as: TEMOVATE       clopidogrel 75 MG tablet  Commonly known as: PLAVIX   75 mg,  Oral, Daily      cyclobenzaprine 10 MG tablet  Commonly known as: FLEXERIL   10 mg, Oral, 3 Times Daily PRN      escitalopram 20 MG tablet  Commonly known as: LEXAPRO   20 mg, Oral, Daily      esomeprazole 20 MG capsule  Commonly known as: nexIUM   20 mg, Oral, Every Morning Before Breakfast      fenofibrate 145 MG tablet  Commonly known as: TRICOR   145 mg, Oral, Daily      fluticasone 50 MCG/ACT nasal spray  Commonly known as: FLONASE   No dose, route, or frequency recorded.      gabapentin 800 MG tablet  Commonly known as: NEURONTIN   800 mg, Oral, 3 Times Daily      glipizide 10 MG tablet  Commonly known as: Glucotrol   10 mg, Oral, Daily      levothyroxine 50 MCG tablet  Commonly known as: SYNTHROID, LEVOTHROID   50 mcg, Oral, Daily, for thyroid      mupirocin 2 % ointment  Commonly known as: BACTROBAN   1 application , Topical, 3 Times Daily      Nitrostat 0.4 MG SL tablet  Generic drug: nitroglycerin   0.4 mg, Sublingual, Every 5 Minutes PRN      tamsulosin 0.4 MG capsule 24 hr capsule  Commonly known as: FLOMAX   0.4 mg, Oral, Daily      traZODone 50 MG tablet  Commonly known as: DESYREL    mg, Oral, Nightly      Tresiba FlexTouch 200 UNIT/ML solution pen-injector pen injection  Generic drug: Insulin Degludec   22 units every morning      valsartan 160 MG tablet  Commonly known as: DIOVAN   160 mg, Oral, Daily      Vitamin D3 1.25 MG (97187 UT) capsule   50,000 Units, Oral, Every 7 Days      zolpidem 10 MG tablet  Commonly known as: AMBIEN   10 mg, Oral, Nightly PRN             Stop These Medications      lisinopril 20 MG tablet  Commonly known as: PRINIVIL,ZESTRIL  Stopped by: MARIBEL Roper                  **Dragon Disclaimer:   Much of this encounter note is an electronic transcription/translation of spoken language to printed text. The electronic translation of spoken language may permit erroneous, or at times, nonsensical words or phrases to be inadvertently transcribed. Although I have  reviewed the note for such errors, some may still exist.

## 2024-02-07 DIAGNOSIS — F51.01 PRIMARY INSOMNIA: Chronic | ICD-10-CM

## 2024-02-07 RX ORDER — ZOLPIDEM TARTRATE 10 MG/1
10 TABLET ORAL NIGHTLY PRN
Qty: 30 TABLET | Refills: 2 | Status: CANCELLED | OUTPATIENT
Start: 2024-02-07

## 2024-02-07 NOTE — TELEPHONE ENCOUNTER
Caller: Ben Walsh    Relationship: Self    Best call back number:     [phone] (Mobile)       Requested Prescriptions:   Requested Prescriptions     Pending Prescriptions Disp Refills    zolpidem (AMBIEN) 10 MG tablet 30 tablet 2     Sig: Take 1 tablet by mouth At Night As Needed for Sleep.        Pharmacy where request should be sent: [facility] [id number] [address] [phone] [facility] [phone]      Last office visit with prescribing clinician: 1/16/2024   Last telemedicine visit with prescribing clinician: Visit date not found   Next office visit with prescribing clinician: Visit date not found     Additional details provided by patient: ONE LEFT     Does the patient have less than a 3 day supply:  [x] Yes  [] No    Would you like a call back once the refill request has been completed: [] Yes [x] No    If the office needs to give you a call back, can they leave a voicemail: [] Yes [x] No    Cami Michel Rep   02/07/24 11:14 EST

## 2024-02-07 NOTE — TELEPHONE ENCOUNTER
Refill not needed at this time. Pharmacy contacted and verified refill available, but it is too early to fill. Patient notified to contact pharmacy to initiate fill as early as 02/13/24. Patient verbalized an understanding and states he has trazodone to help with sleep.

## 2024-02-21 ENCOUNTER — OFFICE VISIT (OUTPATIENT)
Dept: ENDOCRINOLOGY | Age: 79
End: 2024-02-21
Payer: MEDICARE

## 2024-02-21 VITALS
TEMPERATURE: 97.3 F | SYSTOLIC BLOOD PRESSURE: 118 MMHG | HEIGHT: 70 IN | DIASTOLIC BLOOD PRESSURE: 82 MMHG | WEIGHT: 186.2 LBS | HEART RATE: 80 BPM | BODY MASS INDEX: 26.66 KG/M2

## 2024-02-21 DIAGNOSIS — I10 ESSENTIAL HYPERTENSION: ICD-10-CM

## 2024-02-21 DIAGNOSIS — E03.9 PRIMARY HYPOTHYROIDISM: ICD-10-CM

## 2024-02-21 DIAGNOSIS — E78.5 HYPERLIPIDEMIA, UNSPECIFIED HYPERLIPIDEMIA TYPE: ICD-10-CM

## 2024-02-21 DIAGNOSIS — E11.42 TYPE 2 DIABETES MELLITUS WITH PERIPHERAL NEUROPATHY: Primary | ICD-10-CM

## 2024-02-21 DIAGNOSIS — I25.10 CORONARY ARTERY DISEASE INVOLVING NATIVE CORONARY ARTERY OF NATIVE HEART WITHOUT ANGINA PECTORIS: ICD-10-CM

## 2024-02-21 DIAGNOSIS — E55.9 VITAMIN D DEFICIENCY: ICD-10-CM

## 2024-02-21 PROCEDURE — 99215 OFFICE O/P EST HI 40 MIN: CPT | Performed by: INTERNAL MEDICINE

## 2024-02-21 PROCEDURE — 3074F SYST BP LT 130 MM HG: CPT | Performed by: INTERNAL MEDICINE

## 2024-02-21 PROCEDURE — 3079F DIAST BP 80-89 MM HG: CPT | Performed by: INTERNAL MEDICINE

## 2024-02-21 RX ORDER — AMOXICILLIN AND CLAVULANATE POTASSIUM 875; 125 MG/1; MG/1
TABLET, FILM COATED ORAL
COMMUNITY
Start: 2024-02-03

## 2024-02-21 RX ORDER — BLOOD-GLUCOSE,RECEIVER,CONT
1 EACH MISCELLANEOUS DAILY
Qty: 1 EACH | Refills: 1 | Status: SHIPPED | OUTPATIENT
Start: 2024-02-21

## 2024-02-21 RX ORDER — LORATADINE 10 MG/1
CAPSULE, LIQUID FILLED ORAL
COMMUNITY

## 2024-02-21 RX ORDER — GLIPIZIDE 5 MG/1
5 TABLET ORAL DAILY
Qty: 90 TABLET | Refills: 2 | Status: SHIPPED | OUTPATIENT
Start: 2024-02-21

## 2024-02-21 RX ORDER — HYDROCODONE BITARTRATE AND ACETAMINOPHEN 10; 325 MG/1; MG/1
TABLET ORAL
COMMUNITY
Start: 2024-02-19

## 2024-02-21 RX ORDER — BLOOD-GLUCOSE SENSOR
1 EACH MISCELLANEOUS
Qty: 6 EACH | Refills: 3 | Status: SHIPPED | OUTPATIENT
Start: 2024-02-21

## 2024-02-21 NOTE — PROGRESS NOTES
Subjective   Ben Walsh is a 78 y.o. male.     History of Present Illness     Patient is an 78-year-old male who came in for follow-up.  He was last seen by me in February 2022.    He has known diabetes since 2013.  He has been on metformin ER 1000 mg once a day, glipizide 10 mg/day and Tresiba 22 every AM.  Jardiance was discontinued because he was having urinary tract infection.  Januvia was discontinued when he had pancreatitis.  He had diarrhea when he took a higher dose of metformin.  He checks his blood sugar once a week.  Fasting glucose .  Lunchtime glucose .  He had hypoglycemia around 2 PM 3 times last visit   He has no weight change since November 2023.  His last meal was at 7:30 AM.     He has microalbuminuria on urine sample taken in 2014.  Repeat urine microalbumin done in 6/23 was normal.  He is on valsartan.  His last eye examination was in 7/23.  He has no retinopathy.     He has chronic neck and lower back pain due to degenerative disc disease.  He is no longer having burning in his feet.    He denies muscle weakness or incontinence.  He denies claudication.   Arterial ultrasound of the lower extremities done in March 2020 is normal. He is on gabapentin 800 mg 3 times daily prescribed by Dr. Jacobo.  He had epidural injection with partial improvement for 2-3 days in the past.     He has hyperlipidemia and has been on Lipitor 40 mg once a day, TriCor 145 mg once a day, and fish oil 2 g twice a day..  He denies any myalgia.  CT scan of the abdomen and ultrasound of the abdomen in 2012 showed fatty infiltration of the liver and a right kidney stone.       He has hypothyroidism and has been on levothyroxine 50 µg per day.  He denies heat or cold intolerance.  He denies bowel changes.     He has hypertension and has been on amlodipine and valsartan.  He had a false positive stress test in February 2016.  He had a cardiac catheterization in March 2016.  The stent to the mid LAD is  "patent.  There is mild luminal irregularities of the proximal LAD.  The left circumflex complex has mild luminal plaque.  The proximal RCA has a stable 50% stenosis.  He is on Plavix 75 mg once a day.  He denies chest pain or shortness of breath.  He follows with Dr. Borges.      He has history of vitamin D deficiency and is on vitamin D3 50,000 units weekly prescribed by Dr. Marcus.  He denies chronic diarrhea.     He had right carpal tunnel surgery and tendon transplant in February 2024.  He is going through physical therapy.      The following portions of the patient's history were reviewed and updated as appropriate: allergies, current medications, past family history, past medical history, past social history, past surgical history, and problem list.    Review of Systems   Eyes:  Negative for visual disturbance.   Respiratory:  Negative for shortness of breath and wheezing.    Cardiovascular:  Negative for chest pain and palpitations.   Gastrointestinal: Negative.    Genitourinary:  Positive for difficulty urinating. Negative for hematuria.   Musculoskeletal:  Positive for back pain. Negative for myalgias.   Neurological:  Negative for numbness.     Vitals:    02/21/24 1251   BP: 118/82   Pulse: 80   Temp: 97.3 °F (36.3 °C)   TempSrc: Temporal   Weight: 84.5 kg (186 lb 3.2 oz)   Height: 177.8 cm (70\")      Objective   Physical Exam  Constitutional:       General: He is not in acute distress.     Appearance: Normal appearance. He is obese. He is not ill-appearing.   HENT:      Mouth/Throat:      Pharynx: No oropharyngeal exudate or posterior oropharyngeal erythema.   Eyes:      General: No scleral icterus.        Right eye: No discharge.         Left eye: No discharge.   Cardiovascular:      Rate and Rhythm: Normal rate and regular rhythm.      Heart sounds: Normal heart sounds. No murmur heard.     No friction rub.   Pulmonary:      Breath sounds: Normal breath sounds. No rales.   Abdominal:      General: " Bowel sounds are normal.      Palpations: Abdomen is soft.      Tenderness: There is no right CVA tenderness or left CVA tenderness.   Musculoskeletal:      Right lower leg: No edema.      Left lower leg: No edema.      Comments: Right hand has a dynamic brace   Skin:     General: Skin is warm.   Neurological:      Mental Status: He is alert and oriented to person, place, and time.      Comments: Intact light touch in lower extremities       Office Visit on 01/16/2024   Component Date Value Ref Range Status    Amphetamine, Urine Qual 01/16/2024 Negative  Capzam=7802 ng/mL Final    Barbiturates Screen, Urine 01/16/2024 Negative  Egpxea=799 ng/mL Final    Benzodiazepine Screen, Urine 01/16/2024 Negative  Oavleo=466 ng/mL Final    THC Screen, Urine 01/16/2024 Negative  Cutoff=20 ng/mL Final    Cocaine Screen, Urine 01/16/2024 Negative  Cqyzeh=030 ng/mL Final    Opiate Screen, Urine 01/16/2024 Positive (A)  Rbsdoy=622 ng/mL Final    Opiate test includes Codeine, Morphine, Hydromorphone, Hydrocodone.    Oxycodone/Oxymorphone, Urine 01/16/2024 Negative  Httoac=729 ng/mL Final    Test includes Oxycodone and Oxymorphone    Phencyclidine (PCP), Urine 01/16/2024 Negative  Cutoff=25 ng/mL Final    Methadone Screen, Urine 01/16/2024 Negative  Zygiix=770 ng/mL Final    Propoxyphene Screen 01/16/2024 Negative  Ekloxg=902 ng/mL Final    Creatinine, Urine 01/16/2024 64.4  20.0 - 300.0 mg/dL Final    pH, UA 01/16/2024 5.8  4.5 - 8.9 Final    Please note 01/16/2024 Comment   Final    Comment: This assay provides a preliminary unconfirmed analytical test  result that may be suitable for clinical management of patients  in certain situations. Drug-test results should be interpreted  in the context of clinical information. Patient metabolic  variables, specific drug chemistry, and specimen  characteristics can affect test outcome. Technical consultation  is available if a test result is inconsistent with an expected  outcome.    Email:   [email or web address]    Phone:  [phone]       Assessment & Plan   Diagnoses and all orders for this visit:    1. Type 2 diabetes mellitus with peripheral neuropathy (Primary)  -     Comprehensive Metabolic Panel  -     Hemoglobin A1c  -     glipizide (Glucotrol) 5 MG tablet; Take 1 tablet by mouth Daily.  Dispense: 90 tablet; Refill: 2    2. Hyperlipidemia, unspecified hyperlipidemia type  -     Lipid Panel    3. Primary hypothyroidism  -     TSH    4. Essential hypertension  -     Comprehensive Metabolic Panel    5. Coronary artery disease involving native coronary artery of native heart without angina pectoris  -     Comprehensive Metabolic Panel  -     Lipid Panel    6. Vitamin D deficiency  -     Vitamin D,25-Hydroxy    Other orders  -     Continuous Blood Gluc Sensor (FreeStyle Carter 3 Sensor) misc; Use 1 each Every 14 (Fourteen) Days.  Dispense: 6 each; Refill: 3  -     Continuous Blood Gluc  (FreeStyle Carter 3 Buchanan) device; Use 1 each Daily.  Dispense: 1 each; Refill: 1      Decrease glipizide 10 mg 1/2 tablet every morning.  Patient lives alone and is at risk for prolonged hypoglycemia.  He is unable to tolerate SGLT2 inhibitors or DPP 4 inhibitors.  Continue Tresiba 22 units every morning and metformin ER 1000 mg once a day.  Prescription for freestyle carter 3 sensor was sent to pharmacy.    Continue Lipitor 40 mg/day, Tricor 145 mg/day, and fish oil 2 g twice a day.    Continue levothyroxine 50 mcg/day.    Continue amlodipine and valsartan.    Will defer vitamin D replacement to Dr. Marcus.    Copy of my note sent to Dr. Marcus.    RTC 4 mos with ELIAN Black NP.  Total time 55 minutes

## 2024-02-22 LAB
25(OH)D3+25(OH)D2 SERPL-MCNC: 45 NG/ML (ref 30–100)
ALBUMIN SERPL-MCNC: 4.7 G/DL (ref 3.5–5.2)
ALBUMIN/GLOB SERPL: 2.2 G/DL
ALP SERPL-CCNC: 85 U/L (ref 39–117)
ALT SERPL-CCNC: 40 U/L (ref 1–41)
AST SERPL-CCNC: 26 U/L (ref 1–40)
BILIRUB SERPL-MCNC: 0.4 MG/DL (ref 0–1.2)
BUN SERPL-MCNC: 16 MG/DL (ref 8–23)
BUN/CREAT SERPL: 18.6 (ref 7–25)
CALCIUM SERPL-MCNC: 9.9 MG/DL (ref 8.6–10.5)
CHLORIDE SERPL-SCNC: 102 MMOL/L (ref 98–107)
CHOLEST SERPL-MCNC: 159 MG/DL (ref 0–200)
CO2 SERPL-SCNC: 20.7 MMOL/L (ref 22–29)
CREAT SERPL-MCNC: 0.86 MG/DL (ref 0.76–1.27)
EGFRCR SERPLBLD CKD-EPI 2021: 87.5 ML/MIN/1.73
GLOBULIN SER CALC-MCNC: 2.1 GM/DL
GLUCOSE SERPL-MCNC: 74 MG/DL (ref 65–99)
HBA1C MFR BLD: 7.5 % (ref 4.8–5.6)
HDLC SERPL-MCNC: 34 MG/DL (ref 40–60)
IMP & REVIEW OF LAB RESULTS: NORMAL
LDLC SERPL CALC-MCNC: 94 MG/DL (ref 0–100)
POTASSIUM SERPL-SCNC: 4.5 MMOL/L (ref 3.5–5.2)
PROT SERPL-MCNC: 6.8 G/DL (ref 6–8.5)
SODIUM SERPL-SCNC: 137 MMOL/L (ref 136–145)
TRIGL SERPL-MCNC: 180 MG/DL (ref 0–150)
TSH SERPL DL<=0.005 MIU/L-ACNC: 2.44 UIU/ML (ref 0.27–4.2)
VLDLC SERPL CALC-MCNC: 31 MG/DL (ref 5–40)

## 2024-02-22 NOTE — PROGRESS NOTES
Hemoglobin A1c slightly improved at 7.5%.  Diabetes is in fair control.  LDL 94.  HDL 34.  Nonfasting triglycerides 180.  Continue Lipitor 40 mg/day, Tricor 145 mg/day and fish oil 2 g twice a day.  Normal vitamin D.  Continue ergocalciferol 50,000 units weekly.  Normal thyroid function tests.  Continue levothyroxine 50 mcg/day.  Copy of labs sent to Dr. Jake Marcus and to patient through [facility].

## 2024-03-05 ENCOUNTER — OFFICE VISIT (OUTPATIENT)
Dept: SPORTS MEDICINE | Facility: CLINIC | Age: 79
End: 2024-03-05
Payer: MEDICARE

## 2024-03-05 VITALS
BODY MASS INDEX: 26.77 KG/M2 | TEMPERATURE: 97.8 F | DIASTOLIC BLOOD PRESSURE: 70 MMHG | HEIGHT: 70 IN | SYSTOLIC BLOOD PRESSURE: 110 MMHG | OXYGEN SATURATION: 96 % | WEIGHT: 187 LBS | RESPIRATION RATE: 14 BRPM | HEART RATE: 90 BPM

## 2024-03-05 DIAGNOSIS — G89.29 CHRONIC PAIN OF LEFT KNEE: Primary | Chronic | ICD-10-CM

## 2024-03-05 DIAGNOSIS — M25.562 CHRONIC PAIN OF LEFT KNEE: Primary | Chronic | ICD-10-CM

## 2024-03-05 DIAGNOSIS — M17.12 ARTHRITIS OF LEFT KNEE: Chronic | ICD-10-CM

## 2024-03-05 PROCEDURE — 3074F SYST BP LT 130 MM HG: CPT | Performed by: FAMILY MEDICINE

## 2024-03-05 PROCEDURE — 99214 OFFICE O/P EST MOD 30 MIN: CPT | Performed by: FAMILY MEDICINE

## 2024-03-05 PROCEDURE — 3078F DIAST BP <80 MM HG: CPT | Performed by: FAMILY MEDICINE

## 2024-03-05 RX ORDER — CEPHALEXIN 500 MG/1
CAPSULE ORAL
COMMUNITY
Start: 2024-02-27

## 2024-03-05 NOTE — PROGRESS NOTES
"Chief Complaint  Pain and Follow-up of the Left Knee (Not any better with or without a brace)    Subjective        Ben Walsh presents to [facility]  History of Present Illness  Patient is here to follow-up chronic left medial knee pain.  Patient was seen in January of this year for this problem, I did perform an intra-articular steroid injection at that time and he states he gave him may be a few weeks of relief however his pain has returned.  Patient states that he does note at times some swelling, feels warm to touch, plus minus redness.  He has not had any mechanical symptoms.  Objective   Vital Signs:  /70 (BP Location: Left arm, Patient Position: Sitting, Cuff Size: Adult)   Pulse 90   Temp 97.8 °F (36.6 °C) (Infrared)   Resp 14   Ht 177.8 cm (70\")   Wt 84.8 kg (187 lb)   SpO2 96%   BMI 26.83 kg/m²   Estimated body mass index is 26.83 kg/m² as calculated from the following:    Height as of this encounter: 177.8 cm (70\").    Weight as of this encounter: 84.8 kg (187 lb).               Physical Exam  Vitals reviewed.   Constitutional:       Appearance: He is well-developed.   HENT:      Head: Normocephalic and atraumatic.   Eyes:      Conjunctiva/sclera: Conjunctivae normal.      Pupils: Pupils are equal, round, and reactive to light.   Cardiovascular:      Comments: No peripheral edema  Pulmonary:      Effort: Pulmonary effort is normal.   Musculoskeletal:      Comments: Left knee with some soft tissue prominence over the anterior medial tibial plateau.  No effusion.  Patient has tenderness over this area as well as the medial joint line.  Patient does lack just a few degrees of full extension and flexion.  Negative Lachman.  Equivocal Bronson.   Skin:     General: Skin is warm and dry.   Neurological:      Mental Status: He is alert and oriented to person, place, and time.   Psychiatric:         Behavior: Behavior normal.        Result Review " :          XR Knee 3+ View With [place] Left (01/03/2024 13:41)-patient does have some mild medial and patellofemoral compartment arthritis changes.  Also has soft tissue calcifications, possible calcification of popliteal cyst.  Patient also has evidence of mild calcium deposits in the menisci.           Assessment and Plan     Diagnoses and all orders for this visit:    1. Chronic pain of left knee (Primary)  -     MRI Knee Right Without Contrast; Future  -     Diclofenac Sodium 4 %, Topiramate 2 %, cloNIDine HCl 0.2 %, Lidocaine HCl 5 %; Apply 1-2 g topically to the appropriate area as directed 3 (Three) to 4 (Four) times daily.  Dispense: 90 g; Refill: 3    2. Arthritis of left knee  -     MRI Knee Right Without Contrast; Future  -     Diclofenac Sodium 4 %, Topiramate 2 %, cloNIDine HCl 0.2 %, Lidocaine HCl 5 %; Apply 1-2 g topically to the appropriate area as directed 3 (Three) to 4 (Four) times daily.  Dispense: 90 g; Refill: 3    Discussed with the patient since he did not see any significant long-lasting relief with the corticosteroid injection I think it would be helpful for us to check MRI left knee, possible etiologies include but not limited to primary osteoarthritis, pseudogout, meniscal tear, pes anserine bursitis, insufficiency fracture of the medial tibial plateau.  Will prescribe topical compound medication as above for as needed use for pain.  Follow-up after MRI.         Follow Up     No follow-ups on file.  Patient was given instructions and counseling regarding his condition or for health maintenance advice. Please see specific information pulled into the AVS if appropriate.

## 2024-03-11 ENCOUNTER — TELEPHONE (OUTPATIENT)
Dept: ORTHOPEDIC SURGERY | Facility: CLINIC | Age: 79
End: 2024-03-11
Payer: MEDICARE

## 2024-03-11 DIAGNOSIS — M84.453A INSUFFICIENCY FRACTURE OF MEDIAL FEMORAL CONDYLE: ICD-10-CM

## 2024-03-11 DIAGNOSIS — M84.469G INSUFFICIENCY FRACTURE OF TIBIA WITH DELAYED HEALING, SUBSEQUENT ENCOUNTER: Primary | ICD-10-CM

## 2024-03-11 DIAGNOSIS — S83.249S: ICD-10-CM

## 2024-03-11 DIAGNOSIS — M17.12 ARTHRITIS OF LEFT KNEE: ICD-10-CM

## 2024-03-11 NOTE — PROGRESS NOTES
I called the patient and relayed the results above per the physician's interpretation. Patient verbalized understanding and did not have any further requests/ questions. No further action needed at this time. Thank you!     -Pelon AMOS/ISAIAS

## 2024-03-11 NOTE — TELEPHONE ENCOUNTER
SENDING FOR SCHEDULE REVIEW DUE TO DX - NEW PROBLEM - Insufficiency fracture of tibia with delayed healing, Insufficiency fracture of medial femoral condyle, Oth tear of medial mensc, current injury, Arthritis of left knee - PROG NOTE 3.5.24 - MRI IN MEDIA 3.5.24 - NO KNOWN SX     CAN RBB SEE

## 2024-03-11 NOTE — TELEPHONE ENCOUNTER
Can TJS se sooner than 3/18/24 (next open NEW PATIENT slot) due to urgent referral, or is one week okay to schedule?

## 2024-03-17 DIAGNOSIS — N40.0 BENIGN PROSTATIC HYPERPLASIA WITHOUT LOWER URINARY TRACT SYMPTOMS: ICD-10-CM

## 2024-03-17 DIAGNOSIS — I25.10 TWO-VESSEL CORONARY ARTERY DISEASE: ICD-10-CM

## 2024-03-17 DIAGNOSIS — E55.9 VITAMIN D DEFICIENCY: ICD-10-CM

## 2024-03-17 DIAGNOSIS — F11.20 OPIOID DEPENDENCE, UNCOMPLICATED: ICD-10-CM

## 2024-03-17 DIAGNOSIS — E53.8 B12 DEFICIENCY: ICD-10-CM

## 2024-03-17 DIAGNOSIS — E78.5 HYPERLIPIDEMIA, UNSPECIFIED HYPERLIPIDEMIA TYPE: ICD-10-CM

## 2024-03-17 DIAGNOSIS — E03.9 PRIMARY HYPOTHYROIDISM: ICD-10-CM

## 2024-03-17 DIAGNOSIS — I10 ESSENTIAL HYPERTENSION: Primary | ICD-10-CM

## 2024-03-17 DIAGNOSIS — F33.42 RECURRENT MAJOR DEPRESSIVE DISORDER, IN FULL REMISSION: ICD-10-CM

## 2024-03-17 DIAGNOSIS — K21.9 GASTROESOPHAGEAL REFLUX DISEASE WITHOUT ESOPHAGITIS: ICD-10-CM

## 2024-03-17 DIAGNOSIS — E11.42 TYPE 2 DIABETES MELLITUS WITH PERIPHERAL NEUROPATHY: ICD-10-CM

## 2024-03-18 ENCOUNTER — OFFICE VISIT (OUTPATIENT)
Dept: ORTHOPEDIC SURGERY | Facility: CLINIC | Age: 79
End: 2024-03-18
Payer: MEDICARE

## 2024-03-18 ENCOUNTER — REFERRAL TRIAGE (OUTPATIENT)
Dept: CASE MANAGEMENT | Facility: OTHER | Age: 79
End: 2024-03-18
Payer: MEDICARE

## 2024-03-18 VITALS — BODY MASS INDEX: 26.84 KG/M2 | WEIGHT: 187.5 LBS | HEIGHT: 70 IN | TEMPERATURE: 98.4 F

## 2024-03-18 DIAGNOSIS — R52 PAIN: ICD-10-CM

## 2024-03-18 DIAGNOSIS — M17.12 PRIMARY OSTEOARTHRITIS OF LEFT KNEE: Primary | ICD-10-CM

## 2024-03-18 PROCEDURE — 1159F MED LIST DOCD IN RCRD: CPT | Performed by: ORTHOPAEDIC SURGERY

## 2024-03-18 PROCEDURE — 99213 OFFICE O/P EST LOW 20 MIN: CPT | Performed by: ORTHOPAEDIC SURGERY

## 2024-03-18 PROCEDURE — 73564 X-RAY EXAM KNEE 4 OR MORE: CPT | Performed by: ORTHOPAEDIC SURGERY

## 2024-03-18 PROCEDURE — 1160F RVW MEDS BY RX/DR IN RCRD: CPT | Performed by: ORTHOPAEDIC SURGERY

## 2024-03-18 RX ORDER — CHLORHEXIDINE GLUCONATE 500 MG/1
CLOTH TOPICAL TAKE AS DIRECTED
OUTPATIENT
Start: 2024-03-18

## 2024-03-18 RX ORDER — ACETAMINOPHEN 10 MG/ML
1000 INJECTION, SOLUTION INTRAVENOUS ONCE
OUTPATIENT
Start: 2024-03-18 | End: 2024-03-18

## 2024-03-18 RX ORDER — MELOXICAM 7.5 MG/1
15 TABLET ORAL ONCE
OUTPATIENT
Start: 2024-03-18 | End: 2024-03-18

## 2024-03-18 RX ORDER — PREGABALIN 150 MG/1
150 CAPSULE ORAL ONCE
OUTPATIENT
Start: 2024-03-18 | End: 2024-03-18

## 2024-03-18 NOTE — PROGRESS NOTES
Patient ID: Ben Walsh     Chief Complaint:    Chief Complaint   Patient presents with    Left Knee - Pain, Initial Evaluation        HPI:    Ben Walsh is a 78 y.o. who presents today for evaluation of left knee pain.  Patient states has had issues for past year but worse for the past 4 weeks.  He has been seeing Dr. Nagy he did have a steroid injection 3 weeks ago which he stated helped for a few days.  Pain is mainly medially based radiates down his leg.  Does have some decreased range of motion.  He has tried over-the-counter medications as well as rest ice to no avail.  He did get a MRI is here to go over findings as his knee symptoms are decreasing his ability for normal daily activities and overall quality life.  He did have previous surgery by Benny for his arm he still in a brace hoping to start some therapy in the next week.    Patient has significant medical history for cardiac he sees cardiology, he is on Plavix, history of stents, diabetes last hemoglobin A1c he believes is 7.5.  He also has some upcoming dental work.    Social History     Socioeconomic History    Marital status:    Tobacco Use    Smoking status: Never    Smokeless tobacco: Never    Tobacco comments:     caffeine use iced tea all day long, 3 cups coffee daily   Vaping Use    Vaping status: Never Used   Substance and Sexual Activity    Alcohol use: No    Drug use: No    Sexual activity: Defer     Past Medical History:   Diagnosis Date    2-vessel coronary artery disease     Abnormal ECG     Abnormal liver function test     Acute pancreatitis     thought to be from the Januvia Rx, 2014    ADD (attention deficit disorder)     Allergic rhinitis     Atherosclerotic heart disease of native coronary artery without angina pectoris     Atrial fibrillation and flutter     Atypical chest pain     suspect this is not cardiac related given that is not exertional. howevr he is not very functional and this makes the  assessment somewhat difficult    Back pain     BPH (benign prostatic hypertrophy)     CAD (coronary artery disease)     Chronic pain     DDD (degenerative disc disease), lumbar     Depression     Diabetic eye exam 04/01/2017    Diverticulitis of colon     Diverticulitis of colon with hemorrhage     Diverticulosis of colon     SAEED (dyspnea on exertion)     Elevated PSA     Essential hypertension     GERD (gastroesophageal reflux disease)     Glaucoma     Dr. Art    Herpes zoster     HX    Hiatal hernia     History of transfusion     Hyperlipidemia     Hypertension     Hypothyroidism     Impacted cerumen     Incisional hernia     Insomnia     Joint pain     Low back pain     Microalbuminuria     Mitral regurgitation     Trace to mild    Muscle spasm     NAFLD (nonalcoholic fatty liver disease)     Numbness     SKYLAR (obstructive sleep apnea)     Osteoarthritis     Osteoarthritis, multiple sites     PFO (patent foramen ovale)     Primary snoring     Prostatic hyperplasia, benign localized, with obstruction     RCT (rotator cuff tear)     Rotator cuff tear     Tricuspid regurgitation     Trace to mild    Type 2 diabetes mellitus     Vitamin D deficiency      Family History   Problem Relation Age of Onset    Heart failure Mother     Hypertension Mother     Hyperlipidemia Mother     Cancer Mother     Heart attack Mother     Heart disease Mother     Stroke Mother     Alcohol abuse Father     Cancer Father         prostate    Heart failure Father     Prostate cancer Father     Heart disease Father     Stroke Father     Heart attack Father     Cancer Sister     Lung cancer Sister     Other Brother         demyelinating disease of central nervous system    Cancer Brother     Heart failure Brother     Lung cancer Brother     Heart disease Sister        ROS:    ROS:  Constitutional:  Denies fever, shaking or chills   Eyes:  Denies change in visual acuity   HENT:  Denies nasal congestion or sore throat   Respiratory:  Denies  "cough or shortness of breath   Cardiovascular:  Denies chest pain or edema   GI:  Denies abdominal pain, nausea, vomiting, bloody stools or diarrhea   Musculoskeletal:  Denies numbness tingling or loss of motor function except as outlined above in history of present illness.  Integument:  Denies rash, lesion or ulceration   Neurologic:  Denies headache or focal weakness  Lymphatics: No lymphadenopathy, no lymphedema      All other pertinent positives and negatives as noted above in HPI.    Physical Exam:     Vital Signs:  Temp 98.4 °F (36.9 °C) (Temporal)   Ht 177.8 cm (70\")   Wt 85 kg (187 lb 8 oz)   BMI 26.90 kg/m²   Constitutional: Awake alert and oriented x3, well developed, well nourished, no acute distress, non-toxic appearance.      Musculoskeletal:    Exam of the left  knee:  Painful gait with a subtle limp  No muscle atrophy, erythema, ecchymosis, or gross deformity noted  mild knee effusion  + medial> lateral joint line tenderness  Active range of motion 8-115  5/5 strength flexion and extension  The knee is stable to varus and valgus stress testing  Mild varus alignment of the limb  Lachman negative  Posterior drawer negative  Bronson's negative  Patellofemoral grind +  Sensation grossly intact to light tough throughout the lower extremity  Skin is intact  Distal pulses are 2+  No signs or symptoms of DVT        Bilateral Hip exam:  No atrophy, erythema, ecchymosis, or gross deformity noted  No tenderness to palpation  Slightly dimished active range of motion, mild lack of IR but nonpainful  5/5 strength in hip flexion, abduction, and adduction  Anterior, lateral, and posterior hip impingement tests negative  Stinchfield and straight leg raise negative  PHOENIX negative        Diagnostic Studies:     Imaging was personally and individually reviewed and discussed at length with the patient:    4V left knee(s) were taken in the office today, including AP, flexion PA, lateral, and sunrise views to evaluate " the patient's complaint:  Weight bearing views show moderate degenerative changes in all three compartments with the medial compartment being most affected.  There is early osteophyte formation throughout all three compartments.  There is no evidence of fracture or dislocation.  No periosteal reactions or medullary lesions are seen.  Patellar height and alignment are within normal limits.     Comparison films not available    AP pelvis was taken in the office today: Moderate degenerative changes bilateral hip joints Sharmaine bone sclerosis osteophyte formation.      MRI report was reviewed as well as the imaging.  Does show some insufficiency fractures the medial femoral condyle and tibial plateau with some soft tissue swelling and joint effusion.  Evidence of tricompartmental chondrosis most severe in the medial compartment with severe cartilage thinning.  Baker's cyst noted.            Assessment:     left  Knee Osteoarthritis            Plan:     Based on x-rays, history, and office evaluation, we have diagnosed Ben Walsh with knee arthritis.  Told the patient I feel that these insufficiency fractures as a result to the severe degenerative changes in the compartment of the knee.  He is tried and failed multiple insert treatments including rest, ice, anti-inflammatories, injection and therapy at home.  The symptoms continue to be quite limiting to him.  This time I feel that we should proceed with knee replacement surgery.  I discussed surgery in detail with the patient including use of a model as well as risk and benefits.  I did discuss risk and benefits with the patient with risk including but not limited to bleeding, infection, damage to nearby nerves, vessels, tendons, ligaments, continued pain, worsening pain, fracture, dislocation, leg length discrepancy, blood clots, even death with anesthesia and possible need for future procedures surgeries.  Patient understood this and has chosen to  proceed.      Patient will require cardiac clearance, dental clearance    Will need to be off his Plavix likely at least 5 days prior to surgery.    Hemoglobin A1c needs to be 7.5 or below.    Patient will have to wait at least until the end of May given recent injection however this timing could be altered with any dental work that may remain.      Patient will stay overnight.    Plan to proceed with left total knee replacement    All questions were answered, the patient understands and agrees with the plan.

## 2024-03-21 ENCOUNTER — PATIENT OUTREACH (OUTPATIENT)
Dept: CASE MANAGEMENT | Facility: OTHER | Age: 79
End: 2024-03-21
Payer: MEDICARE

## 2024-03-21 DIAGNOSIS — I25.10 ASCVD (ARTERIOSCLEROTIC CARDIOVASCULAR DISEASE): ICD-10-CM

## 2024-03-21 DIAGNOSIS — R80.9 TYPE 2 DIABETES MELLITUS WITH MICROALBUMINURIA, WITHOUT LONG-TERM CURRENT USE OF INSULIN: Primary | ICD-10-CM

## 2024-03-21 DIAGNOSIS — E11.29 TYPE 2 DIABETES MELLITUS WITH MICROALBUMINURIA, WITHOUT LONG-TERM CURRENT USE OF INSULIN: Primary | ICD-10-CM

## 2024-03-21 NOTE — OUTREACH NOTE
AMBULATORY CASE MANAGEMENT NOTE    Name and Relationship of Patient/Support Person: Ben Walsh - Self    CCM Interim Update    Call to patient, verified by name and date of birth.    States that he just got home and would like to call Case Management back.          ACM completed thorough chart review prior to outreach.  Introduced self and purpose, discussed CCM program and benefits.        Education Documentation  No documentation found.        Roxana KO  Ambulatory Case Management    3/21/2024, 14:19 EDT

## 2024-03-22 ENCOUNTER — TELEPHONE (OUTPATIENT)
Dept: ORTHOPEDIC SURGERY | Facility: CLINIC | Age: 79
End: 2024-03-22
Payer: MEDICARE

## 2024-03-22 NOTE — TELEPHONE ENCOUNTER
Patient called today to let us know that his dental implant is scheduled to be put in on April 15.  He is also having a small skin cancer lesion removed from his ear on April 23.  Have advised the patient that he would need to wait at least a month after his ear surgery to proceed with surgery.  Patient's last cortisone injection was done in January 2024.  Patient is on Plavix and will need to be off his Plavix 5 days prior to surgery.  Will get in touch with his PCP to make sure he is okay to stop the Plavix.  Will tentatively plan to schedule him for May 21.

## 2024-03-24 DIAGNOSIS — M62.838 MUSCLE SPASM: ICD-10-CM

## 2024-03-24 DIAGNOSIS — E11.42 TYPE 2 DIABETES MELLITUS WITH PERIPHERAL NEUROPATHY: ICD-10-CM

## 2024-03-24 RX ORDER — INSULIN DEGLUDEC 200 U/ML
INJECTION, SOLUTION SUBCUTANEOUS
Qty: 9 ML | Refills: 11 | Status: SHIPPED | OUTPATIENT
Start: 2024-03-24

## 2024-03-24 RX ORDER — CYCLOBENZAPRINE HCL 10 MG
10 TABLET ORAL 3 TIMES DAILY PRN
Qty: 90 TABLET | Refills: 5 | Status: SHIPPED | OUTPATIENT
Start: 2024-03-24

## 2024-03-27 ENCOUNTER — TELEPHONE (OUTPATIENT)
Dept: CARDIOLOGY | Facility: CLINIC | Age: 79
End: 2024-03-27
Payer: MEDICARE

## 2024-03-27 NOTE — TELEPHONE ENCOUNTER
Pt needs clearance for a total knee arthroplasty on 05/21/24.  They would like him to hold Plavix 5 days prior.  LOV 01/18/24, NOV 01/21/25    Fax to: Dr. Dorado  [phone]    Thanks,  Leyla

## 2024-04-09 DIAGNOSIS — E11.42 TYPE 2 DIABETES MELLITUS WITH PERIPHERAL NEUROPATHY: ICD-10-CM

## 2024-04-09 RX ORDER — METFORMIN HYDROCHLORIDE 500 MG/1
1500 TABLET, EXTENDED RELEASE ORAL DAILY
Qty: 360 TABLET | Refills: 1 | Status: SHIPPED | OUTPATIENT
Start: 2024-04-09

## 2024-05-07 ENCOUNTER — OFFICE VISIT (OUTPATIENT)
Dept: FAMILY MEDICINE CLINIC | Facility: CLINIC | Age: 79
End: 2024-05-07
Payer: MEDICARE

## 2024-05-07 VITALS
HEIGHT: 70 IN | RESPIRATION RATE: 16 BRPM | HEART RATE: 84 BPM | OXYGEN SATURATION: 97 % | DIASTOLIC BLOOD PRESSURE: 75 MMHG | BODY MASS INDEX: 26.05 KG/M2 | SYSTOLIC BLOOD PRESSURE: 121 MMHG | WEIGHT: 182 LBS | TEMPERATURE: 98.2 F

## 2024-05-07 DIAGNOSIS — F51.01 PRIMARY INSOMNIA: Primary | Chronic | ICD-10-CM

## 2024-05-07 PROCEDURE — 3078F DIAST BP <80 MM HG: CPT | Performed by: FAMILY MEDICINE

## 2024-05-07 PROCEDURE — 3074F SYST BP LT 130 MM HG: CPT | Performed by: FAMILY MEDICINE

## 2024-05-07 PROCEDURE — 1159F MED LIST DOCD IN RCRD: CPT | Performed by: FAMILY MEDICINE

## 2024-05-07 PROCEDURE — 1160F RVW MEDS BY RX/DR IN RCRD: CPT | Performed by: FAMILY MEDICINE

## 2024-05-07 PROCEDURE — 99213 OFFICE O/P EST LOW 20 MIN: CPT | Performed by: FAMILY MEDICINE

## 2024-05-07 PROCEDURE — 1125F AMNT PAIN NOTED PAIN PRSNT: CPT | Performed by: FAMILY MEDICINE

## 2024-05-07 RX ORDER — ZOLPIDEM TARTRATE 10 MG/1
10 TABLET ORAL NIGHTLY PRN
Qty: 30 TABLET | Refills: 2 | Status: SHIPPED | OUTPATIENT
Start: 2024-05-07

## 2024-05-07 RX ORDER — TRAZODONE HYDROCHLORIDE 50 MG/1
50-100 TABLET ORAL NIGHTLY
Qty: 180 TABLET | Refills: 1 | Status: SHIPPED | OUTPATIENT
Start: 2024-05-07

## 2024-05-11 DIAGNOSIS — F51.01 PRIMARY INSOMNIA: Chronic | ICD-10-CM

## 2024-05-12 RX ORDER — ZOLPIDEM TARTRATE 10 MG/1
10 TABLET ORAL NIGHTLY PRN
Qty: 30 TABLET | OUTPATIENT
Start: 2024-05-12

## 2024-05-12 RX ORDER — TRAZODONE HYDROCHLORIDE 50 MG/1
50-100 TABLET ORAL NIGHTLY
Qty: 60 TABLET | OUTPATIENT
Start: 2024-05-12

## 2024-05-13 ENCOUNTER — PRE-ADMISSION TESTING (OUTPATIENT)
Dept: PREADMISSION TESTING | Facility: HOSPITAL | Age: 79
End: 2024-05-13
Payer: MEDICARE

## 2024-05-13 VITALS
TEMPERATURE: 96.9 F | HEIGHT: 67 IN | DIASTOLIC BLOOD PRESSURE: 79 MMHG | HEART RATE: 92 BPM | BODY MASS INDEX: 28.11 KG/M2 | RESPIRATION RATE: 16 BRPM | WEIGHT: 179.1 LBS | SYSTOLIC BLOOD PRESSURE: 130 MMHG | OXYGEN SATURATION: 97 %

## 2024-05-13 DIAGNOSIS — M17.12 PRIMARY OSTEOARTHRITIS OF LEFT KNEE: ICD-10-CM

## 2024-05-13 LAB
ABO GROUP BLD: NORMAL
ANION GAP SERPL CALCULATED.3IONS-SCNC: 11 MMOL/L (ref 5–15)
BLD GP AB SCN SERPL QL: NEGATIVE
BUN SERPL-MCNC: 7 MG/DL (ref 8–23)
BUN/CREAT SERPL: 7.3 (ref 7–25)
CALCIUM SPEC-SCNC: 9.3 MG/DL (ref 8.6–10.5)
CHLORIDE SERPL-SCNC: 107 MMOL/L (ref 98–107)
CO2 SERPL-SCNC: 24 MMOL/L (ref 22–29)
CREAT SERPL-MCNC: 0.96 MG/DL (ref 0.76–1.27)
DEPRECATED RDW RBC AUTO: 41.5 FL (ref 37–54)
EGFRCR SERPLBLD CKD-EPI 2021: 79.9 ML/MIN/1.73
ERYTHROCYTE [DISTWIDTH] IN BLOOD BY AUTOMATED COUNT: 11.8 % (ref 12.3–15.4)
GLUCOSE SERPL-MCNC: 154 MG/DL (ref 65–99)
HBA1C MFR BLD: 6.6 % (ref 4.8–5.6)
HCT VFR BLD AUTO: 37.4 % (ref 37.5–51)
HGB BLD-MCNC: 12 G/DL (ref 13–17.7)
MCH RBC QN AUTO: 30.5 PG (ref 26.6–33)
MCHC RBC AUTO-ENTMCNC: 32.1 G/DL (ref 31.5–35.7)
MCV RBC AUTO: 95.2 FL (ref 79–97)
PLATELET # BLD AUTO: 291 10*3/MM3 (ref 140–450)
PMV BLD AUTO: 9.8 FL (ref 6–12)
POTASSIUM SERPL-SCNC: 4.4 MMOL/L (ref 3.5–5.2)
RBC # BLD AUTO: 3.93 10*6/MM3 (ref 4.14–5.8)
RH BLD: POSITIVE
SODIUM SERPL-SCNC: 142 MMOL/L (ref 136–145)
T&S EXPIRATION DATE: NORMAL
WBC NRBC COR # BLD AUTO: 5.77 10*3/MM3 (ref 3.4–10.8)

## 2024-05-13 PROCEDURE — 86850 RBC ANTIBODY SCREEN: CPT

## 2024-05-13 PROCEDURE — 85027 COMPLETE CBC AUTOMATED: CPT

## 2024-05-13 PROCEDURE — 36415 COLL VENOUS BLD VENIPUNCTURE: CPT

## 2024-05-13 PROCEDURE — 86900 BLOOD TYPING SEROLOGIC ABO: CPT

## 2024-05-13 PROCEDURE — 83036 HEMOGLOBIN GLYCOSYLATED A1C: CPT

## 2024-05-13 PROCEDURE — 86901 BLOOD TYPING SEROLOGIC RH(D): CPT

## 2024-05-13 PROCEDURE — 80048 BASIC METABOLIC PNL TOTAL CA: CPT

## 2024-05-13 NOTE — DISCHARGE INSTRUCTIONS
Take the following medications the morning of surgery:    TAKE AMLODIPINE, NEXIUM, GABAPENTIN, FLONASE, SYNTHROID, FLOMAX    If you are on prescription narcotic pain medication to control your pain you may also take that medication the morning of surgery.    General Instructions:  Do not eat solid food after midnight the night before surgery.  You may drink clear liquids day of surgery but must stop at least one hour before your hospital arrival time.  It is beneficial for you to have a clear drink that contains carbohydrates the day of surgery.  We suggest a 12 to 20 ounce bottle of G2 or Powerade Zero for diabetic patients.     Clear liquids are liquids you can see through.  Nothing red in color.     Plain water                               Sports drinks  Sodas                                   Gelatin (Jell-O)  Fruit juices without pulp such as white grape juice and apple juice  Popsicles that contain no fruit or yogurt  Tea or coffee (no cream or milk added)  Gatorade / Powerade  G2 / Powerade Zero      Patients who avoid smoking, chewing tobacco and alcohol for 4 weeks prior to surgery have a reduced risk of post-operative complications.  Quit smoking as many days before surgery as you can.  Do not smoke, use chewing tobacco or drink alcohol the day of surgery.   Bring any papers given to you in the doctor’s office.  Wear clean comfortable clothes.  Do not wear contact lenses, false eyelashes or make-up.  Bring a case for your glasses.   Remove all piercings.  Leave jewelry and any other valuables at home.  The Pre-Admission Testing nurse will instruct you to bring medications if unable to obtain an accurate list in Pre-Admission Testing.    REPORT TO SURGERY ENTRANCE ON 5-21-24        CHLORHEXIDINE CLOTH INSTRUCTIONS  The morning of surgery follow these instructions using the Chlorhexidine cloths you've been given.  These steps reduce bacteria on the body.  Do not use the cloths near your eyes, ears mouth,  genitalia or on open wounds.  Throw the cloths away after use but do not try to flush them down a toilet.      Open and remove one cloth at a time from the package.    Leave the cloth unfolded and begin the bathing.  Massage the skin with the cloths using gentle pressure to remove bacteria.  Do not scrub harshly.   Follow the steps below with one 2% CHG cloth per area (6 total cloths).  One cloth for neck, shoulders and chest.  One cloth for both arms, hands, fingers and underarms (do underarms last).  One cloth for the abdomen followed by groin.  One cloth for right leg and foot including between the toes.  One cloth for left leg and foot including between the toes.  The last cloth is to be used for the back of the neck, back and buttocks.    Allow the CHG to air dry 3 minutes on the skin which will give it time to work and decrease the chance of irritation.  The skin may feel sticky until it is dry.  Do not rinse with water or any other liquid or you will lose the beneficial effects of the CHG.  If mild skin irritation occurs, do rinse the skin to remove the CHG.  Report this to the nurse at time of admission.  Do not apply lotions, creams, ointments, deodorants or perfumes after using the clothes. Dress in clean clothes before coming to the hospital.      If you were given a blood bank ID arm band remember to bring it with you the day of surgery.    Preventing a Surgical Site Infection:  For 2 to 3 days before surgery, avoid shaving with a razor because the razor can irritate skin and make it easier to develop an infection.    Any areas of open skin can increase the risk of a post-operative wound infection by allowing bacteria to enter and travel throughout the body.  Notify your surgeon if you have any skin wounds / rashes even if it is not near the expected surgical site.  The area will need assessed to determine if surgery should be delayed until it is healed.  The night prior to surgery shower using a fresh  bar of anti-bacterial soap (such as Dial) and clean washcloth.  Sleep in a clean bed with clean clothing.  Do not allow pets to sleep with you.  Shower on the morning of surgery using a fresh bar of anti-bacterial soap (such as Dial) and clean washcloth.  Dry with a clean towel and dress in clean clothing.  Ask your surgeon if you will be receiving antibiotics prior to surgery.  Make sure you, your family, and all healthcare providers clean their hands with soap and water or an alcohol based hand  before caring for you or your wound.    Day of surgery:  Your arrival time is approximately two hours before your scheduled surgery time.  Upon arrival, a Pre-op nurse and Anesthesiologist will review your health history, obtain vital signs, and answer questions you may have.  The only belongings needed at this time will be a list of your home medications and if applicable your C-PAP/BI-PAP machine.  A Pre-op nurse will start an IV and you may receive medication in preparation for surgery, including something to help you relax.     Please be aware that surgery does come with discomfort.  We want to make every effort to control your discomfort so please discuss any uncontrolled symptoms with your nurse.   Your doctor will most likely have prescribed pain medications.      If you are going home after surgery you will receive individualized written care instructions before being discharged.  A responsible adult must drive you to and from the hospital on the day of your surgery and ideally stay with you through the night.   .  Discharge prescriptions can be filled by the hospital pharmacy during regular pharmacy hours.  If you are having surgery late in the day/evening your prescription may be e-prescribed to your pharmacy.  Please verify your pharmacy hours or chose a 24 hour pharmacy to avoid not having access to your prescription because your pharmacy has closed for the day.    If you are staying overnight  following surgery, you will be transported to your hospital room following the recovery period.  [facility] has all private rooms.    If you have any questions please call Pre-Admission Testing at [phone].  Deductibles and co-payments are collected on the day of service. Please be prepared to pay the required co-pay, deductible or deposit on the day of service as defined by your plan.    Call your surgeon immediately if you experience any of the following symptoms:  Sore Throat  Shortness of Breath or difficulty breathing  Cough  Chills  Body soreness or muscle pain  Headache  Fever  New loss of taste or smell  Do not arrive for your surgery ill.  Your procedure will need to be rescheduled to another time.  You will need to call your physician before the day of surgery to avoid any unnecessary exposure to hospital staff as well as other patients.

## 2024-05-16 ENCOUNTER — TELEPHONE (OUTPATIENT)
Dept: ORTHOPEDIC SURGERY | Facility: CLINIC | Age: 79
End: 2024-05-16
Payer: MEDICARE

## 2024-05-16 NOTE — TELEPHONE ENCOUNTER
Call returned to the patient.  Have advised him that Dr. Barron is planning to keep him overnight.  He can discuss it with him more in detail at the time of his H&P visit next week

## 2024-05-16 NOTE — TELEPHONE ENCOUNTER
Caller: Ben Licea    Relationship: Self    Best call back number:     Who are you requesting to speak with (clinical staff, provider,  specific staff member): SX     What was the call regarding: MR LICEA IS SCHEDULED FOR SX ON 5/21/24. HE WOULD LIKE TO KNOW IF HE WILL BE STAYING THE NIGHT IN THE HOSPITAL    Is it okay if the provider responds through MyChart: CALL

## 2024-05-20 ENCOUNTER — OFFICE VISIT (OUTPATIENT)
Dept: ORTHOPEDIC SURGERY | Facility: CLINIC | Age: 79
End: 2024-05-20
Payer: MEDICARE

## 2024-05-20 VITALS — WEIGHT: 180.8 LBS | HEIGHT: 70 IN | BODY MASS INDEX: 25.88 KG/M2 | TEMPERATURE: 97.7 F

## 2024-05-20 DIAGNOSIS — M17.12 PRIMARY OSTEOARTHRITIS OF LEFT KNEE: Primary | ICD-10-CM

## 2024-05-20 DIAGNOSIS — R52 PAIN: ICD-10-CM

## 2024-05-20 PROCEDURE — 1159F MED LIST DOCD IN RCRD: CPT | Performed by: ORTHOPAEDIC SURGERY

## 2024-05-20 PROCEDURE — S0260 H&P FOR SURGERY: HCPCS | Performed by: ORTHOPAEDIC SURGERY

## 2024-05-20 PROCEDURE — 77077 JOINT SURVEY SINGLE VIEW: CPT | Performed by: ORTHOPAEDIC SURGERY

## 2024-05-20 PROCEDURE — 1160F RVW MEDS BY RX/DR IN RCRD: CPT | Performed by: ORTHOPAEDIC SURGERY

## 2024-05-20 NOTE — PROGRESS NOTES
Patient: Ben Walsh    Date of Admission: 5/21/2024    YOB: 1945    Medical Record Number: [id number]    Admitting Physician: Dr. Jairo Dorado    Reason for Admission: End Stage Left Knee OA    History of Present Illness: 78 y.o. male presents with severe end stage knee osteoarthritis which has not been responsive to the full compliment of conservative measures. Despite conservative attempts, there is still severe, constant activity limiting pain. Given the severity of the pain, the patient has elected to proceed with knee replacement.    Allergies:   Allergies   Allergen Reactions    Onglyza [Saxagliptin] Other (See Comments)     pancreatitis    Trulicity [Dulaglutide] Unknown - Low Severity     History Pancreatitis    Other Other (See Comments)     PLASTIC TAPE CAUSES RASH    Vibramycin [Doxycycline] Nausea And Vomiting         Current Medications:  Home Medications:    Current Outpatient Medications on File Prior to Visit   Medication Sig    Accu-Chek Guide test strip Dx code E11.29 testing bs 3 x day    amLODIPine (NORVASC) 5 MG tablet Take 1 tablet by mouth Daily.    atorvastatin (LIPITOR) 40 MG tablet Take 1 tablet by mouth Every Night. For cholesterol    cephalexin (KEFLEX) 500 MG capsule Take 1 capsule by mouth. PRIOR TO DENTAL PROCEDURES    Cholecalciferol (Vitamin D3) 1.25 MG (66360 UT) capsule Take 1 capsule by mouth Every 7 (Seven) Days. (Patient taking differently: Take 1 capsule by mouth Every 7 (Seven) Days. TAKES ON SAT)    clobetasol (TEMOVATE) 0.05 % external solution 1 Application As Needed.    clopidogrel (PLAVIX) 75 MG tablet Take 1 tablet by mouth Daily. (Patient taking differently: Take 1 tablet by mouth Daily. OK TO STOP 5 DAYS BEFORE SURGERY PER CARDIOLOGIST)    Continuous Blood Gluc  (FreeStyle Carter 3 [place]) device Use 1 each Daily.    Continuous Blood Gluc Sensor (FreeStyle Carter 3 Sensor) misc Use 1 each Every 14 (Fourteen) Days.    cyclobenzaprine  (FLEXERIL) 10 MG tablet TAKE ONE TABLET BY MOUTH THREE TIMES A DAY AS NEEDED FOR MUSCLE SPASMS    Diclofenac Sodium 4 %, Topiramate 2 %, cloNIDine HCl 0.2 %, Lidocaine HCl 5 % Apply 1-2 g topically to the appropriate area as directed 3 (Three) to 4 (Four) times daily.    escitalopram (LEXAPRO) 20 MG tablet Take 1 tablet by mouth Daily.    esomeprazole (NexIUM) 20 MG capsule Take 1 capsule by mouth Every Morning Before Breakfast.    fenofibrate (TRICOR) 145 MG tablet Take 1 tablet by mouth Daily.    fluticasone (FLONASE) 50 MCG/ACT nasal spray 1 spray by Each Nare route Every Evening.    gabapentin (NEURONTIN) 800 MG tablet Take 1 tablet by mouth 3 (Three) Times a Day.    glipizide (Glucotrol) 5 MG tablet Take 1 tablet by mouth Daily.    HYDROcodone-acetaminophen (NORCO)  MG per tablet Take 1 tablet by mouth Every 8 (Eight) Hours As Needed.    Insulin Degludec (Tresiba FlexTouch) 200 UNIT/ML solution pen-injector pen injection INJECT 22 UNITS EVERY MORNING (Patient taking differently: 22 Units Every Morning. INJECT 22 UNITS EVERY MORNING)    Insulin Pen Needle (B-D ULTRAFINE III SHORT PEN) 31G X 8 MM misc 1 each Daily.    Lancets (ACCU-CHEK SOFT TOUCH) lancets Dx code E11.29 testing bs 3 x day    levothyroxine (SYNTHROID, LEVOTHROID) 50 MCG tablet Take 1 tablet by mouth Daily. for thyroid    Loratadine (Claritin) 10 MG capsule Take 1 capsule by mouth As Needed.    metFORMIN ER (GLUCOPHAGE-XR) 500 MG 24 hr tablet Take 3 tablets by mouth Daily. With food for DMII    mupirocin (BACTROBAN) 2 % ointment Apply 1 application  topically to the appropriate area as directed 3 (Three) Times a Day. (Patient taking differently: Apply 1 Application topically to the appropriate area as directed As Needed.)    NITROSTAT 0.4 MG SL tablet Place 1 tablet under the tongue Every 5 (Five) Minutes As Needed.    tamsulosin (FLOMAX) 0.4 MG capsule 24 hr capsule Take 1 capsule by mouth Daily.    traZODone (DESYREL) 50 MG tablet Take 1-2  tablets by mouth Every Night.    valsartan (DIOVAN) 160 MG tablet TAKE 1 TABLET BY MOUTH DAILY    zolpidem (AMBIEN) 10 MG tablet Take 1 tablet by mouth At Night As Needed for Sleep.     No current facility-administered medications on file prior to visit.     PRN Meds:.    PMH:     Past Medical History:   Diagnosis Date    2-vessel coronary artery disease     Abnormal ECG     Abnormal liver function test     Acute pancreatitis     thought to be from the uvia Rx,     ADD (attention deficit disorder)     Allergic rhinitis     Atherosclerotic heart disease of native coronary artery without angina pectoris     Atrial fibrillation and flutter     Atypical chest pain     suspect this is not cardiac related given that is not exertional. howevr he is not very functional and this makes the assessment somewhat difficult    Back pain     BPH (benign prostatic hypertrophy)     CAD (coronary artery disease)     Cancer 2024    REMOVED BASAL CELL FROM LT AER    Chronic pain     DDD (degenerative disc disease), lumbar     Depression     Diabetes     TYPE 2    Diabetic eye exam 2017    Diverticulitis of colon     Diverticulitis of colon with hemorrhage     Diverticulosis of colon     ASEED (dyspnea on exertion)     Elevated PSA     Essential hypertension     GERD (gastroesophageal reflux disease)     Glaucoma     Dr. Art    Grief     WIFE  2023 FROM [place]    Herpes zoster     HX    Hiatal hernia     History of transfusion     Hyperlipidemia     Hypertension     Hypothyroidism     Impacted cerumen     Incisional hernia     Insomnia     Joint pain     Knee pain     BOY    Low back pain     Microalbuminuria     Mitral regurgitation     Trace to mild    Muscle spasm     NAFLD (nonalcoholic fatty liver disease)     Numbness     SKYLAR (obstructive sleep apnea)     Osteoarthritis     Osteoarthritis, multiple sites     PFO (patent foramen ovale)     Primary snoring     Prostatic hyperplasia, benign localized,  with obstruction     RCT (rotator cuff tear)     Rotator cuff tear     Tricuspid regurgitation     Trace to mild    Type 2 diabetes mellitus     Vitamin D deficiency        PF/Surg/Soc Hx:     Past Surgical History:   Procedure Laterality Date    BACK SURGERY      CARDIAC CATHETERIZATION      CARDIAC CATHETERIZATION N/A 03/11/2016    Procedure: Coronary angiography;  Surgeon: Anastacio Flores MD;  Location: [facility] CATH INVASIVE LOCATION;  Service:     CATARACT EXTRACTION Left 03/2014    CERVICAL SPINE SURGERY      COLON RESECTION      COLONOSCOPY N/A 03/06/2017    Procedure: COLONOSCOPY into cecum;  Surgeon: Mynor Wynne MD;  Location: [facility] ENDOSCOPY;  Service:     CORONARY ANGIOPLASTY WITH STENT PLACEMENT  07/2014    ENDOSCOPY      EPIDURAL BLOCK      EYE SURGERY Right 12/2011    Dr. River Art; had bilat YAG peripheral iridotomy for glaucoma    HAND SURGERY Right 02/2024    HERNIA REPAIR  03/2009    also 4/09; Dr. Carson; incisional hernias    LUMBAR DISC SURGERY      LUMBAR FUSION      LUMBAR LAMINECTOMY      PROSTATE BIOPSY      SHOULDER ARTHROSCOPY W/ ROTATOR CUFF REPAIR Left 08/31/2016    Procedure: LEFT SHOULDER ARTHROSCOPY WITH ROTATOR CUFF REPAIR,  ACROMIOPLASTY, AND DEBRIDMENT OF LABRUM;  Surgeon: Guerrero Kruse MD;  [place] [facility] [place] [facility] [place]  Service:     SHOULDER SURGERY Left     SPINE SURGERY      lumbrosacral        Social History     Occupational History    Not on file   Tobacco Use    Smoking status: Never    Smokeless tobacco: Never    Tobacco comments:     caffeine use iced tea all day long, 3 cups coffee daily   Vaping Use    Vaping status: Never Used   Substance and Sexual Activity    Alcohol use: No    Drug use: No    Sexual activity: Defer      Social History     Social History Narrative    Not on file        Family History   Problem Relation Age of Onset    Heart failure Mother     Hypertension Mother     Hyperlipidemia Mother     Cancer Mother     Heart attack Mother     Heart  "disease Mother     Stroke Mother     Alcohol abuse Father     Cancer Father         prostate    Heart failure Father     Prostate cancer Father     Heart disease Father     Stroke Father     Heart attack Father     Cancer Sister     Lung cancer Sister     Heart disease Sister     Other Brother         demyelinating disease of central nervous system    Cancer Brother     Heart failure Brother     Lung cancer Brother     Malig Hyperthermia Neg Hx          Review of Systems:   A 14 point review of systems was performed, pertinent positives discussed above, all other systems are negative    Physical Exam: 78 y.o. male  Vital Signs :   Vitals:    05/20/24 1341   Temp: 97.7 °F (36.5 °C)   TempSrc: Temporal   Weight: 82 kg (180 lb 12.8 oz)   Height: 177.8 cm (70\")   PainSc:   3   PainLoc: Knee     General: Alert and Oriented x 3, No acute distress.  Psych: mood and affect appropriate; recent and remote memory intact  Eyes: conjunctiva clear; pupils equally round and reactive, sclera nonicteric  CV: no peripheral edema  Resp: normal respiratory effort  Skin: no rashes or wounds; normal turgor  Musculosketetal; pain and crepitance with knee range of motion  Vascular: palpable distal pulses    Xrays:  -4 views (AP, lateral, and sunrise) were reviewed demonstrating end-stage OA with bone on bone articulation.  -A full length AP xray was ordered today for purposes of operative alignment demonstrating end stage arthritic findings. There are no previous full length films for review    Assessment:  End-stage Left knee osteoarthritis. Conservative measures have failed.      Plan:  The plan is to proceed with Left Total Knee Replacement. The patient voiced understanding of the risks, benefits, and alternative forms of treatment that were discussed with Dr Dorado at the time of scheduling.  Patient states he will do the joint class today.  Labs reviewed.  He is got dental and cardiac clearance.  He will stop his Plavix 5 days prior " to surgery.      Plan for Plavix and a daily aspirin after surgery for DVT prophylaxis.      Patient will stay for 23 observation.            Jairo Dorado MD  5/20/2024

## 2024-05-20 NOTE — H&P
Patient: Ben Walsh    Date of Admission: 5/21/2024    YOB: 1945    Medical Record Number: [id number]    Admitting Physician: Dr. Jairo Dorado    Reason for Admission: End Stage Left Knee OA    History of Present Illness: 78 y.o. male presents with severe end stage knee osteoarthritis which has not been responsive to the full compliment of conservative measures. Despite conservative attempts, there is still severe, constant activity limiting pain. Given the severity of the pain, the patient has elected to proceed with knee replacement.    Allergies:   Allergies   Allergen Reactions    Onglyza [Saxagliptin] Other (See Comments)     pancreatitis    Trulicity [Dulaglutide] Unknown - Low Severity     History Pancreatitis    Other Other (See Comments)     PLASTIC TAPE CAUSES RASH    Vibramycin [Doxycycline] Nausea And Vomiting         Current Medications:  Home Medications:    No current facility-administered medications on file prior to encounter.     Current Outpatient Medications on File Prior to Encounter   Medication Sig    Accu-Chek Guide test strip Dx code E11.29 testing bs 3 x day    amLODIPine (NORVASC) 5 MG tablet Take 1 tablet by mouth Daily.    atorvastatin (LIPITOR) 40 MG tablet Take 1 tablet by mouth Every Night. For cholesterol    cephalexin (KEFLEX) 500 MG capsule Take 1 capsule by mouth. PRIOR TO DENTAL PROCEDURES    Cholecalciferol (Vitamin D3) 1.25 MG (49872 UT) capsule Take 1 capsule by mouth Every 7 (Seven) Days. (Patient taking differently: Take 1 capsule by mouth Every 7 (Seven) Days. TAKES ON SAT)    clobetasol (TEMOVATE) 0.05 % external solution 1 Application As Needed.    clopidogrel (PLAVIX) 75 MG tablet Take 1 tablet by mouth Daily. (Patient taking differently: Take 1 tablet by mouth Daily. OK TO STOP 5 DAYS BEFORE SURGERY PER CARDIOLOGIST)    Continuous Blood Gluc  (FreeStyle Carter 3 [place]) device Use 1 each Daily.    Continuous Blood Gluc Sensor (FreeStyle  Carter 3 Sensor) misc Use 1 each Every 14 (Fourteen) Days.    cyclobenzaprine (FLEXERIL) 10 MG tablet TAKE ONE TABLET BY MOUTH THREE TIMES A DAY AS NEEDED FOR MUSCLE SPASMS    Diclofenac Sodium 4 %, Topiramate 2 %, cloNIDine HCl 0.2 %, Lidocaine HCl 5 % Apply 1-2 g topically to the appropriate area as directed 3 (Three) to 4 (Four) times daily.    escitalopram (LEXAPRO) 20 MG tablet Take 1 tablet by mouth Daily.    esomeprazole (NexIUM) 20 MG capsule Take 1 capsule by mouth Every Morning Before Breakfast.    fenofibrate (TRICOR) 145 MG tablet Take 1 tablet by mouth Daily.    fluticasone (FLONASE) 50 MCG/ACT nasal spray 1 spray by Each Nare route Every Evening.    gabapentin (NEURONTIN) 800 MG tablet Take 1 tablet by mouth 3 (Three) Times a Day.    glipizide (Glucotrol) 5 MG tablet Take 1 tablet by mouth Daily.    HYDROcodone-acetaminophen (NORCO)  MG per tablet Take 1 tablet by mouth Every 8 (Eight) Hours As Needed.    Insulin Degludec (Tresiba FlexTouch) 200 UNIT/ML solution pen-injector pen injection INJECT 22 UNITS EVERY MORNING (Patient taking differently: 22 Units Every Morning. INJECT 22 UNITS EVERY MORNING)    Insulin Pen Needle (B-D ULTRAFINE III SHORT PEN) 31G X 8 MM misc 1 each Daily.    Lancets (ACCU-CHEK SOFT TOUCH) lancets Dx code E11.29 testing bs 3 x day    levothyroxine (SYNTHROID, LEVOTHROID) 50 MCG tablet Take 1 tablet by mouth Daily. for thyroid    Loratadine (Claritin) 10 MG capsule Take 1 capsule by mouth As Needed.    mupirocin (BACTROBAN) 2 % ointment Apply 1 application  topically to the appropriate area as directed 3 (Three) Times a Day. (Patient taking differently: Apply 1 Application topically to the appropriate area as directed As Needed.)    NITROSTAT 0.4 MG SL tablet Place 1 tablet under the tongue Every 5 (Five) Minutes As Needed.    tamsulosin (FLOMAX) 0.4 MG capsule 24 hr capsule Take 1 capsule by mouth Daily.    valsartan (DIOVAN) 160 MG tablet TAKE 1 TABLET BY MOUTH DAILY      PRN Meds:.    PMH:     Past Medical History:   Diagnosis Date    2-vessel coronary artery disease     Abnormal ECG     Abnormal liver function test     Acute pancreatitis     thought to be from the uvia Rx,     ADD (attention deficit disorder)     Allergic rhinitis     Atherosclerotic heart disease of native coronary artery without angina pectoris     Atrial fibrillation and flutter     Atypical chest pain     suspect this is not cardiac related given that is not exertional. howevr he is not very functional and this makes the assessment somewhat difficult    Back pain     BPH (benign prostatic hypertrophy)     CAD (coronary artery disease)     Cancer 2024    REMOVED BASAL CELL FROM LT AER    Chronic pain     DDD (degenerative disc disease), lumbar     Depression     Diabetes     TYPE 2    Diabetic eye exam 2017    Diverticulitis of colon     Diverticulitis of colon with hemorrhage     Diverticulosis of colon     SAEED (dyspnea on exertion)     Elevated PSA     Essential hypertension     GERD (gastroesophageal reflux disease)     Glaucoma     Dr. Art    Grief     WIFE  2023 FROM [place]    Herpes zoster     HX    Hiatal hernia     History of transfusion     Hyperlipidemia     Hypertension     Hypothyroidism     Impacted cerumen     Incisional hernia     Insomnia     Joint pain     Knee pain     BOY    Low back pain     Microalbuminuria     Mitral regurgitation     Trace to mild    Muscle spasm     NAFLD (nonalcoholic fatty liver disease)     Numbness     SKYLAR (obstructive sleep apnea)     Osteoarthritis     Osteoarthritis, multiple sites     PFO (patent foramen ovale)     Primary snoring     Prostatic hyperplasia, benign localized, with obstruction     RCT (rotator cuff tear)     Rotator cuff tear     Tricuspid regurgitation     Trace to mild    Type 2 diabetes mellitus     Vitamin D deficiency        PF/Surg/Soc Hx:     Past Surgical History:   Procedure Laterality Date    BACK  SURGERY      CARDIAC CATHETERIZATION      CARDIAC CATHETERIZATION N/A 03/11/2016    Procedure: Coronary angiography;  Surgeon: Anastacio Flores MD;  Location: [facility] CATH INVASIVE LOCATION;  Service:     CATARACT EXTRACTION Left 03/2014    CERVICAL SPINE SURGERY      COLON RESECTION      COLONOSCOPY N/A 03/06/2017    Procedure: COLONOSCOPY into cecum;  Surgeon: Mynor Wynne MD;  Location: [facility] ENDOSCOPY;  Service:     CORONARY ANGIOPLASTY WITH STENT PLACEMENT  07/2014    ENDOSCOPY      EPIDURAL BLOCK      EYE SURGERY Right 12/2011    Dr. River Art; had bilat YAG peripheral iridotomy for glaucoma    HAND SURGERY Right 02/2024    HERNIA REPAIR  03/2009    also 4/09; Dr. Carson; incisional hernias    LUMBAR DISC SURGERY      LUMBAR FUSION      LUMBAR LAMINECTOMY      PROSTATE BIOPSY      SHOULDER ARTHROSCOPY W/ ROTATOR CUFF REPAIR Left 08/31/2016    Procedure: LEFT SHOULDER ARTHROSCOPY WITH ROTATOR CUFF REPAIR,  ACROMIOPLASTY, AND DEBRIDMENT OF LABRUM;  Surgeon: Guerrero Kruse MD;  Location: [facility] OR [facility] [place];  Service:     SHOULDER SURGERY Left     SPINE SURGERY      lumbrosacral        Social History     Occupational History    Not on file   Tobacco Use    Smoking status: Never    Smokeless tobacco: Never    Tobacco comments:     caffeine use iced tea all day long, 3 cups coffee daily   Vaping Use    Vaping status: Never Used   Substance and Sexual Activity    Alcohol use: No    Drug use: No    Sexual activity: Defer      Social History     Social History Narrative    Not on file        Family History   Problem Relation Age of Onset    Heart failure Mother     Hypertension Mother     Hyperlipidemia Mother     Cancer Mother     Heart attack Mother     Heart disease Mother     Stroke Mother     Alcohol abuse Father     Cancer Father         prostate    Heart failure Father     Prostate cancer Father     Heart disease Father     Stroke Father     Heart attack Father     Cancer Sister     Lung cancer Sister      Heart disease Sister     Other Brother         demyelinating disease of central nervous system    Cancer Brother     Heart failure Brother     Lung cancer Brother     Malig Hyperthermia Neg Hx          Review of Systems:   A 14 point review of systems was performed, pertinent positives discussed above, all other systems are negative    Physical Exam: 78 y.o. male  Vital Signs :   There were no vitals filed for this visit.    General: Alert and Oriented x 3, No acute distress.  Psych: mood and affect appropriate; recent and remote memory intact  Eyes: conjunctiva clear; pupils equally round and reactive, sclera nonicteric  CV: no peripheral edema  Resp: normal respiratory effort  Skin: no rashes or wounds; normal turgor  Musculosketetal; pain and crepitance with knee range of motion  Vascular: palpable distal pulses    Xrays:  -4 views (AP, lateral, and sunrise) were reviewed demonstrating end-stage OA with bone on bone articulation.  -A full length AP xray was ordered today for purposes of operative alignment demonstrating end stage arthritic findings. There are no previous full length films for review    Assessment:  End-stage Left knee osteoarthritis. Conservative measures have failed.      Plan:  The plan is to proceed with Left Total Knee Replacement. The patient voiced understanding of the risks, benefits, and alternative forms of treatment that were discussed with Dr Dorado at the time of scheduling.  Patient states he will do the joint class today.  Labs reviewed.  He is got dental and cardiac clearance.  He will stop his Plavix 5 days prior to surgery.      Plan for Plavix and a daily aspirin after surgery for DVT prophylaxis.      Patient will stay for 23 observation.            Jairo Dorado MD  5/20/2024      This note was copied and pasted from most recent office visit.  Interval addendum may be made to update this documentation as needed.    Jairo Dorado MD

## 2024-05-21 ENCOUNTER — ANESTHESIA EVENT (OUTPATIENT)
Dept: PERIOP | Facility: HOSPITAL | Age: 79
End: 2024-05-21
Payer: MEDICARE

## 2024-05-21 ENCOUNTER — ANESTHESIA (OUTPATIENT)
Dept: PERIOP | Facility: HOSPITAL | Age: 79
End: 2024-05-21
Payer: MEDICARE

## 2024-05-21 ENCOUNTER — APPOINTMENT (OUTPATIENT)
Dept: GENERAL RADIOLOGY | Facility: HOSPITAL | Age: 79
End: 2024-05-21
Payer: MEDICARE

## 2024-05-21 ENCOUNTER — HOSPITAL ENCOUNTER (OUTPATIENT)
Facility: HOSPITAL | Age: 79
Setting detail: OBSERVATION
Discharge: HOME | End: 2024-05-22
Attending: ORTHOPAEDIC SURGERY | Admitting: ORTHOPAEDIC SURGERY
Payer: MEDICARE

## 2024-05-21 DIAGNOSIS — M17.12 ARTHRITIS OF LEFT KNEE: Chronic | ICD-10-CM

## 2024-05-21 DIAGNOSIS — G89.29 CHRONIC PAIN OF LEFT KNEE: Chronic | ICD-10-CM

## 2024-05-21 DIAGNOSIS — M17.12 PRIMARY OSTEOARTHRITIS OF LEFT KNEE: Primary | ICD-10-CM

## 2024-05-21 DIAGNOSIS — M25.562 CHRONIC PAIN OF LEFT KNEE: Chronic | ICD-10-CM

## 2024-05-21 LAB
GLUCOSE BLDC GLUCOMTR-MCNC: 106 MG/DL (ref 70–130)
GLUCOSE BLDC GLUCOMTR-MCNC: 191 MG/DL (ref 70–130)
GLUCOSE BLDC GLUCOMTR-MCNC: 193 MG/DL (ref 70–130)
GLUCOSE BLDC GLUCOMTR-MCNC: 205 MG/DL (ref 70–130)

## 2024-05-21 PROCEDURE — 25010000002 ACETAMINOPHEN 10 MG/ML SOLUTION: Performed by: NURSE ANESTHETIST, CERTIFIED REGISTERED

## 2024-05-21 PROCEDURE — 27447 TOTAL KNEE ARTHROPLASTY: CPT | Performed by: ORTHOPAEDIC SURGERY

## 2024-05-21 PROCEDURE — 25010000002 PROPOFOL 200 MG/20ML EMULSION: Performed by: NURSE ANESTHETIST, CERTIFIED REGISTERED

## 2024-05-21 PROCEDURE — 25010000002 MIDAZOLAM PER 1 MG: Performed by: ANESTHESIOLOGY

## 2024-05-21 PROCEDURE — G0378 HOSPITAL OBSERVATION PER HR: HCPCS

## 2024-05-21 PROCEDURE — 25810000003 LACTATED RINGERS SOLUTION: Performed by: ORTHOPAEDIC SURGERY

## 2024-05-21 PROCEDURE — 25810000003 LACTATED RINGERS PER 1000 ML: Performed by: ORTHOPAEDIC SURGERY

## 2024-05-21 PROCEDURE — 25010000002 HYDROMORPHONE PER 4 MG: Performed by: NURSE ANESTHETIST, CERTIFIED REGISTERED

## 2024-05-21 PROCEDURE — C1713 ANCHOR/SCREW BN/BN,TIS/BN: HCPCS | Performed by: ORTHOPAEDIC SURGERY

## 2024-05-21 PROCEDURE — 25010000002 FENTANYL CITRATE (PF) 50 MCG/ML SOLUTION: Performed by: NURSE ANESTHETIST, CERTIFIED REGISTERED

## 2024-05-21 PROCEDURE — 25010000002 DEXAMETHASONE SODIUM PHOSPHATE 20 MG/5ML SOLUTION: Performed by: NURSE ANESTHETIST, CERTIFIED REGISTERED

## 2024-05-21 PROCEDURE — C1776 JOINT DEVICE (IMPLANTABLE): HCPCS | Performed by: ORTHOPAEDIC SURGERY

## 2024-05-21 PROCEDURE — 25010000002 FENTANYL CITRATE (PF) 50 MCG/ML SOLUTION: Performed by: ANESTHESIOLOGY

## 2024-05-21 PROCEDURE — 25010000002 CEFAZOLIN PER 500 MG: Performed by: ORTHOPAEDIC SURGERY

## 2024-05-21 PROCEDURE — 25010000002 MAGNESIUM SULFATE PER 500 MG OF MAGNESIUM: Performed by: NURSE ANESTHETIST, CERTIFIED REGISTERED

## 2024-05-21 PROCEDURE — 25810000003 SODIUM CHLORIDE 0.9 % SOLUTION 250 ML FLEX CONT: Performed by: ORTHOPAEDIC SURGERY

## 2024-05-21 PROCEDURE — 73560 X-RAY EXAM OF KNEE 1 OR 2: CPT

## 2024-05-21 PROCEDURE — 25010000002 VANCOMYCIN HCL 1.25 G RECONSTITUTED SOLUTION 1 EACH VIAL: Performed by: ORTHOPAEDIC SURGERY

## 2024-05-21 PROCEDURE — 63710000001 INSULIN LISPRO (HUMAN) PER 5 UNITS: Performed by: INTERNAL MEDICINE

## 2024-05-21 PROCEDURE — 82948 REAGENT STRIP/BLOOD GLUCOSE: CPT

## 2024-05-21 PROCEDURE — 25010000002 ROPIVACAINE PER 1 MG: Performed by: ANESTHESIOLOGY

## 2024-05-21 PROCEDURE — 25010000002 DEXAMETHASONE PER 1 MG: Performed by: ANESTHESIOLOGY

## 2024-05-21 DEVICE — LEGION CR HIGH FLEX XLPE SZ 5-6 9MM
Type: IMPLANTABLE DEVICE | Site: KNEE | Status: FUNCTIONAL
Brand: LEGION

## 2024-05-21 DEVICE — LEGION CRUCIATE RETAINING OXINIUM                                    FEMORAL SIZE 6 LEFT
Type: IMPLANTABLE DEVICE | Site: KNEE | Status: FUNCTIONAL
Brand: LEGION

## 2024-05-21 DEVICE — IMPLANTABLE DEVICE: Type: IMPLANTABLE DEVICE | Status: FUNCTIONAL

## 2024-05-21 DEVICE — PALACOS® R IS A FAST-CURING, RADIOPAQUE, POLY(METHYL METHACRYLATE)-BASED BONE CEMENT.PALACOS ® R CONTAINS THE X-RAY CONTRAST MEDIUM ZIRCONIUM DIOXIDE. TO IMPROVE VISIBILITY IN THE SURGICAL FIELD PALACOS ® R HAS BEEN COLOURED WITH CHLOROPHYLL (E141). THE BONE CEMENT IS PREPARED DIRECTLY BEFORE USE BY MIXING A POLYMER POWDER COMPONENT WITH A LIQUID MONOMER COMPONENT. A DUCTILE DOUGH FORMS WHICH CURES WITHIN A FEW MINUTES.
Type: IMPLANTABLE DEVICE | Site: KNEE | Status: FUNCTIONAL
Brand: PALACOS®

## 2024-05-21 DEVICE — GENESIS II NON-POROUS TIBIAL                                    BASEPLATE SIZE 5 LEFT
Type: IMPLANTABLE DEVICE | Site: KNEE | Status: FUNCTIONAL
Brand: GENESIS II

## 2024-05-21 DEVICE — KNOTLESS TISSUE CONTROL DEVICE, UNDYED UNIDIRECTIONAL (ANTIBACTERIAL) SYNTHETIC ABSORBABLE DEVICE
Type: IMPLANTABLE DEVICE | Site: KNEE | Status: FUNCTIONAL
Brand: STRATAFIX

## 2024-05-21 DEVICE — VIOLET ANTIBACTERIAL POLYDIOXANONE, KNOTLESS TISSUE CONTROL DEVICE
Type: IMPLANTABLE DEVICE | Site: KNEE | Status: FUNCTIONAL
Brand: STRATAFIX

## 2024-05-21 DEVICE — GENESIS II BICONVEX PATELLA 23MM
Type: IMPLANTABLE DEVICE | Site: KNEE | Status: FUNCTIONAL
Brand: GENESIS II

## 2024-05-21 RX ORDER — ACETAMINOPHEN 325 MG/1
325 TABLET ORAL EVERY 4 HOURS PRN
Status: DISCONTINUED | OUTPATIENT
Start: 2024-05-21 | End: 2024-05-22 | Stop reason: HOSPADM

## 2024-05-21 RX ORDER — INSULIN LISPRO 100 [IU]/ML
2-7 INJECTION, SOLUTION INTRAVENOUS; SUBCUTANEOUS
Status: DISCONTINUED | OUTPATIENT
Start: 2024-05-21 | End: 2024-05-22 | Stop reason: HOSPADM

## 2024-05-21 RX ORDER — DROPERIDOL 2.5 MG/ML
0.62 INJECTION, SOLUTION INTRAMUSCULAR; INTRAVENOUS
Status: DISCONTINUED | OUTPATIENT
Start: 2024-05-21 | End: 2024-05-21 | Stop reason: HOSPADM

## 2024-05-21 RX ORDER — LIDOCAINE HYDROCHLORIDE 20 MG/ML
INJECTION, SOLUTION INFILTRATION; PERINEURAL AS NEEDED
Status: DISCONTINUED | OUTPATIENT
Start: 2024-05-21 | End: 2024-05-21 | Stop reason: SURG

## 2024-05-21 RX ORDER — FENTANYL CITRATE 50 UG/ML
INJECTION, SOLUTION INTRAMUSCULAR; INTRAVENOUS AS NEEDED
Status: DISCONTINUED | OUTPATIENT
Start: 2024-05-21 | End: 2024-05-21 | Stop reason: SURG

## 2024-05-21 RX ORDER — DEXAMETHASONE SODIUM PHOSPHATE 4 MG/ML
INJECTION, SOLUTION INTRA-ARTICULAR; INTRALESIONAL; INTRAMUSCULAR; INTRAVENOUS; SOFT TISSUE
Status: COMPLETED | OUTPATIENT
Start: 2024-05-21 | End: 2024-05-21

## 2024-05-21 RX ORDER — ACETAMINOPHEN 10 MG/ML
INJECTION, SOLUTION INTRAVENOUS AS NEEDED
Status: DISCONTINUED | OUTPATIENT
Start: 2024-05-21 | End: 2024-05-21 | Stop reason: SURG

## 2024-05-21 RX ORDER — KETOROLAC TROMETHAMINE 15 MG/ML
15 INJECTION, SOLUTION INTRAMUSCULAR; INTRAVENOUS EVERY 6 HOURS PRN
Status: DISCONTINUED | OUTPATIENT
Start: 2024-05-21 | End: 2024-05-22 | Stop reason: HOSPADM

## 2024-05-21 RX ORDER — LABETALOL HYDROCHLORIDE 5 MG/ML
5 INJECTION, SOLUTION INTRAVENOUS
Status: DISCONTINUED | OUTPATIENT
Start: 2024-05-21 | End: 2024-05-21 | Stop reason: HOSPADM

## 2024-05-21 RX ORDER — BISACODYL 10 MG
10 SUPPOSITORY, RECTAL RECTAL DAILY PRN
Status: DISCONTINUED | OUTPATIENT
Start: 2024-05-21 | End: 2024-05-22 | Stop reason: HOSPADM

## 2024-05-21 RX ORDER — MAGNESIUM SULFATE HEPTAHYDRATE 500 MG/ML
INJECTION, SOLUTION INTRAMUSCULAR; INTRAVENOUS AS NEEDED
Status: DISCONTINUED | OUTPATIENT
Start: 2024-05-21 | End: 2024-05-21 | Stop reason: SURG

## 2024-05-21 RX ORDER — TRANEXAMIC ACID 100 MG/ML
INJECTION, SOLUTION INTRAVENOUS AS NEEDED
Status: DISCONTINUED | OUTPATIENT
Start: 2024-05-21 | End: 2024-05-21

## 2024-05-21 RX ORDER — FLUMAZENIL 0.1 MG/ML
0.2 INJECTION INTRAVENOUS AS NEEDED
Status: DISCONTINUED | OUTPATIENT
Start: 2024-05-21 | End: 2024-05-21 | Stop reason: HOSPADM

## 2024-05-21 RX ORDER — HYDROCODONE BITARTRATE AND ACETAMINOPHEN 7.5; 325 MG/1; MG/1
1 TABLET ORAL EVERY 4 HOURS PRN
Status: DISCONTINUED | OUTPATIENT
Start: 2024-05-21 | End: 2024-05-21 | Stop reason: HOSPADM

## 2024-05-21 RX ORDER — ONDANSETRON 2 MG/ML
4 INJECTION INTRAMUSCULAR; INTRAVENOUS ONCE AS NEEDED
Status: DISCONTINUED | OUTPATIENT
Start: 2024-05-21 | End: 2024-05-21 | Stop reason: HOSPADM

## 2024-05-21 RX ORDER — HYDROCODONE BITARTRATE AND ACETAMINOPHEN 7.5; 325 MG/1; MG/1
1 TABLET ORAL EVERY 4 HOURS PRN
Status: DISCONTINUED | OUTPATIENT
Start: 2024-05-21 | End: 2024-05-22 | Stop reason: HOSPADM

## 2024-05-21 RX ORDER — PROPOFOL 10 MG/ML
INJECTION, EMULSION INTRAVENOUS AS NEEDED
Status: DISCONTINUED | OUTPATIENT
Start: 2024-05-21 | End: 2024-05-21 | Stop reason: SURG

## 2024-05-21 RX ORDER — MELOXICAM 7.5 MG/1
7.5 TABLET ORAL DAILY
Status: DISCONTINUED | OUTPATIENT
Start: 2024-05-21 | End: 2024-05-22 | Stop reason: HOSPADM

## 2024-05-21 RX ORDER — EPHEDRINE SULFATE 50 MG/ML
5 INJECTION, SOLUTION INTRAVENOUS ONCE AS NEEDED
Status: DISCONTINUED | OUTPATIENT
Start: 2024-05-21 | End: 2024-05-21 | Stop reason: HOSPADM

## 2024-05-21 RX ORDER — HYDRALAZINE HYDROCHLORIDE 20 MG/ML
5 INJECTION INTRAMUSCULAR; INTRAVENOUS
Status: DISCONTINUED | OUTPATIENT
Start: 2024-05-21 | End: 2024-05-21 | Stop reason: HOSPADM

## 2024-05-21 RX ORDER — MELOXICAM 15 MG/1
15 TABLET ORAL ONCE
Status: COMPLETED | OUTPATIENT
Start: 2024-05-21 | End: 2024-05-21

## 2024-05-21 RX ORDER — FENTANYL CITRATE 50 UG/ML
50 INJECTION, SOLUTION INTRAMUSCULAR; INTRAVENOUS ONCE AS NEEDED
Status: COMPLETED | OUTPATIENT
Start: 2024-05-21 | End: 2024-05-21

## 2024-05-21 RX ORDER — PANTOPRAZOLE SODIUM 40 MG/1
40 TABLET, DELAYED RELEASE ORAL
Status: DISCONTINUED | OUTPATIENT
Start: 2024-05-22 | End: 2024-05-22 | Stop reason: HOSPADM

## 2024-05-21 RX ORDER — MIDAZOLAM HYDROCHLORIDE 1 MG/ML
0.5 INJECTION INTRAMUSCULAR; INTRAVENOUS
Status: DISCONTINUED | OUTPATIENT
Start: 2024-05-21 | End: 2024-05-21 | Stop reason: HOSPADM

## 2024-05-21 RX ORDER — NALOXONE HCL 0.4 MG/ML
0.2 VIAL (ML) INJECTION AS NEEDED
Status: DISCONTINUED | OUTPATIENT
Start: 2024-05-21 | End: 2024-05-21 | Stop reason: HOSPADM

## 2024-05-21 RX ORDER — PROMETHAZINE HYDROCHLORIDE 25 MG/1
25 TABLET ORAL ONCE AS NEEDED
Status: DISCONTINUED | OUTPATIENT
Start: 2024-05-21 | End: 2024-05-21 | Stop reason: HOSPADM

## 2024-05-21 RX ORDER — SODIUM CHLORIDE 0.9 % (FLUSH) 0.9 %
3 SYRINGE (ML) INJECTION EVERY 12 HOURS SCHEDULED
Status: DISCONTINUED | OUTPATIENT
Start: 2024-05-21 | End: 2024-05-21 | Stop reason: HOSPADM

## 2024-05-21 RX ORDER — SODIUM CHLORIDE, SODIUM LACTATE, POTASSIUM CHLORIDE, CALCIUM CHLORIDE 600; 310; 30; 20 MG/100ML; MG/100ML; MG/100ML; MG/100ML
100 INJECTION, SOLUTION INTRAVENOUS CONTINUOUS
Status: DISCONTINUED | OUTPATIENT
Start: 2024-05-21 | End: 2024-05-22 | Stop reason: HOSPADM

## 2024-05-21 RX ORDER — ROCURONIUM BROMIDE 10 MG/ML
INJECTION, SOLUTION INTRAVENOUS AS NEEDED
Status: DISCONTINUED | OUTPATIENT
Start: 2024-05-21 | End: 2024-05-21 | Stop reason: SURG

## 2024-05-21 RX ORDER — PROMETHAZINE HYDROCHLORIDE 25 MG/1
25 SUPPOSITORY RECTAL ONCE AS NEEDED
Status: DISCONTINUED | OUTPATIENT
Start: 2024-05-21 | End: 2024-05-21 | Stop reason: HOSPADM

## 2024-05-21 RX ORDER — NICOTINE POLACRILEX 4 MG
15 LOZENGE BUCCAL
Status: DISCONTINUED | OUTPATIENT
Start: 2024-05-21 | End: 2024-05-22 | Stop reason: HOSPADM

## 2024-05-21 RX ORDER — ONDANSETRON 2 MG/ML
4 INJECTION INTRAMUSCULAR; INTRAVENOUS EVERY 6 HOURS PRN
Status: DISCONTINUED | OUTPATIENT
Start: 2024-05-21 | End: 2024-05-22 | Stop reason: HOSPADM

## 2024-05-21 RX ORDER — SODIUM CHLORIDE, SODIUM LACTATE, POTASSIUM CHLORIDE, CALCIUM CHLORIDE 600; 310; 30; 20 MG/100ML; MG/100ML; MG/100ML; MG/100ML
9 INJECTION, SOLUTION INTRAVENOUS CONTINUOUS
Status: DISCONTINUED | OUTPATIENT
Start: 2024-05-21 | End: 2024-05-22 | Stop reason: HOSPADM

## 2024-05-21 RX ORDER — TRANEXAMIC ACID 100 MG/ML
INJECTION, SOLUTION INTRAVENOUS AS NEEDED
Status: DISCONTINUED | OUTPATIENT
Start: 2024-05-21 | End: 2024-05-21 | Stop reason: SURG

## 2024-05-21 RX ORDER — DIPHENHYDRAMINE HYDROCHLORIDE 50 MG/ML
12.5 INJECTION INTRAMUSCULAR; INTRAVENOUS
Status: DISCONTINUED | OUTPATIENT
Start: 2024-05-21 | End: 2024-05-21 | Stop reason: HOSPADM

## 2024-05-21 RX ORDER — SODIUM CHLORIDE 0.9 % (FLUSH) 0.9 %
3-10 SYRINGE (ML) INJECTION AS NEEDED
Status: DISCONTINUED | OUTPATIENT
Start: 2024-05-21 | End: 2024-05-21 | Stop reason: HOSPADM

## 2024-05-21 RX ORDER — IBUPROFEN 600 MG/1
1 TABLET ORAL
Status: DISCONTINUED | OUTPATIENT
Start: 2024-05-21 | End: 2024-05-22 | Stop reason: HOSPADM

## 2024-05-21 RX ORDER — DOCUSATE SODIUM 100 MG/1
100 CAPSULE, LIQUID FILLED ORAL 2 TIMES DAILY
Status: DISCONTINUED | OUTPATIENT
Start: 2024-05-21 | End: 2024-05-22 | Stop reason: HOSPADM

## 2024-05-21 RX ORDER — HYDROMORPHONE HYDROCHLORIDE 1 MG/ML
0.25 INJECTION, SOLUTION INTRAMUSCULAR; INTRAVENOUS; SUBCUTANEOUS
Status: DISCONTINUED | OUTPATIENT
Start: 2024-05-21 | End: 2024-05-21 | Stop reason: HOSPADM

## 2024-05-21 RX ORDER — HYDROCODONE BITARTRATE AND ACETAMINOPHEN 5; 325 MG/1; MG/1
1 TABLET ORAL ONCE AS NEEDED
Status: DISCONTINUED | OUTPATIENT
Start: 2024-05-21 | End: 2024-05-21 | Stop reason: HOSPADM

## 2024-05-21 RX ORDER — PREGABALIN 75 MG/1
150 CAPSULE ORAL ONCE
Status: COMPLETED | OUTPATIENT
Start: 2024-05-21 | End: 2024-05-21

## 2024-05-21 RX ORDER — MAGNESIUM HYDROXIDE 1200 MG/15ML
LIQUID ORAL AS NEEDED
Status: DISCONTINUED | OUTPATIENT
Start: 2024-05-21 | End: 2024-05-21 | Stop reason: HOSPADM

## 2024-05-21 RX ORDER — POLYETHYLENE GLYCOL 3350 17 G/17G
17 POWDER, FOR SOLUTION ORAL 2 TIMES DAILY
Status: DISCONTINUED | OUTPATIENT
Start: 2024-05-21 | End: 2024-05-22 | Stop reason: HOSPADM

## 2024-05-21 RX ORDER — ASPIRIN 81 MG/1
81 TABLET ORAL DAILY
Status: DISCONTINUED | OUTPATIENT
Start: 2024-05-22 | End: 2024-05-22 | Stop reason: HOSPADM

## 2024-05-21 RX ORDER — DEXAMETHASONE SODIUM PHOSPHATE 4 MG/ML
INJECTION, SOLUTION INTRA-ARTICULAR; INTRALESIONAL; INTRAMUSCULAR; INTRAVENOUS; SOFT TISSUE AS NEEDED
Status: DISCONTINUED | OUTPATIENT
Start: 2024-05-21 | End: 2024-05-21 | Stop reason: SURG

## 2024-05-21 RX ORDER — LIDOCAINE HYDROCHLORIDE 10 MG/ML
0.5 INJECTION, SOLUTION INFILTRATION; PERINEURAL ONCE AS NEEDED
Status: DISCONTINUED | OUTPATIENT
Start: 2024-05-21 | End: 2024-05-21 | Stop reason: HOSPADM

## 2024-05-21 RX ORDER — ONDANSETRON 4 MG/1
4 TABLET, ORALLY DISINTEGRATING ORAL EVERY 6 HOURS PRN
Status: DISCONTINUED | OUTPATIENT
Start: 2024-05-21 | End: 2024-05-22 | Stop reason: HOSPADM

## 2024-05-21 RX ORDER — DEXTROSE MONOHYDRATE 25 G/50ML
25 INJECTION, SOLUTION INTRAVENOUS
Status: DISCONTINUED | OUTPATIENT
Start: 2024-05-21 | End: 2024-05-22 | Stop reason: HOSPADM

## 2024-05-21 RX ORDER — ROPIVACAINE HYDROCHLORIDE 5 MG/ML
INJECTION, SOLUTION EPIDURAL; INFILTRATION; PERINEURAL
Status: COMPLETED | OUTPATIENT
Start: 2024-05-21 | End: 2024-05-21

## 2024-05-21 RX ORDER — HYDROCODONE BITARTRATE AND ACETAMINOPHEN 7.5; 325 MG/1; MG/1
2 TABLET ORAL EVERY 4 HOURS PRN
Status: DISCONTINUED | OUTPATIENT
Start: 2024-05-21 | End: 2024-05-22 | Stop reason: HOSPADM

## 2024-05-21 RX ORDER — IPRATROPIUM BROMIDE AND ALBUTEROL SULFATE 2.5; .5 MG/3ML; MG/3ML
3 SOLUTION RESPIRATORY (INHALATION) ONCE AS NEEDED
Status: DISCONTINUED | OUTPATIENT
Start: 2024-05-21 | End: 2024-05-21 | Stop reason: HOSPADM

## 2024-05-21 RX ORDER — FAMOTIDINE 10 MG/ML
20 INJECTION, SOLUTION INTRAVENOUS ONCE
Status: COMPLETED | OUTPATIENT
Start: 2024-05-21 | End: 2024-05-21

## 2024-05-21 RX ORDER — FENTANYL CITRATE 50 UG/ML
25 INJECTION, SOLUTION INTRAMUSCULAR; INTRAVENOUS
Status: DISCONTINUED | OUTPATIENT
Start: 2024-05-21 | End: 2024-05-21 | Stop reason: HOSPADM

## 2024-05-21 RX ADMIN — MIDAZOLAM 1 MG: 1 INJECTION INTRAMUSCULAR; INTRAVENOUS at 12:01

## 2024-05-21 RX ADMIN — FENTANYL CITRATE 25 MCG: 50 INJECTION, SOLUTION INTRAMUSCULAR; INTRAVENOUS at 16:50

## 2024-05-21 RX ADMIN — FAMOTIDINE 20 MG: 10 INJECTION INTRAVENOUS at 12:12

## 2024-05-21 RX ADMIN — MELOXICAM 15 MG: 15 TABLET ORAL at 11:35

## 2024-05-21 RX ADMIN — HYDROMORPHONE HYDROCHLORIDE 0.25 MG: 1 INJECTION, SOLUTION INTRAMUSCULAR; INTRAVENOUS; SUBCUTANEOUS at 17:10

## 2024-05-21 RX ADMIN — ROCURONIUM BROMIDE 50 MG: 10 INJECTION, SOLUTION INTRAVENOUS at 14:14

## 2024-05-21 RX ADMIN — FENTANYL CITRATE 50 MCG: 50 INJECTION, SOLUTION INTRAMUSCULAR; INTRAVENOUS at 14:09

## 2024-05-21 RX ADMIN — MELOXICAM 7.5 MG: 7.5 TABLET ORAL at 20:48

## 2024-05-21 RX ADMIN — FENTANYL CITRATE 50 MCG: 50 INJECTION, SOLUTION INTRAMUSCULAR; INTRAVENOUS at 12:01

## 2024-05-21 RX ADMIN — DEXAMETHASONE SODIUM PHOSPHATE 4 MG: 4 INJECTION, SOLUTION INTRA-ARTICULAR; INTRALESIONAL; INTRAMUSCULAR; INTRAVENOUS; SOFT TISSUE at 12:05

## 2024-05-21 RX ADMIN — MAGNESIUM SULFATE HEPTAHYDRATE 2 G: 500 INJECTION, SOLUTION INTRAMUSCULAR; INTRAVENOUS at 14:48

## 2024-05-21 RX ADMIN — VANCOMYCIN HYDROCHLORIDE 1250 MG: 1.25 INJECTION, POWDER, LYOPHILIZED, FOR SOLUTION INTRAVENOUS at 12:05

## 2024-05-21 RX ADMIN — DEXAMETHASONE SODIUM PHOSPHATE 8 MG: 4 INJECTION, SOLUTION INTRAMUSCULAR; INTRAVENOUS at 14:24

## 2024-05-21 RX ADMIN — ROCURONIUM BROMIDE 20 MG: 10 INJECTION, SOLUTION INTRAVENOUS at 15:41

## 2024-05-21 RX ADMIN — PROPOFOL 50 MG: 10 INJECTION, EMULSION INTRAVENOUS at 14:46

## 2024-05-21 RX ADMIN — ACETAMINOPHEN 1000 MG: 1000 INJECTION INTRAVENOUS at 14:31

## 2024-05-21 RX ADMIN — SODIUM CHLORIDE 2 G: 900 INJECTION INTRAVENOUS at 14:05

## 2024-05-21 RX ADMIN — SODIUM CHLORIDE 2000 MG: 900 INJECTION INTRAVENOUS at 22:27

## 2024-05-21 RX ADMIN — FENTANYL CITRATE 25 MCG: 50 INJECTION, SOLUTION INTRAMUSCULAR; INTRAVENOUS at 16:45

## 2024-05-21 RX ADMIN — PROPOFOL 150 MG: 10 INJECTION, EMULSION INTRAVENOUS at 14:14

## 2024-05-21 RX ADMIN — HYDROMORPHONE HYDROCHLORIDE 0.25 MG: 1 INJECTION, SOLUTION INTRAMUSCULAR; INTRAVENOUS; SUBCUTANEOUS at 17:03

## 2024-05-21 RX ADMIN — PREGABALIN 150 MG: 75 CAPSULE ORAL at 11:39

## 2024-05-21 RX ADMIN — DOCUSATE SODIUM 100 MG: 100 CAPSULE, LIQUID FILLED ORAL at 20:48

## 2024-05-21 RX ADMIN — POLYETHYLENE GLYCOL 3350 17 G: 17 POWDER, FOR SOLUTION ORAL at 20:48

## 2024-05-21 RX ADMIN — HYDROCODONE BITARTRATE AND ACETAMINOPHEN 2 TABLET: 7.5; 325 TABLET ORAL at 18:26

## 2024-05-21 RX ADMIN — FENTANYL CITRATE 50 MCG: 50 INJECTION, SOLUTION INTRAMUSCULAR; INTRAVENOUS at 14:48

## 2024-05-21 RX ADMIN — INSULIN LISPRO 3 UNITS: 100 INJECTION, SOLUTION INTRAVENOUS; SUBCUTANEOUS at 22:27

## 2024-05-21 RX ADMIN — SODIUM CHLORIDE, POTASSIUM CHLORIDE, SODIUM LACTATE AND CALCIUM CHLORIDE 500 ML: 600; 310; 30; 20 INJECTION, SOLUTION INTRAVENOUS at 12:14

## 2024-05-21 RX ADMIN — TRANEXAMIC ACID 1000 MG: 100 INJECTION, SOLUTION INTRAVENOUS at 15:41

## 2024-05-21 RX ADMIN — LIDOCAINE HYDROCHLORIDE 60 MG: 20 INJECTION, SOLUTION INFILTRATION; PERINEURAL at 14:14

## 2024-05-21 RX ADMIN — ROPIVACAINE HYDROCHLORIDE 20 ML: 5 INJECTION EPIDURAL; INFILTRATION; PERINEURAL at 12:05

## 2024-05-21 RX ADMIN — SODIUM CHLORIDE, POTASSIUM CHLORIDE, SODIUM LACTATE AND CALCIUM CHLORIDE 100 ML/HR: 600; 310; 30; 20 INJECTION, SOLUTION INTRAVENOUS at 18:19

## 2024-05-21 NOTE — ANESTHESIA POSTPROCEDURE EVALUATION
Patient: Ben Walsh    Procedure Summary       Date: 05/21/24 Room / Location:  [facility] /  [facility]    Anesthesia Start: 1407 Anesthesia Stop: 1639    Procedure: Left TOTAL KNEE ARTHROPLASTY (Left: Knee) Diagnosis:       Primary osteoarthritis of left knee      (Primary osteoarthritis of left knee [M17.12])    Surgeons: Jairo Dorado MD Provider: Michael Kevin MD    Anesthesia Type: general ASA Status: 3            Anesthesia Type: general    Vitals  Vitals Value Taken Time   BP 94/80 05/21/24 1715   Temp 36.9 °C (98.4 °F) 05/21/24 1635   Pulse 89 05/21/24 1723   Resp 17 05/21/24 1700   SpO2 92 % 05/21/24 1723   Vitals shown include unfiled device data.        Post Anesthesia Care and Evaluation    Patient location during evaluation: PACU  Patient participation: complete - patient participated  Level of consciousness: awake  Pain management: satisfactory to patient    Airway patency: patent  Anesthetic complications: No anesthetic complications  PONV Status: controlled  Cardiovascular status: acceptable  Respiratory status: acceptable  Hydration status: acceptable    Comments: I did not personally evaluate the patient in PACU. I was not notified of any anesthesia complications. The patient appears to have had an uneventful PACU course per chart review.

## 2024-05-21 NOTE — ANESTHESIA PROCEDURE NOTES
Peripheral Block      Patient reassessed immediately prior to procedure    Patient location during procedure: pre-op  Start time: 5/21/2024 12:00 PM  Stop time: 5/21/2024 12:05 PM  Reason for block: procedure for pain, at surgeon's request and post-op pain management  Performed by  Anesthesiologist: Michael Kevin MD  Preanesthetic Checklist  Completed: patient identified, IV checked, site marked, risks and benefits discussed, surgical consent, monitors and equipment checked, pre-op evaluation and timeout performed  Prep:  Pt Position: supine  Sterile barriers:cap, gloves, sterile barriers, washed/disinfected hands and mask  Prep: ChloraPrep  Patient monitoring: blood pressure monitoring, continuous pulse oximetry and EKG  Procedure    Sedation: yes  Performed under: local infiltration  Guidance:ultrasound guided  Images:still images obtained, printed/placed on chart    Laterality:left  Block Type:adductor canal block  Injection Technique:single-shot  Needle Type:echogenic  Resistance on Injection: none    Medications Used: dexamethasone (DECADRON) injection - Injection   4 mg - 5/21/2024 12:05:00 PM  ropivacaine (NAROPIN) 0.5 % injection - Injection   20 mL - 5/21/2024 12:05:00 PM      Post Assessment  Injection Assessment: negative aspiration for heme, no paresthesia on injection and incremental injection  Patient Tolerance:comfortable throughout block  Complications:no  Additional Notes  USG used to verify needle placement and medication administration

## 2024-05-21 NOTE — ANESTHESIA PREPROCEDURE EVALUATION
Anesthesia Evaluation     NPO Solid Status: > 8 hours  NPO Liquid Status: > 8 hours           Airway   Mallampati: I  No difficulty expected  Dental      Pulmonary    (+) ,sleep apnea  Cardiovascular   Exercise tolerance: good (4-7 METS)    PT is on anticoagulation therapy    (+) hypertension, CAD, dysrhythmias Atrial Fib, Atrial Flutter, SAEED, hyperlipidemia    ROS comment: There is normal left ventricular systolic function.  The estimated ejection fraction is 55-60%.   A patent foramen ovale is demonstrated by agitated saline contrast.   The right ventricular systolic pressure is calculated at 31 mmHg.       Neuro/Psych  (+) numbness, psychiatric history  GI/Hepatic/Renal/Endo    (+) hiatal hernia, GERD, liver disease, diabetes mellitus, thyroid problem     Musculoskeletal     (+) back pain  Abdominal    Substance History      OB/GYN          Other   arthritis,   history of cancer    ROS/Med Hx Other: PFO              Anesthesia Plan    ASA 3     general     (Regional for POPC PSR )  intravenous induction     Anesthetic plan, risks, benefits, and alternatives have been provided, discussed and informed consent has been obtained with: patient.    CODE STATUS:

## 2024-05-21 NOTE — ANESTHESIA PROCEDURE NOTES
Airway  Urgency: elective    Date/Time: 5/21/2024 2:18 PM  Airway not difficult    General Information and Staff    Patient location during procedure: OR  Anesthesiologist: Bárbara Escalona MD  CRNA/CAA: Viri Chen CRNA    Indications and Patient Condition  Indications for airway management: airway protection    Preoxygenated: yes  MILS maintained throughout  Mask difficulty assessment: 2 - vent by mask + OA or adjuvant +/- NMBA    Final Airway Details  Final airway type: endotracheal airway      Successful airway: ETT  Cuffed: yes   Successful intubation technique: direct laryngoscopy  Endotracheal tube insertion site: oral  Blade: Kruse  Blade size: 2  ETT size (mm): 7.5  Cormack-Lehane Classification: grade I - full view of glottis  Placement verified by: chest auscultation and capnometry   Cuff volume (mL): 8  Measured from: lips  ETT/EBT  to lips (cm): 23  Number of attempts at approach: 1  Assessment: lips, teeth, and gum same as pre-op and atraumatic intubation    Additional Comments  Pt preoxygenated, SIVI, bag mask vent, ATETI, dentition as before

## 2024-05-21 NOTE — SIGNIFICANT NOTE
"   05/21/24 1202   Peripheral IV 05/21/24 1201 Posterior;Right Forearm   Placement date: If unknown, DO NOT use \"Add Comment\" note/Placement time: If unknown, DO NOT use \"Add Comment\" note: 05/21/24 1201   Hand Hygiene Completed: Yes  Size (Gauge): (c) 20 G  Orientation: Posterior;Right  Location: Forearm  Site Prep: Chlor...   Site Assessment Clean;Dry;Intact   Dressing Type Transparent   Line Status Blood return noted;Flushed;Infusing   Dressing Status Clean;Dry;Intact   Reason Not Rotated Anticipated discharge     Ultrasound Inserted IV Site:RFA    Catheter Length:1.88in    Diameter:0.30cm    Depth:0.5cm      Vascular Access Score=5  1) Palpable / Visible / Dis  2) Palpable / Viasible / Not Distended  3) Easily Palpable / Not Visibile  4) Poorly Palpable / Visible  5) Poorly / Nonpalpable / NV   "

## 2024-05-21 NOTE — OP NOTE
Name: Ben Walsh    YOB: 1945    DATE OF SURGERY: 5/21/2024    PREOPERATIVE DIAGNOSIS: Left knee end-stage osteoarthritis    POSTOPERATIVE DIAGNOSIS: Left knee end-stage osteoarthritis    PROCEDURE PERFORMED: Left total knee replacement     Surgical Approach: Knee Medial Parapatellar     SURGEON: Jairo Dorado M.D.    ASSISTANT: NAPOLEON JACQUES    A surgical assistant was integral in ensuring a successful outcome with this procedure.  The assistant was utilized to assist in positioning the patient, draping the patient, was used throughout the case to provide with retraction of tissues, suctioning of blood and body fluids for visualization, positioning of the extremity to allow for proper exposure so that I could perform the procedure.  Without the use of a surgical assistant during this procedure I feel that the outcome may have been compromised or would have been suboptimal or at risk for complications.    IMPLANTS: [employer]:     Implant Name Type Inv. Item Serial No.  Lot No. LRB No. Used Action   CMT BONE PALACOS R HI/VISC 1X40 - [id number] Implant CMT BONE PALACOS R HI/VISC 1X40  [place] [facility] [id number] Left 2 Implanted   DEV CONTRL TISS STRATAFIX SYMM PDS PLUS HERNAN CT-1 60CM - [id number] Implant DEV CONTRL TISS STRATAFIX SYMM PDS PLUS HERNAN CT-1 60CM  [employer]  DIV [employer] J [employer] J [id number] Left 1 Implanted   DEV CONTRL TISS STRATAFIXSPIRALMNCRYL PLSPS2 REV3/0 45CM - [id number] Implant DEV CONTRL TISS STRATAFIXSPIRALMNCRYL PLSPS2 REV3/0 45CM  [employer]  DIV OF J AND J [id number] Left 1 Implanted   PAT GEN2 BICONVEX [id number] - [id number] Implant PAT GEN2 BICONVEX [id number]  [employer] [id number] Left 1 Implanted   COMP FEM LEGION OXINIUM CR SZ6 LT - [id number] Implant COMP FEM LEGION OXINIUM CR SZ6 LT  [employer] [id number] Left 1 Implanted   BASE TIB/KN GEN2 NONPOR TI SZ5 LT - [id number] Implant BASE TIB/KN GEN2 NONPOR TI SZ5 LT  [employer] [id number] Left 1  Implanted   INSRT ART/KN LEGION CR HF XLPE SZ5TO6 9MM - [id number] Implant INSRT ART/KN LEGION CR HF XLPE SZ5TO6 9MM  [employer] [id number] Left 1 Implanted       Estimated Blood Loss: 100cc  Specimens : none  Complications: none    DESCRIPTION OF PROCEDURE: The patient was taken to the operating room and placed in the supine position. A sequential compression device was carefully placed on the non-operative leg. Preoperative antibiotics were administered. Surgical time out was performed. After adequate induction of anesthesia, the leg was prepped and draped in the usual sterile fashion, exsanguinated with an Esmarch bandage and the tourniquet inflated to 250 mmHg. A midline incision was performed followed by a medial parapatellar arthrotomy. The patella was subluxed laterally.  A portion of the fat pad, ACL, and anterior horns of the meniscus were excised.  A drill hole was then placed in the center of the femoral canal in line with the canal.  It was irrigated and suctioned.  The intramedullary armando was then placed and a 5 degree distal valgus cut was performed after the block was pinned in place appropriately.  The cut surface was then removed.  The sizing and rotation guide was then placed and seated appropriately.  It was sized and then the drill holes for the 4-in-1 cutting guide were placed in 3 degrees of external rotation based off of the posterior condyles.  The 4-in-1 cutting block was then placed and the femoral cuts were performed.  The excess bone was removed and the cut surfaces looked good.  At this point we placed the retractors around the proximal tibia and a slight release of the PCL fibers off of the posterior proximal tibia was performed.  We used the extramedullary tibial alignment guide and it was aligned appropriately and then the depth was set and the block pinned in place.  The tibial cut was then performed and the alignment guide was removed.  The tibial cut was removed and the cut  surface looked good.  The posterior horns of the menisci were then removed as well as the posterior osteophytes.  Flexion extension blocks were then used to check the balance of the knee. The tibial cut surface was then sized with the sizing templates and the tibial and femoral trial were then placed. The knee was placed in full extension and then the tibial tray rotation was then matched to the femoral rotation and marked.    Attention was then placed to the patella. The patella was noted to track centrally through range of motion. The patella was then sized with the trials. The thickness of the patella was then measured. The patella was resurfaced and the surrounding osteophytes were removed. The preoperative thickness was reproduced. The patella tracked centrally through range of motion.  We then checked the balance with the trial implants in place and there was excellent medial lateral and flexion-extension balance.     At this point all trial components were removed, the knee was copiously irrigated with pulsed lavage, and the knee was injected with anesthetic cocktail solution. The cut surfaces were then dried with clean lap sponges, and the components were cemented tibia, followed by femur, then patella. The knee was held in full extension and all excess cement was removed. The knee was held still until the cement had completely hardened.  The tourniquet was then released. Bleeders were carefully cauterized with cautery.  There was excellent hemostasis.  We then checked the knee again in both flexion and extension with the trial polyethylene spacer in place.  Range of motion was found to be excellent.  Tracking of the patella is midline.  The trial polyethylene spacer was removed and the final polyethylene was placed.  Again we checked range of motion and stability in both flexion and extension which was found to be excellent with central patellar tracking.   The knee was then copiously irrigated.  We closed  the knee in multiple layers in standard fashion. Sterile dressing were applied. At the end of the case, the sponge and needle counts were reported as being correct. There were no known complications. The patient was then transported to the recovery room.      Jairo Dorado M.D.

## 2024-05-21 NOTE — PLAN OF CARE
Goal Outcome Evaluation:      Patient is POD #0 for a left total knee arthroplasty.  Patient is alert x4.  Vitals are stable.  Alok dressing is dry, and intact.  Alok drain is flashing green.  Ice applied to operative extremity. LLE remains elevated.  Patient was able to ambulate from the stretcher to the bed.  Gait is steady.  Tylenol, and norco were ordered for pain management.  Teaching was provided on how to use the incentive spirometer.  Voiding function is intact.  No sign of distress noted.  Will continue to monitor and update accordingly.

## 2024-05-22 ENCOUNTER — HOME HEALTH ADMISSION (OUTPATIENT)
Dept: HOME HEALTH SERVICES | Facility: HOME HEALTHCARE | Age: 79
End: 2024-05-22
Payer: COMMERCIAL

## 2024-05-22 ENCOUNTER — READMISSION MANAGEMENT (OUTPATIENT)
Dept: CALL CENTER | Facility: HOSPITAL | Age: 79
End: 2024-05-22
Payer: MEDICARE

## 2024-05-22 ENCOUNTER — DOCUMENTATION (OUTPATIENT)
Dept: HOME HEALTH SERVICES | Facility: HOME HEALTHCARE | Age: 79
End: 2024-05-22
Payer: MEDICARE

## 2024-05-22 VITALS
TEMPERATURE: 97.9 F | WEIGHT: 179.1 LBS | RESPIRATION RATE: 16 BRPM | HEART RATE: 89 BPM | DIASTOLIC BLOOD PRESSURE: 77 MMHG | BODY MASS INDEX: 28.11 KG/M2 | HEIGHT: 67 IN | OXYGEN SATURATION: 92 % | SYSTOLIC BLOOD PRESSURE: 120 MMHG

## 2024-05-22 LAB
GLUCOSE BLDC GLUCOMTR-MCNC: 228 MG/DL (ref 70–130)
HCT VFR BLD AUTO: 30.8 % (ref 37.5–51)
HGB BLD-MCNC: 9.8 G/DL (ref 13–17.7)

## 2024-05-22 PROCEDURE — 25010000002 CEFAZOLIN PER 500 MG: Performed by: ORTHOPAEDIC SURGERY

## 2024-05-22 PROCEDURE — 82948 REAGENT STRIP/BLOOD GLUCOSE: CPT

## 2024-05-22 PROCEDURE — 63710000001 INSULIN LISPRO (HUMAN) PER 5 UNITS: Performed by: INTERNAL MEDICINE

## 2024-05-22 PROCEDURE — 97530 THERAPEUTIC ACTIVITIES: CPT

## 2024-05-22 PROCEDURE — 99024 POSTOP FOLLOW-UP VISIT: CPT | Performed by: ORTHOPAEDIC SURGERY

## 2024-05-22 PROCEDURE — 27447 TOTAL KNEE ARTHROPLASTY: CPT | Performed by: SPECIALIST/TECHNOLOGIST, OTHER

## 2024-05-22 PROCEDURE — G0378 HOSPITAL OBSERVATION PER HR: HCPCS

## 2024-05-22 PROCEDURE — 85018 HEMOGLOBIN: CPT | Performed by: ORTHOPAEDIC SURGERY

## 2024-05-22 PROCEDURE — 97161 PT EVAL LOW COMPLEX 20 MIN: CPT

## 2024-05-22 PROCEDURE — 85014 HEMATOCRIT: CPT | Performed by: ORTHOPAEDIC SURGERY

## 2024-05-22 RX ORDER — ASPIRIN 81 MG/1
81 TABLET ORAL DAILY
Qty: 30 TABLET | Refills: 0 | Status: SHIPPED | OUTPATIENT
Start: 2024-05-22

## 2024-05-22 RX ORDER — MELOXICAM 7.5 MG/1
7.5 TABLET ORAL DAILY
Qty: 28 TABLET | Refills: 0 | Status: SHIPPED | OUTPATIENT
Start: 2024-05-22 | End: 2024-06-19

## 2024-05-22 RX ORDER — HYDROCODONE BITARTRATE AND ACETAMINOPHEN 7.5; 325 MG/1; MG/1
TABLET ORAL
Qty: 42 TABLET | Refills: 0 | Status: SHIPPED | OUTPATIENT
Start: 2024-05-22

## 2024-05-22 RX ORDER — POLYETHYLENE GLYCOL 3350 17 G/17G
17 POWDER, FOR SOLUTION ORAL 2 TIMES DAILY
Qty: 26 PACKET | Refills: 0 | Status: SHIPPED | OUTPATIENT
Start: 2024-05-22 | End: 2024-06-04

## 2024-05-22 RX ORDER — ONDANSETRON 4 MG/1
4 TABLET, ORALLY DISINTEGRATING ORAL EVERY 6 HOURS PRN
Qty: 10 TABLET | Refills: 0 | Status: SHIPPED | OUTPATIENT
Start: 2024-05-22

## 2024-05-22 RX ADMIN — PANTOPRAZOLE SODIUM 40 MG: 40 TABLET, DELAYED RELEASE ORAL at 05:53

## 2024-05-22 RX ADMIN — DOCUSATE SODIUM 100 MG: 100 CAPSULE, LIQUID FILLED ORAL at 07:56

## 2024-05-22 RX ADMIN — HYDROCODONE BITARTRATE AND ACETAMINOPHEN 1 TABLET: 7.5; 325 TABLET ORAL at 11:14

## 2024-05-22 RX ADMIN — POLYETHYLENE GLYCOL 3350 17 G: 17 POWDER, FOR SOLUTION ORAL at 07:54

## 2024-05-22 RX ADMIN — HYDROCODONE BITARTRATE AND ACETAMINOPHEN 1 TABLET: 7.5; 325 TABLET ORAL at 07:56

## 2024-05-22 RX ADMIN — MELOXICAM 7.5 MG: 7.5 TABLET ORAL at 07:55

## 2024-05-22 RX ADMIN — ASPIRIN 81 MG: 81 TABLET, COATED ORAL at 07:55

## 2024-05-22 RX ADMIN — SODIUM CHLORIDE 2000 MG: 900 INJECTION INTRAVENOUS at 05:53

## 2024-05-22 RX ADMIN — INSULIN LISPRO 3 UNITS: 100 INJECTION, SOLUTION INTRAVENOUS; SUBCUTANEOUS at 07:54

## 2024-05-22 NOTE — DISCHARGE PLACEMENT REQUEST
"Andriy Licea (78 y.o. Male)       Date of Birth   1945    Social Security Number       Address   [address] [place] [address]    Home Phone   [phone]    MRN   [id number]       Muslim   Alevism    Marital Status                               Admission Date   5/21/24    Admission Type   Elective    Admitting Provider   Jairo Dorado MD    Attending Provider   Jairo Dorado MD    Department, Room/Bed   [address], 76/1       Discharge Date       Discharge Disposition   Home or Self Care    Discharge Destination                                 Attending Provider: Jairo Dorado MD    Allergies: Onglyza [Saxagliptin], Trulicity [Dulaglutide], Other, Vibramycin [Doxycycline]    Isolation: None   Infection: None   Code Status: Prior    Ht: 170.2 cm (67.01\")   Wt: 81.2 kg (179 lb 1.6 oz)    Admission Cmt: None   Principal Problem: Osteoarthritis of left knee [M17.12]                   Active Insurance as of 5/21/2024       Primary Coverage       Payor Plan Insurance Group Employer/Plan Group    ANTHEM MEDICARE REPLACEMENT ANTHEM MEDICARE ADVANTAGE [id number]       Payor Plan Address Payor Plan Phone Number Payor Plan Fax Number Effective Dates    [address] [phone]  1/1/2024 - None Entered    [facility] [id number]         Subscriber Name Subscriber Birth Date Member ID       ANDRIY LICEA 1945 [id number]                     Emergency Contacts        (Rel.) Home Phone Work Phone Mobile Phone    LUDMILA HERRERA (Daughter) -- -- [phone]    JOSEFA JUÁREZ (Daughter) [phone] -- [phone]              "

## 2024-05-22 NOTE — OUTREACH NOTE
Prep Survey      Flowsheet Row Responses   [facility] facility patient discharged from? [facility]   Is LACE score < 7 ? Yes   Eligibility [facility]   Date of Admission 05/21/24   Date of Discharge 05/22/24   Discharge Disposition Home or Self Care   Discharge diagnosis Left total knee arthroplasty   Does the patient have one of the following disease processes/diagnoses(primary or secondary)? Total Joint Replacement   Does the patient have Home health ordered? Yes   What is the Home health agency?  [facility]   Is there a DME ordered? No   Prep survey completed? Yes            JONAS FIELDS - Registered Nurse

## 2024-05-22 NOTE — PROGRESS NOTES
Orthopedic Progress Note      Patient: Ben Walsh  Date of Admission: 5/21/2024  YOB: 1945  Medical Record Number: [id number]    POD # :  1 Day Post-Op Procedure(s) (LRB):  Left TOTAL KNEE ARTHROPLASTY (Left)    Patient seen and examined this morning.  Resting well.  Pain controlled.    Physical Exam:  78 y.o.  male  Vitals:  Temp:  [97.7 °F (36.5 °C)-98.4 °F (36.9 °C)] 97.9 °F (36.6 °C)  Heart Rate:  [69-95] 89  Resp:  [16-21] 17  BP: ()/(67-97) 120/77  alert and oriented    Ext: NV intact. ROM appropriate. Calf is soft and nontender. Negative Homans Sn.  2+ pedal pulses  Skin: Incision clean dry and intact w/out signs or  symptoms of infection.    Activity: Mobilizing Per P.T.   Weight Bearing: As Tolerated    Data Review     Admission on 05/21/2024   Component Date Value Ref Range Status    Glucose 05/21/2024 106  70 - 130 mg/dL Final    Glucose 05/21/2024 191 (H)  70 - 130 mg/dL Final    Glucose 05/21/2024 193 (H)  70 - 130 mg/dL Final    Glucose 05/21/2024 205 (H)  70 - 130 mg/dL Final    Hemoglobin 05/22/2024 9.8 (L)  13.0 - 17.7 g/dL Final    Hematocrit 05/22/2024 30.8 (L)  37.5 - 51.0 % Final       No results found.    Medications:  aspirin, 81 mg, Oral, Daily  docusate sodium, 100 mg, Oral, BID  insulin lispro, 2-7 Units, Subcutaneous, 4x Daily AC & at Bedtime  meloxicam, 7.5 mg, Oral, Daily  pantoprazole, 40 mg, Oral, Q AM  polyethylene glycol, 17 g, Oral, BID        acetaminophen    bisacodyl    dextrose    dextrose    glucagon (human recombinant)    HYDROcodone-acetaminophen    HYDROcodone-acetaminophen    ketorolac    magnesium hydroxide    ondansetron ODT **OR** ondansetron      Imaging: Postop imaging show status post total knee replacement implants in satisfactory position no acute complications noted.      Assessment:  Doing well POD  # 1 Day Post-Op Procedure(s) (LRB):  Left TOTAL KNEE ARTHROPLASTY (Left)  Problems Addressed this Visit          Musculoskeletal and  Injuries    * (Principal) Osteoarthritis of left knee    Relevant Medications    lactated ringers bolus 500 mL (Completed)    ethyl alcohol 62 % 2 each (Completed)    ceFAZolin 2000 mg IVPB in 100 mL NS (MBP) (Completed)    Vancomycin HCl 1,250 mg in sodium chloride 0.9 % 250 mL VTB (Completed)    meloxicam (MOBIC) tablet 15 mg (Completed)    pregabalin (LYRICA) capsule 150 mg (Completed)     Diagnoses         Codes Comments    Primary osteoarthritis of left knee     ICD-10-CM: M17.12  ICD-9-CM: 715.16             Plan:  Continue knee protocol   Continue efforts to mobilize  Continue Pain Control Measures  Continue incisional Care  DVT prophylaxis    Discharge Plan:Home today pending PT medical clearance.    Jairo Dorado MD    Date: 5/22/2024  Time: 07:01 EDT

## 2024-05-22 NOTE — PLAN OF CARE
Goal Outcome Evaluation: a/ox4, assist x1 to BR, LTKA       Problem: Adult Inpatient Plan of Care  Goal: Absence of Hospital-Acquired Illness or Injury  Intervention: Identify and Manage Fall Risk  Recent Flowsheet Documentation  Taken 5/22/2024 0603 by Lauren Pisano RN  Safety Promotion/Fall Prevention:   assistive device/personal items within reach   safety round/check completed  Taken 5/22/2024 0415 by Lauren Pisano RN  Safety Promotion/Fall Prevention:   assistive device/personal items within reach   safety round/check completed  Taken 5/22/2024 0245 by Lauren Pisano RN  Safety Promotion/Fall Prevention:   assistive device/personal items within reach   safety round/check completed  Taken 5/22/2024 0010 by Lauren Pisano RN  Safety Promotion/Fall Prevention:   assistive device/personal items within reach   safety round/check completed  Taken 5/21/2024 2224 by Lauren Pisano RN  Safety Promotion/Fall Prevention:   assistive device/personal items within reach   safety round/check completed  Taken 5/21/2024 2052 by Lauren Pisano RN  Safety Promotion/Fall Prevention:   activity supervised   assistive device/personal items within reach   clutter free environment maintained   fall prevention program maintained   gait belt   lighting adjusted   mobility aid in reach   nonskid shoes/slippers when out of bed   safety round/check completed  Intervention: Prevent Skin Injury  Recent Flowsheet Documentation  Taken 5/22/2024 0603 by Lauren Pisano RN  Body Position:   position changed independently   neutral body alignment  Taken 5/22/2024 0415 by Luaren Pisano RN  Body Position:   neutral body alignment   supine, legs elevated  Taken 5/22/2024 0245 by Lauren Pisano RN  Body Position: neutral body alignment  Taken 5/22/2024 0010 by Lauren Pisano RN  Body Position:   position changed independently   neutral body alignment  Taken 5/21/2024 2224 by Lauren Pisano RN  Body Position:   position changed independently   neutral body  alignment  Taken 5/21/2024 2052 by Lauren Pisano RN  Body Position:   position changed independently   neutral body alignment  Skin Protection:   adhesive use limited   incontinence pads utilized   protective footwear used  Intervention: Prevent and Manage VTE (Venous Thromboembolism) Risk  Recent Flowsheet Documentation  Taken 5/21/2024 2052 by Lauren Pisano RN  Activity Management: dorsiflexion/plantar flexion performed  VTE Prevention/Management:   bilateral   sequential compression devices on  Goal: Optimal Comfort and Wellbeing  Intervention: Monitor Pain and Promote Comfort  Recent Flowsheet Documentation  Taken 5/21/2024 2052 by Lauren Pisano RN  Pain Management Interventions: cold applied  Intervention: Provide Person-Centered Care  Recent Flowsheet Documentation  Taken 5/21/2024 2052 by Lauren Pisano RN  Trust Relationship/Rapport:   care explained   thoughts/feelings acknowledged     Problem: Diabetes Comorbidity  Goal: Blood Glucose Level Within Targeted Range  Intervention: Monitor and Manage Glycemia  Recent Flowsheet Documentation  Taken 5/21/2024 2052 by Lauren Pisano RN  Glycemic Management: blood glucose monitored     Problem: Hypertension Comorbidity  Goal: Blood Pressure in Desired Range  Intervention: Maintain Blood Pressure Management  Recent Flowsheet Documentation  Taken 5/22/2024 0603 by Lauren Pisano RN  Medication Review/Management: medications reviewed  Taken 5/22/2024 0415 by Lauren Pisano RN  Medication Review/Management: medications reviewed  Taken 5/22/2024 0245 by Lauren Pisano RN  Medication Review/Management: medications reviewed  Taken 5/22/2024 0010 by Lauren Pisano RN  Medication Review/Management: medications reviewed  Taken 5/21/2024 2224 by Lauren Pisano RN  Medication Review/Management: medications reviewed  Taken 5/21/2024 2052 by Lauren Pisano RN  Syncope Management:   position changed slowly   legs elevated  Medication Review/Management: medications reviewed     Problem:  Osteoarthritis Comorbidity  Goal: Maintenance of Osteoarthritis Symptom Control  Intervention: Maintain Osteoarthritis Symptom Control  Recent Flowsheet Documentation  Taken 5/22/2024 0603 by Lauren Pisano RN  Medication Review/Management: medications reviewed  Taken 5/22/2024 0415 by Lauren Pisano RN  Medication Review/Management: medications reviewed  Taken 5/22/2024 0245 by Lauren Pisano RN  Medication Review/Management: medications reviewed  Taken 5/22/2024 0010 by Lauren Pisano RN  Medication Review/Management: medications reviewed  Taken 5/21/2024 2224 by Lauren Pisano RN  Medication Review/Management: medications reviewed  Taken 5/21/2024 2052 by Lauren Pisano RN  Activity Management: dorsiflexion/plantar flexion performed  Medication Review/Management: medications reviewed     Problem: Pain Chronic (Persistent) (Comorbidity Management)  Goal: Acceptable Pain Control and Functional Ability  Intervention: Manage Persistent Pain  Recent Flowsheet Documentation  Taken 5/22/2024 0603 by Lauren Pisano RN  Medication Review/Management: medications reviewed  Taken 5/22/2024 0415 by Lauren Pisano RN  Medication Review/Management: medications reviewed  Taken 5/22/2024 0245 by Lauren Pisano RN  Medication Review/Management: medications reviewed  Taken 5/22/2024 0010 by Lauren Pisano RN  Medication Review/Management: medications reviewed  Taken 5/21/2024 2224 by Lauren Pisano RN  Medication Review/Management: medications reviewed  Taken 5/21/2024 2052 by Lauren Pisano RN  Bowel Elimination Promotion: adequate fluid intake promoted  Medication Review/Management: medications reviewed  Intervention: Develop Pain Management Plan  Recent Flowsheet Documentation  Taken 5/21/2024 2052 by Lauren Pisano RN  Pain Management Interventions: cold applied  Intervention: Optimize Psychosocial Wellbeing  Recent Flowsheet Documentation  Taken 5/21/2024 2052 by Lauren Pisano RN  Supportive Measures:   active listening utilized   self-care  encouraged  Diversional Activities: television  Family/Support System Care:   self-care encouraged   support provided     Problem: Fall Injury Risk  Goal: Absence of Fall and Fall-Related Injury  Intervention: Identify and Manage Contributors  Recent Flowsheet Documentation  Taken 5/22/2024 0603 by Lauren Pisano RN  Medication Review/Management: medications reviewed  Taken 5/22/2024 0415 by Lauren Pisano RN  Medication Review/Management: medications reviewed  Taken 5/22/2024 0245 by Lauren Pisano RN  Medication Review/Management: medications reviewed  Taken 5/22/2024 0010 by Lauren Pisano RN  Medication Review/Management: medications reviewed  Taken 5/21/2024 2224 by Lauren Pisano RN  Medication Review/Management: medications reviewed  Taken 5/21/2024 2052 by Lauren Pisano RN  Medication Review/Management: medications reviewed  Self-Care Promotion:   independence encouraged   BADL personal objects within reach  Intervention: Promote Injury-Free Environment  Recent Flowsheet Documentation  Taken 5/22/2024 0603 by Lauren Pisano RN  Safety Promotion/Fall Prevention:   assistive device/personal items within reach   safety round/check completed  Taken 5/22/2024 0415 by Lauren Pisano RN  Safety Promotion/Fall Prevention:   assistive device/personal items within reach   safety round/check completed  Taken 5/22/2024 0245 by Lauren Pisano RN  Safety Promotion/Fall Prevention:   assistive device/personal items within reach   safety round/check completed  Taken 5/22/2024 0010 by Lauren Pisano RN  Safety Promotion/Fall Prevention:   assistive device/personal items within reach   safety round/check completed  Taken 5/21/2024 2224 by Lauren Pisano RN  Safety Promotion/Fall Prevention:   assistive device/personal items within reach   safety round/check completed  Taken 5/21/2024 2052 by Lauren Pisano RN  Safety Promotion/Fall Prevention:   activity supervised   assistive device/personal items within reach   clutter free environment  maintained   fall prevention program maintained   gait belt   lighting adjusted   mobility aid in reach   nonskid shoes/slippers when out of bed   safety round/check completed     Problem: Pain Acute  Goal: Acceptable Pain Control and Functional Ability  Intervention: Prevent or Manage Pain  Recent Flowsheet Documentation  Taken 5/22/2024 0603 by Lauren Pisano RN  Medication Review/Management: medications reviewed  Taken 5/22/2024 0415 by Lauren Pisano RN  Medication Review/Management: medications reviewed  Taken 5/22/2024 0245 by Lauren Pisano RN  Medication Review/Management: medications reviewed  Taken 5/22/2024 0010 by Lauren Pisano RN  Medication Review/Management: medications reviewed  Taken 5/21/2024 2224 by Lauren Pisano RN  Medication Review/Management: medications reviewed  Taken 5/21/2024 2052 by Lauren Pisano RN  Sensory Stimulation Regulation: television on  Bowel Elimination Promotion: adequate fluid intake promoted  Medication Review/Management: medications reviewed  Intervention: Develop Pain Management Plan  Recent Flowsheet Documentation  Taken 5/21/2024 2052 by Lauren Pisano RN  Pain Management Interventions: cold applied  Intervention: Optimize Psychosocial Wellbeing  Recent Flowsheet Documentation  Taken 5/21/2024 2052 by Lauren Pisano RN  Supportive Measures:   active listening utilized   self-care encouraged  Diversional Activities: television

## 2024-05-22 NOTE — CASE MANAGEMENT/SOCIAL WORK
Continued Stay Note  [facility]     Patient Name: Ben Walsh  MRN: [id number]  Today's Date: 5/22/2024    Admit Date: 5/21/2024    Plan: Home with family support & Religious HH.   Discharge Plan       Row Name 05/22/24 0928       Plan    Plan Home with family support & Religious HH.    Patient/Family in Agreement with Plan yes    Provided Post Acute Provider List? Yes    Post Acute Provider List Home Health    Plan Comments Spoke with the patient, verified current information and explained the role of the CCP. Patient said he has family support. He plans to discharge home with family support and HH. CMS HH List was reviewed and he requests Religious HH. Referral sent in Epic. Spoke with Lauren/STELLA. Patient said his family will transport him home at discharge. No other needs identified. CCP will follow.                   Discharge Codes    No documentation.                 Expected Discharge Date and Time       Expected Discharge Date Expected Discharge Time    May 22, 2024               Carie KING RN

## 2024-05-22 NOTE — PLAN OF CARE
Goal Outcome Evaluation:  Plan of Care Reviewed With: patient           Outcome Evaluation: Patient is an 78 y.o male who presented POD1 L TKA. Patient AOx4 supine in bed upon arrival. Patient lives alone but his daughter will be staying at d/c. Patient reports no ROGELIO. No equipment needs at d/c. Patient completed all bed mobility mod(I). Patient performed STS from EOB with SBA and VCs for hand placement. Patient ambulated 80ft with rwx and SBA. Gait slow and mildly antalgic with patient able to progress to step through gait pattern. No overt LOB noted. Patient educated in and performed TKA post op exercises while seated in the recliner prior to returning to bed. Patient planning to d/c home with HHPT follow up. Acute PT will sign off.      Anticipated Discharge Disposition (PT): home with assist, home with home health

## 2024-05-22 NOTE — PLAN OF CARE
Goal Outcome Evaluation:         All goals met and pt goo for discharge.

## 2024-05-22 NOTE — THERAPY EVALUATION
Patient Name: Ben Walsh  : 1945    MRN: [id number]                              Today's Date: 2024       Admit Date: 2024    Visit Dx:     ICD-10-CM ICD-9-CM   1. Primary osteoarthritis of left knee  M17.12 715.16     Patient Active Problem List   Diagnosis    Allergic rhinitis    ADD (attention deficit disorder)    DDD (degenerative disc disease), lumbar    BPH (benign prostatic hypertrophy)    Chronic pain    ASCVD (arteriosclerotic cardiovascular disease)    Depression    Diverticulosis of colon    GERD (gastroesophageal reflux disease)    Hiatal hernia    Hyperlipidemia    Essential hypertension    Primary hypothyroidism    Insomnia    Osteoarthritis, multiple sites    Acute pancreatitis    Vitamin D deficiency    Glaucoma    Abnormal nuclear stress test    Two-vessel coronary artery disease    SAEED (dyspnea on exertion)    Obstructive sleep apnea syndrome    Patent foramen ovale    Snoring    Disorder of rotator cuff    History of COPD    Type 2 diabetes mellitus with microalbuminuria, without long-term current use of insulin    Carpal tunnel syndrome of right wrist    Abnormal electrocardiogram    Atypical chest pain    Type 2 diabetes mellitus with peripheral neuropathy    B12 deficiency    Osteoarthritis of left knee     Past Medical History:   Diagnosis Date    2-vessel coronary artery disease     Abnormal ECG     Abnormal liver function test     Acute pancreatitis     thought to be from the Januvia Rx,     ADD (attention deficit disorder)     Allergic rhinitis     Atherosclerotic heart disease of native coronary artery without angina pectoris     Atrial fibrillation and flutter     Atypical chest pain     suspect this is not cardiac related given that is not exertional. howevr he is not very functional and this makes the assessment somewhat difficult    Back pain     BPH (benign prostatic hypertrophy)     CAD (coronary artery disease)     Cancer 2024    REMOVED BASAL CELL  FROM LT AER    Chronic pain     DDD (degenerative disc disease), lumbar     Depression     Diabetes     TYPE 2    Diabetic eye exam 2017    Diverticulitis of colon     Diverticulitis of colon with hemorrhage     Diverticulosis of colon     SAEED (dyspnea on exertion)     Elevated PSA     Essential hypertension     GERD (gastroesophageal reflux disease)     Glaucoma     Dr. Art    Grief     WIFE  2023 FROM [place]    Herpes zoster     HX    Hiatal hernia     History of transfusion     Hyperlipidemia     Hypertension     Hypothyroidism     Impacted cerumen     Incisional hernia     Insomnia     Joint pain     Knee pain     BOY    Low back pain     Microalbuminuria     Mitral regurgitation     Trace to mild    Muscle spasm     NAFLD (nonalcoholic fatty liver disease)     Numbness     SKYLAR (obstructive sleep apnea)     Osteoarthritis     Osteoarthritis, multiple sites     PFO (patent foramen ovale)     Primary snoring     Prostatic hyperplasia, benign localized, with obstruction     RCT (rotator cuff tear)     Rotator cuff tear     Tricuspid regurgitation     Trace to mild    Type 2 diabetes mellitus     Vitamin D deficiency      Past Surgical History:   Procedure Laterality Date    BACK SURGERY      CARDIAC CATHETERIZATION      CARDIAC CATHETERIZATION N/A 2016    Procedure: Coronary angiography;  Surgeon: Anastacio Flores MD;  Location: [facility] CATH INVASIVE LOCATION;  Service:     CATARACT EXTRACTION Left 2014    CERVICAL SPINE SURGERY      COLON RESECTION      COLONOSCOPY N/A 2017    Procedure: COLONOSCOPY into cecum;  Surgeon: Mynor Wynne MD;  Location: [facility] ENDOSCOPY;  Service:     CORONARY ANGIOPLASTY WITH STENT PLACEMENT  2014    ENDOSCOPY      EPIDURAL BLOCK      EYE SURGERY Right 2011    Dr. River Art; had bilat YAG peripheral iridotomy for glaucoma    HAND SURGERY Right 2024    HERNIA REPAIR  2009    also ; Dr. Carson; incisional hernias    LUMBAR  DISC SURGERY      LUMBAR FUSION      LUMBAR LAMINECTOMY      PROSTATE BIOPSY      SHOULDER ARTHROSCOPY W/ ROTATOR CUFF REPAIR Left 08/31/2016    Procedure: LEFT SHOULDER ARTHROSCOPY WITH ROTATOR CUFF REPAIR,  ACROMIOPLASTY, AND DEBRIDMENT OF LABRUM;  Surgeon: Guerrero Kruse MD;  Location: [facility];  Service:     SHOULDER SURGERY Left     SPINE SURGERY      lumbrosacral      General Information       Row Name 05/22/24 0857          Physical Therapy Time and Intention    Document Type evaluation;discharge evaluation/summary  -     Mode of Treatment individual therapy;physical therapy  -       Row Name 05/22/24 0857          General Information    Patient Profile Reviewed yes  -     Prior Level of Function independent:  -       Row Name 05/22/24 0857          Living Environment    People in Home alone  daughter staying  -       Row Name 05/22/24 0857          Home Main Entrance    Number of Stairs, Main Entrance none  -       Row Name 05/22/24 0857          Cognition    Orientation Status (Cognition) oriented x 4  -       Row Name 05/22/24 0857          Safety Issues, Functional Mobility    Impairments Affecting Function (Mobility) endurance/activity tolerance;strength;pain;range of motion (ROM)  -               User Key  (r) = Recorded By, (t) = Taken By, (c) = Cosigned By      Initials Name Provider Type     Evelina Major PT Physical Therapist                   Mobility       Row Name 05/22/24 0857          Bed Mobility    Bed Mobility supine-sit;sit-supine  -     Supine-Sit [place] (Bed Mobility) modified independence  -     Sit-Supine [place] (Bed Mobility) modified independence  -     Assistive Device (Bed Mobility) head of bed elevated;bed rails  -       Row Name 05/22/24 0857          Sit-Stand Transfer    Sit-Stand [place] (Transfers) standby assist;verbal cues  -     Assistive Device (Sit-Stand Transfers) walker, front-wheeled  -       Row Name 05/22/24  0857          Gait/Stairs (Locomotion)    [place] Level (Gait) standby assist;verbal cues  -     Assistive Device (Gait) walker, front-wheeled  -     Distance in Feet (Gait) 80  -     Deviations/Abnormal Patterns (Gait) violeta decreased;antalgic;gait speed decreased  -     Bilateral Gait Deviations forward flexed posture;heel strike decreased  -     Comment, (Gait/Stairs) Gait slow and mildly antalgic with patient able to progress to step through gait pattern. No overt LOB noted.  -               User Key  (r) = Recorded By, (t) = Taken By, (c) = Cosigned By      Initials Name Provider Type     Evelina Major PT Physical Therapist                   Obj/Interventions       Row Name 05/22/24 0858          Range of Motion Comprehensive    Comment, General Range of Motion L knee flexion 5-85 degrees  -       Row Name 05/22/24 0858          Strength Comprehensive (MMT)    General Manual Muscle Testing (MMT) Assessment lower extremity strength deficits identified  -     Comment, General Manual Muscle Testing (MMT) Assessment Generalized post op weakness  -       Row Name 05/22/24 0858          Motor Skills    Therapeutic Exercise other (see comments)  TKA post op exercises  -       Row Name 05/22/24 0858          Balance    Balance Assessment sitting static balance;sitting dynamic balance;sit to stand dynamic balance;standing static balance;standing dynamic balance  -     Static Sitting Balance independent  -     Dynamic Sitting Balance modified independence  -     Position, Sitting Balance sitting edge of bed  -     Sit to Stand Dynamic Balance standby assist;verbal cues  -     Static Standing Balance supervision  -     Dynamic Standing Balance standby assist  -     Position/Device Used, Standing Balance supported;walker, front-wheeled  -     Balance Interventions sitting;standing;sit to stand;supported;static;dynamic  -               User Key  (r) = Recorded By, (t) =  Taken By, (c) = Cosigned By      Initials Name Provider Type    Evelina Botello PT Physical Therapist                   Goals/Plan    No documentation.                  Clinical Impression       Row Name 05/22/24 0859          Pain    Pretreatment Pain Rating 9/10  -SM     Posttreatment Pain Rating 9/10  -     Pain Location - Side/Orientation Left  -     Pain Location - knee  -     Pain Intervention(s) Rest;Repositioned;Ambulation/increased activity  -       Row Name 05/22/24 0859          Plan of Care Review    Plan of Care Reviewed With patient  -     Outcome Evaluation Patient is an 78 y.o male who presented POD1 L TKA. Patient AOx4 supine in bed upon arrival. Patient lives alone but his daughter will be staying at d/c. Patient reports no ROGELIO. No equipment needs at d/c. Patient completed all bed mobility mod(I). Patient performed STS from EOB with SBA and VCs for hand placement. Patient ambulated 80ft with rwx and SBA. Gait slow and mildly antalgic with patient able to progress to step through gait pattern. No overt LOB noted. Patient educated in and performed TKA post op exercises while seated in the recliner prior to returning to bed. Patient planning to d/c home with HHPT follow up. Acute PT will sign off.  -       Row Name 05/22/24 0859          Therapy Assessment/Plan (PT)    Therapy Frequency (PT) evaluation only  -       Row Name 05/22/24 0859          Vital Signs    Pre Patient Position Supine  -     Intra Patient Position Standing  -SM     Post Patient Position Supine  -       Row Name 05/22/24 0859          Positioning and Restraints    Pre-Treatment Position in bed  -     Post Treatment Position bed  -SM     In Bed notified nsg;call light within reach;encouraged to call for assist;exit alarm on;fowlers  -               User Key  (r) = Recorded By, (t) = Taken By, (c) = Cosigned By      Initials Name Provider Type    Evelina Botello PT Physical Therapist                    Outcome Measures       Row Name 05/22/24 0901          How much help from another person do you currently need...    Turning from your back to your side while in flat bed without using bedrails? 4  -SM     Moving from lying on back to sitting on the side of a flat bed without bedrails? 4  -SM     Moving to and from a bed to a chair (including a wheelchair)? 4  -SM     Standing up from a chair using your arms (e.g., wheelchair, bedside chair)? 4  -SM     Climbing 3-5 steps with a railing? 3  -SM     To walk in hospital room? 4  -SM     AM-PAC 6 Clicks Score (PT) 23  -     Highest Level of Mobility Goal 7 --> Walk 25 feet or more  -               User Key  (r) = Recorded By, (t) = Taken By, (c) = Cosigned By      Initials Name Provider Type     Evelina Major PT Physical Therapist                                 Physical Therapy Education       Title: PT OT SLP Therapies (Done)       Topic: Physical Therapy (Done)       Point: Mobility training (Done)       Learning Progress Summary             Patient Acceptance, E, VU by  at 5/22/2024 0901                         Point: Home exercise program (Done)       Learning Progress Summary             Patient Acceptance, E, VU by  at 5/22/2024 0901                         Point: Body mechanics (Done)       Learning Progress Summary             Patient Acceptance, E, VU by  at 5/22/2024 0901                         Point: Precautions (Done)       Learning Progress Summary             Patient Acceptance, E, VU by  at 5/22/2024 0901                                         User Key       Initials Effective Dates Name Provider Type Discipline     05/02/22 -  Evelina Major PT Physical Therapist PT                  PT Recommendation and Plan     Plan of Care Reviewed With: patient  Outcome Evaluation: Patient is an 78 y.o male who presented POD1 L TKA. Patient AOx4 supine in bed upon arrival. Patient lives alone but his daughter will be staying at d/c.  Patient reports no ROGELIO. No equipment needs at d/c. Patient completed all bed mobility mod(I). Patient performed STS from EOB with SBA and VCs for hand placement. Patient ambulated 80ft with rwx and SBA. Gait slow and mildly antalgic with patient able to progress to step through gait pattern. No overt LOB noted. Patient educated in and performed TKA post op exercises while seated in the recliner prior to returning to bed. Patient planning to d/c home with HHPT follow up. Acute PT will sign off.     Time Calculation:         PT Charges       Row Name 05/22/24 0902             Time Calculation    Start Time 0810  -SM      Stop Time 0827  -SM      Time Calculation (min) 17 min  -SM      PT Received On 05/22/24  -         Time Calculation- PT    Total Timed Code Minutes- PT 12 minute(s)  -SM         Timed Charges    34616 - PT Therapeutic Exercise Minutes 4  -SM      16482 - PT Therapeutic Activity Minutes 8  -SM         Total Minutes    Timed Charges Total Minutes 12  -SM       Total Minutes 12  -SM                User Key  (r) = Recorded By, (t) = Taken By, (c) = Cosigned By      Initials Name Provider Type     Evelina Major, PT Physical Therapist                  Therapy Charges for Today       Code Description Service Date Service Provider Modifiers Qty    29079008929  PT THERAPEUTIC ACT EA 15 MIN 5/22/2024 Evelina Major, PT GP 1    [id number]  PT EVAL LOW COMPLEXITY 2 5/22/2024 Evelina Major, PT GP 1            PT G-Codes  AM-PAC 6 Clicks Score (PT): 23  PT Discharge Summary  Anticipated Discharge Disposition (PT): home with assist, home with home health    Evelina Major PT  5/22/2024

## 2024-05-22 NOTE — DISCHARGE SUMMARY
Patient Name: Ben Walsh  Patient YOB: 1945    Date of Admission:  5/21/2024  Date of Discharge:  5/22/2024  Discharge Diagnosis: SD ARTHRP KNE CONDYLE&PLATU MEDIAL&LAT COMPARTMENTS [63136] (Left TOTAL KNEE ARTHROPLASTY)  Presenting Problem/History of Present Illness: Primary osteoarthritis of left knee [M17.12]  Osteoarthritis of left knee [M17.12]  Admitting Physician: Dr Jairo Dorado  Consults:   Consults       Date and Time Order Name Status Description    5/21/2024  6:03 PM Inpatient Hospitalist Consult Completed             DETAILS OF HOSPITAL STAY:  Patient is a 78 y.o. male was admitted to the floor following the above procedure and underwent an uncomplicated hospital stay.  Patient did well with physical therapy and was ambulating well without problems.  On the day of discharge the wound was clean, dry and intact and calf was soft and nontender and Homans sign was negative.  Patient was tolerating  without problems.  Patient will be discharged home.    Condition on Discharge:  Stable    Vital Signs  Temp:  [97.7 °F (36.5 °C)-98.4 °F (36.9 °C)] 97.9 °F (36.6 °C)  Heart Rate:  [79-95] 89  Resp:  [16-21] 16  BP: ()/(67-90) 120/77    LABS:      Admission on 05/21/2024, Discharged on 05/22/2024   Component Date Value Ref Range Status    Glucose 05/21/2024 106  70 - 130 mg/dL Final    Glucose 05/21/2024 191 (H)  70 - 130 mg/dL Final    Glucose 05/21/2024 193 (H)  70 - 130 mg/dL Final    Glucose 05/21/2024 205 (H)  70 - 130 mg/dL Final    Hemoglobin 05/22/2024 9.8 (L)  13.0 - 17.7 g/dL Final    Hematocrit 05/22/2024 30.8 (L)  37.5 - 51.0 % Final    Glucose 05/22/2024 228 (H)  70 - 130 mg/dL Final       No results found.    Discharge Medications     Discharge Medications        New Medications        Instructions Start Date   aspirin 81 MG EC tablet   81 mg, Oral, Daily      HYDROcodone-acetaminophen 7.5-325 MG per tablet  Commonly known as: NORCO  Replaces: HYDROcodone-acetaminophen   MG per tablet   Take 1 tablet every 4 hours for mild to moderate pain.  May take 2 tablets every 4 hours for severe pain.      meloxicam 7.5 MG tablet  Commonly known as: MOBIC   7.5 mg, Oral, Daily      ondansetron ODT 4 MG disintegrating tablet  Commonly known as: ZOFRAN-ODT   4 mg, Oral, Every 6 Hours PRN      polyethylene glycol 17 g packet  Commonly known as: MIRALAX   17 g, Oral, 2 Times Daily             Changes to Medications        Instructions Start Date   clopidogrel 75 MG tablet  Commonly known as: PLAVIX  What changed: additional instructions   75 mg, Oral, Daily      Diclofenac Sodium 4 %, Topiramate 2 %, cloNIDine HCl 0.2 %, Lidocaine HCl 5 %  What changed: additional instructions   1-2 g, Topical, 3 to 4 Times Daily, Do not apply to operate knee      Tresiba FlexTouch 200 UNIT/ML solution pen-injector pen injection  Generic drug: Insulin Degludec  What changed:   how much to take  when to take this   INJECT 22 UNITS EVERY MORNING             Continue These Medications        Instructions Start Date   Accu-Chek Guide test strip  Generic drug: glucose blood   Dx code E11.29 testing bs 3 x day      accu-chek soft touch lancets   Dx code E11.29 testing bs 3 x day      amLODIPine 5 MG tablet  Commonly known as: NORVASC   5 mg, Oral, Daily      atorvastatin 40 MG tablet  Commonly known as: LIPITOR   40 mg, Oral, Nightly, For cholesterol      B-D ULTRAFINE III SHORT PEN 31G X 8 MM misc  Generic drug: Insulin Pen Needle   1 each, Does not apply, Daily      cephalexin 500 MG capsule  Commonly known as: KEFLEX   Take 1 capsule by mouth. PRIOR TO DENTAL PROCEDURES      Claritin 10 MG capsule  Generic drug: Loratadine   10 mg, Oral, As Needed      clobetasol 0.05 % external solution  Commonly known as: TEMOVATE   1 Application As Needed.      cyclobenzaprine 10 MG tablet  Commonly known as: FLEXERIL   10 mg, Oral, 3 Times Daily PRN      escitalopram 20 MG tablet  Commonly known as: LEXAPRO   20 mg, Oral,  Daily      esomeprazole 20 MG capsule  Commonly known as: nexIUM   20 mg, Oral, Every Morning Before Breakfast      fenofibrate 145 MG tablet  Commonly known as: TRICOR   145 mg, Oral, Daily      fluticasone 50 MCG/ACT nasal spray  Commonly known as: FLONASE   1 spray, Each Nare, Every Evening      FreeStyle Carter 3 Iraan device   1 each, Does not apply, Daily      FreeStyle Carter 3 Sensor misc   1 each, Does not apply, Every 14 Days      gabapentin 800 MG tablet  Commonly known as: NEURONTIN   800 mg, Oral, 3 Times Daily      glipizide 5 MG tablet  Commonly known as: Glucotrol   5 mg, Oral, Daily      levothyroxine 50 MCG tablet  Commonly known as: SYNTHROID, LEVOTHROID   50 mcg, Oral, Daily, for thyroid      metFORMIN  MG 24 hr tablet  Commonly known as: GLUCOPHAGE-XR   1,500 mg, Oral, Daily, With food for DMII      mupirocin 2 % ointment  Commonly known as: BACTROBAN   1 application , Topical, 3 Times Daily      Nitrostat 0.4 MG SL tablet  Generic drug: nitroglycerin   0.4 mg, Sublingual, Every 5 Minutes PRN      tamsulosin 0.4 MG capsule 24 hr capsule  Commonly known as: FLOMAX   0.4 mg, Oral, Daily      traZODone 50 MG tablet  Commonly known as: DESYREL    mg, Oral, Nightly      valsartan 160 MG tablet  Commonly known as: DIOVAN   160 mg, Oral, Daily      Vitamin D3 1.25 MG (54064 UT) capsule   50,000 Units, Oral, Every 7 Days      zolpidem 10 MG tablet  Commonly known as: AMBIEN   10 mg, Oral, Nightly PRN             Stop These Medications      HYDROcodone-acetaminophen  MG per tablet  Commonly known as: NORCO  Replaced by: HYDROcodone-acetaminophen 7.5-325 MG per tablet              Activity at Discharge:     Discharge Instructions: Patient is to continue with physical therapy exercises daily and continue working with the physical therapist as ordered. Patient may weight bear as tolerated. Apply ice regularly. Patient may ice for long periods of time as long as ice is not directly on the  skin. Patient instructed on frequent calf pumping exercises.  Patient also instructed on incentive spirometer during hospitalization and encouraged to continue to use at home regularly.    Dressing: The dressing is designed to be left in place until you return to the office in 2 weeks.  The suction unit should stop functioning at 7 days and the green light will switch to yellow.  At that point the suction unit and tubing can be disconnected at the port closest to the dressing.  The suction unit and tubing may be discarded.  You may shower immediately upon return home, you will need to turn the pump off by depressing the orange button once and then you may disconnect the pump and tubing at the connection port.  After showering, shake off the excess water and reattach the tubing.  Restart the pump by depressing the orange button one time and you will notice the green light flashing again.  If the dressing becomes disloged or saturated it should be changed. Please refer to the NELDA information sheet if you have any questions about the dressing.  If you have a home health nurse or therapist they can be contacted to assist with dressing change or repair. You may also call the NELDA dressing hotline for questions related to the dressing (1-762.203.3926). If there still other problems or questions related to the dressing despite these measures then you can contact Marycarmen at our office [phone].  If for some reason the NELDA dressing is removed, after 7 days the wound can be gently cleaned with antibacterial soap then allowed to dry and covered with a dry sterile dressing. The wound should be covered at all times except while showering.  Patient may change dressings daily and prn using sterile 4x4 and paper tape, and should call if any unusual drainage, redness or swelling.*  Follow up appointment in 2 weeks - patient to call the office at [phone] to schedule.  Patient will be discharged on plavix and aspirin for DVT  ppx    Discharge Diagnosis:    Osteoarthritis of left knee      Follow-up Appointments  Future Appointments   Date Time Provider Department Center   5/23/2024 To Be Determined Nya Miranda, PT [facility] None   6/3/2024 11:20 AM Jairo Ernst MD [facility] [address] [facility]   6/21/2024  2:00 PM Anabel Black APRN [facility]   10/21/2024  2:30 PM Justin Miller MD [facility]   1/21/2025  2:00 PM Angel Borges MD [facility]     Additional Instructions for the Follow-ups that You Need to Schedule       Ambulatory Referral to Home Health   As directed      Notify the office if dressing becomes dislodged or saturated. Do not change unless instructed by provider    Order Comments: Notify the office if dressing becomes dislodged or saturated. Do not change unless instructed by provider    Face to Face Visit Date: 5/22/2024   Follow-up provider for Plan of Care?: I will be treating the patient on an ongoing basis.  Please send me the Plan of Care for signature.   Follow-up provider: JAIRO ERNST [[id number]]   Reason/Clinical Findings: Postsurgical   Describe mobility limitations that make leaving home difficult: Patient requires the assistance of another to leave home   Nursing/Therapeutic Services Requested: Physical Therapy   PT orders: Total joint pathway   Frequency: 1 Week 1        Discharge Follow-up with Specialty: Orthopedics; 2 Weeks   As directed      Specialty: Orthopedics   Follow Up: 2 Weeks   Follow Up Details: Return to the office to see Dr. Jairo Ernst MD  05/22/24  15:04 EDT

## 2024-05-22 NOTE — CONSULTS
CONSULT NOTE    INTERNAL MEDICINE   [facility]       Patient Identification:  Name: Ben Walsh  Age: 78 y.o.  Sex: male  :  1945  MRN: [id number]             Date of Consultation:  24          Primary Care Physician: Jake Marcus MD                               Requesting Physician: dr hughes  Reason for Consultation:medical management    Chief Complaint:  78 year old gentleman was admitted after a knee replacement; we are asked to see him regarding medical management; he has a history of diabetes, hypertension, hyperlipidemia, hypothyroidism, cad, a fib and sleep apnea; he feels well postop; no visitors are present    History of Present Illness:   As above      Past Medical History:  Past Medical History:   Diagnosis Date    2-vessel coronary artery disease     Abnormal ECG     Abnormal liver function test     Acute pancreatitis     thought to be from the Januvia Rx,     ADD (attention deficit disorder)     Allergic rhinitis     Atherosclerotic heart disease of native coronary artery without angina pectoris     Atrial fibrillation and flutter     Atypical chest pain     suspect this is not cardiac related given that is not exertional. howevr he is not very functional and this makes the assessment somewhat difficult    Back pain     BPH (benign prostatic hypertrophy)     CAD (coronary artery disease)     Cancer 2024    REMOVED BASAL CELL FROM LT AER    Chronic pain     DDD (degenerative disc disease), lumbar     Depression     Diabetes     TYPE 2    Diabetic eye exam 2017    Diverticulitis of colon     Diverticulitis of colon with hemorrhage     Diverticulosis of colon     SAEED (dyspnea on exertion)     Elevated PSA     Essential hypertension     GERD (gastroesophageal reflux disease)     Glaucoma     Dr. Art    Grief     WIFE  2023 FROM [place]    Herpes zoster     HX    Hiatal hernia     History of transfusion     Hyperlipidemia      Hypertension     Hypothyroidism     Impacted cerumen     Incisional hernia     Insomnia     Joint pain     Knee pain     BOY    Low back pain     Microalbuminuria     Mitral regurgitation     Trace to mild    Muscle spasm     NAFLD (nonalcoholic fatty liver disease)     Numbness     SKYLAR (obstructive sleep apnea)     Osteoarthritis     Osteoarthritis, multiple sites     PFO (patent foramen ovale)     Primary snoring     Prostatic hyperplasia, benign localized, with obstruction     RCT (rotator cuff tear)     Rotator cuff tear     Tricuspid regurgitation     Trace to mild    Type 2 diabetes mellitus     Vitamin D deficiency      Past Surgical History:  Past Surgical History:   Procedure Laterality Date    BACK SURGERY      CARDIAC CATHETERIZATION      CARDIAC CATHETERIZATION N/A 03/11/2016    Procedure: Coronary angiography;  Surgeon: Anastacio Flores MD;  Location: [facility] CATH INVASIVE LOCATION;  Service:     CATARACT EXTRACTION Left 03/2014    CERVICAL SPINE SURGERY      COLON RESECTION      COLONOSCOPY N/A 03/06/2017    Procedure: COLONOSCOPY into cecum;  Surgeon: Mynor Wynne MD;  Location: [facility] ENDOSCOPY;  Service:     CORONARY ANGIOPLASTY WITH STENT PLACEMENT  07/2014    ENDOSCOPY      EPIDURAL BLOCK      EYE SURGERY Right 12/2011    Dr. River Art; had bilat YAG peripheral iridotomy for glaucoma    HAND SURGERY Right 02/2024    HERNIA REPAIR  03/2009    also 4/09; Dr. Carson; incisional hernias    LUMBAR DISC SURGERY      LUMBAR FUSION      LUMBAR LAMINECTOMY      PROSTATE BIOPSY      SHOULDER ARTHROSCOPY W/ ROTATOR CUFF REPAIR Left 08/31/2016    Procedure: LEFT SHOULDER ARTHROSCOPY WITH ROTATOR CUFF REPAIR,  ACROMIOPLASTY, AND DEBRIDMENT OF LABRUM;  Surgeon: Guerrero Kruse MD;  Location: [facility] OR [facility] [place];  Service:     SHOULDER SURGERY Left     SPINE SURGERY      lumbrosacral      Home Meds:  Medications Prior to Admission   Medication Sig Dispense Refill Last Dose    Accu-Chek Guide test strip Dx  code E11.29 testing bs 3 x day 300 each 1 5/21/2024    amLODIPine (NORVASC) 5 MG tablet Take 1 tablet by mouth Daily. 90 tablet 1 5/20/2024    atorvastatin (LIPITOR) 40 MG tablet Take 1 tablet by mouth Every Night. For cholesterol 90 tablet 1 5/20/2024    Cholecalciferol (Vitamin D3) 1.25 MG (10058 UT) capsule Take 1 capsule by mouth Every 7 (Seven) Days. (Patient taking differently: Take 1 capsule by mouth Every 7 (Seven) Days. TAKES ON SAT) 15 capsule 3 Past Week    Continuous Blood Gluc  (FreeStyle Carter 3 [place]) device Use 1 each Daily. 1 each 1 5/21/2024    Continuous Blood Gluc Sensor (FreeStyle Carter 3 Sensor) misc Use 1 each Every 14 (Fourteen) Days. 6 each 3 5/21/2024    cyclobenzaprine (FLEXERIL) 10 MG tablet TAKE ONE TABLET BY MOUTH THREE TIMES A DAY AS NEEDED FOR MUSCLE SPASMS 90 tablet 5 5/20/2024    escitalopram (LEXAPRO) 20 MG tablet Take 1 tablet by mouth Daily. 90 tablet 1 5/20/2024    esomeprazole (NexIUM) 20 MG capsule Take 1 capsule by mouth Every Morning Before Breakfast.   5/20/2024    fenofibrate (TRICOR) 145 MG tablet Take 1 tablet by mouth Daily. 90 tablet 1 5/20/2024    gabapentin (NEURONTIN) 800 MG tablet Take 1 tablet by mouth 3 (Three) Times a Day.   5/20/2024    glipizide (Glucotrol) 5 MG tablet Take 1 tablet by mouth Daily. 90 tablet 2 5/20/2024    HYDROcodone-acetaminophen (NORCO)  MG per tablet Take 1 tablet by mouth Every 8 (Eight) Hours As Needed.   5/20/2024    Insulin Degludec (Tresiba FlexTouch) 200 UNIT/ML solution pen-injector pen injection INJECT 22 UNITS EVERY MORNING (Patient taking differently: 22 Units Every Morning. INJECT 22 UNITS EVERY MORNING) 9 mL 11 5/20/2024    Insulin Pen Needle (B-D ULTRAFINE III SHORT PEN) 31G X 8 MM misc 1 each Daily. 100 each 3 5/21/2024    Lancets (ACCU-CHEK SOFT TOUCH) lancets Dx code E11.29 testing bs 3 x day 300 each 1 5/21/2024    levothyroxine (SYNTHROID, LEVOTHROID) 50 MCG tablet Take 1 tablet by mouth Daily. for thyroid  90 tablet 1 5/20/2024    Loratadine (Claritin) 10 MG capsule Take 1 capsule by mouth As Needed.   5/20/2024    metFORMIN ER (GLUCOPHAGE-XR) 500 MG 24 hr tablet Take 3 tablets by mouth Daily. With food for DMII 360 tablet 1 5/20/2024    tamsulosin (FLOMAX) 0.4 MG capsule 24 hr capsule Take 1 capsule by mouth Daily. 90 capsule 1 5/20/2024    traZODone (DESYREL) 50 MG tablet Take 1-2 tablets by mouth Every Night. 180 tablet 1 Past Week    valsartan (DIOVAN) 160 MG tablet TAKE 1 TABLET BY MOUTH DAILY 90 tablet 1 5/20/2024    zolpidem (AMBIEN) 10 MG tablet Take 1 tablet by mouth At Night As Needed for Sleep. 30 tablet 2 Past Week    cephalexin (KEFLEX) 500 MG capsule Take 1 capsule by mouth. PRIOR TO DENTAL PROCEDURES   Unknown    clobetasol (TEMOVATE) 0.05 % external solution 1 Application As Needed.   Unknown    clopidogrel (PLAVIX) 75 MG tablet Take 1 tablet by mouth Daily. (Patient taking differently: Take 1 tablet by mouth Daily. OK TO STOP 5 DAYS BEFORE SURGERY PER CARDIOLOGIST) 90 tablet 1 5/16/2024    Diclofenac Sodium 4 %, Topiramate 2 %, cloNIDine HCl 0.2 %, Lidocaine HCl 5 % Apply 1-2 g topically to the appropriate area as directed 3 (Three) to 4 (Four) times daily. 90 g 3 More than a month    fluticasone (FLONASE) 50 MCG/ACT nasal spray 1 spray by Each Nare route Every Evening.   Unknown    mupirocin (BACTROBAN) 2 % ointment Apply 1 application  topically to the appropriate area as directed 3 (Three) Times a Day. (Patient taking differently: Apply 1 Application topically to the appropriate area as directed As Needed.) 30 g 1 Unknown    NITROSTAT 0.4 MG SL tablet Place 1 tablet under the tongue Every 5 (Five) Minutes As Needed.   More than a month     Current Meds:     Current Facility-Administered Medications:     acetaminophen (TYLENOL) tablet 325 mg, 325 mg, Oral, Q4H PRN, Jairo Dorado MD    [START ON 5/22/2024] aspirin EC tablet 81 mg, 81 mg, Oral, Daily, Jairo Dorado MD    bisacodyl (DULCOLAX)  suppository 10 mg, 10 mg, Rectal, Daily PRN, Jairo Dorado MD    ceFAZolin 2000 mg IVPB in 100 mL NS (MBP), 2,000 mg, Intravenous, Q8H, Jairo Dorado MD    dextrose (D50W) (25 g/50 mL) IV injection 25 g, 25 g, Intravenous, Q15 Min PRN, Karin Laws MD    dextrose (GLUTOSE) oral gel 15 g, 15 g, Oral, Q15 Min PRN, Karin Laws MD    docusate sodium (COLACE) capsule 100 mg, 100 mg, Oral, BID, Jairo Dorado MD    glucagon (GLUCAGEN) injection 1 mg, 1 mg, Intramuscular, Q15 Min PRN, Karin Laws MD    HYDROcodone-acetaminophen (NORCO) 7.5-325 MG per tablet 1 tablet, 1 tablet, Oral, Q4H PRN, Jairo Dorado MD    HYDROcodone-acetaminophen (NORCO) 7.5-325 MG per tablet 2 tablet, 2 tablet, Oral, Q4H PRN, Jairo Dorado MD, 2 tablet at 05/21/24 1826    insulin lispro (HUMALOG/ADMELOG) injection 2-7 Units, 2-7 Units, Subcutaneous, 4x Daily AC & at Bedtime, Karin Laws MD    ketorolac (TORADOL) injection 15 mg, 15 mg, Intravenous, Q6H PRN, Jairo Dorado MD    lactated ringers infusion, 9 mL/hr, Intravenous, Continuous, Jairo Dorado MD, Stopped at 05/21/24 1944    lactated ringers infusion, 100 mL/hr, Intravenous, Continuous, Jairo Dorado MD, Last Rate: 100 mL/hr at 05/21/24 1819, 100 mL/hr at 05/21/24 1819    magnesium hydroxide (MILK OF MAGNESIA) suspension 10 mL, 10 mL, Oral, Daily PRN, Jairo Dorado MD    meloxicam (MOBIC) tablet 7.5 mg, 7.5 mg, Oral, Daily, Jairo Dorado MD    ondansetron ODT (ZOFRAN-ODT) disintegrating tablet 4 mg, 4 mg, Oral, Q6H PRN **OR** ondansetron (ZOFRAN) injection 4 mg, 4 mg, Intravenous, Q6H PRN, Jairo Dorado MD    [START ON 5/22/2024] pantoprazole (PROTONIX) EC tablet 40 mg, 40 mg, Oral, Q AM, Jairo Dorado MD    polyethylene glycol (MIRALAX) packet 17 g, 17 g, Oral, BID, Jairo Dorado MD  Allergies:  Allergies   Allergen Reactions    Onglyza [Saxagliptin] Other (See Comments)     pancreatitis     Trulicity [Dulaglutide] Unknown - Low Severity     History Pancreatitis    Other Other (See Comments)     PLASTIC TAPE CAUSES RASH    Vibramycin [Doxycycline] Nausea And Vomiting     Social History:   Social History     Socioeconomic History    Marital status:    Tobacco Use    Smoking status: Never    Smokeless tobacco: Never    Tobacco comments:     caffeine use iced tea all day long, 3 cups coffee daily   Vaping Use    Vaping status: Never Used   Substance and Sexual Activity    Alcohol use: No    Drug use: No    Sexual activity: Defer     Family History:  Family History   Problem Relation Age of Onset    Heart failure Mother     Hypertension Mother     Hyperlipidemia Mother     Cancer Mother     Heart attack Mother     Heart disease Mother     Stroke Mother     Alcohol abuse Father     Cancer Father         prostate    Heart failure Father     Prostate cancer Father     Heart disease Father     Stroke Father     Heart attack Father     Cancer Sister     Lung cancer Sister     Heart disease Sister     Other Brother         demyelinating disease of central nervous system    Cancer Brother     Heart failure Brother     Lung cancer Brother     Malig Hyperthermia Neg Hx           Review of Systems  See history of present illness and past medical history.  Patient denies headache, dizziness, syncope, falls, trauma, change in vision, change in hearing, change in taste, changes in weight, changes in appetite, focal weakness, numbness, or paresthesia.  Patient denies chest pain, palpitations, dyspnea, orthopnea, PND, cough, sinus pressure, rhinorrhea, epistaxis, hemoptysis, nausea, vomiting,hematemesis, diarrhea, constipation or hematochezia. Denies cold or heat intolerance, polydipsia, polyuria, polyphagia. Denies hematuria, pyuria, dysuria, hesitancy, frequency or urgency. Denies consumption of raw and under cooked meats foods or change in water source.  Denies fever, chills, sweats, night sweats.    "      Vitals:   /68 (BP Location: Left arm, Patient Position: Lying)   Pulse 87   Temp 98.1 °F (36.7 °C) (Oral)   Resp 16   Ht 170.2 cm (67.01\")   Wt 81.2 kg (179 lb 1.6 oz)   SpO2 91%   BMI 28.04 kg/m²   I/O:   Intake/Output Summary (Last 24 hours) at 5/21/2024 2034  Last data filed at 5/21/2024 1730  Gross per 24 hour   Intake 900 ml   Output 475 ml   Net 425 ml     Exam:  General Appearance:    Alert, cooperative, no distress, appears stated age   Head:    Normocephalic, without obvious abnormality, atraumatic   Eyes:    PERRL, conjunctivae/corneas clear, EOM's intact, both eyes   Ears:    Normal external ear canals, both ears   Nose:   Nares normal, septum midline, mucosa normal, no drainage    or sinus tenderness   Throat:   Lips, tongue, gums normal; oral mucosa pink and moist   Neck:   Supple, symmetrical, trachea midline, no adenopathy;     thyroid:  no enlargement/tenderness/nodules; no carotid    bruit or JVD   Back:     Symmetric, no curvature, ROM normal, no CVA tenderness   Lungs:     Clear to auscultation bilaterally, respirations unlabored   Chest Wall:    No tenderness or deformity    Heart:    Regular rate and rhythm, S1 and S2 normal, no murmur, rub   or gallop   Abdomen:     Soft, nontender, bowel sounds active all four quadrants,     no masses, no hepatomegaly, no splenomegaly   Extremities:   Extremities normal, atraumatic, no cyanosis or edema                Data Review:  Labs in chart were reviewed.  No results found for: \"WBC\", \"HGB\", \"HCT\", \"PLT\"  No results found for: \"NA\", \"K\", \"CL\", \"CO2\", \"BUN\", \"CREATININE\", \"GLUCOSE\"  No results found for: \"CALCIUM\", \"MG\", \"PHOS\"            Imaging Results (Last 7 Days)       Procedure Component Value Units Date/Time    XR Knee 1 or 2 View Left [[id number]] Collected: 05/21/24 1724     Updated: 05/21/24 1730    Narrative:      XR KNEE 1 OR 2 VW LEFT-5/21/2024     HISTORY: Postop left knee arthroplasty.     Distal femoral and proximal tibia " components of the left knee prosthesis  are well seated with no abnormal surrounding bony lucencies. Soft tissue  air is seen. No unexpected findings are noted.       Impression:      1. Satisfactory postoperative appearance of the left knee.        This report was finalized on 2024 5:27 PM by Dr. Deonte Curtis M.D on Workstation: [id number]             Past Medical History:   Diagnosis Date    2-vessel coronary artery disease     Abnormal ECG     Abnormal liver function test     Acute pancreatitis     thought to be from the uvia Rx, 2014    ADD (attention deficit disorder)     Allergic rhinitis     Atherosclerotic heart disease of native coronary artery without angina pectoris     Atrial fibrillation and flutter     Atypical chest pain     suspect this is not cardiac related given that is not exertional. howevr he is not very functional and this makes the assessment somewhat difficult    Back pain     BPH (benign prostatic hypertrophy)     CAD (coronary artery disease)     Cancer 2024    REMOVED BASAL CELL FROM LT AER    Chronic pain     DDD (degenerative disc disease), lumbar     Depression     Diabetes     TYPE 2    Diabetic eye exam 2017    Diverticulitis of colon     Diverticulitis of colon with hemorrhage     Diverticulosis of colon     SAEED (dyspnea on exertion)     Elevated PSA     Essential hypertension     GERD (gastroesophageal reflux disease)     Glaucoma     Dr. Art    Grief     WIFE  2023 FROM [place]    Herpes zoster     HX    Hiatal hernia     History of transfusion     Hyperlipidemia     Hypertension     Hypothyroidism     Impacted cerumen     Incisional hernia     Insomnia     Joint pain     Knee pain     BOY    Low back pain     Microalbuminuria     Mitral regurgitation     Trace to mild    Muscle spasm     NAFLD (nonalcoholic fatty liver disease)     Numbness     SKYLAR (obstructive sleep apnea)     Osteoarthritis     Osteoarthritis, multiple sites     PFO (patent  foramen ovale)     Primary snoring     Prostatic hyperplasia, benign localized, with obstruction     RCT (rotator cuff tear)     Rotator cuff tear     Tricuspid regurgitation     Trace to mild    Type 2 diabetes mellitus     Vitamin D deficiency        Assessment:  Active Hospital Problems    Diagnosis  POA    **Osteoarthritis of left knee [M17.12]  Unknown      Resolved Hospital Problems   No resolved problems to display.   Diabetes  Hypertension  Anemia  Hyperlipidemia  Hypothyroidism  Sleep apnea  Cad   A fib      Plan:  Accu checks, sliding scale  Trend bp  Labs in the am  Will follow with you  Thanks for involving us in his care    Karin Laws MD   5/21/2024  20:34 EDT

## 2024-05-22 NOTE — PROGRESS NOTES
[facility] will follow post hospital as requested. Patient agreeable to services. Contact information confirmed.

## 2024-05-23 ENCOUNTER — HOME CARE VISIT (OUTPATIENT)
Dept: HOME HEALTH SERVICES | Facility: HOME HEALTHCARE | Age: 79
End: 2024-05-23
Payer: MEDICARE

## 2024-05-23 ENCOUNTER — HOME CARE VISIT (OUTPATIENT)
Dept: HOME HEALTH SERVICES | Facility: HOME HEALTHCARE | Age: 79
End: 2024-05-23
Payer: COMMERCIAL

## 2024-05-23 ENCOUNTER — TRANSITIONAL CARE MANAGEMENT TELEPHONE ENCOUNTER (OUTPATIENT)
Dept: CALL CENTER | Facility: HOSPITAL | Age: 79
End: 2024-05-23
Payer: MEDICARE

## 2024-05-23 VITALS
SYSTOLIC BLOOD PRESSURE: 122 MMHG | OXYGEN SATURATION: 94 % | TEMPERATURE: 78 F | DIASTOLIC BLOOD PRESSURE: 61 MMHG | RESPIRATION RATE: 18 BRPM | HEART RATE: 97 BPM

## 2024-05-23 PROCEDURE — G0151 HHCP-SERV OF PT,EA 15 MIN: HCPCS

## 2024-05-23 NOTE — OUTREACH NOTE
Call Center TCM Note      Flowsheet Row Responses   [facility] patient discharged from? [place]   Does the patient have one of the following disease processes/diagnoses(primary or secondary)? Total Joint Replacement   Joint surgery performed? Knee   TCM attempt successful? Yes   Call start time 0955   Call end time 1002   Has the patient been back in either the hospital or Emergency Department since discharge? No   Discharge diagnosis Left total knee arthroplasty   Does the patient have all medications related to this admission filled (includes all antibiotics, pain medications, etc.) Yes   Is the patient taking all medications as directed (includes completed medication regime)? Yes   Is the patient able to teach back alternate methods of pain control? Ice   Comments HOSP DC FU appt 6/4/24 130 pm.   Does the patient have an appointment with their PCP within 7-14 days of discharge? Yes   What is the Home health agency?  [facility]   Has home health visited the patient within 72 hours of discharge? Call prior to 72 hours   Psychosocial issues? No   Has the patient began therapy sessions (either in the home or as an out patient)? No   If the patient has started attending therapy, what post op day did they begin to attend (either in home or as an out patient)?   today   Does the patient have a wound vac in place? Yes   Has the patient fallen since discharge? No   Did the patient receive a copy of their discharge instructions? Yes   Nursing interventions Reviewed instructions with patient   What is the patient's perception of their functional status since discharge? Improving   Is the patient able to teach back signs and symptoms of infection? Temp >100.4 for 24h or longer, Incisional drainage, Blisters around incision, Increased swelling or redness around incision (not associated with surgical edema), Severe discomfort or pain, Shortness of breath or chest pain   Is the patient able to teach back how to prevent  infection? No tub baths, hot tub or swimming, Shower only as directed by surgeon, Eat well-balanced diet   Is the patient able to teach back signs and symptoms of DVT? Redness in calf, Area hot to touch, Severe pain in calf, Swelling in calf, Shortness of breath or chest pain   Is the patient able to teach back home safety measures? Ability to shower   If the patient is a current smoker, are they able to teach back resources for cessation? Not a smoker   Is the patient/caregiver able to teach back the hierarchy of who to call/visit for symptoms/problems? PCP, Specialist, Home health nurse, Urgent Care, ED, 911 Yes   TCM call completed? Yes   Wrap up additional comments Pt doing ok. Pain as expected. reminded  Pt to use pain meds as ordered and not let the pain get out of control. Pt .VU. PCP appt set with agreement of Pt.   Call end time 1002            Rika Hills RN    5/23/2024, 10:02 EDT

## 2024-05-23 NOTE — CASE MANAGEMENT/SOCIAL WORK
Case Management Discharge Note      Final Note: dc home with [facility]     Provided Post Acute Provider List?: Yes  Post Acute Provider List: Home Health    Selected Continued Care - Discharged on 5/22/2024 Admission date: 5/21/2024 - Discharge disposition: Home or Self Care      Destination    No services have been selected for the patient.                Durable Medical Equipment    No services have been selected for the patient.                Dialysis/Infusion    No services have been selected for the patient.                Home Medical Care Coordination complete.      Service Provider Selected Services Address Phone Fax Patient Preferred     [facility] [address] [address] [phone] --              Therapy    No services have been selected for the patient.                Community Resources    No services have been selected for the patient.                Community & DME    No services have been selected for the patient.                    Transportation Services  Private: Car    Final Discharge Disposition Code: 06 - home with home health care

## 2024-05-23 NOTE — HOME HEALTH
"Reason for referral/Focus of Care:  79 yo M referred to home health physical therapy with decreased ability to transfer and amb related to recent L TKR    Subjective: \"I didn't sleep very well last night.\" per patient    Patient's PT Goal: \"get back to taking care of my dog.\" per patient    Pertinent Medical History: OA, allergic rhinitis, DDD, ADD, BPH, chronic pain, depression, GERD, HTN, hyperlipidemia, insomnia, type 2 diabetes mellitus, h/o carpal tunnel surgery R wrist    Previous level of function: IND with amb without device, IND with ADLs, driving    Social Environment/DME/Potential Barriers for Goal Attainment: lives in patio home. Daughter currently staying with him to assist. Has daughter who is PA who works for [employer] that also provides assist. Has walk in shower, FWW, cane, shower seat, bed rail, grab bars.    Skin Integrity/wound status: see wound care screen for details.    Code Status: Full    Medication issues/concerns: none identified    HB status: yes. See homebound screen for details.    Problems Identified: see Care Plan    Functional Status/Fall Risk/Safety: see PT evaluation/care plan    POC notification to Dr. Dorado     on  5/23/24   via case communication.    Assessment: Skilled physical therapy is needed for 2w1, 3w2 due to decreased ability to transfer and amb related to recent L TKR for evaluation, gait training, ther ex, HEP, fall prevention, pain/edema management, and safe activity progression    Plan for next visit:gait training, ther ex, pain/edema management instruction"

## 2024-05-24 ENCOUNTER — HOME CARE VISIT (OUTPATIENT)
Dept: HOME HEALTH SERVICES | Facility: HOME HEALTHCARE | Age: 79
End: 2024-05-24
Payer: COMMERCIAL

## 2024-05-24 VITALS
OXYGEN SATURATION: 94 % | SYSTOLIC BLOOD PRESSURE: 146 MMHG | RESPIRATION RATE: 18 BRPM | TEMPERATURE: 97.2 F | HEART RATE: 81 BPM | DIASTOLIC BLOOD PRESSURE: 61 MMHG

## 2024-05-24 PROCEDURE — G0151 HHCP-SERV OF PT,EA 15 MIN: HCPCS

## 2024-05-24 NOTE — HOME HEALTH
"Subjective: \"Things got a lot worse last night when the block wore off.\" per patient    no new med changes  no recent falls    Skill/education provided: see interventions for details    Patient/caregiver response: see interventions for details    Assessment: patient requiring assist with SLR today due to increased pain after block wore off. Family assisting with pain meds on a regular schedule.    Plan for next visit: gait training, ther ex, progress ROM, progress to standing ther ex as able"

## 2024-05-28 ENCOUNTER — TELEPHONE (OUTPATIENT)
Dept: ORTHOPEDIC SURGERY | Facility: HOSPITAL | Age: 79
End: 2024-05-28
Payer: MEDICARE

## 2024-05-28 ENCOUNTER — HOME CARE VISIT (OUTPATIENT)
Dept: HOME HEALTH SERVICES | Facility: HOME HEALTHCARE | Age: 79
End: 2024-05-28
Payer: COMMERCIAL

## 2024-05-28 VITALS
OXYGEN SATURATION: 93 % | TEMPERATURE: 97.1 F | SYSTOLIC BLOOD PRESSURE: 94 MMHG | RESPIRATION RATE: 18 BRPM | HEART RATE: 94 BPM | DIASTOLIC BLOOD PRESSURE: 59 MMHG

## 2024-05-28 PROCEDURE — G0151 HHCP-SERV OF PT,EA 15 MIN: HCPCS

## 2024-05-28 NOTE — HOME HEALTH
"Subjective: \"My right hip is what is really bothering me now.\" per patient  Patient answered door holding onto large dog. Walker located back in bedroom.     no new med changes  no recent falls    Skill/education provided: see interventions for details    Patient/caregiver response: see interventions for details    Assessment: patient agrees to try to use walker more to decrease R hip pain. Reports fair compliance with HEP. Patient's large dog that is deaf has now returned home which may put patient at increased fall risk    Plan for next visit: progress standing ther ex, HEP progression, pain/edema management"

## 2024-05-28 NOTE — TELEPHONE ENCOUNTER
Attempted to reach Mr. Walsh to see how he is doing as he is 1 week SP LTK. Message left at this time.

## 2024-05-29 ENCOUNTER — HOME CARE VISIT (OUTPATIENT)
Dept: HOME HEALTH SERVICES | Facility: HOME HEALTHCARE | Age: 79
End: 2024-05-29
Payer: COMMERCIAL

## 2024-05-29 VITALS
OXYGEN SATURATION: 94 % | DIASTOLIC BLOOD PRESSURE: 71 MMHG | RESPIRATION RATE: 18 BRPM | TEMPERATURE: 97.5 F | HEART RATE: 96 BPM | SYSTOLIC BLOOD PRESSURE: 137 MMHG

## 2024-05-29 PROCEDURE — G0151 HHCP-SERV OF PT,EA 15 MIN: HCPCS

## 2024-05-29 NOTE — HOME HEALTH
"Subjective: \"I was just getting ready to call you to cancel my visit for today because my right hip is really hurting.\" per patient    no new med changes  no recent falls    Skill/education provided: see interventions for details    Patient/caregiver response: see interventions for details    Assessment: patient with reports of being limited mostly by new onset R hip pain. Reports he is recovering from R wrist surgery and is unable to put weight on R UE fully on walker so R hip is taking a lot of weight s/p L TKR. Patient reports he will call Dr. Dorado later today about R hip pain. L knee pain appears to be appropriate s/p L TKR at this stage.    Plan for next visit: gait training, progress HEP as able, progress L knee ROM beyond 95"

## 2024-05-30 ENCOUNTER — TELEPHONE (OUTPATIENT)
Dept: ORTHOPEDIC SURGERY | Facility: CLINIC | Age: 79
End: 2024-05-30

## 2024-05-30 DIAGNOSIS — M17.12 PRIMARY OSTEOARTHRITIS OF LEFT KNEE: Primary | ICD-10-CM

## 2024-05-30 RX ORDER — OXYCODONE AND ACETAMINOPHEN 7.5; 325 MG/1; MG/1
TABLET ORAL
Qty: 42 TABLET | Refills: 0 | Status: SHIPPED | OUTPATIENT
Start: 2024-05-30

## 2024-05-30 NOTE — TELEPHONE ENCOUNTER
Caller: Ben Walsh    Relationship: Self    Best call back number: [phone]    What is the best time to reach you: ANY     Who are you requesting to speak with (clinical staff, provider,  specific staff member): PROVIDER     Do you know the name of the person who called: YES     What was the call regarding: PATIENT STATES   CALLED THE PATIENT- NEEDING A CALL BACK

## 2024-05-31 ENCOUNTER — HOME CARE VISIT (OUTPATIENT)
Dept: HOME HEALTH SERVICES | Facility: HOME HEALTHCARE | Age: 79
End: 2024-05-31
Payer: COMMERCIAL

## 2024-05-31 VITALS
OXYGEN SATURATION: 92 % | TEMPERATURE: 97.5 F | HEART RATE: 99 BPM | RESPIRATION RATE: 18 BRPM | DIASTOLIC BLOOD PRESSURE: 59 MMHG | SYSTOLIC BLOOD PRESSURE: 99 MMHG

## 2024-05-31 DIAGNOSIS — Z96.652 S/P TKR (TOTAL KNEE REPLACEMENT), LEFT: Primary | ICD-10-CM

## 2024-05-31 PROCEDURE — G0151 HHCP-SERV OF PT,EA 15 MIN: HCPCS

## 2024-05-31 NOTE — HOME HEALTH
"Subjective: \"Things feel much better today.\" per patient    new dose of hydrocodone 7.5/325 entered into med list  no recent falls    Skill/education provided: see interventions for details    Patient/caregiver response: see interventions for details    Assessment: patient with reports of improved pain control in the R hip and L knee today and able to participate fully with ther ex. Patient agrees to performing HEP twice daily over the weekend    Plan for next visit: gait training, progress L knee ROM in sitting beyond 99, cane training as able"

## 2024-06-03 ENCOUNTER — HOME CARE VISIT (OUTPATIENT)
Dept: HOME HEALTH SERVICES | Facility: HOME HEALTHCARE | Age: 79
End: 2024-06-03
Payer: COMMERCIAL

## 2024-06-03 VITALS
SYSTOLIC BLOOD PRESSURE: 125 MMHG | TEMPERATURE: 97.6 F | DIASTOLIC BLOOD PRESSURE: 62 MMHG | HEART RATE: 81 BPM | OXYGEN SATURATION: 94 % | RESPIRATION RATE: 18 BRPM

## 2024-06-03 PROCEDURE — G0151 HHCP-SERV OF PT,EA 15 MIN: HCPCS

## 2024-06-03 NOTE — HOME HEALTH
"Subjective: \"I tried to cancel my appointment with you today. My dog bumped into my surgery leg and it is really sore.\" per patient    no new med changes  no recent falls    Skill/education provided: see interventions for details    Patient/caregiver response: see interventions for details    Assessment: patient continues to not use any assistive device for amb and large unruly dog often gets in his way. Therapist recommending patient use walker to protect himself from dog or finding someone who can assist him with taking care of the dog. Patient reports increased pain today but also reports he has not had any pain medication since last night. Agrees to try to take pain meds on a regular schedule.    Therapist assisted patient with scheduling outpatient therapy services 6/10 at [facility]    Plan for next visit: progress knee ROM, pain/edmea management instruction, fall prevention"

## 2024-06-05 ENCOUNTER — HOME CARE VISIT (OUTPATIENT)
Dept: HOME HEALTH SERVICES | Facility: HOME HEALTHCARE | Age: 79
End: 2024-06-05
Payer: COMMERCIAL

## 2024-06-05 VITALS
RESPIRATION RATE: 18 BRPM | TEMPERATURE: 97.4 F | HEART RATE: 86 BPM | DIASTOLIC BLOOD PRESSURE: 60 MMHG | OXYGEN SATURATION: 93 % | SYSTOLIC BLOOD PRESSURE: 141 MMHG

## 2024-06-05 PROCEDURE — G0151 HHCP-SERV OF PT,EA 15 MIN: HCPCS

## 2024-06-05 NOTE — HOME HEALTH
"Subjective: \"Things are going pretty well today.\" per patient    no new med changes  no recent falls    Skill/education provided: see interventions for details    Patient/caregiver response: see interventions for details    Assessment:patient reports good pain control today. Able to progress to cane amb and outside amb with no issues    Plan for next visit: probable agency discharge, ther ex, ROM progression"

## 2024-06-06 ENCOUNTER — OFFICE VISIT (OUTPATIENT)
Dept: ORTHOPEDIC SURGERY | Facility: CLINIC | Age: 79
End: 2024-06-06
Payer: MEDICARE

## 2024-06-06 VITALS — BODY MASS INDEX: 25.14 KG/M2 | TEMPERATURE: 98 F | HEIGHT: 70 IN | WEIGHT: 175.6 LBS

## 2024-06-06 DIAGNOSIS — Z96.652 S/P TKR (TOTAL KNEE REPLACEMENT), LEFT: Primary | ICD-10-CM

## 2024-06-06 PROCEDURE — 99024 POSTOP FOLLOW-UP VISIT: CPT | Performed by: ORTHOPAEDIC SURGERY

## 2024-06-06 PROCEDURE — 1159F MED LIST DOCD IN RCRD: CPT | Performed by: ORTHOPAEDIC SURGERY

## 2024-06-06 PROCEDURE — 1160F RVW MEDS BY RX/DR IN RCRD: CPT | Performed by: ORTHOPAEDIC SURGERY

## 2024-06-06 RX ORDER — MONTELUKAST SODIUM 10 MG/1
TABLET ORAL
COMMUNITY
Start: 2024-05-21

## 2024-06-06 NOTE — PROGRESS NOTES
Ben Walsh : 1945 MRN: [id number] DATE: 2024    DIAGNOSIS: 2 week follow up left total knee      SUBJECTIVE:Patient returns today for 2 week follow up of left total knee replacement. Patient reports doing well with no unusual complaints. Appears to be progressing appropriately.    OBJECTIVE:   Exam:. The incision is healing appropriately. No sign of infection. Range of motion is progressing as expected. The calf is soft and nontender with a negative Homans sign. 6-100    ASSESSMENT: 2 week status post left knee replacement.    PLAN: 1) Dressing removed and steri strips applied   2)  PT (outpatient)    6-week goals active range of motion 0-120.  Full extension is most important motion.  Walk without a limp or device.  Stairs with a reciprocal pattern and no railing assist to ascend.  Rail to use okay to descend.  Single leg stance for more than 10 seconds.  Stand from a 17 inch chair without upper extremity assistance.  No extension lag with straight leg raise.    For physical therapy no resistance on cardio machines for 3 months.  No weight machines for 3 months.  No treadmill for 6 weeks.  No cuff weights greater than 2 pounds in the initial 6 weeks postoperatively.  No more than 5 pounds in the first 3 months.       3) Continue ice PRN   4) aspirin 81 mg orally every day for 1 month   5) Follow up in 4 weeks with repeat Xrays of left knee (3views)    Jairo Dorado MD  2024

## 2024-06-06 NOTE — PATIENT INSTRUCTIONS
6-week goals active range of motion 0-120.  Full extension is most important motion.  Walk without a limp or device.  Stairs with a reciprocal pattern and no railing assist to ascend.  Rail to use okay to descend.  Single leg stance for more than 10 seconds.  Stand from a 17 inch chair without upper extremity assistance.  No extension lag with straight leg raise.    For physical therapy no resistance on cardio machines for 3 months.  No weight machines for 3 months.  No treadmill for 6 weeks.  No cuff weights greater than 2 pounds in the initial 6 weeks postoperatively.  No more than 5 pounds in the first 3 months.

## 2024-06-07 ENCOUNTER — HOME CARE VISIT (OUTPATIENT)
Dept: HOME HEALTH SERVICES | Facility: HOME HEALTHCARE | Age: 79
End: 2024-06-07
Payer: COMMERCIAL

## 2024-06-07 VITALS
RESPIRATION RATE: 18 BRPM | OXYGEN SATURATION: 98 % | HEART RATE: 95 BPM | DIASTOLIC BLOOD PRESSURE: 70 MMHG | TEMPERATURE: 97.3 F | SYSTOLIC BLOOD PRESSURE: 105 MMHG

## 2024-06-07 DIAGNOSIS — M17.12 PRIMARY OSTEOARTHRITIS OF LEFT KNEE: ICD-10-CM

## 2024-06-07 PROCEDURE — G0151 HHCP-SERV OF PT,EA 15 MIN: HCPCS

## 2024-06-07 NOTE — HOME HEALTH
Discharge Summary:    Patient was seen for PT discharge today due to PT goals met see interventions/goal status for details. Patient was seen for a total of 8  visits for L TKR       for evaluation, gait training, transfer training, home safety, fall prevention, and pain/edema management. Currently patient is able to to amb IND/SUP with FWW, IND with transfers, IND with HEP with written handouts, IND with pain/edema management and fall prevention. Patient agrees with PT discharge. L knee ROM in sitting 5-105 with c/o pain at end range of motion    Home environment: lives alone with family checking in on him as needed.    Follow up: 6/10 outpatient physical therapy Yash Cotter

## 2024-06-09 RX ORDER — OXYCODONE AND ACETAMINOPHEN 7.5; 325 MG/1; MG/1
1 TABLET ORAL EVERY 4 HOURS PRN
Qty: 42 TABLET | Refills: 0 | Status: SHIPPED | OUTPATIENT
Start: 2024-06-09

## 2024-06-17 DIAGNOSIS — M17.12 PRIMARY OSTEOARTHRITIS OF LEFT KNEE: ICD-10-CM

## 2024-06-17 RX ORDER — OXYCODONE AND ACETAMINOPHEN 7.5; 325 MG/1; MG/1
1 TABLET ORAL EVERY 6 HOURS PRN
Qty: 30 TABLET | Refills: 0 | Status: SHIPPED | OUTPATIENT
Start: 2024-06-17

## 2024-06-17 NOTE — TELEPHONE ENCOUNTER
Caller: Ben Walsh    Relationship: Self    Best call back number: [phone]    Requested Prescriptions:   oxyCODONE-acetaminophen (PERCOCET) 7.5-325 MG per tablet [[id number]]     Pharmacy where request should be sent:    [facility]    Last office visit with prescribing clinician: Visit date not found   Last telemedicine visit with prescribing clinician: Visit date not found   Next office visit with prescribing clinician: Visit date not found     Additional details provided by patient:     Does the patient have less than a 3 day supply:  [x] Yes  [] No    Would you like a call back once the refill request has been completed: [x] Yes [] No    If the office needs to give you a call back, can they leave a voicemail: [x] Yes [] No    Cami Rebolledo   06/17/24 11:16 EDT

## 2024-06-28 ENCOUNTER — OFFICE VISIT (OUTPATIENT)
Dept: ENDOCRINOLOGY | Age: 79
End: 2024-06-28
Payer: MEDICARE

## 2024-06-28 VITALS
TEMPERATURE: 96.9 F | WEIGHT: 174.2 LBS | HEIGHT: 70 IN | DIASTOLIC BLOOD PRESSURE: 82 MMHG | OXYGEN SATURATION: 96 % | SYSTOLIC BLOOD PRESSURE: 132 MMHG | BODY MASS INDEX: 24.94 KG/M2 | HEART RATE: 90 BPM

## 2024-06-28 DIAGNOSIS — E78.5 HYPERLIPIDEMIA, UNSPECIFIED HYPERLIPIDEMIA TYPE: ICD-10-CM

## 2024-06-28 DIAGNOSIS — I10 ESSENTIAL HYPERTENSION: ICD-10-CM

## 2024-06-28 DIAGNOSIS — E11.42 TYPE 2 DIABETES MELLITUS WITH PERIPHERAL NEUROPATHY: Primary | ICD-10-CM

## 2024-06-28 NOTE — PATIENT INSTRUCTIONS
Decrease Tresiba 20 units every morning  Stop glipizide 5 mg daily due to hypoglycemia   Continue with metformin  mg 2 tablets daily

## 2024-06-28 NOTE — PROGRESS NOTES
Chief Complaint  Chief Complaint   Patient presents with    Diabetes     Type 2: Pt carter is attached, is up to date on eye exam, no hx of retinopathy or neuropathy.         Subjective          History of Present Illness    Ben Walsh 79 y.o. presents for a follow-up for Type 2 Diabetes    He was diagnosed with diabetes in 2013    Pt is on the following medications for their diabetes: Tresiba 22 units every morning, glipizide 5 mg daily and metformin  mg 2 tablets daily          Pt has history of pancreatitis - therefore avoid DPP4 and GLP1 class  Jardiance stopped due to recurrent UTIs  Metformin  mg 2 tablets twice daily caused diarrhea        Pt complains of numbness and tingling in feet and vision changes    Denies diarrhea, constipation, chest pain and shortness of breath    Pt denies a history of diabetic retinopathy.  Last eye exam was 07/23    Pt does not have nephropathy.  Patient is currently taking ARB    Pt does have neuropathy.  Current takes gabapentin per pain management  He has chronic neck and lower back pain due to degenerative disc disease     Pt does have a history of CAD with stent placement in 03/2016    Last A1C in 05/24 was 6.6    Last microalbumin in 06/23 was negative           Blood Sugars    Blood glucoses are checked via Carter.    Fasting blood glucoses: 62 - low 100s    Pre-meal blood glucoses: 60s - high 100s    Pt has episodes of hypoglycemia after breakfast and early morning hours        Sensor Data    Time in range 86%  High 8%  Very high 1%  Low 5%  Very low 0%      Average Glucose - 123 mg/dL      Sensor Wear - 97 %              Hyperlipidemia     Pt is currently taking atorvastatin 40 mg HS and Tricor 145 mg daily     Last lipid panel in 02/24 showed Total 159, HDL 34, LDL 94 and Triglycerides 180            Hypertension    Pt denies any chest pain, palpitations, shortness of breath or headache    Current regimen includes amlodipine 5 mg daily and valsartan  "160 mg daily     He follows with Cardiology (Dr. Borges)                    Hypothyroid     Managed by PCP                 I have reviewed the patient's allergies, medicines, past medical hx, family hx and social hx.    Objective   Vital Signs:   /82   Pulse 90   Temp 96.9 °F (36.1 °C) (Temporal)   Ht 177.8 cm (70\")   Wt 79 kg (174 lb 3.2 oz)   SpO2 96%   BMI 25.00 kg/m²       Physical Exam   Physical Exam  Constitutional:       General: He is not in acute distress.     Appearance: Normal appearance. He is not diaphoretic.   HENT:      Head: Normocephalic and atraumatic.   Eyes:      General:         Right eye: No discharge.         Left eye: No discharge.   Skin:     General: Skin is warm and dry.   Neurological:      Mental Status: He is alert.   Psychiatric:         Mood and Affect: Mood normal.         Behavior: Behavior normal.                    Results Review:   Hemoglobin A1C   Date Value Ref Range Status   05/13/2024 6.60 (H) 4.80 - 5.60 % Final     Triglycerides   Date Value Ref Range Status   02/21/2024 180 (H) 0 - 150 mg/dL Final     HDL Cholesterol   Date Value Ref Range Status   02/21/2024 34 (L) 40 - 60 mg/dL Final     LDL Chol Calc (NIH)   Date Value Ref Range Status   02/21/2024 94 0 - 100 mg/dL Final     VLDL Cholesterol Joel   Date Value Ref Range Status   02/21/2024 31 5 - 40 mg/dL Final         Assessment and Plan {CC Problem List  Visit Diagnosis  ROS  Review (Popup)  Health Maintenance  Quality  BestPractice  Medications  SmartSets  SnapShot Encounters  Media :23  Diagnoses and all orders for this visit:    1. Type 2 diabetes mellitus with peripheral neuropathy (Primary)  -     Microalbumin / Creatinine Urine Ratio - Urine, Clean Catch    A1c in May was 6.6% but with significant hypoglycemia  Decrease Tresiba 20 units every morning  Stop glipizide 5 mg daily due to hypoglycemia   Continue with metformin  mg 2 tablets daily  Continue with Carter      2. " Hyperlipidemia, unspecified hyperlipidemia type    Continue with statin      3. Essential hypertension    Stable  Continue with current medication regimen  Defer management to PCP/cardiology      No refills needed at this time      Urine today  RTC on 10/21/24 with Dr. Miller      Follow Up     Patient was given instructions and counseling regarding her condition or for health maintenance advice. Please see specific information pulled into the AVS if appropriate.                Anabel Black, MARIBEL  06/28/24

## 2024-06-29 LAB
ALBUMIN/CREAT UR: 6 MG/G CREAT (ref 0–29)
CREAT UR-MCNC: 116.5 MG/DL
MICROALBUMIN UR-MCNC: 7.4 UG/ML

## 2024-07-06 DIAGNOSIS — R30.0 DYSURIA: Primary | ICD-10-CM

## 2024-07-12 ENCOUNTER — OFFICE VISIT (OUTPATIENT)
Dept: ORTHOPEDIC SURGERY | Facility: CLINIC | Age: 79
End: 2024-07-12
Payer: MEDICARE

## 2024-07-12 VITALS — TEMPERATURE: 98.2 F | BODY MASS INDEX: 24.91 KG/M2 | HEIGHT: 70 IN | WEIGHT: 174 LBS

## 2024-07-12 DIAGNOSIS — Z96.652 S/P TKR (TOTAL KNEE REPLACEMENT), LEFT: Primary | ICD-10-CM

## 2024-07-12 DIAGNOSIS — R52 PAIN: ICD-10-CM

## 2024-07-12 PROCEDURE — 1159F MED LIST DOCD IN RCRD: CPT | Performed by: ORTHOPAEDIC SURGERY

## 2024-07-12 PROCEDURE — 1160F RVW MEDS BY RX/DR IN RCRD: CPT | Performed by: ORTHOPAEDIC SURGERY

## 2024-07-12 PROCEDURE — 99024 POSTOP FOLLOW-UP VISIT: CPT | Performed by: ORTHOPAEDIC SURGERY

## 2024-07-12 PROCEDURE — 73562 X-RAY EXAM OF KNEE 3: CPT | Performed by: ORTHOPAEDIC SURGERY

## 2024-07-12 NOTE — PROGRESS NOTES
Ben Walsh : 1945 MRN: [id number] DATE: 2024    DIAGNOSIS: 6 week follow up left total knee      SUBJECTIVE:Patient returns today for 6 week follow up of left  total knee replacement. Patient reports doing well with no unusual complaints. Appears to be progressing appropriately.    OBJECTIVE:   Exam:. The incision is well healed. No sign of infection. Range of motion is measured at 2 to 128. The calf is soft and nontender with a negative Homans sign. Strength is progressing and the patient is ambulating appropriately.    DIAGNOSTIC STUDIES  Xrays: 3 views of the left  knee (AP, lateral, and sunrise) were ordered and reviewed for evaluation of recent knee replacement. They demonstrate a well positioned, well aligned knee replacement without complicating factors noted. In comparison with previous films there has been no change.    ASSESSMENT: 6 week status post left knee replacement.    PLAN: 1) Continue with PT exercises as prescribed   2) D/C DVT ppx per ortho   3) Follow up in 8 weeks    Antibiotics before dental and GI procedures discussed.     Jairo Dorado MD  2024

## 2024-07-16 DIAGNOSIS — I10 ESSENTIAL HYPERTENSION: Chronic | ICD-10-CM

## 2024-07-16 DIAGNOSIS — E78.5 HYPERLIPIDEMIA, UNSPECIFIED HYPERLIPIDEMIA TYPE: Chronic | ICD-10-CM

## 2024-07-16 DIAGNOSIS — E03.9 PRIMARY HYPOTHYROIDISM: Chronic | ICD-10-CM

## 2024-07-16 DIAGNOSIS — F33.42 RECURRENT MAJOR DEPRESSIVE DISORDER, IN FULL REMISSION: Chronic | ICD-10-CM

## 2024-07-16 RX ORDER — LEVOTHYROXINE SODIUM 0.05 MG/1
50 TABLET ORAL DAILY
Qty: 30 TABLET | Refills: 0 | Status: SHIPPED | OUTPATIENT
Start: 2024-07-16

## 2024-07-16 RX ORDER — ESCITALOPRAM OXALATE 20 MG/1
20 TABLET ORAL DAILY
Qty: 30 TABLET | Refills: 0 | Status: SHIPPED | OUTPATIENT
Start: 2024-07-16

## 2024-07-16 RX ORDER — AMLODIPINE BESYLATE 5 MG/1
5 TABLET ORAL DAILY
Qty: 30 TABLET | Refills: 0 | Status: SHIPPED | OUTPATIENT
Start: 2024-07-16

## 2024-07-16 RX ORDER — VALSARTAN 160 MG/1
160 TABLET ORAL DAILY
Qty: 30 TABLET | Refills: 0 | Status: SHIPPED | OUTPATIENT
Start: 2024-07-16

## 2024-07-16 RX ORDER — ATORVASTATIN CALCIUM 40 MG/1
40 TABLET, FILM COATED ORAL NIGHTLY
Qty: 30 TABLET | Refills: 0 | Status: SHIPPED | OUTPATIENT
Start: 2024-07-16

## 2024-07-24 DIAGNOSIS — Z12.5 SCREENING PSA (PROSTATE SPECIFIC ANTIGEN): Primary | ICD-10-CM

## 2024-08-02 LAB — PSA SERPL-MCNC: 1 NG/ML (ref 0–4)

## 2024-08-04 LAB
APPEARANCE UR: CLEAR
BACTERIA #/AREA URNS HPF: NORMAL /[HPF]
BACTERIA UR CULT: NO GROWTH
BACTERIA UR CULT: NORMAL
BILIRUB UR QL STRIP: NEGATIVE
CASTS URNS QL MICRO: NORMAL /LPF
COLOR UR: YELLOW
EPI CELLS #/AREA URNS HPF: NORMAL /HPF (ref 0–10)
GLUCOSE UR QL STRIP: NEGATIVE
HGB UR QL STRIP: NEGATIVE
KETONES UR QL STRIP: NEGATIVE
LEUKOCYTE ESTERASE UR QL STRIP: NEGATIVE
MICRO URNS: NORMAL
MICRO URNS: NORMAL
NITRITE UR QL STRIP: NEGATIVE
PH UR STRIP: 5.5 [PH] (ref 5–7.5)
PROT UR QL STRIP: NEGATIVE
RBC #/AREA URNS HPF: NORMAL /HPF (ref 0–2)
SP GR UR STRIP: 1.02 (ref 1–1.03)
UROBILINOGEN UR STRIP-MCNC: 1 MG/DL (ref 0.2–1)
WBC #/AREA URNS HPF: NORMAL /HPF (ref 0–5)

## 2024-08-05 ENCOUNTER — TELEPHONE (OUTPATIENT)
Dept: ENDOCRINOLOGY | Age: 79
End: 2024-08-05

## 2024-08-05 NOTE — TELEPHONE ENCOUNTER
Caller: Ben Walsh     Relationship: SELF     Best call back number: [phone]    What is your medical concern? PT IS ON [employer] SENSORS AND HAVING TROUBLE GETTING THEM ON HIMSELF, WASTED SEVERAL NOT REALIZING THAT THERE WAS A LIMITED NUMBER OF THEM. PHARMACY STATING THAT THEY ARE NOT DUE FOR REFILL UNTIL 9/20 AND PT IS COMPLETELY OUT.     How long has this issue been going on? NA     Is your provider already aware of this issue? NO     Have you been treated for this issue? NO

## 2024-08-05 NOTE — TELEPHONE ENCOUNTER
8/5 called and sw pt his daughter is a PA he said she has 1 in her office he he going to have them put it on for him told him ti call the co and tell them he had trouble putting it on and replace one

## 2024-08-14 DIAGNOSIS — N40.0 BENIGN PROSTATIC HYPERPLASIA WITHOUT LOWER URINARY TRACT SYMPTOMS: Chronic | ICD-10-CM

## 2024-08-14 RX ORDER — TAMSULOSIN HYDROCHLORIDE 0.4 MG/1
1 CAPSULE ORAL DAILY
Qty: 30 CAPSULE | Refills: 0 | Status: SHIPPED | OUTPATIENT
Start: 2024-08-14

## 2024-08-18 DIAGNOSIS — E78.5 HYPERLIPIDEMIA, UNSPECIFIED HYPERLIPIDEMIA TYPE: Chronic | ICD-10-CM

## 2024-08-18 DIAGNOSIS — E03.9 PRIMARY HYPOTHYROIDISM: Chronic | ICD-10-CM

## 2024-08-18 DIAGNOSIS — I10 ESSENTIAL HYPERTENSION: Chronic | ICD-10-CM

## 2024-08-18 DIAGNOSIS — F33.42 RECURRENT MAJOR DEPRESSIVE DISORDER, IN FULL REMISSION: Chronic | ICD-10-CM

## 2024-08-19 DIAGNOSIS — F51.01 PRIMARY INSOMNIA: Chronic | ICD-10-CM

## 2024-08-19 RX ORDER — AMLODIPINE BESYLATE 5 MG/1
5 TABLET ORAL DAILY
Qty: 30 TABLET | Refills: 0 | Status: SHIPPED | OUTPATIENT
Start: 2024-08-19

## 2024-08-19 RX ORDER — ZOLPIDEM TARTRATE 10 MG/1
10 TABLET ORAL NIGHTLY PRN
Qty: 30 TABLET | Refills: 2 | OUTPATIENT
Start: 2024-08-19

## 2024-08-19 RX ORDER — VALSARTAN 160 MG/1
160 TABLET ORAL DAILY
Qty: 30 TABLET | Refills: 0 | Status: SHIPPED | OUTPATIENT
Start: 2024-08-19

## 2024-08-19 RX ORDER — LEVOTHYROXINE SODIUM 0.05 MG/1
50 TABLET ORAL DAILY
Qty: 30 TABLET | Refills: 0 | Status: SHIPPED | OUTPATIENT
Start: 2024-08-19

## 2024-08-19 RX ORDER — ATORVASTATIN CALCIUM 40 MG/1
40 TABLET, FILM COATED ORAL NIGHTLY
Qty: 30 TABLET | Refills: 0 | Status: SHIPPED | OUTPATIENT
Start: 2024-08-19

## 2024-08-19 RX ORDER — ESCITALOPRAM OXALATE 20 MG/1
20 TABLET ORAL DAILY
Qty: 30 TABLET | Refills: 0 | Status: SHIPPED | OUTPATIENT
Start: 2024-08-19

## 2024-08-19 NOTE — TELEPHONE ENCOUNTER
Caller: Ben Walsh    Relationship: Self    Best call back number: [phone]     Requested Prescriptions:   Requested Prescriptions     Pending Prescriptions Disp Refills    zolpidem (AMBIEN) 10 MG tablet 30 tablet 2     Sig: Take 1 tablet by mouth At Night As Needed for Sleep. Indications: Trouble Sleeping        Pharmacy where request should be sent: [facility] [id number] [address] [phone] [facility] [phone]      Last office visit with prescribing clinician: 5/7/2024   Last telemedicine visit with prescribing clinician: Visit date not found   Next office visit with prescribing clinician: Visit date not found     Does the patient have less than a 3 day supply:  [x] Yes  [] No    Would you like a call back once the refill request has been completed: [] Yes [x] No    If the office needs to give you a call back, can they leave a voicemail: [] Yes [x] No    Cami Izaguirre   08/19/24 14:14 EDT

## 2024-08-23 DIAGNOSIS — F51.01 PRIMARY INSOMNIA: Chronic | ICD-10-CM

## 2024-08-24 RX ORDER — MONTELUKAST SODIUM 10 MG/1
10 TABLET ORAL DAILY
Qty: 90 TABLET | OUTPATIENT
Start: 2024-08-24

## 2024-08-25 RX ORDER — ZOLPIDEM TARTRATE 10 MG/1
10 TABLET ORAL NIGHTLY PRN
Qty: 30 TABLET | OUTPATIENT
Start: 2024-08-25

## 2024-08-27 DIAGNOSIS — F51.01 PRIMARY INSOMNIA: Chronic | ICD-10-CM

## 2024-08-27 RX ORDER — ZOLPIDEM TARTRATE 10 MG/1
10 TABLET ORAL NIGHTLY PRN
Qty: 10 TABLET | Refills: 0 | Status: SHIPPED | OUTPATIENT
Start: 2024-08-27

## 2024-08-27 NOTE — TELEPHONE ENCOUNTER
Caller: Ben Walsh PAT    Relationship: Self    Best call back number: [phone]     Requested Prescriptions:   Requested Prescriptions     Pending Prescriptions Disp Refills    zolpidem (AMBIEN) 10 MG tablet 30 tablet 2     Sig: Take 1 tablet by mouth At Night As Needed for Sleep. Indications: Trouble Sleeping        Pharmacy where request should be sent: [facility] [id number] [place] [id number] [facility] [phone] [facility] [phone] FX     Last office visit with prescribing clinician: 5/7/2024   Last telemedicine visit with prescribing clinician: Visit date not found   Next office visit with prescribing clinician: 9/4/2024     Additional details provided by patient: PATIENT IS OUT OF MEDICATION. NEXT APPT NOT AVAILABLE UNTIL 9/4. PLEASE REFILL IN MEANTIME IF POSSIBLE.    Does the patient have less than a 3 day supply:  [x] Yes  [] No    Would you like a call back once the refill request has been completed: [x] Yes [] No    If the office needs to give you a call back, can they leave a voicemail: [x] Yes [] No    Cami Morales Rep   08/27/24 10:37 EDT

## 2024-09-03 NOTE — PROGRESS NOTES
Chief Complaint:   Chief Complaint   Patient presents with    Hypertension    Hyperlipidemia    Hypothyroidism    Diabetes       Ben Walsh 79 y.o. male who presents today for Medical Management of the below listed issues. He  has a problem list of   Patient Active Problem List   Diagnosis    Allergic rhinitis    ADD (attention deficit disorder)    DDD (degenerative disc disease), lumbar    BPH (benign prostatic hypertrophy)    Chronic pain    ASCVD (arteriosclerotic cardiovascular disease)    Depression    Diverticulosis of colon    GERD (gastroesophageal reflux disease)    Hiatal hernia    Hyperlipidemia    Essential hypertension    Primary hypothyroidism    Insomnia    Osteoarthritis, multiple sites    Acute pancreatitis    Vitamin D deficiency    Glaucoma    Abnormal nuclear stress test    Two-vessel coronary artery disease    SAEED (dyspnea on exertion)    Obstructive sleep apnea syndrome    Patent foramen ovale    Snoring    Disorder of rotator cuff    History of COPD    Type 2 diabetes mellitus with microalbuminuria, without long-term current use of insulin    Carpal tunnel syndrome of right wrist    Abnormal electrocardiogram    Atypical chest pain    Type 2 diabetes mellitus with peripheral neuropathy    B12 deficiency    Osteoarthritis of left knee   .  Since the last visit, He has overall felt well.  he has been compliant with   Current Outpatient Medications:     Accu-Chek Guide test strip, Dx code E11.29 testing bs 3 x day, Disp: 300 each, Rfl: 1    amLODIPine (NORVASC) 5 MG tablet, Take 1 tablet by mouth Daily. Indications: High Blood Pressure Disorder, Disp: 90 tablet, Rfl: 1    aspirin 81 MG EC tablet, Take 1 tablet by mouth Daily. Indications: VTE Prophylaxis, Disp: 30 tablet, Rfl: 0    atorvastatin (LIPITOR) 40 MG tablet, Take 1 tablet by mouth Every Night. for cholesterol., Disp: 90 tablet, Rfl: 1    cephalexin (KEFLEX) 500 MG capsule, Take 1 capsule by mouth. PRIOR TO DENTAL PROCEDURES,  Disp: , Rfl:     Cholecalciferol (Vitamin D3) 1.25 MG (87684 UT) capsule, Take 1 capsule by mouth Every 7 (Seven) Days. (Patient taking differently: Take 1 capsule by mouth Every 7 (Seven) Days. TAKES ON SAT), Disp: 15 capsule, Rfl: 3    clobetasol (TEMOVATE) 0.05 % external solution, Apply 1 Application topically to the appropriate area as directed As Needed (itching). Indications: Itching, Skin Inflammation, Disp: , Rfl:     clopidogrel (PLAVIX) 75 MG tablet, Take 1 tablet by mouth Daily., Disp: 90 tablet, Rfl: 1    Continuous Blood Gluc  (FreeStyle Carter 3 Eldridge) device, Use 1 each Daily., Disp: 1 each, Rfl: 1    Continuous Blood Gluc Sensor (FreeStyle Carter 3 Sensor) misc, Use 1 each Every 14 (Fourteen) Days., Disp: 6 each, Rfl: 3    cyclobenzaprine (FLEXERIL) 10 MG tablet, TAKE ONE TABLET BY MOUTH THREE TIMES A DAY AS NEEDED FOR MUSCLE SPASMS, Disp: 90 tablet, Rfl: 5    Diclofenac Sodium 4 %, Topiramate 2 %, cloNIDine HCl 0.2 %, Lidocaine HCl 5 %, Apply 1-2 g topically to the appropriate area as directed 3 (Three) to 4 (Four) times daily. Do not apply to operate knee, Disp: , Rfl:     escitalopram (LEXAPRO) 20 MG tablet, Take 1 tablet by mouth Daily. Indications: Major Depressive Disorder, Disp: 90 tablet, Rfl: 1    esomeprazole (NexIUM) 20 MG capsule, Take 1 capsule by mouth Every Morning Before Breakfast. Indications: Heartburn, Disp: , Rfl:     fenofibrate (TRICOR) 145 MG tablet, Take 1 tablet by mouth Daily., Disp: 90 tablet, Rfl: 1    fluticasone (FLONASE) 50 MCG/ACT nasal spray, 1 spray by Each Nare route Every Evening. Indications: Allergic Rhinitis, Disp: , Rfl:     gabapentin (NEURONTIN) 800 MG tablet, Take 1 tablet by mouth 3 (Three) Times a Day. Indications: Neuropathic Pain, Disp: , Rfl:     HYDROcodone-acetaminophen (NORCO) 7.5-325 MG per tablet, Take 1 tablet every 4 hours for mild to moderate pain.  May take 2 tablets every 4 hours for severe pain. (Patient taking differently: Take 1  tablet every 4 hours for mild to moderate pain.  May take 2 tablets every 4 hours for severe pain.), Disp: 42 tablet, Rfl: 0    Insulin Degludec (Tresiba FlexTouch) 200 UNIT/ML solution pen-injector pen injection, INJECT 22 UNITS EVERY MORNING (Patient taking differently: 22 Units Every Morning. INJECT 22 UNITS EVERY MORNING), Disp: 9 mL, Rfl: 11    Insulin Pen Needle (B-D ULTRAFINE III SHORT PEN) 31G X 8 MM misc, 1 each Daily., Disp: 100 each, Rfl: 3    Lancets (ACCU-CHEK SOFT TOUCH) lancets, Dx code E11.29 testing bs 3 x day, Disp: 300 each, Rfl: 1    levothyroxine (SYNTHROID, LEVOTHROID) 50 MCG tablet, Take 1 tablet by mouth Daily. for thyroid  Indications: Underactive Thyroid, Disp: 90 tablet, Rfl: 1    Loratadine (Claritin) 10 MG capsule, Take 1 capsule by mouth As Needed. Indications: Hayfever, Disp: , Rfl:     metFORMIN ER (GLUCOPHAGE-XR) 500 MG 24 hr tablet, Take 3 tablets by mouth Daily. With food for DMII, Disp: 360 tablet, Rfl: 1    montelukast (SINGULAIR) 10 MG tablet, , Disp: , Rfl:     mupirocin (BACTROBAN) 2 % ointment, Apply 1 application  topically to the appropriate area as directed 3 (Three) Times a Day. (Patient taking differently: Apply 1 Application topically to the appropriate area as directed As Needed.), Disp: 30 g, Rfl: 1    NITROSTAT 0.4 MG SL tablet, Place 1 tablet under the tongue Every 5 (Five) Minutes As Needed. Indications: Acute Angina Pectoris, Disp: , Rfl:     ondansetron ODT (ZOFRAN-ODT) 4 MG disintegrating tablet, Take 1 tablet by mouth Every 6 (Six) Hours As Needed for Nausea or Vomiting. (Patient taking differently: Take 1 tablet by mouth Every 6 (Six) Hours As Needed for Nausea or Vomiting.), Disp: 10 tablet, Rfl: 0    oxyCODONE-acetaminophen (PERCOCET) 7.5-325 MG per tablet, Take 1 tablet by mouth Every 6 (Six) Hours As Needed for Severe Pain. Take 1 tablet every 6 hours for mild to moderate pain may take 2 tablets every 6 hours for severe pain.  Indications: Pain, Disp: 30  "tablet, Rfl: 0    tamsulosin (FLOMAX) 0.4 MG capsule 24 hr capsule, Take 1 capsule by mouth Daily., Disp: 90 capsule, Rfl: 1    traZODone (DESYREL) 50 MG tablet, Take 1-2 tablets by mouth Every Night. Indications: Trouble Sleeping, Disp: 180 tablet, Rfl: 1    valsartan (DIOVAN) 160 MG tablet, Take 1 tablet by mouth Daily. Indications: High Blood Pressure Disorder, Disp: 90 tablet, Rfl: 1    zolpidem (AMBIEN) 10 MG tablet, Take 1 tablet by mouth At Night As Needed for Sleep. Indications: Trouble Sleeping, Disp: 90 tablet, Rfl: 0.  He denies medication side effects.    All of the other chronic condition(s) listed above are stable w/o issues.    /58   Pulse 77   Temp 97.9 °F (36.6 °C)   Resp 16   Ht 177.8 cm (70\")   Wt 80.5 kg (177 lb 8 oz)   SpO2 96%   BMI 25.47 kg/m²     Results for orders placed or performed in visit on 07/24/24   PSA Screen    Specimen: Blood   Result Value Ref Range    PSA 1.0 0.0 - 4.0 ng/mL             The following portions of the patient's history were reviewed and updated as appropriate: allergies, current medications, past family history, past medical history, past social history, past surgical history, and problem list.    Review of Systems   Constitutional:  Negative for activity change, chills and fever.   Respiratory:  Negative for cough.    Cardiovascular:  Negative for chest pain.   Psychiatric/Behavioral:  Negative for dysphoric mood.        Objective     BMI is within normal parameters. No other follow-up for BMI required.        Physical Exam  Vitals and nursing note reviewed.   Constitutional:       General: He is not in acute distress.     Appearance: He is well-developed.   Cardiovascular:      Rate and Rhythm: Normal rate and regular rhythm.   Pulmonary:      Effort: Pulmonary effort is normal.      Breath sounds: Normal breath sounds.   Neurological:      Mental Status: He is alert and oriented to person, place, and time.   Psychiatric:         Behavior: Behavior " normal.         Thought Content: Thought content normal.     Labs reviewed with pt today during visit. All questions answered.          Diagnoses and all orders for this visit:    1. Essential hypertension (Primary)  -     amLODIPine (NORVASC) 5 MG tablet; Take 1 tablet by mouth Daily. Indications: High Blood Pressure Disorder  Dispense: 90 tablet; Refill: 1  -     valsartan (DIOVAN) 160 MG tablet; Take 1 tablet by mouth Daily. Indications: High Blood Pressure Disorder  Dispense: 90 tablet; Refill: 1    2. Hyperlipidemia, unspecified hyperlipidemia type  -     atorvastatin (LIPITOR) 40 MG tablet; Take 1 tablet by mouth Every Night. for cholesterol.  Dispense: 90 tablet; Refill: 1  -     fenofibrate (TRICOR) 145 MG tablet; Take 1 tablet by mouth Daily.  Dispense: 90 tablet; Refill: 1    3. Two-vessel coronary artery disease  -     clopidogrel (PLAVIX) 75 MG tablet; Take 1 tablet by mouth Daily.  Dispense: 90 tablet; Refill: 1    4. Recurrent major depressive disorder, in full remission  -     escitalopram (LEXAPRO) 20 MG tablet; Take 1 tablet by mouth Daily. Indications: Major Depressive Disorder  Dispense: 90 tablet; Refill: 1    5. Primary hypothyroidism  -     levothyroxine (SYNTHROID, LEVOTHROID) 50 MCG tablet; Take 1 tablet by mouth Daily. for thyroid  Indications: Underactive Thyroid  Dispense: 90 tablet; Refill: 1    6. Benign prostatic hyperplasia without lower urinary tract symptoms  -     tamsulosin (FLOMAX) 0.4 MG capsule 24 hr capsule; Take 1 capsule by mouth Daily.  Dispense: 90 capsule; Refill: 1    7. Primary insomnia  -     traZODone (DESYREL) 50 MG tablet; Take 1-2 tablets by mouth Every Night. Indications: Trouble Sleeping  Dispense: 180 tablet; Refill: 1  -     zolpidem (AMBIEN) 10 MG tablet; Take 1 tablet by mouth At Night As Needed for Sleep. Indications: Trouble Sleeping  Dispense: 90 tablet; Refill: 0    8. Screening for colon cancer  -     Ambulatory Referral to General Surgery

## 2024-09-04 ENCOUNTER — OFFICE VISIT (OUTPATIENT)
Dept: FAMILY MEDICINE CLINIC | Facility: CLINIC | Age: 79
End: 2024-09-04
Payer: MEDICARE

## 2024-09-04 VITALS
OXYGEN SATURATION: 96 % | WEIGHT: 177.5 LBS | RESPIRATION RATE: 16 BRPM | HEART RATE: 77 BPM | SYSTOLIC BLOOD PRESSURE: 118 MMHG | TEMPERATURE: 97.9 F | BODY MASS INDEX: 25.41 KG/M2 | HEIGHT: 70 IN | DIASTOLIC BLOOD PRESSURE: 58 MMHG

## 2024-09-04 DIAGNOSIS — I25.10 TWO-VESSEL CORONARY ARTERY DISEASE: Chronic | ICD-10-CM

## 2024-09-04 DIAGNOSIS — I10 ESSENTIAL HYPERTENSION: Primary | Chronic | ICD-10-CM

## 2024-09-04 DIAGNOSIS — E03.9 PRIMARY HYPOTHYROIDISM: Chronic | ICD-10-CM

## 2024-09-04 DIAGNOSIS — F51.01 PRIMARY INSOMNIA: Chronic | ICD-10-CM

## 2024-09-04 DIAGNOSIS — N40.0 BENIGN PROSTATIC HYPERPLASIA WITHOUT LOWER URINARY TRACT SYMPTOMS: Chronic | ICD-10-CM

## 2024-09-04 DIAGNOSIS — E78.5 HYPERLIPIDEMIA, UNSPECIFIED HYPERLIPIDEMIA TYPE: Chronic | ICD-10-CM

## 2024-09-04 DIAGNOSIS — Z12.11 SCREENING FOR COLON CANCER: ICD-10-CM

## 2024-09-04 DIAGNOSIS — F33.42 RECURRENT MAJOR DEPRESSIVE DISORDER, IN FULL REMISSION: Chronic | ICD-10-CM

## 2024-09-04 PROCEDURE — 3074F SYST BP LT 130 MM HG: CPT | Performed by: FAMILY MEDICINE

## 2024-09-04 PROCEDURE — 1125F AMNT PAIN NOTED PAIN PRSNT: CPT | Performed by: FAMILY MEDICINE

## 2024-09-04 PROCEDURE — 3078F DIAST BP <80 MM HG: CPT | Performed by: FAMILY MEDICINE

## 2024-09-04 PROCEDURE — 1159F MED LIST DOCD IN RCRD: CPT | Performed by: FAMILY MEDICINE

## 2024-09-04 PROCEDURE — 1160F RVW MEDS BY RX/DR IN RCRD: CPT | Performed by: FAMILY MEDICINE

## 2024-09-04 PROCEDURE — 99214 OFFICE O/P EST MOD 30 MIN: CPT | Performed by: FAMILY MEDICINE

## 2024-09-04 RX ORDER — FENOFIBRATE 145 MG/1
145 TABLET, COATED ORAL DAILY
Qty: 90 TABLET | Refills: 1 | Status: SHIPPED | OUTPATIENT
Start: 2024-09-04

## 2024-09-04 RX ORDER — ATORVASTATIN CALCIUM 40 MG/1
40 TABLET, FILM COATED ORAL NIGHTLY
Qty: 90 TABLET | Refills: 1 | Status: SHIPPED | OUTPATIENT
Start: 2024-09-04

## 2024-09-04 RX ORDER — TAMSULOSIN HYDROCHLORIDE 0.4 MG/1
1 CAPSULE ORAL DAILY
Qty: 90 CAPSULE | Refills: 1 | Status: SHIPPED | OUTPATIENT
Start: 2024-09-04

## 2024-09-04 RX ORDER — CLOPIDOGREL BISULFATE 75 MG/1
75 TABLET ORAL DAILY
Qty: 90 TABLET | Refills: 1 | Status: SHIPPED | OUTPATIENT
Start: 2024-09-04

## 2024-09-04 RX ORDER — LEVOTHYROXINE SODIUM 50 UG/1
50 TABLET ORAL DAILY
Qty: 90 TABLET | Refills: 1 | Status: SHIPPED | OUTPATIENT
Start: 2024-09-04

## 2024-09-04 RX ORDER — VALSARTAN 160 MG/1
160 TABLET ORAL DAILY
Qty: 90 TABLET | Refills: 1 | Status: SHIPPED | OUTPATIENT
Start: 2024-09-04

## 2024-09-04 RX ORDER — TRAZODONE HYDROCHLORIDE 50 MG/1
50-100 TABLET, FILM COATED ORAL NIGHTLY
Qty: 180 TABLET | Refills: 1 | Status: SHIPPED | OUTPATIENT
Start: 2024-09-04

## 2024-09-04 RX ORDER — ZOLPIDEM TARTRATE 10 MG/1
10 TABLET ORAL NIGHTLY PRN
Qty: 90 TABLET | Refills: 0 | Status: SHIPPED | OUTPATIENT
Start: 2024-09-04

## 2024-09-04 RX ORDER — AMLODIPINE BESYLATE 5 MG/1
5 TABLET ORAL DAILY
Qty: 90 TABLET | Refills: 1 | Status: SHIPPED | OUTPATIENT
Start: 2024-09-04

## 2024-09-04 RX ORDER — ESCITALOPRAM OXALATE 20 MG/1
20 TABLET ORAL DAILY
Qty: 90 TABLET | Refills: 1 | Status: SHIPPED | OUTPATIENT
Start: 2024-09-04

## 2024-09-09 DIAGNOSIS — E11.42 TYPE 2 DIABETES MELLITUS WITH PERIPHERAL NEUROPATHY: ICD-10-CM

## 2024-09-09 RX ORDER — PEN NEEDLE, DIABETIC 31 GX5/16"
NEEDLE, DISPOSABLE MISCELLANEOUS
Qty: 100 EACH | Refills: 3 | Status: SHIPPED | OUTPATIENT
Start: 2024-09-09

## 2024-09-09 NOTE — TELEPHONE ENCOUNTER
"Rx Refill Note  Requested Prescriptions     Pending Prescriptions Disp Refills    Droplet Pen [place] 31G X 8 MM misc [Pharmacy Med Name: DROPLET PEN NEEDLE 31GX5/16\"] 100 each 3     Sig: USE ONE EACH DAY      Last office visit with prescribing clinician: 6/28/2024   Last telemedicine visit with prescribing clinician: Visit date not found   Next office visit with prescribing clinician: Visit date not found                         Would you like a call back once the refill request has been completed: [] Yes [] No    If the office needs to give you a call back, can they leave a voicemail: [] Yes [] No    Yesi Bee  09/09/24, 15:35 EDT  "

## 2024-09-17 ENCOUNTER — OFFICE VISIT (OUTPATIENT)
Dept: ORTHOPEDIC SURGERY | Facility: CLINIC | Age: 79
End: 2024-09-17
Payer: MEDICARE

## 2024-09-17 VITALS — TEMPERATURE: 98.7 F | HEIGHT: 70 IN | BODY MASS INDEX: 24.67 KG/M2 | WEIGHT: 172.3 LBS

## 2024-09-17 DIAGNOSIS — Z96.652 S/P TKR (TOTAL KNEE REPLACEMENT), LEFT: Primary | ICD-10-CM

## 2024-09-17 PROCEDURE — 99212 OFFICE O/P EST SF 10 MIN: CPT | Performed by: ORTHOPAEDIC SURGERY

## 2024-09-17 PROCEDURE — 1159F MED LIST DOCD IN RCRD: CPT | Performed by: ORTHOPAEDIC SURGERY

## 2024-09-17 PROCEDURE — 1160F RVW MEDS BY RX/DR IN RCRD: CPT | Performed by: ORTHOPAEDIC SURGERY

## 2024-09-17 PROCEDURE — 73562 X-RAY EXAM OF KNEE 3: CPT | Performed by: ORTHOPAEDIC SURGERY

## 2024-09-17 RX ORDER — HYDROCODONE BITARTRATE AND ACETAMINOPHEN 10; 325 MG/1; MG/1
TABLET ORAL
COMMUNITY
Start: 2024-08-28

## 2024-09-24 ENCOUNTER — OFFICE VISIT (OUTPATIENT)
Dept: ENDOCRINOLOGY | Age: 79
End: 2024-09-24
Payer: MEDICARE

## 2024-09-24 VITALS
HEART RATE: 77 BPM | OXYGEN SATURATION: 96 % | HEIGHT: 70 IN | WEIGHT: 176.2 LBS | TEMPERATURE: 97.4 F | DIASTOLIC BLOOD PRESSURE: 62 MMHG | BODY MASS INDEX: 25.22 KG/M2 | SYSTOLIC BLOOD PRESSURE: 116 MMHG

## 2024-09-24 DIAGNOSIS — E03.9 PRIMARY HYPOTHYROIDISM: ICD-10-CM

## 2024-09-24 DIAGNOSIS — E55.9 VITAMIN D DEFICIENCY: ICD-10-CM

## 2024-09-24 DIAGNOSIS — I10 ESSENTIAL HYPERTENSION: ICD-10-CM

## 2024-09-24 DIAGNOSIS — E11.29 TYPE 2 DIABETES MELLITUS WITH MICROALBUMINURIA, WITHOUT LONG-TERM CURRENT USE OF INSULIN: Primary | ICD-10-CM

## 2024-09-24 DIAGNOSIS — E78.5 HYPERLIPIDEMIA, UNSPECIFIED HYPERLIPIDEMIA TYPE: ICD-10-CM

## 2024-09-24 DIAGNOSIS — R80.9 TYPE 2 DIABETES MELLITUS WITH MICROALBUMINURIA, WITHOUT LONG-TERM CURRENT USE OF INSULIN: Primary | ICD-10-CM

## 2024-09-24 DIAGNOSIS — E11.42 TYPE 2 DIABETES MELLITUS WITH PERIPHERAL NEUROPATHY: ICD-10-CM

## 2024-09-24 PROCEDURE — 3078F DIAST BP <80 MM HG: CPT | Performed by: INTERNAL MEDICINE

## 2024-09-24 PROCEDURE — G2211 COMPLEX E/M VISIT ADD ON: HCPCS | Performed by: INTERNAL MEDICINE

## 2024-09-24 PROCEDURE — 3074F SYST BP LT 130 MM HG: CPT | Performed by: INTERNAL MEDICINE

## 2024-09-24 PROCEDURE — 99214 OFFICE O/P EST MOD 30 MIN: CPT | Performed by: INTERNAL MEDICINE

## 2024-09-25 LAB
25(OH)D3+25(OH)D2 SERPL-MCNC: 65.5 NG/ML (ref 30–100)
ALBUMIN SERPL-MCNC: 4.5 G/DL (ref 3.5–5.2)
ALBUMIN/GLOB SERPL: 2.1 G/DL
ALP SERPL-CCNC: 115 U/L (ref 39–117)
ALT SERPL-CCNC: 19 U/L (ref 1–41)
AST SERPL-CCNC: 19 U/L (ref 1–40)
BILIRUB SERPL-MCNC: 0.3 MG/DL (ref 0–1.2)
BUN SERPL-MCNC: 9 MG/DL (ref 8–23)
BUN/CREAT SERPL: 10.2 (ref 7–25)
CALCIUM SERPL-MCNC: 9.8 MG/DL (ref 8.6–10.5)
CHLORIDE SERPL-SCNC: 104 MMOL/L (ref 98–107)
CHOLEST SERPL-MCNC: 134 MG/DL (ref 0–200)
CO2 SERPL-SCNC: 26.8 MMOL/L (ref 22–29)
CREAT SERPL-MCNC: 0.88 MG/DL (ref 0.76–1.27)
EGFRCR SERPLBLD CKD-EPI 2021: 86.4 ML/MIN/1.73
GLOBULIN SER CALC-MCNC: 2.1 GM/DL
GLUCOSE SERPL-MCNC: 109 MG/DL (ref 65–99)
HBA1C MFR BLD: 7.2 % (ref 4.8–5.6)
HDLC SERPL-MCNC: 38 MG/DL (ref 40–60)
IMP & REVIEW OF LAB RESULTS: NORMAL
LDLC SERPL CALC-MCNC: 78 MG/DL (ref 0–100)
POTASSIUM SERPL-SCNC: 4.9 MMOL/L (ref 3.5–5.2)
PROT SERPL-MCNC: 6.6 G/DL (ref 6–8.5)
SODIUM SERPL-SCNC: 141 MMOL/L (ref 136–145)
TRIGL SERPL-MCNC: 95 MG/DL (ref 0–150)
TSH SERPL DL<=0.005 MIU/L-ACNC: 1.94 UIU/ML (ref 0.27–4.2)
VIT B12 SERPL-MCNC: 1167 PG/ML (ref 211–946)
VLDLC SERPL CALC-MCNC: 18 MG/DL (ref 5–40)

## 2024-10-23 ENCOUNTER — TELEPHONE (OUTPATIENT)
Dept: FAMILY MEDICINE CLINIC | Facility: CLINIC | Age: 79
End: 2024-10-23
Payer: MEDICARE

## 2024-10-23 NOTE — TELEPHONE ENCOUNTER
He has been seeing Dr. 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 Dr. Wharton called me about having him see ENT to evaluate possible lymph node involvement in head and neck... Dr Colón was to set this up.  This has not been done and we do not have any notes in regards to the path report or the procedures from Dr. Colón... Need notes from Dr. Colón... Basically he needs a CT with and without contrast of head and neck to check for lymph node involvement?

## 2024-10-24 DIAGNOSIS — R59.1 HEAD AND NECK LYMPHADENOPATHY: Primary | ICD-10-CM

## 2024-10-24 DIAGNOSIS — Z85.89 HISTORY OF SQUAMOUS CELL CARCINOMA: ICD-10-CM

## 2024-10-30 PROBLEM — N20.0 RENAL STONES: Status: ACTIVE | Noted: 2024-10-30

## 2024-10-30 PROBLEM — N32.9 LESION OF URINARY BLADDER: Status: ACTIVE | Noted: 2024-10-30

## 2024-11-15 ENCOUNTER — TELEPHONE (OUTPATIENT)
Dept: CARDIOLOGY | Facility: CLINIC | Age: 79
End: 2024-11-15
Payer: MEDICARE

## 2024-11-15 NOTE — TELEPHONE ENCOUNTER
Pt needs clearance for a bladder tumor removal on 11/20/24 under general anesthesia.  LOV 01/18/24.    Please fax to Dr. Rosario @ [phone], [facility]    Thanks,  Leyla Carrizales # [phone]

## 2024-11-15 NOTE — TELEPHONE ENCOUNTER
CARDIOLOGY    Patient Name: Ben Walsh  :1945  Age: 79 y.o.    11/15/24    To whom it may concern:    Ben Walsh was referred to my office for cardiovascular risk assessment exam for upcoming bladder tumor removal.    From a cardiovascular standpoint, the patient is at the following risk of major cardiovascular event in the liz-operative setting:  [] Low         [] Modifiable  [] Low-Moderate       [x] Non-Modifiable   [x] Moderate  [] Moderare - high  [] High    Cardiac Testing:  [] Needs further cardiac testing  [x] Does not need further cardiac testing    Anticoagulant Status:  [] No anticoagulants    [x] The patient is on  [x] ASA; hold for 5-7 days.  [] Coumadin; hold for ___ days.  [x] Plavix; hold for 5-7 days.  [] Eliquis; hold for ___ days.  [] Brilinta; hold for ___ days.  [] Xarelto; hold for ___ days.  [] Effient; hold for ___ days.    [] The patient requires Lovenox bridging, which has been prescribed.     Beta Blockers:  [] The patient will need pre-op beta blockers which will be prescribed by my clinic.    If there are any problems during surgery, please do not hesitate to contact my office.    Thank you for allowing me to participate in this patient's care.    Sincerely,         MARIBEL Roper  [facility]   [facility]  [address] [place] [address]  Office: [phone]

## 2024-12-12 DIAGNOSIS — R30.0 DYSURIA: Primary | ICD-10-CM

## 2024-12-13 RX ORDER — PHENAZOPYRIDINE HYDROCHLORIDE 200 MG/1
200 TABLET, FILM COATED ORAL 3 TIMES DAILY PRN
Qty: 9 TABLET | Refills: 0 | Status: SHIPPED | OUTPATIENT
Start: 2024-12-13

## 2024-12-13 NOTE — PROGRESS NOTES
I called Dr. Rosario yesterday to discuss his few weeks of urethritis, erythema, dysuria,.  Patient is already taken 2 doses of Augmentin from prior Rx and helping plan is to still obtain urine culture and continue Augmentin for 7 days and advise Pyridium for dysuria for 2 to 3 days.

## 2024-12-15 LAB
APPEARANCE UR: CLEAR
BACTERIA #/AREA URNS HPF: NORMAL /[HPF]
BACTERIA UR CULT: NO GROWTH
BACTERIA UR CULT: NORMAL
BILIRUB UR QL STRIP: NEGATIVE
CASTS URNS QL MICRO: NORMAL /LPF
COLOR UR: YELLOW
EPI CELLS #/AREA URNS HPF: NORMAL /HPF (ref 0–10)
GLUCOSE UR QL STRIP: ABNORMAL
HGB UR QL STRIP: NEGATIVE
KETONES UR QL STRIP: NEGATIVE
LEUKOCYTE ESTERASE UR QL STRIP: ABNORMAL
MICRO URNS: ABNORMAL
NITRITE UR QL STRIP: NEGATIVE
PH UR STRIP: 6.5 [PH] (ref 5–7.5)
PROT UR QL STRIP: ABNORMAL
RBC #/AREA URNS HPF: NORMAL /HPF (ref 0–2)
SP GR UR STRIP: 1.02 (ref 1–1.03)
UROBILINOGEN UR STRIP-MCNC: 1 MG/DL (ref 0.2–1)
WBC #/AREA URNS HPF: NORMAL /HPF (ref 0–5)

## 2024-12-17 DIAGNOSIS — F51.01 PRIMARY INSOMNIA: Chronic | ICD-10-CM

## 2024-12-17 RX ORDER — ZOLPIDEM TARTRATE 10 MG/1
10 TABLET ORAL NIGHTLY PRN
Qty: 14 TABLET | Refills: 0 | Status: SHIPPED | OUTPATIENT
Start: 2024-12-17

## 2024-12-17 NOTE — TELEPHONE ENCOUNTER
Caller: Nico Ben PAT    Relationship: Self    Best call back number:[phone]      Requested Prescriptions:   Requested Prescriptions     Pending Prescriptions Disp Refills    zolpidem (AMBIEN) 10 MG tablet 90 tablet 0     Sig: Take 1 tablet by mouth At Night As Needed for Sleep. Indications: Trouble Sleeping        Pharmacy where request should be sent: [facility] [id number] [facility] [phone] [facility] [phone] FX     Last office visit with prescribing clinician: 9/4/2024   Last telemedicine visit with prescribing clinician: Visit date not found   Next office visit with prescribing clinician: 12/31/2024     Additional details provided by patient: HAS 6 PILLS LEFT. AND WILL BE OUT BEFORE HIS APPOINTMENT ON 12/31/24.  CAN YOU GIVE HIM ENOUGH TO GET THROUGH    Does the patient have less than a 3 day supply:  [] Yes  [x] No    Would you like a call back once the refill request has been completed: [] Yes [x] No    If the office needs to give you a call back, can they leave a voicemail: [] Yes [x] No    Cami Restrepo Rep   12/17/24 16:00 EST          No

## 2024-12-17 NOTE — TELEPHONE ENCOUNTER
APPOINTMENT LAST SEEN 9/4/2024 DR BARBA  Return in about 3 months (around 12/4/2024) for Recheck.   SCHEDULED APPOINTMENT 12/31/2024 DR BARBA   Telephone Encounter by Cherise Plummer NCMA at 01/20/18 10:24 AM     Author:  Cherise Plummer NCMA Service:  (none) Author Type:  Certified Medical Assistant     Filed:  01/20/18 10:28 AM Encounter Date:  1/20/2018 Status:  Signed     :  Cherise Plummer NCMA ([facility] Assistant)            To provider. Please advise. Patient does not meet protocols.  Last BP was elevated at 154/86 on 08/15/2017 and patient was to follow up in 3 months.  No apt has been made.  Ok to refill?[NM1.1M]      Revision History        User Key Date/Time User Provider Type Action    > NM1.1 01/20/18 10:28 AM Cherise Plummer NCMA Certified Medical Assistant Sign    M - Manual

## 2025-01-13 NOTE — ASSESSMENT & PLAN NOTE
Orders:    zolpidem (AMBIEN) 10 MG tablet; Take 1 tablet by mouth At Night As Needed for Sleep. Indications: Trouble Sleeping

## 2025-01-13 NOTE — PROGRESS NOTES
Subjective   The ABCs of the Annual Wellness Visit  Medicare Wellness Visit      Ben Walsh is a 79 y.o. patient who presents for a Medicare Wellness Visit.    The following portions of the patient's history were reviewed and   updated as appropriate: allergies, current medications, past family history, past medical history, past social history, past surgical history, and problem list.    Compared to one year ago, the patient's physical   health is the same.  Compared to one year ago, the patient's mental   health is the same.    Recent Hospitalizations:  He was not admitted to the hospital during the last year.     Current Medical Providers:  Patient Care Team:  Jake Marcus MD as PCP - General (Family Medicine)  Guerrero Kruse MD as Consulting Physician (Orthopedic Surgery)  Kelton Francois MD as Consulting Physician (Ophthalmology)  Justin Miller MD as Consulting Physician (Endocrinology)  Angel Borges MD as Consulting Physician (Cardiology)  Grupo Jacobo MD (Pain Medicine)  Henrique Stahl (Anesthesiology)  Anabel Black APRN as Nurse Practitioner (Nurse Practitioner)  Jairo Dorado MD as Consulting Physician (Orthopedic Surgery)  Balir Nagy MD (Inactive) as Surgeon (Sports Medicine)  Osito Mariee MD as Surgeon (Hand Surgery)  Jake Colón MD as Consulting Physician (Dermatology)  Haydee Parekh APRN as Nurse Practitioner (Cardiology)  Anabel Black APRN as Nurse Practitioner (Nurse Practitioner)  Ty Rosario MD as Consulting Physician (Urology)  Tank Francois MD as Consulting Physician (Otolaryngology)    Outpatient Medications Prior to Visit   Medication Sig Dispense Refill    mupirocin (BACTROBAN) 2 % ointment Apply 1 application  topically to the appropriate area as directed 3 (Three) Times a Day. (Patient taking differently: Apply 1 Application topically to the appropriate area as directed As Needed.) 30 g 1     Accu-Chek Guide test strip Dx code E11.29 testing bs 3 x day 300 each 1    amLODIPine (NORVASC) 5 MG tablet Take 1 tablet by mouth Daily. Indications: High Blood Pressure Disorder 90 tablet 1    amoxicillin-clavulanate (AUGMENTIN) 875-125 MG per tablet Take 1 tablet by mouth Every 12 (Twelve) Hours. One PO BID for infection with food (Patient not taking: Reported on 1/14/2025) 20 tablet 5    atorvastatin (LIPITOR) 40 MG tablet Take 1 tablet by mouth Every Night. for cholesterol. 90 tablet 1    Cholecalciferol (Vitamin D3) 1.25 MG (02614 UT) capsule Take 1 capsule by mouth Every 7 (Seven) Days. TAKES ON SAT 13 capsule 3    clobetasol (TEMOVATE) 0.05 % external solution Apply 1 Application topically to the appropriate area as directed As Needed (itching). Indications: Itching, Skin Inflammation      clopidogrel (PLAVIX) 75 MG tablet Take 1 tablet by mouth Daily. 90 tablet 1    Continuous Blood Gluc  (FreeStyle Carter 3 [place]) device Use 1 each Daily. 1 each 1    Continuous Blood Gluc Sensor (FreeStyle Carter 3 Sensor) misc Use 1 each Every 14 (Fourteen) Days. 6 each 3    cyclobenzaprine (FLEXERIL) 10 MG tablet TAKE ONE TABLET BY MOUTH THREE TIMES A DAY AS NEEDED FOR MUSCLE SPASMS 90 tablet 5    Diclofenac Sodium 4 %, Topiramate 2 %, cloNIDine HCl 0.2 %, Lidocaine HCl 5 % Apply 1-2 g topically to the appropriate area as directed 3 (Three) to 4 (Four) times daily. Do not apply to operate knee      Droplet Pen Needles 31G X 8 MM misc USE ONE EACH  each 3    escitalopram (LEXAPRO) 20 MG tablet Take 1 tablet by mouth Daily. Indications: Major Depressive Disorder 90 tablet 1    fenofibrate (TRICOR) 145 MG tablet Take 1 tablet by mouth Daily. 90 tablet 1    fluticasone (FLONASE) 50 MCG/ACT nasal spray 1 spray by Each Nare route Every Evening. Indications: Allergic Rhinitis      gabapentin (NEURONTIN) 800 MG tablet Take 1 tablet by mouth 3 (Three) Times a Day. Indications: Neuropathic Pain       HYDROcodone-acetaminophen (NORCO)  MG per tablet       Insulin Degludec (Tresiba FlexTouch) 200 UNIT/ML solution pen-injector pen injection INJECT 22 UNITS EVERY MORNING (Patient taking differently: 22 Units Every Morning. INJECT 22 UNITS EVERY MORNING) 9 mL 11    Lancets (ACCU-CHEK SOFT TOUCH) lancets Dx code E11.29 testing bs 3 x day 300 each 1    levothyroxine (SYNTHROID, LEVOTHROID) 50 MCG tablet Take 1 tablet by mouth Daily. for thyroid  Indications: Underactive Thyroid 90 tablet 1    metFORMIN ER (GLUCOPHAGE-XR) 500 MG 24 hr tablet Take 2 tablets by mouth Daily. With food for DMII  Indications: Type 2 Diabetes      NITROSTAT 0.4 MG SL tablet Place 1 tablet under the tongue Every 5 (Five) Minutes As Needed. Indications: Acute Angina Pectoris      phenazopyridine (Pyridium) 200 MG tablet Take 1 tablet by mouth 3 (Three) Times a Day As Needed for Bladder Spasms. 9 tablet 0    tamsulosin (FLOMAX) 0.4 MG capsule 24 hr capsule Take 1 capsule by mouth Daily. 90 capsule 1    traZODone (DESYREL) 50 MG tablet Take 1-2 tablets by mouth Every Night. Indications: Trouble Sleeping 180 tablet 1    valsartan (DIOVAN) 160 MG tablet Take 1 tablet by mouth Daily. Indications: High Blood Pressure Disorder 90 tablet 1    zolpidem (AMBIEN) 10 MG tablet Take 1 tablet by mouth At Night As Needed for Sleep. Indications: Trouble Sleeping 14 tablet 0     No facility-administered medications prior to visit.     Opioid medication/s are on active medication list.  and I have evaluated his active treatment plan and pain score trends (see table).  Vitals:    01/14/25 1449   PainSc: 0-No pain     I have reviewed the chart for potential of high risk medication and harmful drug interactions in the elderly.        Aspirin is not on active medication list.  Aspirin use is not indicated based on review of current medical condition/s. Risk of harm outweighs potential benefits.  .    Patient Active Problem List   Diagnosis    Allergic rhinitis  "   ADD (attention deficit disorder)    DDD (degenerative disc disease), lumbar    BPH (benign prostatic hypertrophy)    Chronic pain    ASCVD (arteriosclerotic cardiovascular disease)    Depression    Diverticulosis of colon    GERD (gastroesophageal reflux disease)    Hiatal hernia    Hyperlipidemia    Essential hypertension    Primary hypothyroidism    Insomnia    Osteoarthritis, multiple sites    Acute pancreatitis    Vitamin D deficiency    Glaucoma    Abnormal nuclear stress test    Two-vessel coronary artery disease    SAEED (dyspnea on exertion)    Obstructive sleep apnea syndrome    Patent foramen ovale    Snoring    Disorder of rotator cuff    History of COPD    Type 2 diabetes mellitus with microalbuminuria, without long-term current use of insulin    Carpal tunnel syndrome of right wrist    Abnormal electrocardiogram    Atypical chest pain    Type 2 diabetes mellitus with peripheral neuropathy    B12 deficiency    Osteoarthritis of left knee    Lesion of urinary bladder    Renal stones     Advance Care Planning Advance Directive is not on file.  ACP discussion was held with the patient during this visit. Patient has an advance directive (not in EMR), copy requested.            Objective   Vitals:    01/14/25 1449   BP: 118/62   Pulse: 86   Resp: 16   Temp: 97.9 °F (36.6 °C)   TempSrc: Oral   SpO2: 97%   Weight: 80.7 kg (178 lb)   Height: 177.8 cm (70\")   PainSc: 0-No pain       Estimated body mass index is 25.54 kg/m² as calculated from the following:    Height as of this encounter: 177.8 cm (70\").    Weight as of this encounter: 80.7 kg (178 lb).                Does the patient have evidence of cognitive impairment? No                                                                                                Health  Risk Assessment    Smoking Status:  Social History     Tobacco Use   Smoking Status Never   Smokeless Tobacco Never   Tobacco Comments    caffeine use iced tea all day long, 3 cups coffee " daily     Alcohol Consumption:  Social History     Substance and Sexual Activity   Alcohol Use No       Fall Risk Screen  STEADI Fall Risk Assessment has not been completed.    Depression Screening   Little interest or pleasure in doing things? Not at all   Feeling down, depressed, or hopeless? Not at all   PHQ-2 Total Score 0      Health Habits and Functional and Cognitive Screenin/14/2025     2:00 PM   Functional & Cognitive Status   Do you have difficulty preparing food and eating? No   Do you have difficulty bathing yourself, getting dressed or grooming yourself? No   Do you have difficulty using the toilet? No   Do you have difficulty moving around from place to place? No   Do you have trouble with steps or getting out of a bed or a chair? No   Current Diet Well Balanced Diet   Dental Exam Up to date   Eye Exam Up to date   Exercise (times per week) 4 times per week   Current Exercises Include Walking   Do you need help using the phone?  No   Are you deaf or do you have serious difficulty hearing?  No   Do you need help to go to places out of walking distance? No   Do you need help shopping? No   Do you need help preparing meals?  No   Do you need help with housework?  No   Do you need help with laundry? No   Do you need help taking your medications? No   Do you need help managing money? No   Do you ever drive or ride in a car without wearing a seat belt? No   Have you felt unusual stress, anger or loneliness in the last month? No   Who do you live with? Alone   If you need help, do you have trouble finding someone available to you? No   Have you been bothered in the last four weeks by sexual problems? No   Do you have difficulty concentrating, remembering or making decisions? No           Age-appropriate Screening Schedule:  Refer to the list below for future screening recommendations based on patient's age, sex and/or medical conditions. Orders for these recommended tests are listed in the plan  section. The patient has been provided with a written plan.    Health Maintenance List  Health Maintenance   Topic Date Due    TDAP/TD VACCINES (1 - Tdap) Never done    RSV Vaccine - Adults (1 - 1-dose 75+ series) Never done    INFLUENZA VACCINE  07/01/2024    DIABETIC EYE EXAM  07/26/2024    COVID-19 Vaccine (3 - 2024-25 season) 09/01/2024    HEMOGLOBIN A1C  03/24/2025    DIABETIC FOOT EXAM  02/21/2025    BMI FOLLOWUP  05/31/2025    LIPID PANEL  09/24/2025    ANNUAL WELLNESS VISIT  01/14/2026    COLORECTAL CANCER SCREENING  03/06/2027    Pneumococcal Vaccine 65+  Completed    URINE MICROALBUMIN  Discontinued    ZOSTER VACCINE  Discontinued                                                                                                                                                CMS Preventative Services Quick Reference  Risk Factors Identified During Encounter  None Identified    The above risks/problems have been discussed with the patient.  Pertinent information has been shared with the patient in the After Visit Summary.  An After Visit Summary and PPPS were made available to the patient.    Follow Up:   Next Medicare Wellness visit to be scheduled in 1 year.         Additional E&M Note during same encounter follows:  Patient has additional, significant, and separately identifiable condition(s)/problem(s) that require work above and beyond the Medicare Wellness Visit     Chief Complaint  Insomnia (Med refill due ) and medicare wellness (due)    Subjective   HPI  Ben is also being seen today for additional medical problem/s.    Review of Systems   Constitutional:  Negative for chills and fever.   HENT:  Negative for congestion, ear pain and sinus pressure.    Eyes:  Negative for pain and visual disturbance.   Respiratory:  Negative for cough and shortness of breath.    Cardiovascular:  Negative for chest pain.   Gastrointestinal:  Negative for abdominal pain.   Genitourinary:  Negative for difficulty  "urinating and dysuria.   Skin: Negative.    Neurological: Negative.    Psychiatric/Behavioral:  Negative for dysphoric mood.               Objective   Vital Signs:  /62   Pulse 86   Temp 97.9 °F (36.6 °C) (Oral)   Resp 16   Ht 177.8 cm (70\")   Wt 80.7 kg (178 lb)   SpO2 97%   BMI 25.54 kg/m²   Physical Exam  Vitals and nursing note reviewed.   Constitutional:       General: He is not in acute distress.     Appearance: He is well-developed.   Cardiovascular:      Rate and Rhythm: Normal rate and regular rhythm.   Pulmonary:      Effort: Pulmonary effort is normal.      Breath sounds: Normal breath sounds.   Neurological:      Mental Status: He is alert and oriented to person, place, and time.   Psychiatric:         Behavior: Behavior normal.         Thought Content: Thought content normal.                       Assessment and Plan Additional age appropriate preventative wellness advice topics were discussed during today's preventative wellness exam(some topics already addressed during AWV portion of the note above):    Physical Activity: Advised cardiovascular activity 150 minutes per week as tolerated. (example brisk walk for 30 minutes, 5 days a week).     Nutrition: Discussed nutrition plan with patient. Information shared in after visit summary. Goal is for a well balanced diet to enhance overall health.           Encounter for subsequent annual wellness visit (AWV) in Medicare patient         Primary insomnia    Orders:    zolpidem (AMBIEN) 10 MG tablet; Take 1 tablet by mouth At Night As Needed for Sleep. Indications: Trouble Sleeping    Medication management    Orders:    ToxAssure Flex 15, Ur -          I spent 15 minutes caring for Ben on this date of service. This time includes time spent by me in the following activities:preparing for the visit, performing a medically appropriate examination and/or evaluation , ordering medications, tests, or procedures, and documenting information in the " medical record  Follow Up   Return in about 3 months (around 4/14/2025) for Recheck.  Patient was given instructions and counseling regarding his condition or for health maintenance advice. Please see specific information pulled into the AVS if appropriate.

## 2025-01-14 ENCOUNTER — OFFICE VISIT (OUTPATIENT)
Dept: FAMILY MEDICINE CLINIC | Facility: CLINIC | Age: 80
End: 2025-01-14
Payer: MEDICARE

## 2025-01-14 VITALS
BODY MASS INDEX: 25.48 KG/M2 | SYSTOLIC BLOOD PRESSURE: 118 MMHG | WEIGHT: 178 LBS | HEIGHT: 70 IN | OXYGEN SATURATION: 97 % | RESPIRATION RATE: 16 BRPM | TEMPERATURE: 97.9 F | DIASTOLIC BLOOD PRESSURE: 62 MMHG | HEART RATE: 86 BPM

## 2025-01-14 DIAGNOSIS — F51.01 PRIMARY INSOMNIA: Chronic | ICD-10-CM

## 2025-01-14 DIAGNOSIS — Z00.00 ENCOUNTER FOR SUBSEQUENT ANNUAL WELLNESS VISIT (AWV) IN MEDICARE PATIENT: Primary | ICD-10-CM

## 2025-01-14 DIAGNOSIS — Z79.899 MEDICATION MANAGEMENT: ICD-10-CM

## 2025-01-14 PROCEDURE — 1159F MED LIST DOCD IN RCRD: CPT | Performed by: FAMILY MEDICINE

## 2025-01-14 PROCEDURE — G0439 PPPS, SUBSEQ VISIT: HCPCS | Performed by: FAMILY MEDICINE

## 2025-01-14 PROCEDURE — 1126F AMNT PAIN NOTED NONE PRSNT: CPT | Performed by: FAMILY MEDICINE

## 2025-01-14 PROCEDURE — 99213 OFFICE O/P EST LOW 20 MIN: CPT | Performed by: FAMILY MEDICINE

## 2025-01-14 PROCEDURE — 3078F DIAST BP <80 MM HG: CPT | Performed by: FAMILY MEDICINE

## 2025-01-14 PROCEDURE — 1160F RVW MEDS BY RX/DR IN RCRD: CPT | Performed by: FAMILY MEDICINE

## 2025-01-14 PROCEDURE — 1170F FXNL STATUS ASSESSED: CPT | Performed by: FAMILY MEDICINE

## 2025-01-14 PROCEDURE — 3074F SYST BP LT 130 MM HG: CPT | Performed by: FAMILY MEDICINE

## 2025-01-14 RX ORDER — ZOLPIDEM TARTRATE 10 MG/1
10 TABLET ORAL NIGHTLY PRN
Qty: 90 TABLET | Refills: 0 | Status: SHIPPED | OUTPATIENT
Start: 2025-01-14

## 2025-01-20 LAB
1OH-MIDAZOLAM UR QL SCN: NOT DETECTED NG/MG CREAT
6MAM UR QL SCN: NEGATIVE NG/ML
7AMINOCLONAZEPAM/CREAT UR: NOT DETECTED NG/MG CREAT
A-OH ALPRAZ/CREAT UR: NOT DETECTED NG/MG CREAT
A-OH-TRIAZOLAM/CREAT UR CFM: NOT DETECTED NG/MG CREAT
ALPRAZ/CREAT UR CFM: NOT DETECTED NG/MG CREAT
AMPHETAMINES UR QL SCN: NEGATIVE NG/ML
BARBITURATES UR QL SCN: NEGATIVE NG/ML
BENZODIAZ SCN METH UR: NEGATIVE
BUPRENORPHINE UR QL SCN: NEGATIVE
BUPRENORPHINE/CREAT UR: NOT DETECTED NG/MG CREAT
CANNABINOIDS UR QL SCN: NEGATIVE NG/ML
CLONAZEPAM/CREAT UR CFM: NOT DETECTED NG/MG CREAT
COCAINE+BZE UR QL SCN: NEGATIVE NG/ML
CODEINE UR CFM-MCNC: NOT DETECTED NG/MG CREAT
CREAT UR-MCNC: 60 MG/DL
DESALKYLFLURAZ/CREAT UR: NOT DETECTED NG/MG CREAT
DHC/CREAT UR: 375 NG/MG CREAT
DIAZEPAM/CREAT UR: NOT DETECTED NG/MG CREAT
ETHANOL UR QL SCN: NEGATIVE G/DL
FENTANYL CTO UR SCN-MCNC: NEGATIVE NG/ML
FENTANYL/CREAT UR: NOT DETECTED NG/MG CREAT
FLUNITRAZEPAM UR QL SCN: NOT DETECTED NG/MG CREAT
HYDROCODONE UR CFM-MCNC: 2645 NG/MG CREAT
HYDROMORPHONE UR CFM-MCNC: 298 NG/MG CREAT
LORAZEPAM/CREAT UR: NOT DETECTED NG/MG CREAT
METHADONE UR QL SCN: NEGATIVE NG/ML
METHADONE+METAB UR QL SCN: NEGATIVE NG/ML
MIDAZOLAM/CREAT UR CFM: NOT DETECTED NG/MG CREAT
MORPHINE UR CFM-MCNC: NOT DETECTED NG/MG CREAT
NORBUPRENORPHINE/CREAT UR: NOT DETECTED NG/MG CREAT
NORCODEINE/CREAT UR CFM: NOT DETECTED NG/MG CREAT
NORDIAZEPAM/CREAT UR: NOT DETECTED NG/MG CREAT
NORFENTANYL/CREAT UR: NOT DETECTED NG/MG CREAT
NORFLUNITRAZEPAM UR-MCNC: NOT DETECTED NG/MG CREAT
NORHYDROCODONE UR CFM-MCNC: 927 NG/MG CREAT
NORMORPHINE UR-MCNC: NOT DETECTED NG/MG CREAT
OPIATES UR QL CFM: NORMAL
OPIATES UR SCN-MCNC: NORMAL NG/ML
OXAZEPAM/CREAT UR: NOT DETECTED NG/MG CREAT
OXYCODONE CTO UR SCN-MCNC: NEGATIVE NG/ML
PCP UR QL SCN: NEGATIVE NG/ML
PRESCRIBED MEDICATIONS: NORMAL
TAPENTADOL CTO UR SCN-MCNC: NEGATIVE NG/ML
TEMAZEPAM/CREAT UR: NOT DETECTED NG/MG CREAT
TRAMADOL UR QL SCN: NEGATIVE NG/ML

## 2025-01-28 NOTE — PROGRESS NOTES
Subjective:     Encounter Date:01/29/2025      Patient ID: Ben Walsh is a 79 y.o. male.    Chief Complaint: Follow-up CAD  History of Present Illness  This is a 79-year-old man who follows with Dr. Borges and is new to me today.  He has a past medical history of coronary artery disease with prior angioplasty to the LAD, borderline lesion to the RCA, diabetes, hypertension and hyperlipidemia.  In 2016 he had a cardiac catheterization that showed two-vessel coronary artery disease with a borderline lesion in the RCA.  It was recommended in 2021 that he undergo a myocardial perfusion stress test for preoperative risk assessment.  This showed no evidence of ischemia.    He was last seen by MARIBEL Davies in January 2024 at which time he was needing preoperative clearance again for right hand surgery.  No workup was recommended and he was cleared for surgery.    He is here today for follow-up visit.  From a cardiac standpoint he has been feeling well.  He did undergo hand surgery and has not really noticed much improvement with this.  His biggest complaint right now is back pain.  He is having pain in his back, on the left side.  He has had a pain similar to this before but it was on the right it did not last this long.  This is lasted a couple of weeks it sounds like.  He says lying down helps with the discomfort.  Deep breaths does not make it worse.  Blood pressures have been well-controlled.  He denies any chest pain, arm pain or neck pain.  No complaints of shortness of breath, palpitations, dizziness or passing out.  He denies any swelling in his legs, orthopnea or PND.    I have reviewed and updated as appropriate allergies, current medications, past family history, past medical history, past surgical history and problem list.    Review of Systems   Constitutional: Negative for fever, malaise/fatigue, weight gain and weight loss.   HENT:  Negative for congestion, hoarse voice and sore throat.     Eyes:  Negative for blurred vision and double vision.   Cardiovascular:  Negative for chest pain, dyspnea on exertion, leg swelling, orthopnea, palpitations and syncope.   Respiratory:  Negative for cough, shortness of breath and wheezing.    Musculoskeletal:  Positive for back pain.   Gastrointestinal:  Negative for abdominal pain, hematemesis, hematochezia and melena.   Genitourinary:  Negative for dysuria and hematuria.   Neurological:  Negative for dizziness, headaches, light-headedness and numbness.   Psychiatric/Behavioral:  Negative for depression. The patient is not nervous/anxious.          Current Outpatient Medications:     Accu-Chek Guide test strip, Dx code E11.29 testing bs 3 x day, Disp: 300 each, Rfl: 1    amLODIPine (NORVASC) 5 MG tablet, Take 1 tablet by mouth Daily. Indications: High Blood Pressure Disorder, Disp: 90 tablet, Rfl: 1    amoxicillin-clavulanate (AUGMENTIN) 875-125 MG per tablet, Take 1 tablet by mouth Every 12 (Twelve) Hours. One PO BID for infection with food, Disp: 20 tablet, Rfl: 5    atorvastatin (LIPITOR) 40 MG tablet, Take 1 tablet by mouth Every Night. for cholesterol., Disp: 90 tablet, Rfl: 1    Cholecalciferol (Vitamin D3) 1.25 MG (99240 UT) capsule, Take 1 capsule by mouth Every 7 (Seven) Days. TAKES ON SAT, Disp: 13 capsule, Rfl: 3    clobetasol (TEMOVATE) 0.05 % external solution, Apply 1 Application topically to the appropriate area as directed As Needed (itching). Indications: Itching, Skin Inflammation, Disp: , Rfl:     clopidogrel (PLAVIX) 75 MG tablet, Take 1 tablet by mouth Daily., Disp: 90 tablet, Rfl: 1    Continuous Blood Gluc  (FreeStyle Carter 3 [place]) device, Use 1 each Daily., Disp: 1 each, Rfl: 1    Continuous Blood Gluc Sensor (FreeStyle Carter 3 Sensor) misc, Use 1 each Every 14 (Fourteen) Days., Disp: 6 each, Rfl: 3    cyclobenzaprine (FLEXERIL) 10 MG tablet, TAKE ONE TABLET BY MOUTH THREE TIMES A DAY AS NEEDED FOR MUSCLE SPASMS, Disp: 90 tablet,  Rfl: 5    Diclofenac Sodium 4 %, Topiramate 2 %, cloNIDine HCl 0.2 %, Lidocaine HCl 5 %, Apply 1-2 g topically to the appropriate area as directed 3 (Three) to 4 (Four) times daily. Do not apply to operate knee, Disp: , Rfl:     Droplet Pen Needles 31G X 8 MM misc, USE ONE EACH DAY, Disp: 100 each, Rfl: 3    escitalopram (LEXAPRO) 20 MG tablet, Take 1 tablet by mouth Daily. Indications: Major Depressive Disorder, Disp: 90 tablet, Rfl: 1    fenofibrate (TRICOR) 145 MG tablet, Take 1 tablet by mouth Daily., Disp: 90 tablet, Rfl: 1    fluticasone (FLONASE) 50 MCG/ACT nasal spray, 1 spray by Each Nare route Every Evening. Indications: Allergic Rhinitis, Disp: , Rfl:     gabapentin (NEURONTIN) 800 MG tablet, Take 1 tablet by mouth 3 (Three) Times a Day. Indications: Neuropathic Pain, Disp: , Rfl:     HYDROcodone-acetaminophen (NORCO)  MG per tablet, , Disp: , Rfl:     Insulin Degludec (Tresiba FlexTouch) 200 UNIT/ML solution pen-injector pen injection, INJECT 22 UNITS EVERY MORNING (Patient taking differently: 22 Units Every Morning. INJECT 22 UNITS EVERY MORNING), Disp: 9 mL, Rfl: 11    ketoconazole (NIZORAL) 2 % shampoo, , Disp: , Rfl:     Lancets (ACCU-CHEK SOFT TOUCH) lancets, Dx code E11.29 testing bs 3 x day, Disp: 300 each, Rfl: 1    levothyroxine (SYNTHROID, LEVOTHROID) 50 MCG tablet, Take 1 tablet by mouth Daily. for thyroid  Indications: Underactive Thyroid, Disp: 90 tablet, Rfl: 1    metFORMIN ER (GLUCOPHAGE-XR) 500 MG 24 hr tablet, Take 2 tablets by mouth Daily. With food for DMII  Indications: Type 2 Diabetes, Disp: , Rfl:     mupirocin (BACTROBAN) 2 % ointment, Apply 1 application  topically to the appropriate area as directed 3 (Three) Times a Day. (Patient taking differently: Apply 1 Application topically to the appropriate area as directed As Needed.), Disp: 30 g, Rfl: 1    NITROSTAT 0.4 MG SL tablet, Place 1 tablet under the tongue Every 5 (Five) Minutes As Needed. Indications: Acute Angina  Pectoris, Disp: , Rfl:     omeprazole (priLOSEC) 40 MG capsule, , Disp: , Rfl:     phenazopyridine (Pyridium) 200 MG tablet, Take 1 tablet by mouth 3 (Three) Times a Day As Needed for Bladder Spasms., Disp: 9 tablet, Rfl: 0    tamsulosin (FLOMAX) 0.4 MG capsule 24 hr capsule, Take 1 capsule by mouth Daily., Disp: 90 capsule, Rfl: 1    traZODone (DESYREL) 50 MG tablet, Take 1-2 tablets by mouth Every Night. Indications: Trouble Sleeping, Disp: 180 tablet, Rfl: 1    valsartan (DIOVAN) 160 MG tablet, Take 1 tablet by mouth Daily. Indications: High Blood Pressure Disorder, Disp: 90 tablet, Rfl: 1    zolpidem (AMBIEN) 10 MG tablet, Take 1 tablet by mouth At Night As Needed for Sleep. Indications: Trouble Sleeping, Disp: 90 tablet, Rfl: 0    Past Medical History:   Diagnosis Date    2-vessel coronary artery disease     Abnormal ECG     Abnormal liver function test     Acute pancreatitis     thought to be from the Januvia Rx,     ADD (attention deficit disorder)     Allergic rhinitis     Atherosclerotic heart disease of native coronary artery without angina pectoris     Atrial fibrillation and flutter     Atypical chest pain     suspect this is not cardiac related given that is not exertional. howevr he is not very functional and this makes the assessment somewhat difficult    Back pain     BPH (benign prostatic hypertrophy)     CAD (coronary artery disease)     Cancer 2024    REMOVED BASAL CELL FROM LT AER    Chronic pain     DDD (degenerative disc disease), lumbar     Depression     Diabetes     TYPE 2    Diabetic eye exam 2017    Diverticulitis of colon     Diverticulitis of colon with hemorrhage     Diverticulosis of colon     SAEED (dyspnea on exertion)     Elevated PSA     Essential hypertension     GERD (gastroesophageal reflux disease)     Glaucoma     Dr. Art    Grief     WIFE  2023 FROM [place]    Herpes zoster     HX    Hiatal hernia     History of transfusion     Hyperlipidemia      Hypertension     Hypothyroidism     Impacted cerumen     Incisional hernia     Insomnia     Joint pain     Knee pain     BOY    Low back pain     Microalbuminuria     Mitral regurgitation     Trace to mild    Muscle spasm     NAFLD (nonalcoholic fatty liver disease)     Numbness     SKYLAR (obstructive sleep apnea)     Osteoarthritis     Osteoarthritis, multiple sites     PFO (patent foramen ovale)     Primary snoring     Prostatic hyperplasia, benign localized, with obstruction     RCT (rotator cuff tear)     Rotator cuff tear     Tricuspid regurgitation     Trace to mild    Type 2 diabetes mellitus     Vitamin D deficiency        Past Surgical History:   Procedure Laterality Date    BACK SURGERY      CARDIAC CATHETERIZATION      CARDIAC CATHETERIZATION N/A 03/11/2016    Procedure: Coronary angiography;  Surgeon: Anastacio Flores MD;  Location: [facility] CATH INVASIVE LOCATION;  Service:     CARPAL TUNNEL RELEASE Right     CATARACT EXTRACTION Left 03/2014    CERVICAL SPINE SURGERY      COLON RESECTION      COLONOSCOPY N/A 03/06/2017    Procedure: COLONOSCOPY into cecum;  Surgeon: Mynor Wynne MD;  Location: [facility] ENDOSCOPY;  Service:     CORONARY ANGIOPLASTY WITH STENT PLACEMENT  07/2014    ENDOSCOPY      EPIDURAL BLOCK      EYE SURGERY Right 12/2011    Dr. River Art; had bilat YAG peripheral iridotomy for glaucoma    HAND SURGERY Right 02/2024    HERNIA REPAIR  03/2009    also 4/09; Dr. Carson; incisional hernias    LUMBAR DISC SURGERY      LUMBAR FUSION      LUMBAR LAMINECTOMY      PROSTATE BIOPSY      SHOULDER ARTHROSCOPY W/ ROTATOR CUFF REPAIR Left 08/31/2016    Procedure: LEFT SHOULDER ARTHROSCOPY WITH ROTATOR CUFF REPAIR,  ACROMIOPLASTY, AND DEBRIDMENT OF LABRUM;  Surgeon: Guerrero Kruse MD;  Location: [facility] OR [facility];  Service:     SHOULDER SURGERY Left     SPINE SURGERY      lumbrosacral    TOTAL KNEE ARTHROPLASTY Left 05/21/2024    Procedure: Left TOTAL KNEE ARTHROPLASTY;  Surgeon: Ave  "MD Jairo;  Location: [facility] OR [facility];  Service: Orthopedics;  Laterality: Left;       Family History   Problem Relation Age of Onset    Heart failure Mother     Hypertension Mother     Hyperlipidemia Mother     Cancer Mother     Heart attack Mother     Heart disease Mother     Stroke Mother     Alcohol abuse Father     Cancer Father         prostate    Heart failure Father     Prostate cancer Father     Heart disease Father     Stroke Father     Heart attack Father     Cancer Sister     Lung cancer Sister     Heart disease Sister     Other Brother         demyelinating disease of central nervous system    Cancer Brother     Heart failure Brother     Lung cancer Brother     Malig Hyperthermia Neg Hx        Social History     Tobacco Use    Smoking status: Never    Smokeless tobacco: Never    Tobacco comments:     caffeine use iced tea all day long, 3 cups coffee daily   Vaping Use    Vaping status: Never Used   Substance Use Topics    Alcohol use: No    Drug use: No         ECG 12 Lead    Date/Time: 1/29/2025 3:49 PM  Performed by: Brooke Langley APRN    Authorized by: Brooke Langley APRN  Comparison: compared with previous ECG from 1/18/2024  Similar to previous ECG  Rhythm: sinus rhythm             Objective:     Visit Vitals  /80 (BP Location: Left arm, Patient Position: Sitting, Cuff Size: Adult)   Pulse 84   Ht 177.8 cm (70\")   Wt 80.7 kg (178 lb)   SpO2 97%   BMI 25.54 kg/m²             Physical Exam  Constitutional:       Appearance: Normal appearance. He is normal weight.   HENT:      Head: Normocephalic.   Neck:      Vascular: No carotid bruit.   Cardiovascular:      Rate and Rhythm: Normal rate and regular rhythm.      Chest Wall: PMI is not displaced.      Pulses: Normal pulses.           Radial pulses are 2+ on the right side and 2+ on the left side.        Posterior tibial pulses are 2+ on the right side and 2+ on the left side.      Heart sounds: Normal heart sounds. No murmur heard.     No " friction rub. No gallop.   Pulmonary:      Effort: Pulmonary effort is normal.      Breath sounds: Normal breath sounds.   Abdominal:      General: Bowel sounds are normal. There is no distension.      Palpations: Abdomen is soft.   Musculoskeletal:      Right lower leg: No edema.      Left lower leg: No edema.   Skin:     General: Skin is warm and dry.      Capillary Refill: Capillary refill takes less than 2 seconds.   Neurological:      Mental Status: He is alert and oriented to person, place, and time.   Psychiatric:         Mood and Affect: Mood normal.         Behavior: Behavior normal.         Thought Content: Thought content normal.          Lab Review:   Lipid Panel          2/21/2024    14:07 9/24/2024    12:41   Lipid Panel   Total Cholesterol 159  134    Triglycerides 180  95    HDL Cholesterol 34  38    VLDL Cholesterol 31  18    LDL Cholesterol  94  78          Cardiac Procedures:       Assessment:         Diagnoses and all orders for this visit:    1. Two-vessel coronary artery disease (Primary)    2. Patent foramen ovale    3. Hyperlipidemia, unspecified hyperlipidemia type    4. Essential hypertension    5. ASCVD (arteriosclerotic cardiovascular disease)    Other orders  -     ECG 12 Lead            Plan:       CAD: two vessel CAD with prior PCI and stenting to the LAD. Borderline lesion to the RCA. No reported anginal symptoms. On clopidogrel, statin and amlodipine. EKG is stable with no new ischemic changes. Continue current medical therapy.  HTN: blood pressure well controlled on valsartan and amlodipine. No changes.  HLD: on statin. Goal LDL < 70. Most recent was 78  Back pain: I do not feel this is heart related. It improves with lying flat and deep breaths does not exacerbated as well. Recommend following up with either his orthopedists or PCP.    Thank you for allowing me to participate in this patient's care. Please call with any questions or concerns. Mr. Walsh will follow up with   Katerina in 1 year.          Your medication list            Accurate as of January 29, 2025  3:50 PM. If you have any questions, ask your nurse or doctor.                CHANGE how you take these medications        Instructions Last Dose Given Next Dose Due   mupirocin 2 % ointment  Commonly known as: BACTROBAN  What changed:   when to take this  reasons to take this      Apply 1 application  topically to the appropriate area as directed 3 (Three) Times a Day.       Tresiba FlexTouch 200 UNIT/ML solution pen-injector pen injection  Generic drug: Insulin Degludec  What changed:   how much to take  when to take this      INJECT 22 UNITS EVERY MORNING              CONTINUE taking these medications        Instructions Last Dose Given Next Dose Due   Accu-Chek Guide test strip  Generic drug: glucose blood      Dx code E11.29 testing bs 3 x day       accu-chek soft touch lancets      Dx code E11.29 testing bs 3 x day       amLODIPine 5 MG tablet  Commonly known as: NORVASC      Take 1 tablet by mouth Daily. Indications: High Blood Pressure Disorder       amoxicillin-clavulanate 875-125 MG per tablet  Commonly known as: AUGMENTIN      Take 1 tablet by mouth Every 12 (Twelve) Hours. One PO BID for infection with food       atorvastatin 40 MG tablet  Commonly known as: LIPITOR      Take 1 tablet by mouth Every Night. for cholesterol.       clobetasol 0.05 % external solution  Commonly known as: TEMOVATE      Apply 1 Application topically to the appropriate area as directed As Needed (itching). Indications: Itching, Skin Inflammation       clopidogrel 75 MG tablet  Commonly known as: PLAVIX      Take 1 tablet by mouth Daily.       cyclobenzaprine 10 MG tablet  Commonly known as: FLEXERIL      TAKE ONE TABLET BY MOUTH THREE TIMES A DAY AS NEEDED FOR MUSCLE SPASMS       Diclofenac Sodium 4 %, Topiramate 2 %, cloNIDine HCl 0.2 %, Lidocaine HCl 5 %      Apply 1-2 g topically to the appropriate area as directed 3 (Three) to 4  (Four) times daily. Do not apply to operate knee       Droplet Pen Needles 31G X 8 MM misc  Generic drug: Insulin Pen Needle      USE ONE EACH DAY       escitalopram 20 MG tablet  Commonly known as: LEXAPRO      Take 1 tablet by mouth Daily. Indications: Major Depressive Disorder       fenofibrate 145 MG tablet  Commonly known as: TRICOR      Take 1 tablet by mouth Daily.       fluticasone 50 MCG/ACT nasal spray  Commonly known as: FLONASE      1 spray by Each Nare route Every Evening. Indications: Allergic Rhinitis       FreeStyle Carter 3 [place] device      Use 1 each Daily.       FreeStyle Carter 3 Sensor misc      Use 1 each Every 14 (Fourteen) Days.       gabapentin 800 MG tablet  Commonly known as: NEURONTIN      Take 1 tablet by mouth 3 (Three) Times a Day. Indications: Neuropathic Pain       HYDROcodone-acetaminophen  MG per tablet  Commonly known as: NORCO           ketoconazole 2 % shampoo  Commonly known as: NIZORAL           levothyroxine 50 MCG tablet  Commonly known as: SYNTHROID, LEVOTHROID      Take 1 tablet by mouth Daily. for thyroid  Indications: Underactive Thyroid       metFORMIN  MG 24 hr tablet  Commonly known as: GLUCOPHAGE-XR      Take 2 tablets by mouth Daily. With food for DMII  Indications: Type 2 Diabetes       Nitrostat 0.4 MG SL tablet  Generic drug: nitroglycerin      Place 1 tablet under the tongue Every 5 (Five) Minutes As Needed. Indications: Acute Angina Pectoris       omeprazole 40 MG capsule  Commonly known as: priLOSEC           phenazopyridine 200 MG tablet  Commonly known as: Pyridium      Take 1 tablet by mouth 3 (Three) Times a Day As Needed for Bladder Spasms.       tamsulosin 0.4 MG capsule 24 hr capsule  Commonly known as: FLOMAX      Take 1 capsule by mouth Daily.       traZODone 50 MG tablet  Commonly known as: DESYREL      Take 1-2 tablets by mouth Every Night. Indications: Trouble Sleeping       valsartan 160 MG tablet  Commonly known as: DIOVAN      Take 1  tablet by mouth Daily. Indications: High Blood Pressure Disorder       Vitamin D3 1.25 MG (60951 UT) capsule      Take 1 capsule by mouth Every 7 (Seven) Days. TAKES ON SAT       zolpidem 10 MG tablet  Commonly known as: AMBIEN      Take 1 tablet by mouth At Night As Needed for Sleep. Indications: Trouble Sleeping                  Brooke Langley, MARIBEL  01/29/25  3:25 PM EST

## 2025-01-29 ENCOUNTER — OFFICE VISIT (OUTPATIENT)
Dept: CARDIOLOGY | Facility: CLINIC | Age: 80
End: 2025-01-29
Payer: MEDICARE

## 2025-01-29 VITALS
DIASTOLIC BLOOD PRESSURE: 80 MMHG | BODY MASS INDEX: 25.48 KG/M2 | SYSTOLIC BLOOD PRESSURE: 125 MMHG | HEIGHT: 70 IN | WEIGHT: 178 LBS | OXYGEN SATURATION: 97 % | HEART RATE: 84 BPM

## 2025-01-29 DIAGNOSIS — I10 ESSENTIAL HYPERTENSION: ICD-10-CM

## 2025-01-29 DIAGNOSIS — I25.10 ASCVD (ARTERIOSCLEROTIC CARDIOVASCULAR DISEASE): ICD-10-CM

## 2025-01-29 DIAGNOSIS — E78.5 HYPERLIPIDEMIA, UNSPECIFIED HYPERLIPIDEMIA TYPE: ICD-10-CM

## 2025-01-29 DIAGNOSIS — I25.10 TWO-VESSEL CORONARY ARTERY DISEASE: Primary | ICD-10-CM

## 2025-01-29 DIAGNOSIS — Q21.12 PATENT FORAMEN OVALE: ICD-10-CM

## 2025-01-29 PROCEDURE — 3079F DIAST BP 80-89 MM HG: CPT | Performed by: NURSE PRACTITIONER

## 2025-01-29 PROCEDURE — 99214 OFFICE O/P EST MOD 30 MIN: CPT | Performed by: NURSE PRACTITIONER

## 2025-01-29 PROCEDURE — 3074F SYST BP LT 130 MM HG: CPT | Performed by: NURSE PRACTITIONER

## 2025-01-29 PROCEDURE — 93000 ELECTROCARDIOGRAM COMPLETE: CPT | Performed by: NURSE PRACTITIONER

## 2025-01-29 RX ORDER — OMEPRAZOLE 40 MG/1
CAPSULE, DELAYED RELEASE ORAL
COMMUNITY
Start: 2025-01-23

## 2025-01-29 RX ORDER — KETOCONAZOLE 20 MG/ML
SHAMPOO, SUSPENSION TOPICAL
COMMUNITY
Start: 2025-01-16

## 2025-02-04 ENCOUNTER — OFFICE VISIT (OUTPATIENT)
Dept: ENDOCRINOLOGY | Age: 80
End: 2025-02-04
Payer: MEDICARE

## 2025-02-04 VITALS
WEIGHT: 185.4 LBS | OXYGEN SATURATION: 93 % | DIASTOLIC BLOOD PRESSURE: 70 MMHG | BODY MASS INDEX: 26.54 KG/M2 | HEIGHT: 70 IN | HEART RATE: 81 BPM | SYSTOLIC BLOOD PRESSURE: 122 MMHG

## 2025-02-04 DIAGNOSIS — E11.29 TYPE 2 DIABETES MELLITUS WITH MICROALBUMINURIA, WITHOUT LONG-TERM CURRENT USE OF INSULIN: Primary | ICD-10-CM

## 2025-02-04 DIAGNOSIS — R80.9 TYPE 2 DIABETES MELLITUS WITH MICROALBUMINURIA, WITHOUT LONG-TERM CURRENT USE OF INSULIN: Primary | ICD-10-CM

## 2025-02-04 DIAGNOSIS — Z86.018 HISTORY OF BENIGN BLADDER TUMOR: ICD-10-CM

## 2025-02-04 DIAGNOSIS — I10 ESSENTIAL HYPERTENSION: ICD-10-CM

## 2025-02-04 DIAGNOSIS — E55.9 VITAMIN D DEFICIENCY: ICD-10-CM

## 2025-02-04 DIAGNOSIS — E03.9 PRIMARY HYPOTHYROIDISM: ICD-10-CM

## 2025-02-04 DIAGNOSIS — I25.10 CORONARY ARTERY DISEASE INVOLVING NATIVE CORONARY ARTERY OF NATIVE HEART WITHOUT ANGINA PECTORIS: ICD-10-CM

## 2025-02-04 DIAGNOSIS — E78.5 HYPERLIPIDEMIA, UNSPECIFIED HYPERLIPIDEMIA TYPE: ICD-10-CM

## 2025-02-04 PROCEDURE — 3078F DIAST BP <80 MM HG: CPT | Performed by: INTERNAL MEDICINE

## 2025-02-04 PROCEDURE — G2211 COMPLEX E/M VISIT ADD ON: HCPCS | Performed by: INTERNAL MEDICINE

## 2025-02-04 PROCEDURE — 3074F SYST BP LT 130 MM HG: CPT | Performed by: INTERNAL MEDICINE

## 2025-02-04 PROCEDURE — 99214 OFFICE O/P EST MOD 30 MIN: CPT | Performed by: INTERNAL MEDICINE

## 2025-02-04 NOTE — PROGRESS NOTES
Subjective   Ben Walsh is a 79 y.o. male.     History of Present Illness     He has known diabetes since 2013.  He has been on metformin  mg 2 tablets once a day, and Tresiba 20 every AM.  Jardiance was discontinued because he was having urinary tract infection.  Januvia was discontinued when he had pancreatitis.  He had diarrhea when he took a higher dose of metformin.  He is using a freestyle mark 3 sensor.  His last meal was at 8 AM.     Sensor data - unable to download.     He had microalbuminuria on urine sample taken in 2014.  Urine microalbumin was normal in June 2024..  He is on valsartan.  His last eye examination was in 6/24.  He has no retinopathy.     He has chronic neck and lower back pain due to degenerative disc disease.  He is no longer having burning in his feet.    He denies muscle weakness or incontinence.   Arterial ultrasound of the lower extremities done in March 2020 is normal. He is on gabapentin 800 mg 3 times daily prescribed by Dr. Henrique Stahl.      He has intermittent urinary incontinence and retention.  He had a cystoscopy with transurethral resection of 3 cm bladder tumor done by Dr. Rosario in November 2024.  He sees urologist Dr. Rosario.  He denies bowel incontinence.     He has hyperlipidemia and has been on Lipitor 40 mg once a day, and TriCor 145 mg once a day.  He denies any myalgia.  CT scan of the abdomen and ultrasound of the abdomen in 2012 showed fatty infiltration of the liver and a right kidney stone.       He has hypothyroidism and has been on levothyroxine 50 µg per day.  He denies heat or cold intolerance.  He denies bowel changes.     He has hypertension and has been on amlodipine and valsartan.  He had a false positive stress test in February 2016.  He had a cardiac catheterization in March 2016.  The stent to the mid LAD is patent.  There is mild luminal irregularities of the proximal LAD.  The left circumflex complex has mild luminal plaque.  The  "proximal RCA has a stable 50% stenosis.  He is on Plavix 75 mg once a day.  He denies chest pain or shortness of breath.  He follows with Dr. Borges     He has history of vitamin D deficiency and is on vitamin D3 50,000 units weekly prescribed by Dr. Marcus.  He denies chronic diarrhea.    He is a retired principal of a trade school.    He is the father of Janiya ERVIN.       The following portions of the patient's history were reviewed and updated as appropriate: allergies, current medications, past family history, past medical history, past social history, past surgical history, and problem list.    Review of Systems   Eyes:  Negative for visual disturbance.   Respiratory:  Negative for shortness of breath and wheezing.    Cardiovascular:  Negative for chest pain and palpitations.   Gastrointestinal: Negative.    Genitourinary:  Positive for difficulty urinating. Negative for dysuria and hematuria.        Intermittent incontinence and retention   Musculoskeletal:  Positive for back pain. Negative for myalgias.   Neurological:  Negative for numbness.     Vitals:    02/04/25 1331   BP: 122/70   Pulse: 81   SpO2: 93%   Weight: 84.1 kg (185 lb 6.4 oz)   Height: 177.8 cm (70\")      Objective   Physical Exam  Constitutional:       Appearance: Normal appearance. He is not toxic-appearing or diaphoretic.   Eyes:      General: No scleral icterus.        Right eye: No discharge.         Left eye: No discharge.   Cardiovascular:      Rate and Rhythm: Normal rate and regular rhythm.   Pulmonary:      Breath sounds: No rales.   Chest:      Chest wall: No tenderness.   Abdominal:      Palpations: Abdomen is soft.      Tenderness: There is no right CVA tenderness or left CVA tenderness.   Musculoskeletal:      Right lower leg: No edema.      Left lower leg: No edema.      Comments: Feet warm.  No cyanosis or pedal edema.  No clubbing.  No plantar ulcers.   Neurological:      Mental Status: He is alert and oriented to person, " place, and time.       Office Visit on 01/14/2025   Component Date Value Ref Range Status    Report Summary 01/14/2025 FINAL   Final    Comment: ====================================================================  Opiate Class, MS, Ur RFX  ToxAssure Flex 15, Ur  ====================================================================  Test                             Result       Flag       Units  Drug Present    Hydrocodone                    2645                    ng/mg creat    Hydromorphone                  298                     ng/mg creat    Dihydrocodeine                 375                     ng/mg creat    Norhydrocodone                 927                     ng/mg creat     Sources of hydrocodone include scheduled prescription     medications. Hydromorphone, dihydrocodeine and norhydrocodone are     expected metabolites of hydrocodone. Hydromorphone and     dihydrocodeine are also available as scheduled prescription     medications.  ====================================================================  Test                      Result    Flag   Units      Ref Range    Creatinine              60               mg/dL                                 >=20  ====================================================================  Declared Medications:   Medication list was not provided.  ====================================================================  For clinical consultation, please call [phone].  ====================================================================      CREATININE 01/14/2025 60  mg/dL Final    REFERENCE RANGE: Ref Range>=20    Amphetamines, IA 01/14/2025 Negative  CUTOFF:300 ng/mL Final    Benzodiazepines 01/14/2025 Negative   Final    Diazepam Urine, Qualitative 01/14/2025 Not Detected  ng/mg creat Final    Desmethyldiazepam 01/14/2025 Not Detected  ng/mg creat Final    Oxazepam, urine 01/14/2025 Not Detected  ng/mg creat Final    Temazepam 01/14/2025 Not Detected  ng/mg creat Final     Comment: Expected metabolism of benzodiazepine class drugs:   Parent Drug       Detected Metabolites   -----------       --------------------   Diazepam:         Desmethyldiazepam, Temazepam, Oxazepam   Chlordiazepoxide: Desmethyldiazepam, Oxazepam   Clorazepate:      Desmethyldiazepam, Oxazepam   Halazepam:        Desmethyldiazepam, Oxazepam   Temazepam:        Oxazepam   Oxazepam:         None      Alprazolam Urine, Conf 01/14/2025 Not Detected  ng/mg creat Final    Alpha-hydroxyalprazolam, Urine 01/14/2025 Not Detected  ng/mg creat Final    Desalkylflurazepam, Urine 01/14/2025 Not Detected  ng/mg creat Final    Lorazepam, Urine 01/14/2025 Not Detected  ng/mg creat Final    Alpha-hydroxytriazolam, Urine 01/14/2025 Not Detected  ng/mg creat Final    Clonazepam 01/14/2025 Not Detected  ng/mg creat Final    7- AMINOCLONAZEPAM 01/14/2025 Not Detected  ng/mg creat Final    Midazolam, Urine 01/14/2025 Not Detected  ng/mg creat Final    Alpha-hydroxymidazolam, Urine 01/14/2025 Not Detected  ng/mg creat Final    Flunitrazepam 01/14/2025 Not Detected  ng/mg creat Final    DESMETHYLFLUNITRAZEPAM 01/14/2025 Not Detected  ng/mg creat Final    COCAINE / METABOLITE, IA 01/14/2025 Negative  CUTOFF:150 ng/mL Final    Ethyl Alcohol, Enz 01/14/2025 Negative  CUTOFF:0.020 g/dL Final    Cannavinoids IA 01/14/2025 Negative  CUTOFF:20 ng/mL Final    6-Acetylmorphine IA 01/14/2025 Negative  CUTOFF:10 ng/mL Final    Opiate Class IA 01/14/2025 Comment  CUTOFF:100 ng/mL Final    Further testing indicated    Oxycodone Class IA 01/14/2025 Negative  CUTOFF:100 ng/mL Final    METHADONE, IA 01/14/2025 Negative  CUTOFF:100 ng/mL Final    Methadone MTB IA 01/14/2025 Negative  CUTOFF:100 ng/mL Final    Buprenorphine, Urine 01/14/2025 Negative   Final    Buprenorphine, Urine 01/14/2025 Not Detected  ng/mg creat Final    Norbuprenorphine 01/14/2025 Not Detected  ng/mg creat Final    Fentanyl 01/14/2025 Negative   Final    Fentanyl, Urine  01/14/2025 Not Detected  ng/mg creat Final    Norfentanyl Urine 01/14/2025 Not Detected  ng/mg creat Final    Tapentadol, IA 01/14/2025 Negative  CUTOFF:200 ng/mL Final    TRAMADOL, IA 01/14/2025 Negative  CUTOFF:200 ng/mL Final    Barbiturates, IA 01/14/2025 Negative  CUTOFF:200 ng/mL Final    PHENCYCLIDINE, IA 01/14/2025 Negative  CUTOFF:25 ng/mL Final    Opiate Class 01/14/2025 +POSITIVE+   Final    Codeine 01/14/2025 Not Detected  ng/mg creat Final    Morphine 01/14/2025 Not Detected  ng/mg creat Final    normorphine 01/14/2025 Not Detected  ng/mg creat Final    Norcodeine Ur 01/14/2025 Not Detected  ng/mg creat Final    Hydrocodone 01/14/2025 2,645  ng/mg creat Final    Hydromorphone 01/14/2025 298  ng/mg creat Final    Dihydrocodeine 01/14/2025 375  ng/mg creat Final    Norhydrocodone 01/14/2025 927  ng/mg creat Final    Comment: Expected metabolism of opiate class drugs:  Parent Drug       Detected Metabolites   -----------       --------------------   Codeine:          Major:  Morphine, Norcodeine                     Minor:  Hydrocodone, Hydromorphone,                             Dihydrocodeine, Norhydrocodone,                             Normorphine   Morphine:         Major:  Normorphine                     Minor:  Hydromorphone   Hydrocodone:      Hydromorphone, Dihydrocodeine,                     Norhydrocodone   Hydromorphone:    None   Dihydrocodeine:   None   Heroin:           6-Acetylmorphine (if included),                     Morphine, Normorphine                     Codeine, in small amounts in comparison                      to morphine, is often detected when                      heroin is the source drug.       Assessment & Plan   Diagnoses and all orders for this visit:    1. Type 2 diabetes mellitus with microalbuminuria, without long-term current use of insulin (Primary)  -     Comprehensive Metabolic Panel  -     Vitamin B12  -     Hemoglobin A1c    2. Hyperlipidemia, unspecified  hyperlipidemia type  -     Lipid Panel    3. History of benign bladder tumor    4. Primary hypothyroidism  -     TSH Rfx On Abnormal To Free T4    5. Essential hypertension    6. Vitamin D deficiency  -     Vitamin D,25-Hydroxy    7. Coronary artery disease involving native coronary artery of native heart without angina pectoris      Continue Tresiba 20 units every morning and metformin  mg 2 tablets daily.    Follow-up with Dr. Rehman and Dr. Rosario as scheduled.    Continue Lipitor 40 mg/day and Tricor 145 mg/day.    Continue levothyroxine 50 mcg a day.    Continue amlodipine and valsartan.    Continue vitamin D3 50,000 units weekly.    Copy my note sent to Dr. Marcus, Dr. Henrique Stahl, Dr. Rosario, and Janiya Black NP.    RTC 4 mos. With ELIAN Black NP

## 2025-02-05 LAB
25(OH)D3+25(OH)D2 SERPL-MCNC: 57.4 NG/ML (ref 30–100)
ALBUMIN SERPL-MCNC: 3.9 G/DL (ref 3.5–5.2)
ALBUMIN/GLOB SERPL: 1.4 G/DL
ALP SERPL-CCNC: 86 U/L (ref 39–117)
ALT SERPL-CCNC: 25 U/L (ref 1–41)
AST SERPL-CCNC: 23 U/L (ref 1–40)
BILIRUB SERPL-MCNC: 0.4 MG/DL (ref 0–1.2)
BUN SERPL-MCNC: 8 MG/DL (ref 8–23)
BUN/CREAT SERPL: 9 (ref 7–25)
CALCIUM SERPL-MCNC: 9.4 MG/DL (ref 8.6–10.5)
CHLORIDE SERPL-SCNC: 105 MMOL/L (ref 98–107)
CHOLEST SERPL-MCNC: 136 MG/DL (ref 0–200)
CO2 SERPL-SCNC: 22.1 MMOL/L (ref 22–29)
CREAT SERPL-MCNC: 0.89 MG/DL (ref 0.76–1.27)
EGFRCR SERPLBLD CKD-EPI 2021: 86.1 ML/MIN/1.73
GLOBULIN SER CALC-MCNC: 2.7 GM/DL
GLUCOSE SERPL-MCNC: 133 MG/DL (ref 65–99)
HBA1C MFR BLD: 6.7 % (ref 4.8–5.6)
HDLC SERPL-MCNC: 33 MG/DL (ref 40–60)
IMP & REVIEW OF LAB RESULTS: NORMAL
LDLC SERPL CALC-MCNC: 77 MG/DL (ref 0–100)
POTASSIUM SERPL-SCNC: 4.3 MMOL/L (ref 3.5–5.2)
PROT SERPL-MCNC: 6.6 G/DL (ref 6–8.5)
SODIUM SERPL-SCNC: 141 MMOL/L (ref 136–145)
TRIGL SERPL-MCNC: 150 MG/DL (ref 0–150)
TSH SERPL DL<=0.005 MIU/L-ACNC: 2.97 UIU/ML (ref 0.27–4.2)
VIT B12 SERPL-MCNC: 1155 PG/ML (ref 211–946)
VLDLC SERPL CALC-MCNC: 26 MG/DL (ref 5–40)

## 2025-02-05 NOTE — PROGRESS NOTES
Vitamin B12 1155 mcg/mL.  Vitamin B12 okay  Hemoglobin A1c improved at 6.7%.  Diabetes is well-controlled.  LDL 77.  HDL 33.  Triglycerides 150.  Continue Lipitor 40 mg/day and Tricor 145 mg/day.  Normal thyroid function test.  Continue levothyroxine 50 mcg/day.  Normal vitamin D.  On vitamin D 50,000 units weekly prescribed with Dr. Marcus.  Copy of labs sent to Dr. Marcus, Dr. Rosario, and Janiya ERVIN.  Copy of labs sent to patient through [facility].

## 2025-02-07 ENCOUNTER — TELEPHONE (OUTPATIENT)
Dept: FAMILY MEDICINE CLINIC | Facility: CLINIC | Age: 80
End: 2025-02-07
Payer: MEDICARE

## 2025-02-07 DIAGNOSIS — R80.9 TYPE 2 DIABETES MELLITUS WITH MICROALBUMINURIA, WITHOUT LONG-TERM CURRENT USE OF INSULIN: Primary | ICD-10-CM

## 2025-02-07 DIAGNOSIS — E11.29 TYPE 2 DIABETES MELLITUS WITH MICROALBUMINURIA, WITHOUT LONG-TERM CURRENT USE OF INSULIN: Primary | ICD-10-CM

## 2025-02-07 NOTE — TELEPHONE ENCOUNTER
Pt wants a referral for podiatry. He has been going to them for 2 years and now his insurance requires him to have a referral.     [facility].   Fax number: [phone].

## 2025-02-10 ENCOUNTER — TELEPHONE (OUTPATIENT)
Dept: FAMILY MEDICINE CLINIC | Facility: CLINIC | Age: 80
End: 2025-02-10
Payer: MEDICARE

## 2025-02-10 DIAGNOSIS — E11.42 TYPE 2 DIABETES MELLITUS WITH PERIPHERAL NEUROPATHY: Primary | ICD-10-CM

## 2025-02-10 NOTE — TELEPHONE ENCOUNTER
PATIENT CALLED NEEDING HIS PODIATRY REFERRAL TO BE SENT TO   [facility]    FAX NUMBER [phone]    CALL BACK NUMBER [phone]    HIS APPOINTMENT IS TODAY AT 1:30

## 2025-02-18 RX ORDER — ACYCLOVIR 800 MG/1
TABLET ORAL
Qty: 6 EACH | Refills: 3 | Status: SHIPPED | OUTPATIENT
Start: 2025-02-18

## 2025-02-18 NOTE — TELEPHONE ENCOUNTER
Rx Refill Note  Requested Prescriptions     Pending Prescriptions Disp Refills    Continuous Glucose Sensor (FreeStyle Carter 3 Sensor) misc [Pharmacy Med Name: FREESTYLE CARTER 3 SENSOR]       Sig: APPLY SENSOR TO SKIN FOR CONTINUOUS BLOOD SUGAR MONITORING, REPLACE EVERY 14 DAYS      Last office visit with prescribing clinician: 2/4/2025   Last telemedicine visit with prescribing clinician: Visit date not found   Next office visit with prescribing clinician: Visit date not found                         Would you like a call back once the refill request has been completed: [] Yes [] No    If the office needs to give you a call back, can they leave a voicemail: [] Yes [] No    Karen Bee MA  02/18/25, 12:22 EST

## 2025-03-03 DIAGNOSIS — I25.10 TWO-VESSEL CORONARY ARTERY DISEASE: Chronic | ICD-10-CM

## 2025-03-03 DIAGNOSIS — E78.5 HYPERLIPIDEMIA, UNSPECIFIED HYPERLIPIDEMIA TYPE: Chronic | ICD-10-CM

## 2025-03-03 RX ORDER — FENOFIBRATE 145 MG/1
145 TABLET, COATED ORAL DAILY
Qty: 30 TABLET | Refills: 0 | Status: SHIPPED | OUTPATIENT
Start: 2025-03-03

## 2025-03-03 RX ORDER — CLOPIDOGREL BISULFATE 75 MG/1
75 TABLET ORAL DAILY
Qty: 30 TABLET | Refills: 0 | Status: SHIPPED | OUTPATIENT
Start: 2025-03-03

## 2025-03-12 DIAGNOSIS — E03.9 PRIMARY HYPOTHYROIDISM: Chronic | ICD-10-CM

## 2025-03-12 DIAGNOSIS — E78.5 HYPERLIPIDEMIA, UNSPECIFIED HYPERLIPIDEMIA TYPE: Chronic | ICD-10-CM

## 2025-03-12 DIAGNOSIS — F33.42 RECURRENT MAJOR DEPRESSIVE DISORDER, IN FULL REMISSION: Chronic | ICD-10-CM

## 2025-03-12 DIAGNOSIS — N40.0 BENIGN PROSTATIC HYPERPLASIA WITHOUT LOWER URINARY TRACT SYMPTOMS: Chronic | ICD-10-CM

## 2025-03-12 DIAGNOSIS — I10 ESSENTIAL HYPERTENSION: Chronic | ICD-10-CM

## 2025-03-12 RX ORDER — TAMSULOSIN HYDROCHLORIDE 0.4 MG/1
1 CAPSULE ORAL DAILY
Qty: 30 CAPSULE | Refills: 0 | Status: SHIPPED | OUTPATIENT
Start: 2025-03-12

## 2025-03-12 RX ORDER — ESCITALOPRAM OXALATE 20 MG/1
TABLET ORAL
Qty: 30 TABLET | Refills: 0 | Status: SHIPPED | OUTPATIENT
Start: 2025-03-12

## 2025-03-12 RX ORDER — LEVOTHYROXINE SODIUM 50 UG/1
TABLET ORAL
Qty: 30 TABLET | Refills: 0 | Status: SHIPPED | OUTPATIENT
Start: 2025-03-12

## 2025-03-12 RX ORDER — ATORVASTATIN CALCIUM 40 MG/1
40 TABLET, FILM COATED ORAL NIGHTLY
Qty: 30 TABLET | Refills: 0 | Status: SHIPPED | OUTPATIENT
Start: 2025-03-12

## 2025-03-12 RX ORDER — VALSARTAN 160 MG/1
TABLET ORAL
Qty: 30 TABLET | Refills: 0 | Status: SHIPPED | OUTPATIENT
Start: 2025-03-12

## 2025-03-12 RX ORDER — AMLODIPINE BESYLATE 5 MG/1
TABLET ORAL
Qty: 30 TABLET | Refills: 0 | Status: SHIPPED | OUTPATIENT
Start: 2025-03-12

## 2025-03-26 DIAGNOSIS — Z12.11 SCREEN FOR COLON CANCER: Primary | ICD-10-CM

## 2025-03-31 DIAGNOSIS — I25.10 TWO-VESSEL CORONARY ARTERY DISEASE: Chronic | ICD-10-CM

## 2025-03-31 DIAGNOSIS — E78.5 HYPERLIPIDEMIA, UNSPECIFIED HYPERLIPIDEMIA TYPE: Chronic | ICD-10-CM

## 2025-03-31 RX ORDER — CLOPIDOGREL BISULFATE 75 MG/1
75 TABLET ORAL DAILY
Qty: 90 TABLET | Refills: 0 | Status: SHIPPED | OUTPATIENT
Start: 2025-03-31

## 2025-03-31 RX ORDER — FENOFIBRATE 145 MG/1
145 TABLET, COATED ORAL DAILY
Qty: 90 TABLET | Refills: 0 | Status: SHIPPED | OUTPATIENT
Start: 2025-03-31

## 2025-04-10 ENCOUNTER — RESULTS FOLLOW-UP (OUTPATIENT)
Dept: FAMILY MEDICINE CLINIC | Facility: CLINIC | Age: 80
End: 2025-04-10
Payer: MEDICARE

## 2025-04-10 NOTE — TELEPHONE ENCOUNTER
Results mailed to pt   Screening for colon cancer with Cologuard result is negative. Advise rescreening for colon cancer in 3 years.

## 2025-04-10 NOTE — LETTER
Ben Walsh  [address] [facility] [address]    April 10, 2025     Dear Mr. Walsh:    Below are the results from your recent visit:Screening for colon cancer with Cologuard result is negative. Advise rescreening for colon cancer in 3 years.     Resulted Orders   Cologuard - Stool, Per Rectum   Result Value Ref Range    Cologuard Negative Negative      Comment:      NEGATIVE TEST RESULT. A negative Cologuard result indicates a low likelihood that a colorectal cancer (CRC) or advanced adenoma (adenomatous polyps with more advanced pre-malignant features)  is present. The chance that a person with a negative Cologuard test has a colorectal cancer is less than 1 in 1500 (negative predictive value >99.9%) or has an  advanced adenoma is less than  5.3% (negative predictive value 94.7%). These data are based on a prospective cross-sectional study of 10,000 individuals at average risk for colorectal cancer who were screened with both Cologuard and colonoscopy. (Amrit MCHUGH. et al, N Engl J Med 2014;370(14):3510-7658) The normal value (reference range) for this assay is negative.    COLOGUARD RE-SCREENING RECOMMENDATION: Periodic colorectal cancer screening is an important part of preventive healthcare for asymptomatic individuals at average risk for colorectal cancer.  Following a negative Cologuard result, the American Cancer Society and U.S.  Multi-Society Task Force screening guidelines recommend a Cologuard re-screening interval of 3 years.   References: American Cancer Society Guideline for Colorectal Cancer Screening: https://www.cancer.org/cancer/colon-rectal-cancer/ruegckcgo-fywzmbjof-bhomeuu/acs-recommendations.html.; Parag SHEA, Anuja BARTHOLOMEW, Hallie AUGUSTINK, Colorectal Cancer Screening: Recommendations for Physicians and Patients from the U.S. Multi-Society Task Force on Colorectal Cancer Screening , Am J Gastroenterology 2017; 112:6634-1818.    TEST DESCRIPTION: Composite algorithmic analysis of stool  DNA-biomarkers with hemoglobin immunoassay.   Quantitative values of individual biomarkers are not reportable and are not associated with individual biomarker result reference ranges. Cologuard is intended for colorectal cancer screening of adults of either sex, 45 years or older, who are at average-risk for colorectal cancer (CRC). Cologuard has been approved for use by the U.S. FDA. The performance of Cologuard was  established in a cross sectional study of average-risk adults aged 50-84. Cologuard performance in patients ages 45 to 49 years was estimated by sub-group analysis of near-age groups. Colonoscopies performed for a positive result may find as the most clinically significant lesion: colorectal cancer [4.0%], advanced adenoma (including sessile serrated polyps greater than or equal to 1cm diameter) [20%] or non- advanced adenoma [31%]; or no colorectal neoplasia [45%]. These estimates are derived from a prospective cross-sectional screening study of 10,000 individuals at average risk for colorectal cancer who were screened with both Cologuard and colonoscopy. (Amrit BAEZA et al, N Engl J Med 2014;370(14):4192-3363.) Cologuard may produce a false negative or false positive result (no colorectal cancer or precancerous polyp present at colonoscopy follow up). A negative Cologuard test result does not guarantee the absence of CRC or advanced adenoma (pre-cancer). The current Cologuard  screening interval is every 3 years. (American Cancer Society and U.S. Multi-Society Task Force). Cologuard performance data in a 10,000 patient pivotal study using colonoscopy as the reference method can be accessed at the following location: www.Eversight.Mapidy/results. Additional description of the Cologuard test process, warnings and precautions can be found at www.cologuard.com.         .    If you have any questions or concerns, please don't hesitate to call.         Sincerely,        Janiya Black PA-C

## 2025-04-11 DIAGNOSIS — N40.0 BENIGN PROSTATIC HYPERPLASIA WITHOUT LOWER URINARY TRACT SYMPTOMS: Chronic | ICD-10-CM

## 2025-04-11 DIAGNOSIS — E03.9 PRIMARY HYPOTHYROIDISM: Chronic | ICD-10-CM

## 2025-04-11 DIAGNOSIS — I10 ESSENTIAL HYPERTENSION: Chronic | ICD-10-CM

## 2025-04-11 DIAGNOSIS — E78.5 HYPERLIPIDEMIA, UNSPECIFIED HYPERLIPIDEMIA TYPE: Chronic | ICD-10-CM

## 2025-04-11 DIAGNOSIS — F33.42 RECURRENT MAJOR DEPRESSIVE DISORDER, IN FULL REMISSION: Chronic | ICD-10-CM

## 2025-04-11 RX ORDER — VALSARTAN 160 MG/1
TABLET ORAL
Qty: 30 TABLET | Refills: 0 | Status: SHIPPED | OUTPATIENT
Start: 2025-04-11

## 2025-04-11 RX ORDER — ATORVASTATIN CALCIUM 40 MG/1
TABLET, FILM COATED ORAL
Qty: 30 TABLET | Refills: 0 | Status: SHIPPED | OUTPATIENT
Start: 2025-04-11

## 2025-04-11 RX ORDER — LEVOTHYROXINE SODIUM 50 UG/1
TABLET ORAL
Qty: 30 TABLET | Refills: 0 | Status: SHIPPED | OUTPATIENT
Start: 2025-04-11

## 2025-04-11 RX ORDER — AMLODIPINE BESYLATE 5 MG/1
TABLET ORAL
Qty: 30 TABLET | Refills: 0 | Status: SHIPPED | OUTPATIENT
Start: 2025-04-11

## 2025-04-11 RX ORDER — TAMSULOSIN HYDROCHLORIDE 0.4 MG/1
1 CAPSULE ORAL DAILY
Qty: 30 CAPSULE | Refills: 0 | Status: SHIPPED | OUTPATIENT
Start: 2025-04-11

## 2025-04-11 RX ORDER — ESCITALOPRAM OXALATE 20 MG/1
TABLET ORAL
Qty: 30 TABLET | Refills: 0 | Status: SHIPPED | OUTPATIENT
Start: 2025-04-11

## 2025-04-11 NOTE — TELEPHONE ENCOUNTER
Rx Refill Note  Requested Prescriptions     Pending Prescriptions Disp Refills    escitalopram (LEXAPRO) 20 MG tablet [Pharmacy Med Name: ESCITALOPRAM 20 MG TABLET] 30 tablet 0     Sig: TAKE 1 TABLET BY MOUTH DAILY FOR MAJOR DEPRESSIVE DISORDER    atorvastatin (LIPITOR) 40 MG tablet [Pharmacy Med Name: ATORVASTATIN 40 MG TABLET] 30 tablet 0     Sig: TAKE 1 TABLET BY MOUTH ONCE NIGHTLY FOR CHOLESTEROL    amLODIPine (NORVASC) 5 MG tablet [Pharmacy Med Name: amLODIPine BESYLATE 5 MG TAB] 30 tablet 0     Sig: TAKE 1 TABLET BY MOUTH DAILY FOR HIGH BLOOD PRESSURE DISORDER    valsartan (DIOVAN) 160 MG tablet [Pharmacy Med Name: VALSARTAN 160 MG TABLET] 30 tablet 0     Sig: TAKE 1 TABLET BY MOUTH DAILY FOR HIGH BLOOD PRESSURE DISORDER    levothyroxine (SYNTHROID, LEVOTHROID) 50 MCG tablet [Pharmacy Med Name: LEVOTHYROXINE 50 MCG TABLET] 30 tablet 0     Sig: TAKE 1 TABLET BY MOUTH DAILY FOR UNDERACTIVE THYROID    tamsulosin (FLOMAX) 0.4 MG capsule 24 hr capsule [Pharmacy Med Name: TAMSULOSIN HCL 0.4 MG CAPSULE] 30 capsule 0     Sig: TAKE 1 CAPSULE BY MOUTH DAILY      Last office visit with prescribing clinician: 1/14/2025   Last telemedicine visit with prescribing clinician: Visit date not found   Next office visit with prescribing clinician: Visit date not found                         Would you like a call back once the refill request has been completed: [] Yes [] No    If the office needs to give you a call back, can they leave a voicemail: [] Yes [] No    Kelly Acevedo MA  04/11/25, 15:20 EDT

## 2025-04-14 ENCOUNTER — TELEPHONE (OUTPATIENT)
Dept: ENDOCRINOLOGY | Age: 80
End: 2025-04-14
Payer: MEDICARE

## 2025-04-14 NOTE — TELEPHONE ENCOUNTER
Caller: Ben Walsh     Relationship:SELF    Callback number: [phone]    Is it ok to leave a message: [] Yes [] No    Requested medication for samples: Continuous Blood Gluc  (FreeStyle Carter 3 Ballinger) device     How much medication does the patient currently have left: 5 DAYS    Who will be picking up the samples: PATIENT    Do you need information about patient financial assistance for this medication: [] Yes [] No    Additional details provided: PT ONLY HAS 1 SENSOR LEFT AND WILL RUN OUT BEFORE HIS NEXT APPT SO HE WANTED TO SEE IF HE COULD GET ANY SAMPLES. PLEASE CALL BACK.

## 2025-04-15 RX ORDER — ACYCLOVIR 800 MG/1
1 TABLET ORAL
Qty: 2 EACH | Refills: 5 | COMMUNITY
Start: 2025-04-15

## 2025-04-15 NOTE — TELEPHONE ENCOUNTER
Rx Refill Note  Requested Prescriptions     Pending Prescriptions Disp Refills    Continuous Glucose Sensor (FreeStyle Carter 3 Sensor) misc 2 each 0     Sig: Apply 1 each topically to the appropriate area as directed Every 14 (Fourteen) Days.      Last office visit with prescribing clinician: 6/28/2024   Last telemedicine visit with prescribing clinician: Visit date not found   Next office visit with prescribing clinician: 6/5/2025                         Would you like a call back once the refill request has been completed: [] Yes [] No    If the office needs to give you a call back, can they leave a voicemail: [] Yes [] No    Yesi Bee  04/15/25, 10:17 EDT

## 2025-04-15 NOTE — TELEPHONE ENCOUNTER
Called and spoke with pt to let him know that we did have some samples. Pt stated that he would be by tomorrow afternoon to pick that up.

## 2025-04-23 DIAGNOSIS — F51.01 PRIMARY INSOMNIA: Chronic | ICD-10-CM

## 2025-04-23 NOTE — TELEPHONE ENCOUNTER
Caller: Nico Ben PAT    Relationship: Self    Best call back number:[phone]      Requested Prescriptions:   Requested Prescriptions     Pending Prescriptions Disp Refills    zolpidem (AMBIEN) 10 MG tablet 90 tablet 0     Sig: Take 1 tablet by mouth At Night As Needed for Sleep. Indications: Trouble Sleeping        Pharmacy where request should be sent: [facility] [id number] [address] [phone] [facility] [phone] FX     Last office visit with prescribing clinician: 1/14/2025   Last telemedicine visit with prescribing clinician: Visit date not found   Next office visit with prescribing clinician: 4/28/2025     Additional details provided by patient: OUT OF MEDICATION.  NEXT APPOINTMENT IS 4/28/25    Does the patient have less than a 3 day supply:  [x] Yes  [] No    Would you like a call back once the refill request has been completed: [] Yes [x] No    If the office needs to give you a call back, can they leave a voicemail: [] Yes [x] No    Cami Restrepo Rep   04/23/25 11:58 EDT

## 2025-04-24 RX ORDER — ZOLPIDEM TARTRATE 10 MG/1
10 TABLET ORAL NIGHTLY PRN
Qty: 14 TABLET | Refills: 0 | Status: SHIPPED | OUTPATIENT
Start: 2025-04-24 | End: 2025-04-27 | Stop reason: SDUPTHER

## 2025-04-27 NOTE — PROGRESS NOTES
Chief Complaint:   Chief Complaint   Patient presents with    Hypertension    Hyperlipidemia    Hypothyroidism    Arthritis    Insomnia    Coronary Artery Disease    Depression       Ben Walsh 79 y.o. male who presents today for Medical Management of the below listed issues. He  has a problem list of   Patient Active Problem List   Diagnosis    Allergic rhinitis    ADD (attention deficit disorder)    DDD (degenerative disc disease), lumbar    BPH (benign prostatic hypertrophy)    Chronic pain    ASCVD (arteriosclerotic cardiovascular disease)    Depression    Diverticulosis of colon    GERD (gastroesophageal reflux disease)    Hiatal hernia    Hyperlipidemia    Essential hypertension    Primary hypothyroidism    Insomnia    Osteoarthritis, multiple sites    Acute pancreatitis    Vitamin D deficiency    Glaucoma    Abnormal nuclear stress test    Two-vessel coronary artery disease    SAEED (dyspnea on exertion)    Obstructive sleep apnea syndrome    Patent foramen ovale    Snoring    Disorder of rotator cuff    History of COPD    Type 2 diabetes mellitus with microalbuminuria, without long-term current use of insulin    Carpal tunnel syndrome of right wrist    Abnormal electrocardiogram    Atypical chest pain    Type 2 diabetes mellitus with peripheral neuropathy    B12 deficiency    Osteoarthritis of left knee    Lesion of urinary bladder    Renal stones   .  Since the last visit, He has overall felt well.  he has been compliant with   Current Outpatient Medications:     amLODIPine (NORVASC) 5 MG tablet, Take 1 tablet by mouth Daily., Disp: 90 tablet, Rfl: 1    atorvastatin (LIPITOR) 40 MG tablet, Take 1 tablet by mouth Daily., Disp: 90 tablet, Rfl: 1    clopidogrel (PLAVIX) 75 MG tablet, Take 1 tablet by mouth Daily., Disp: 90 tablet, Rfl: 1    escitalopram (LEXAPRO) 20 MG tablet, Take 1 tablet by mouth Daily., Disp: 90 tablet, Rfl: 1    fenofibrate (TRICOR) 145 MG tablet, Take 1 tablet by mouth Daily.,  Disp: 90 tablet, Rfl: 1    levothyroxine (SYNTHROID, LEVOTHROID) 50 MCG tablet, Take 1 tablet by mouth Every Morning., Disp: 90 tablet, Rfl: 1    tamsulosin (FLOMAX) 0.4 MG capsule 24 hr capsule, Take 1 capsule by mouth Daily., Disp: 90 capsule, Rfl: 1    traZODone (DESYREL) 50 MG tablet, Take 1-2 tablets by mouth Every Night. Indications: Trouble Sleeping, Disp: 180 tablet, Rfl: 1    valsartan (DIOVAN) 160 MG tablet, Take 1 tablet by mouth Daily., Disp: 90 tablet, Rfl: 1    zolpidem (AMBIEN) 10 MG tablet, Take 1 tablet by mouth At Night As Needed for Sleep. Indications: Trouble Sleeping, Disp: 90 tablet, Rfl: 0    Accu-Chek Guide test strip, Dx code E11.29 testing bs 3 x day, Disp: 300 each, Rfl: 1    Cholecalciferol (Vitamin D3) 1.25 MG (35115 UT) capsule, Take 1 capsule by mouth Every 7 (Seven) Days. TAKES ON SAT, Disp: 13 capsule, Rfl: 3    clobetasol (TEMOVATE) 0.05 % external solution, Apply 1 Application topically to the appropriate area as directed As Needed (itching). Indications: Itching, Skin Inflammation, Disp: , Rfl:     Continuous Blood Gluc  (FreeStyle Carter 3 [place]) device, Use 1 each Daily., Disp: 1 each, Rfl: 1    Continuous Glucose Sensor (FreeStyle Carter 3 Sensor) misc, Apply 1 each topically to the appropriate area as directed Every 14 (Fourteen) Days., Disp: 2 each, Rfl: 5    cyclobenzaprine (FLEXERIL) 10 MG tablet, TAKE ONE TABLET BY MOUTH THREE TIMES A DAY AS NEEDED FOR MUSCLE SPASMS, Disp: 90 tablet, Rfl: 5    Diclofenac Sodium 4 %, Topiramate 2 %, cloNIDine HCl 0.2 %, Lidocaine HCl 5 %, Apply 1-2 g topically to the appropriate area as directed 3 (Three) to 4 (Four) times daily. Do not apply to operate knee, Disp: , Rfl:     Droplet Pen Needles 31G X 8 MM misc, USE ONE EACH DAY, Disp: 100 each, Rfl: 3    fluticasone (FLONASE) 50 MCG/ACT nasal spray, 1 spray by Each Nare route Every Evening. Indications: Allergic Rhinitis, Disp: , Rfl:     gabapentin (NEURONTIN) 800 MG tablet, Take 1  "tablet by mouth 3 (Three) Times a Day. Indications: Neuropathic Pain, Disp: , Rfl:     HYDROcodone-acetaminophen (NORCO)  MG per tablet, , Disp: , Rfl:     Insulin Degludec (Tresiba FlexTouch) 200 UNIT/ML solution pen-injector pen injection, INJECT 22 UNITS EVERY MORNING (Patient taking differently: 22 Units Every Morning. INJECT 22 UNITS EVERY MORNING), Disp: 9 mL, Rfl: 11    ketoconazole (NIZORAL) 2 % shampoo, , Disp: , Rfl:     Lancets (ACCU-CHEK SOFT TOUCH) lancets, Dx code E11.29 testing bs 3 x day, Disp: 300 each, Rfl: 1    metFORMIN ER (GLUCOPHAGE-XR) 500 MG 24 hr tablet, Take 2 tablets by mouth Daily. With food for DMII  Indications: Type 2 Diabetes, Disp: , Rfl:     mupirocin (BACTROBAN) 2 % ointment, Apply 1 application  topically to the appropriate area as directed 3 (Three) Times a Day. (Patient taking differently: Apply 1 Application topically to the appropriate area as directed As Needed.), Disp: 30 g, Rfl: 1    NITROSTAT 0.4 MG SL tablet, Place 1 tablet under the tongue Every 5 (Five) Minutes As Needed. Indications: Acute Angina Pectoris, Disp: , Rfl:     omeprazole (priLOSEC) 40 MG capsule, , Disp: , Rfl: .  He denies medication side effects.    All of the other chronic condition(s) listed above are stable w/o issues.    /62   Pulse 98   Temp 97.9 °F (36.6 °C) (Oral)   Resp 20   Ht 177.8 cm (70\")   Wt 79.5 kg (175 lb 3.2 oz)   SpO2 97%   BMI 25.14 kg/m²     Results for orders placed or performed in visit on 03/26/25   Cologuard - Stool, Per Rectum    Collection Time: 04/02/25 12:08 PM    Specimen: Per Rectum; Stool   Result Value Ref Range    Cologuard Negative Negative             The following portions of the patient's history were reviewed and updated as appropriate: allergies, current medications, past family history, past medical history, past social history, past surgical history, and problem list.    Review of Systems   Constitutional:  Negative for activity change, chills " and fever.   Respiratory:  Negative for cough.    Cardiovascular:  Negative for chest pain.   Psychiatric/Behavioral:  Negative for dysphoric mood.        Objective             Physical Exam  Vitals and nursing note reviewed.   Constitutional:       General: He is not in acute distress.     Appearance: He is well-developed.   Cardiovascular:      Rate and Rhythm: Normal rate and regular rhythm.   Pulmonary:      Effort: Pulmonary effort is normal.      Breath sounds: Normal breath sounds.   Neurological:      Mental Status: He is alert and oriented to person, place, and time.   Psychiatric:         Behavior: Behavior normal.         Thought Content: Thought content normal.     Labs from Endocrine reviewed by me at today's visit.        Diagnoses and all orders for this visit:    1. Essential hypertension (Primary)  -     amLODIPine (NORVASC) 5 MG tablet; Take 1 tablet by mouth Daily.  Dispense: 90 tablet; Refill: 1  -     valsartan (DIOVAN) 160 MG tablet; Take 1 tablet by mouth Daily.  Dispense: 90 tablet; Refill: 1    2. Hyperlipidemia, unspecified hyperlipidemia type  -     atorvastatin (LIPITOR) 40 MG tablet; Take 1 tablet by mouth Daily.  Dispense: 90 tablet; Refill: 1  -     fenofibrate (TRICOR) 145 MG tablet; Take 1 tablet by mouth Daily.  Dispense: 90 tablet; Refill: 1    3. Two-vessel coronary artery disease  -     clopidogrel (PLAVIX) 75 MG tablet; Take 1 tablet by mouth Daily.  Dispense: 90 tablet; Refill: 1    4. Recurrent major depressive disorder, in full remission  -     escitalopram (LEXAPRO) 20 MG tablet; Take 1 tablet by mouth Daily.  Dispense: 90 tablet; Refill: 1    5. Primary hypothyroidism  -     levothyroxine (SYNTHROID, LEVOTHROID) 50 MCG tablet; Take 1 tablet by mouth Every Morning.  Dispense: 90 tablet; Refill: 1    6. Benign prostatic hyperplasia without lower urinary tract symptoms  -     tamsulosin (FLOMAX) 0.4 MG capsule 24 hr capsule; Take 1 capsule by mouth Daily.  Dispense: 90  capsule; Refill: 1    7. Primary insomnia  -     traZODone (DESYREL) 50 MG tablet; Take 1-2 tablets by mouth Every Night. Indications: Trouble Sleeping  Dispense: 180 tablet; Refill: 1  -     zolpidem (AMBIEN) 10 MG tablet; Take 1 tablet by mouth At Night As Needed for Sleep. Indications: Trouble Sleeping  Dispense: 90 tablet; Refill: 0

## 2025-04-28 ENCOUNTER — OFFICE VISIT (OUTPATIENT)
Dept: FAMILY MEDICINE CLINIC | Facility: CLINIC | Age: 80
End: 2025-04-28
Payer: MEDICARE

## 2025-04-28 VITALS
BODY MASS INDEX: 25.08 KG/M2 | OXYGEN SATURATION: 97 % | HEIGHT: 70 IN | SYSTOLIC BLOOD PRESSURE: 116 MMHG | TEMPERATURE: 97.9 F | HEART RATE: 98 BPM | DIASTOLIC BLOOD PRESSURE: 62 MMHG | RESPIRATION RATE: 20 BRPM | WEIGHT: 175.2 LBS

## 2025-04-28 DIAGNOSIS — F51.01 PRIMARY INSOMNIA: Chronic | ICD-10-CM

## 2025-04-28 DIAGNOSIS — F33.42 RECURRENT MAJOR DEPRESSIVE DISORDER, IN FULL REMISSION: Chronic | ICD-10-CM

## 2025-04-28 DIAGNOSIS — E78.5 HYPERLIPIDEMIA, UNSPECIFIED HYPERLIPIDEMIA TYPE: Chronic | ICD-10-CM

## 2025-04-28 DIAGNOSIS — I10 ESSENTIAL HYPERTENSION: Primary | Chronic | ICD-10-CM

## 2025-04-28 DIAGNOSIS — I25.10 TWO-VESSEL CORONARY ARTERY DISEASE: Chronic | ICD-10-CM

## 2025-04-28 DIAGNOSIS — E03.9 PRIMARY HYPOTHYROIDISM: Chronic | ICD-10-CM

## 2025-04-28 DIAGNOSIS — N40.0 BENIGN PROSTATIC HYPERPLASIA WITHOUT LOWER URINARY TRACT SYMPTOMS: Chronic | ICD-10-CM

## 2025-04-28 PROCEDURE — 3078F DIAST BP <80 MM HG: CPT | Performed by: FAMILY MEDICINE

## 2025-04-28 PROCEDURE — 1125F AMNT PAIN NOTED PAIN PRSNT: CPT | Performed by: FAMILY MEDICINE

## 2025-04-28 PROCEDURE — 1159F MED LIST DOCD IN RCRD: CPT | Performed by: FAMILY MEDICINE

## 2025-04-28 PROCEDURE — 99214 OFFICE O/P EST MOD 30 MIN: CPT | Performed by: FAMILY MEDICINE

## 2025-04-28 PROCEDURE — 3074F SYST BP LT 130 MM HG: CPT | Performed by: FAMILY MEDICINE

## 2025-04-28 PROCEDURE — 1160F RVW MEDS BY RX/DR IN RCRD: CPT | Performed by: FAMILY MEDICINE

## 2025-04-28 RX ORDER — TRAZODONE HYDROCHLORIDE 50 MG/1
50-100 TABLET ORAL NIGHTLY
Qty: 180 TABLET | Refills: 1 | Status: SHIPPED | OUTPATIENT
Start: 2025-04-28

## 2025-04-28 RX ORDER — TAMSULOSIN HYDROCHLORIDE 0.4 MG/1
1 CAPSULE ORAL DAILY
Qty: 90 CAPSULE | Refills: 1 | Status: SHIPPED | OUTPATIENT
Start: 2025-04-28

## 2025-04-28 RX ORDER — AMLODIPINE BESYLATE 5 MG/1
5 TABLET ORAL DAILY
Qty: 90 TABLET | Refills: 1 | Status: SHIPPED | OUTPATIENT
Start: 2025-04-28

## 2025-04-28 RX ORDER — VALSARTAN 160 MG/1
160 TABLET ORAL DAILY
Qty: 90 TABLET | Refills: 1 | Status: SHIPPED | OUTPATIENT
Start: 2025-04-28

## 2025-04-28 RX ORDER — LEVOTHYROXINE SODIUM 50 UG/1
50 TABLET ORAL
Qty: 90 TABLET | Refills: 1 | Status: SHIPPED | OUTPATIENT
Start: 2025-04-28

## 2025-04-28 RX ORDER — ATORVASTATIN CALCIUM 40 MG/1
40 TABLET, FILM COATED ORAL DAILY
Qty: 90 TABLET | Refills: 1 | Status: SHIPPED | OUTPATIENT
Start: 2025-04-28

## 2025-04-28 RX ORDER — ESCITALOPRAM OXALATE 20 MG/1
20 TABLET ORAL DAILY
Qty: 90 TABLET | Refills: 1 | Status: SHIPPED | OUTPATIENT
Start: 2025-04-28

## 2025-04-28 RX ORDER — CLOPIDOGREL BISULFATE 75 MG/1
75 TABLET ORAL DAILY
Qty: 90 TABLET | Refills: 1 | Status: SHIPPED | OUTPATIENT
Start: 2025-04-28

## 2025-04-28 RX ORDER — FENOFIBRATE 145 MG/1
145 TABLET, FILM COATED ORAL DAILY
Qty: 90 TABLET | Refills: 1 | Status: SHIPPED | OUTPATIENT
Start: 2025-04-28

## 2025-04-28 RX ORDER — ZOLPIDEM TARTRATE 10 MG/1
10 TABLET ORAL NIGHTLY PRN
Qty: 90 TABLET | Refills: 0 | Status: SHIPPED | OUTPATIENT
Start: 2025-04-28

## 2025-05-21 DIAGNOSIS — E11.42 TYPE 2 DIABETES MELLITUS WITH PERIPHERAL NEUROPATHY: ICD-10-CM

## 2025-05-21 RX ORDER — INSULIN DEGLUDEC 200 U/ML
INJECTION, SOLUTION SUBCUTANEOUS
Qty: 9 ML | Refills: 11 | Status: SHIPPED | OUTPATIENT
Start: 2025-05-21

## 2025-06-05 ENCOUNTER — OFFICE VISIT (OUTPATIENT)
Dept: ENDOCRINOLOGY | Age: 80
End: 2025-06-05
Payer: MEDICARE

## 2025-06-05 VITALS
TEMPERATURE: 98.7 F | SYSTOLIC BLOOD PRESSURE: 122 MMHG | BODY MASS INDEX: 24.94 KG/M2 | HEIGHT: 70 IN | DIASTOLIC BLOOD PRESSURE: 76 MMHG | HEART RATE: 94 BPM | WEIGHT: 174.2 LBS | OXYGEN SATURATION: 93 %

## 2025-06-05 DIAGNOSIS — E11.42 TYPE 2 DIABETES MELLITUS WITH PERIPHERAL NEUROPATHY: Primary | ICD-10-CM

## 2025-06-05 DIAGNOSIS — E78.5 HYPERLIPIDEMIA, UNSPECIFIED HYPERLIPIDEMIA TYPE: ICD-10-CM

## 2025-06-05 DIAGNOSIS — I10 ESSENTIAL HYPERTENSION: ICD-10-CM

## 2025-06-05 NOTE — PROGRESS NOTES
"Chief Complaint  Chief Complaint   Patient presents with    Diabetes     Type 2: Pt carter is attached, is up to date on eye exam, no hx of retinopathy, mild neuropathy.         Subjective        History of Present Illness    Ben Walsh 80 y.o. presents for a follow-up for Type 2 Diabetes     He was diagnosed with diabetes in 2013     Pt is on the following medications for their diabetes: Tresiba 20 units every morning and metformin  mg 2 tablets daily           Pt has history of pancreatitis - therefore avoid DPP4 and GLP1 class  Jardiance stopped due to recurrent UTIs  High dose metformin caused diarrhea  Glipizide stopped due to hypoglycemia        Pt complains of numbness and tingling in feet and \"fuzzy\" vision    Denies diarrhea, constipation, chest pain and shortness of breath    Pt denies a history of diabetic retinopathy.  Last eye exam was 06/24     Pt does not have nephropathy.  Patient is currently taking ARB     Pt does have neuropathy.  Current takes gabapentin per pain management  Has chronic neck and lower back pain due to degenerative disc disease     Pt does have a history of CAD with stent placement in 03/2016    Last A1C in 02/25 was 6.7    Last microalbumin in 06/24 was negative           Blood Sugars    Blood glucoses are checked via Carter.    Fasting blood glucoses: low 100s    Pre-meal blood glucoses: low to high 100s     Pt denies episodes of hypoglycemia.        Sensor Data    Time in range 72%  High 26%  Very high 2%  Low 0%  Very low 0%      Average Glucose - 156 mg/dL      Sensor Wear - 96 %                Hyperlipidemia     Pt is currently taking atorvastatin 40 mg HS and fenofibrate 145 mg daily per PCP    Last lipid panel in 02/25 showed Total 136, HDL 33, LDL 77 and Triglycerides 150            Hypertension    Pt denies any chest pain, palpitations, shortness of breath or headache    Current regimen includes amlodipine 5 mg daily and valsartan 160 mg daily     Follows " "with Cardiology (Dr. Borges)            Objective   Vital Signs:   /76   Pulse 94   Temp 98.7 °F (37.1 °C) (Oral)   Ht 177.8 cm (70\")   Wt 79 kg (174 lb 3.2 oz)   SpO2 93%   BMI 25.00 kg/m²       Physical Exam   Physical Exam  Constitutional:       General: He is not in acute distress.     Appearance: Normal appearance. He is not diaphoretic.   HENT:      Head: Normocephalic and atraumatic.   Eyes:      General:         Right eye: No discharge.         Left eye: No discharge.   Skin:     General: Skin is warm and dry.   Neurological:      Mental Status: He is alert.   Psychiatric:         Mood and Affect: Mood normal.         Behavior: Behavior normal.                    Results Review:   Hemoglobin A1C   Date Value Ref Range Status   02/04/2025 6.70 (H) 4.80 - 5.60 % Final     Comment:     Hemoglobin A1C Ranges:  Increased Risk for Diabetes  5.7% to 6.4%  Diabetes                     >= 6.5%  Diabetic Goal                < 7.0%     05/13/2024 6.60 (H) 4.80 - 5.60 % Final     Triglycerides   Date Value Ref Range Status   02/04/2025 150 0 - 150 mg/dL Final     HDL Cholesterol   Date Value Ref Range Status   02/04/2025 33 (L) 40 - 60 mg/dL Final     LDL Chol Calc (NIH)   Date Value Ref Range Status   02/04/2025 77 0 - 100 mg/dL Final     VLDL Cholesterol Joel   Date Value Ref Range Status   02/04/2025 26 5 - 40 mg/dL Final         Assessment and Plan  Diagnoses and all orders for this visit:    1. Type 2 diabetes mellitus with peripheral neuropathy (Primary)  -     Hemoglobin A1c  -     Basic Metabolic Panel  -     Microalbumin / Creatinine Urine Ratio - Urine, Clean Catch  -     Fructosamine    Overall Carter download looks great; 72% time in range without hypoglycemia   Pt does have some hyperglycemia but states that is mostly when he eats something he is not suppose to eat  Check labs today  Continue with Tresiba 20 units every morning    Continue with metformin  mg 2 tablets daily  Continue with " Carter      2. Hyperlipidemia, unspecified hyperlipidemia type  -     Basic Metabolic Panel    Lipid panel in Feb 2025 was good  Continue with atorvastatin per PCP  Continue with fenofibrate per PCP      3. Essential hypertension  -     Basic Metabolic Panel    Stable  Continue with current medication regimen  Defer management to PCP/Cardiology          Labs today  RTC in 4 months with Dr. Miller      Follow Up    Patient was given instructions and counseling regarding her condition or for health maintenance advice. Please see specific information pulled into the AVS if appropriate.                Anabel Black, MARIBEL  06/05/25

## 2025-06-06 ENCOUNTER — RESULTS FOLLOW-UP (OUTPATIENT)
Dept: ENDOCRINOLOGY | Age: 80
End: 2025-06-06
Payer: MEDICARE

## 2025-06-06 LAB
BUN SERPL-MCNC: 18 MG/DL (ref 8–27)
BUN/CREAT SERPL: 15 (ref 10–24)
CALCIUM SERPL-MCNC: 9.7 MG/DL (ref 8.6–10.2)
CHLORIDE SERPL-SCNC: 104 MMOL/L (ref 96–106)
CO2 SERPL-SCNC: 21 MMOL/L (ref 20–29)
CREAT SERPL-MCNC: 1.18 MG/DL (ref 0.76–1.27)
EGFRCR SERPLBLD CKD-EPI 2021: 62 ML/MIN/1.73
FRUCTOSAMINE SERPL-SCNC: 275 UMOL/L (ref 0–285)
GLUCOSE SERPL-MCNC: 196 MG/DL (ref 70–99)
HBA1C MFR BLD: 7.2 % (ref 4.8–5.6)
POTASSIUM SERPL-SCNC: 4.7 MMOL/L (ref 3.5–5.2)
SODIUM SERPL-SCNC: 138 MMOL/L (ref 134–144)
UNABLE TO VOID: NORMAL

## 2025-06-10 DIAGNOSIS — M62.838 MUSCLE SPASM: ICD-10-CM

## 2025-06-10 RX ORDER — CYCLOBENZAPRINE HCL 10 MG
10 TABLET ORAL 3 TIMES DAILY PRN
Qty: 90 TABLET | Refills: 5 | Status: SHIPPED | OUTPATIENT
Start: 2025-06-10

## 2025-06-27 ENCOUNTER — TELEPHONE (OUTPATIENT)
Dept: FAMILY MEDICINE CLINIC | Facility: CLINIC | Age: 80
End: 2025-06-27
Payer: MEDICARE

## 2025-06-27 DIAGNOSIS — M25.572 ACUTE LEFT ANKLE PAIN: ICD-10-CM

## 2025-06-27 DIAGNOSIS — E11.42 TYPE 2 DIABETES MELLITUS WITH PERIPHERAL NEUROPATHY: Primary | ICD-10-CM

## 2025-06-27 NOTE — Clinical Note
Sorry to be last-minute my stepdad is having significant ankle pain and trouble walking and was able to get in to see [facility] on Monday at the [facility] location at 1 PM and needs a referral.  He also has diabetes with peripheral neuropathy and needs footcare.

## 2025-06-27 NOTE — TELEPHONE ENCOUNTER
Leela with [facility] called and said that this pt needs a new referral faxed over. The last one was on 2/7/2025 for 1 visit and he has an appt on 6/30/2025.   Leela's phone # [phone] and the fax # is [phone].

## 2025-07-01 NOTE — TELEPHONE ENCOUNTER
Mrs. Black it looks like they are wanting you to put in a a new referral order for Podiatry and then I will refer them back over to [facility]. I did attempt to call Leela back from their office but no answer.

## 2025-08-10 DIAGNOSIS — E11.42 TYPE 2 DIABETES MELLITUS WITH PERIPHERAL NEUROPATHY: ICD-10-CM

## 2025-08-10 RX ORDER — METFORMIN HYDROCHLORIDE 500 MG/1
1000 TABLET, EXTENDED RELEASE ORAL DAILY
Qty: 180 TABLET | Refills: 3 | Status: SHIPPED | OUTPATIENT
Start: 2025-08-10

## 2025-08-13 ENCOUNTER — OFFICE VISIT (OUTPATIENT)
Dept: FAMILY MEDICINE CLINIC | Facility: CLINIC | Age: 80
End: 2025-08-13
Payer: MEDICARE

## 2025-08-13 VITALS
HEART RATE: 79 BPM | SYSTOLIC BLOOD PRESSURE: 124 MMHG | WEIGHT: 170 LBS | TEMPERATURE: 98.3 F | HEIGHT: 70 IN | BODY MASS INDEX: 24.34 KG/M2 | OXYGEN SATURATION: 95 % | DIASTOLIC BLOOD PRESSURE: 60 MMHG | RESPIRATION RATE: 17 BRPM

## 2025-08-13 DIAGNOSIS — F51.01 PRIMARY INSOMNIA: Chronic | ICD-10-CM

## 2025-08-13 PROCEDURE — 99213 OFFICE O/P EST LOW 20 MIN: CPT | Performed by: FAMILY MEDICINE

## 2025-08-13 PROCEDURE — 1125F AMNT PAIN NOTED PAIN PRSNT: CPT | Performed by: FAMILY MEDICINE

## 2025-08-13 PROCEDURE — 3074F SYST BP LT 130 MM HG: CPT | Performed by: FAMILY MEDICINE

## 2025-08-13 PROCEDURE — 1159F MED LIST DOCD IN RCRD: CPT | Performed by: FAMILY MEDICINE

## 2025-08-13 PROCEDURE — 3078F DIAST BP <80 MM HG: CPT | Performed by: FAMILY MEDICINE

## 2025-08-13 PROCEDURE — 1160F RVW MEDS BY RX/DR IN RCRD: CPT | Performed by: FAMILY MEDICINE

## 2025-08-13 RX ORDER — ZOLPIDEM TARTRATE 10 MG/1
10 TABLET ORAL NIGHTLY PRN
Qty: 90 TABLET | Refills: 0 | Status: SHIPPED | OUTPATIENT
Start: 2025-08-13

## 2025-08-26 ENCOUNTER — OFFICE VISIT (OUTPATIENT)
Dept: ORTHOPEDIC SURGERY | Facility: CLINIC | Age: 80
End: 2025-08-26
Payer: MEDICARE

## 2025-08-26 VITALS — HEIGHT: 70 IN | WEIGHT: 170 LBS | BODY MASS INDEX: 24.34 KG/M2 | TEMPERATURE: 98.6 F

## 2025-08-26 DIAGNOSIS — M70.62 TROCHANTERIC BURSITIS OF LEFT HIP: Primary | ICD-10-CM

## 2025-08-26 DIAGNOSIS — M25.552 LEFT HIP PAIN: ICD-10-CM

## 2025-08-26 DIAGNOSIS — M76.32 ILIOTIBIAL BAND TENDINITIS OF LEFT SIDE: ICD-10-CM

## 2025-08-26 PROCEDURE — 1159F MED LIST DOCD IN RCRD: CPT | Performed by: ORTHOPAEDIC SURGERY

## 2025-08-26 PROCEDURE — 73502 X-RAY EXAM HIP UNI 2-3 VIEWS: CPT | Performed by: ORTHOPAEDIC SURGERY

## 2025-08-26 PROCEDURE — 1160F RVW MEDS BY RX/DR IN RCRD: CPT | Performed by: ORTHOPAEDIC SURGERY

## 2025-08-26 PROCEDURE — 99213 OFFICE O/P EST LOW 20 MIN: CPT | Performed by: ORTHOPAEDIC SURGERY

## (undated) DEVICE — BLD DEBAKY BEAVER MAMMATOME 8MM

## (undated) DEVICE — SOL IRR NACL 0.9PCT 3000ML

## (undated) DEVICE — APPL DURAPREP IODOPHOR APL 26ML

## (undated) DEVICE — DRSNG SURESITE WNDW 2.38X2.75

## (undated) DEVICE — MAT FLR ABSORBENT LG 4FT 10 2.5FT

## (undated) DEVICE — DUAL CUT SAGITTAL BLADE

## (undated) DEVICE — MEDI-VAC YANKAUER SUCTION HANDLE W/BULBOUS TIP: Brand: CARDINAL HEALTH

## (undated) DEVICE — PREP SOL POVIDONE/IODINE BT 4OZ

## (undated) DEVICE — SOL ISO/ALC 70PCT 4OZ

## (undated) DEVICE — PREMIUM WET SKIN PREP TRAY: Brand: MEDLINE INDUSTRIES, INC.

## (undated) DEVICE — TUBING, SUCTION, 1/4" X 10', STRAIGHT: Brand: MEDLINE

## (undated) DEVICE — 450 ML BOTTLE OF 0.05% CHLORHEXIDINE GLUCONATE IN 99.95% STERILE WATER FOR IRRIGATION, USP AND APPLICATOR.: Brand: IRRISEPT ANTIMICROBIAL WOUND LAVAGE

## (undated) DEVICE — Device: Brand: DEFENDO AIR/WATER/SUCTION AND BIOPSY VALVE

## (undated) DEVICE — GLV SURG SENSICARE PI LF PF 8 GRN STRL

## (undated) DEVICE — THE STERILE LIGHT HANDLE COVER IS USED WITH STERIS SURGICAL LIGHTING AND VISUALIZATION SYSTEMS.

## (undated) DEVICE — SUT VIC 0 CT1 CR8 27IN JJ41G

## (undated) DEVICE — BNDG,ELSTC,MATRIX,STRL,6"X5YD,LF,HOOK&LP: Brand: MEDLINE

## (undated) DEVICE — GLV SURG BIOGEL LTX PF 8

## (undated) DEVICE — PATIENT RETURN ELECTRODE, SINGLE-USE, CONTACT QUALITY MONITORING, ADULT, WITH 9FT CORD, FOR PATIENTS WEIGING OVER 33LBS. (15KG): Brand: MEGADYNE

## (undated) DEVICE — UNDYED BRAIDED (POLYGLACTIN 910), SYNTHETIC ABSORBABLE SUTURE: Brand: COATED VICRYL

## (undated) DEVICE — INTENDED TO SUPPORT AND MAINTAIN THE POSITION OF AN ANESTHETIZED PATIENT DURING SURGERY: Brand: HERMANTOR XL PINK KNEE POSITIONING PAD

## (undated) DEVICE — NEEDLE, QUINCKE 22GX3.5": Brand: MEDLINE INDUSTRIES, INC.

## (undated) DEVICE — CANN NASL CO2 TRULINK W/O2 A/

## (undated) DEVICE — TRAP FLD MINIVAC MEGADYNE 100ML

## (undated) DEVICE — PK KN TOTL 40

## (undated) DEVICE — SPNG GZ WOVN 4X4IN 12PLY 10/BX STRL

## (undated) DEVICE — SYS CLS SKIN PREMIERPRO EXOFINFUSION 22CM

## (undated) DEVICE — THE TORRENT IRRIGATION SCOPE CONNECTOR IS USED WITH THE TORRENT IRRIGATION TUBING TO PROVIDE IRRIGATION FLUIDS SUCH AS STERILE WATER DURING GASTROINTESTINAL ENDOSCOPIC PROCEDURES WHEN USED IN CONJUNCTION WITH AN IRRIGATION PUMP (OR ELECTROSURGICAL UNIT).: Brand: TORRENT

## (undated) DEVICE — 3M™ IOBAN™ 2 ANTIMICROBIAL INCISE DRAPE 6640EZ: Brand: IOBAN™ 2